# Patient Record
Sex: MALE | Race: WHITE | NOT HISPANIC OR LATINO | Employment: OTHER | URBAN - METROPOLITAN AREA
[De-identification: names, ages, dates, MRNs, and addresses within clinical notes are randomized per-mention and may not be internally consistent; named-entity substitution may affect disease eponyms.]

---

## 2018-08-27 ENCOUNTER — HOSPITAL ENCOUNTER (OUTPATIENT)
Dept: RADIOLOGY | Facility: MEDICAL CENTER | Age: 63
End: 2018-08-27

## 2018-09-20 ENCOUNTER — APPOINTMENT (OUTPATIENT)
Dept: ADMISSIONS | Facility: MEDICAL CENTER | Age: 63
DRG: 472 | End: 2018-09-20
Attending: NEUROLOGICAL SURGERY
Payer: COMMERCIAL

## 2018-09-20 RX ORDER — AMLODIPINE BESYLATE 5 MG/1
5 TABLET ORAL DAILY
COMMUNITY
End: 2021-03-01

## 2018-09-20 RX ORDER — TRAMADOL HYDROCHLORIDE 50 MG/1
50 TABLET ORAL EVERY 4 HOURS PRN
Status: ON HOLD | COMMUNITY
End: 2018-09-22

## 2018-09-20 RX ORDER — LEVOTHYROXINE SODIUM 0.05 MG/1
50 TABLET ORAL
COMMUNITY
End: 2021-03-01

## 2018-09-20 RX ORDER — LISINOPRIL 30 MG/1
30 TABLET ORAL
COMMUNITY
End: 2021-03-01

## 2018-09-20 RX ORDER — POTASSIUM CHLORIDE 750 MG/1
10 TABLET, FILM COATED, EXTENDED RELEASE ORAL DAILY
COMMUNITY
End: 2021-03-01

## 2018-09-20 RX ORDER — GABAPENTIN 100 MG/1
100 CAPSULE ORAL 2 TIMES DAILY
COMMUNITY
End: 2021-03-01

## 2018-09-20 RX ORDER — FUROSEMIDE 20 MG/1
20 TABLET ORAL DAILY
Status: ON HOLD | COMMUNITY
End: 2021-03-05

## 2018-09-20 RX ORDER — TRAVOPROST OPHTHALMIC SOLUTION 0.04 MG/ML
1 SOLUTION OPHTHALMIC EVERY EVENING
COMMUNITY

## 2018-09-21 ENCOUNTER — APPOINTMENT (OUTPATIENT)
Dept: RADIOLOGY | Facility: MEDICAL CENTER | Age: 63
DRG: 472 | End: 2018-09-21
Attending: NEUROLOGICAL SURGERY
Payer: COMMERCIAL

## 2018-09-21 ENCOUNTER — HOSPITAL ENCOUNTER (INPATIENT)
Facility: MEDICAL CENTER | Age: 63
LOS: 1 days | DRG: 472 | End: 2018-09-22
Attending: NEUROLOGICAL SURGERY | Admitting: NEUROLOGICAL SURGERY
Payer: COMMERCIAL

## 2018-09-21 DIAGNOSIS — G89.18 ACUTE POST-OPERATIVE PAIN: ICD-10-CM

## 2018-09-21 LAB
ABO GROUP BLD: NORMAL
ABO GROUP BLD: NORMAL
BLD GP AB SCN SERPL QL: NORMAL
GLUCOSE BLD-MCNC: 139 MG/DL (ref 65–99)
GLUCOSE BLD-MCNC: 141 MG/DL (ref 65–99)
GLUCOSE BLD-MCNC: 311 MG/DL (ref 65–99)
RH BLD: NORMAL
RH BLD: NORMAL

## 2018-09-21 PROCEDURE — 110454 HCHG SHELL REV 250: Performed by: NEUROLOGICAL SURGERY

## 2018-09-21 PROCEDURE — A6402 STERILE GAUZE <= 16 SQ IN: HCPCS | Performed by: NEUROLOGICAL SURGERY

## 2018-09-21 PROCEDURE — 86850 RBC ANTIBODY SCREEN: CPT

## 2018-09-21 PROCEDURE — 302128 INFUSION PUMP: Performed by: NEUROLOGICAL SURGERY

## 2018-09-21 PROCEDURE — 0RT30ZZ RESECTION OF CERVICAL VERTEBRAL DISC, OPEN APPROACH: ICD-10-PCS | Performed by: NEUROLOGICAL SURGERY

## 2018-09-21 PROCEDURE — 700111 HCHG RX REV CODE 636 W/ 250 OVERRIDE (IP)

## 2018-09-21 PROCEDURE — 160009 HCHG ANES TIME/MIN: Performed by: NEUROLOGICAL SURGERY

## 2018-09-21 PROCEDURE — 502000 HCHG MISC OR IMPLANTS RC 0278: Performed by: NEUROLOGICAL SURGERY

## 2018-09-21 PROCEDURE — 700111 HCHG RX REV CODE 636 W/ 250 OVERRIDE (IP): Performed by: PHYSICIAN ASSISTANT

## 2018-09-21 PROCEDURE — 95939 C MOTOR EVOKED UPR&LWR LIMBS: CPT | Performed by: NEUROLOGICAL SURGERY

## 2018-09-21 PROCEDURE — C1713 ANCHOR/SCREW BN/BN,TIS/BN: HCPCS | Performed by: NEUROLOGICAL SURGERY

## 2018-09-21 PROCEDURE — 95865 NEEDLE EMG LARYNX: CPT | Performed by: NEUROLOGICAL SURGERY

## 2018-09-21 PROCEDURE — 86900 BLOOD TYPING SEROLOGIC ABO: CPT

## 2018-09-21 PROCEDURE — 0RG10A0 FUSION OF CERVICAL VERTEBRAL JOINT WITH INTERBODY FUSION DEVICE, ANTERIOR APPROACH, ANTERIOR COLUMN, OPEN APPROACH: ICD-10-PCS | Performed by: NEUROLOGICAL SURGERY

## 2018-09-21 PROCEDURE — 700112 HCHG RX REV CODE 229: Performed by: PHYSICIAN ASSISTANT

## 2018-09-21 PROCEDURE — 160036 HCHG PACU - EA ADDL 30 MINS PHASE I: Performed by: NEUROLOGICAL SURGERY

## 2018-09-21 PROCEDURE — 500444 HCHG HEMOSTAT, SURGICEL 2X3: Performed by: NEUROLOGICAL SURGERY

## 2018-09-21 PROCEDURE — 160035 HCHG PACU - 1ST 60 MINS PHASE I: Performed by: NEUROLOGICAL SURGERY

## 2018-09-21 PROCEDURE — A9270 NON-COVERED ITEM OR SERVICE: HCPCS | Performed by: PHYSICIAN ASSISTANT

## 2018-09-21 PROCEDURE — 36415 COLL VENOUS BLD VENIPUNCTURE: CPT

## 2018-09-21 PROCEDURE — 700111 HCHG RX REV CODE 636 W/ 250 OVERRIDE (IP): Performed by: ANESTHESIOLOGY

## 2018-09-21 PROCEDURE — A9270 NON-COVERED ITEM OR SERVICE: HCPCS | Performed by: ANESTHESIOLOGY

## 2018-09-21 PROCEDURE — 700101 HCHG RX REV CODE 250

## 2018-09-21 PROCEDURE — 501838 HCHG SUTURE GENERAL: Performed by: NEUROLOGICAL SURGERY

## 2018-09-21 PROCEDURE — 160041 HCHG SURGERY MINUTES - EA ADDL 1 MIN LEVEL 4: Performed by: NEUROLOGICAL SURGERY

## 2018-09-21 PROCEDURE — 700105 HCHG RX REV CODE 258: Performed by: PHYSICIAN ASSISTANT

## 2018-09-21 PROCEDURE — 95861 NEEDLE EMG 2 EXTREMITIES: CPT | Performed by: NEUROLOGICAL SURGERY

## 2018-09-21 PROCEDURE — 160029 HCHG SURGERY MINUTES - 1ST 30 MINS LEVEL 4: Performed by: NEUROLOGICAL SURGERY

## 2018-09-21 PROCEDURE — 95940 IONM IN OPERATNG ROOM 15 MIN: CPT | Performed by: NEUROLOGICAL SURGERY

## 2018-09-21 PROCEDURE — 95925 SOMATOSENSORY TESTING: CPT | Performed by: NEUROLOGICAL SURGERY

## 2018-09-21 PROCEDURE — 95937 NEUROMUSCULAR JUNCTION TEST: CPT | Performed by: NEUROLOGICAL SURGERY

## 2018-09-21 PROCEDURE — 700101 HCHG RX REV CODE 250: Performed by: ANESTHESIOLOGY

## 2018-09-21 PROCEDURE — 500331 HCHG COTTONOID, SURG PATTIE: Performed by: NEUROLOGICAL SURGERY

## 2018-09-21 PROCEDURE — 86901 BLOOD TYPING SEROLOGIC RH(D): CPT

## 2018-09-21 PROCEDURE — 160002 HCHG RECOVERY MINUTES (STAT): Performed by: NEUROLOGICAL SURGERY

## 2018-09-21 PROCEDURE — 4A11X4G MONITORING OF PERIPHERAL NERVOUS ELECTRICAL ACTIVITY, INTRAOPERATIVE, EXTERNAL APPROACH: ICD-10-PCS | Performed by: NEUROLOGICAL SURGERY

## 2018-09-21 PROCEDURE — 700102 HCHG RX REV CODE 250 W/ 637 OVERRIDE(OP): Performed by: ANESTHESIOLOGY

## 2018-09-21 PROCEDURE — 82962 GLUCOSE BLOOD TEST: CPT

## 2018-09-21 PROCEDURE — 700102 HCHG RX REV CODE 250 W/ 637 OVERRIDE(OP): Performed by: PHYSICIAN ASSISTANT

## 2018-09-21 PROCEDURE — 160048 HCHG OR STATISTICAL LEVEL 1-5: Performed by: NEUROLOGICAL SURGERY

## 2018-09-21 PROCEDURE — 72040 X-RAY EXAM NECK SPINE 2-3 VW: CPT

## 2018-09-21 PROCEDURE — 500864 HCHG NEEDLE, SPINAL 18G: Performed by: NEUROLOGICAL SURGERY

## 2018-09-21 PROCEDURE — 770001 HCHG ROOM/CARE - MED/SURG/GYN PRIV*

## 2018-09-21 DEVICE — PLATE ANTERIOR CERVICAL 25MM (1TX1+2TCX1=3): Type: IMPLANTABLE DEVICE | Status: FUNCTIONAL

## 2018-09-21 DEVICE — SCREW ATL TRNS 4.0X15 SELF TAP VAR (1TX10+2TCX10=30): Type: IMPLANTABLE DEVICE | Status: FUNCTIONAL

## 2018-09-21 RX ORDER — BUPIVACAINE HYDROCHLORIDE AND EPINEPHRINE 5; 5 MG/ML; UG/ML
INJECTION, SOLUTION EPIDURAL; INTRACAUDAL; PERINEURAL
Status: DISCONTINUED | OUTPATIENT
Start: 2018-09-21 | End: 2018-09-21 | Stop reason: HOSPADM

## 2018-09-21 RX ORDER — INSULIN GLARGINE 100 [IU]/ML
0.2 INJECTION, SOLUTION SUBCUTANEOUS EVERY EVENING
Status: DISCONTINUED | OUTPATIENT
Start: 2018-09-21 | End: 2018-09-21

## 2018-09-21 RX ORDER — HYDROMORPHONE HYDROCHLORIDE 2 MG/ML
0.2 INJECTION, SOLUTION INTRAMUSCULAR; INTRAVENOUS; SUBCUTANEOUS
Status: DISCONTINUED | OUTPATIENT
Start: 2018-09-21 | End: 2018-09-21 | Stop reason: HOSPADM

## 2018-09-21 RX ORDER — HYDROMORPHONE HYDROCHLORIDE 2 MG/ML
0.4 INJECTION, SOLUTION INTRAMUSCULAR; INTRAVENOUS; SUBCUTANEOUS
Status: DISCONTINUED | OUTPATIENT
Start: 2018-09-21 | End: 2018-09-21 | Stop reason: HOSPADM

## 2018-09-21 RX ORDER — NALOXONE HYDROCHLORIDE 0.4 MG/ML
0.1 INJECTION, SOLUTION INTRAMUSCULAR; INTRAVENOUS; SUBCUTANEOUS PRN
Status: DISCONTINUED | OUTPATIENT
Start: 2018-09-21 | End: 2018-09-21 | Stop reason: HOSPADM

## 2018-09-21 RX ORDER — PROMETHAZINE HYDROCHLORIDE 25 MG/1
12.5-25 TABLET ORAL EVERY 4 HOURS PRN
Status: DISCONTINUED | OUTPATIENT
Start: 2018-09-21 | End: 2018-09-22 | Stop reason: HOSPADM

## 2018-09-21 RX ORDER — DIPHENHYDRAMINE HCL 25 MG
25 TABLET ORAL EVERY 6 HOURS PRN
Status: DISCONTINUED | OUTPATIENT
Start: 2018-09-21 | End: 2018-09-22 | Stop reason: HOSPADM

## 2018-09-21 RX ORDER — CALCIUM CARBONATE 500 MG/1
500 TABLET, CHEWABLE ORAL 2 TIMES DAILY
Status: DISCONTINUED | OUTPATIENT
Start: 2018-09-21 | End: 2018-09-22 | Stop reason: HOSPADM

## 2018-09-21 RX ORDER — LISINOPRIL 20 MG/1
30 TABLET ORAL
Status: DISCONTINUED | OUTPATIENT
Start: 2018-09-21 | End: 2018-09-22 | Stop reason: HOSPADM

## 2018-09-21 RX ORDER — INSULIN GLARGINE 100 [IU]/ML
40 INJECTION, SOLUTION SUBCUTANEOUS 2 TIMES DAILY
COMMUNITY

## 2018-09-21 RX ORDER — SODIUM CHLORIDE 9 MG/ML
INJECTION, SOLUTION INTRAVENOUS CONTINUOUS
Status: ACTIVE | OUTPATIENT
Start: 2018-09-21 | End: 2018-09-21

## 2018-09-21 RX ORDER — AMOXICILLIN 250 MG
1 CAPSULE ORAL NIGHTLY
Status: DISCONTINUED | OUTPATIENT
Start: 2018-09-21 | End: 2018-09-22 | Stop reason: HOSPADM

## 2018-09-21 RX ORDER — POTASSIUM CHLORIDE 750 MG/1
10 TABLET, FILM COATED, EXTENDED RELEASE ORAL DAILY
Status: DISCONTINUED | OUTPATIENT
Start: 2018-09-22 | End: 2018-09-21

## 2018-09-21 RX ORDER — GLYCOPYRROLATE 0.2 MG/ML
0.2 INJECTION INTRAMUSCULAR; INTRAVENOUS
Status: DISCONTINUED | OUTPATIENT
Start: 2018-09-21 | End: 2018-09-21 | Stop reason: HOSPADM

## 2018-09-21 RX ORDER — LIDOCAINE HYDROCHLORIDE 10 MG/ML
INJECTION, SOLUTION INFILTRATION; PERINEURAL
Status: COMPLETED
Start: 2018-09-21 | End: 2018-09-21

## 2018-09-21 RX ORDER — OXYCODONE HYDROCHLORIDE AND ACETAMINOPHEN 5; 325 MG/1; MG/1
1 TABLET ORAL EVERY 4 HOURS PRN
Status: DISCONTINUED | OUTPATIENT
Start: 2018-09-21 | End: 2018-09-22 | Stop reason: HOSPADM

## 2018-09-21 RX ORDER — ACETAMINOPHEN 500 MG
1000 TABLET ORAL ONCE
Status: DISCONTINUED | OUTPATIENT
Start: 2018-09-21 | End: 2018-09-21

## 2018-09-21 RX ORDER — ALPRAZOLAM 0.25 MG/1
0.25 TABLET ORAL 2 TIMES DAILY PRN
Status: DISCONTINUED | OUTPATIENT
Start: 2018-09-21 | End: 2018-09-22 | Stop reason: HOSPADM

## 2018-09-21 RX ORDER — OXYCODONE HYDROCHLORIDE 10 MG/1
5 TABLET ORAL
Status: DISCONTINUED | OUTPATIENT
Start: 2018-09-21 | End: 2018-09-21 | Stop reason: HOSPADM

## 2018-09-21 RX ORDER — ONDANSETRON 2 MG/ML
4 INJECTION INTRAMUSCULAR; INTRAVENOUS EVERY 4 HOURS PRN
Status: DISCONTINUED | OUTPATIENT
Start: 2018-09-21 | End: 2018-09-22 | Stop reason: HOSPADM

## 2018-09-21 RX ORDER — ACETAMINOPHEN 500 MG
1000 TABLET ORAL EVERY 6 HOURS
Status: DISCONTINUED | OUTPATIENT
Start: 2018-09-21 | End: 2018-09-22 | Stop reason: HOSPADM

## 2018-09-21 RX ORDER — HYDROMORPHONE HYDROCHLORIDE 2 MG/ML
0.1 INJECTION, SOLUTION INTRAMUSCULAR; INTRAVENOUS; SUBCUTANEOUS
Status: DISCONTINUED | OUTPATIENT
Start: 2018-09-21 | End: 2018-09-21 | Stop reason: HOSPADM

## 2018-09-21 RX ORDER — FUROSEMIDE 20 MG/1
20 TABLET ORAL DAILY
Status: DISCONTINUED | OUTPATIENT
Start: 2018-09-21 | End: 2018-09-22 | Stop reason: HOSPADM

## 2018-09-21 RX ORDER — LATANOPROST 50 UG/ML
1 SOLUTION/ DROPS OPHTHALMIC EVERY EVENING
Status: DISCONTINUED | OUTPATIENT
Start: 2018-09-21 | End: 2018-09-22 | Stop reason: HOSPADM

## 2018-09-21 RX ORDER — DIPHENHYDRAMINE HYDROCHLORIDE 50 MG/ML
25 INJECTION INTRAMUSCULAR; INTRAVENOUS EVERY 6 HOURS PRN
Status: DISCONTINUED | OUTPATIENT
Start: 2018-09-21 | End: 2018-09-22 | Stop reason: HOSPADM

## 2018-09-21 RX ORDER — MORPHINE SULFATE 4 MG/ML
2 INJECTION, SOLUTION INTRAMUSCULAR; INTRAVENOUS EVERY 4 HOURS PRN
Status: DISCONTINUED | OUTPATIENT
Start: 2018-09-21 | End: 2018-09-22 | Stop reason: HOSPADM

## 2018-09-21 RX ORDER — DOCUSATE SODIUM 100 MG/1
100 CAPSULE, LIQUID FILLED ORAL 2 TIMES DAILY
Status: DISCONTINUED | OUTPATIENT
Start: 2018-09-21 | End: 2018-09-22 | Stop reason: HOSPADM

## 2018-09-21 RX ORDER — CEFAZOLIN SODIUM 2 G/100ML
2 INJECTION, SOLUTION INTRAVENOUS EVERY 8 HOURS
Status: COMPLETED | OUTPATIENT
Start: 2018-09-21 | End: 2018-09-22

## 2018-09-21 RX ORDER — DEXAMETHASONE SODIUM PHOSPHATE 4 MG/ML
4 INJECTION, SOLUTION INTRA-ARTICULAR; INTRALESIONAL; INTRAMUSCULAR; INTRAVENOUS; SOFT TISSUE EVERY 6 HOURS
Status: DISCONTINUED | OUTPATIENT
Start: 2018-09-21 | End: 2018-09-22 | Stop reason: HOSPADM

## 2018-09-21 RX ORDER — ONDANSETRON 2 MG/ML
4 INJECTION INTRAMUSCULAR; INTRAVENOUS
Status: DISCONTINUED | OUTPATIENT
Start: 2018-09-21 | End: 2018-09-21 | Stop reason: HOSPADM

## 2018-09-21 RX ORDER — MEPERIDINE HYDROCHLORIDE 25 MG/ML
6.25 INJECTION INTRAMUSCULAR; INTRAVENOUS; SUBCUTANEOUS
Status: DISCONTINUED | OUTPATIENT
Start: 2018-09-21 | End: 2018-09-21 | Stop reason: HOSPADM

## 2018-09-21 RX ORDER — BISACODYL 10 MG
10 SUPPOSITORY, RECTAL RECTAL
Status: DISCONTINUED | OUTPATIENT
Start: 2018-09-21 | End: 2018-09-22 | Stop reason: HOSPADM

## 2018-09-21 RX ORDER — DEXTROSE MONOHYDRATE 25 G/50ML
25 INJECTION, SOLUTION INTRAVENOUS
Status: DISCONTINUED | OUTPATIENT
Start: 2018-09-21 | End: 2018-09-22 | Stop reason: HOSPADM

## 2018-09-21 RX ORDER — OXYCODONE HCL 5 MG/5 ML
10 SOLUTION, ORAL ORAL
Status: COMPLETED | OUTPATIENT
Start: 2018-09-21 | End: 2018-09-21

## 2018-09-21 RX ORDER — AMLODIPINE BESYLATE 5 MG/1
5 TABLET ORAL DAILY
Status: DISCONTINUED | OUTPATIENT
Start: 2018-09-21 | End: 2018-09-22 | Stop reason: HOSPADM

## 2018-09-21 RX ORDER — SODIUM CHLORIDE 9 MG/ML
INJECTION, SOLUTION INTRAVENOUS CONTINUOUS
Status: DISCONTINUED | OUTPATIENT
Start: 2018-09-21 | End: 2018-09-22 | Stop reason: HOSPADM

## 2018-09-21 RX ORDER — ONDANSETRON 4 MG/1
4 TABLET, ORALLY DISINTEGRATING ORAL EVERY 4 HOURS PRN
Status: DISCONTINUED | OUTPATIENT
Start: 2018-09-21 | End: 2018-09-22 | Stop reason: HOSPADM

## 2018-09-21 RX ORDER — TRAMADOL HYDROCHLORIDE 50 MG/1
100 TABLET ORAL EVERY 4 HOURS PRN
Status: DISCONTINUED | OUTPATIENT
Start: 2018-09-21 | End: 2018-09-22 | Stop reason: HOSPADM

## 2018-09-21 RX ORDER — OXYCODONE HCL 5 MG/5 ML
5 SOLUTION, ORAL ORAL
Status: COMPLETED | OUTPATIENT
Start: 2018-09-21 | End: 2018-09-21

## 2018-09-21 RX ORDER — AMOXICILLIN 250 MG
1 CAPSULE ORAL
Status: DISCONTINUED | OUTPATIENT
Start: 2018-09-21 | End: 2018-09-22 | Stop reason: HOSPADM

## 2018-09-21 RX ORDER — GABAPENTIN 100 MG/1
100 CAPSULE ORAL 2 TIMES DAILY
Status: DISCONTINUED | OUTPATIENT
Start: 2018-09-21 | End: 2018-09-22 | Stop reason: HOSPADM

## 2018-09-21 RX ORDER — POLYETHYLENE GLYCOL 3350 17 G/17G
1 POWDER, FOR SOLUTION ORAL 2 TIMES DAILY PRN
Status: DISCONTINUED | OUTPATIENT
Start: 2018-09-21 | End: 2018-09-22 | Stop reason: HOSPADM

## 2018-09-21 RX ORDER — CYCLOBENZAPRINE HCL 10 MG
10 TABLET ORAL EVERY 8 HOURS PRN
Status: DISCONTINUED | OUTPATIENT
Start: 2018-09-21 | End: 2018-09-22 | Stop reason: HOSPADM

## 2018-09-21 RX ORDER — SODIUM CHLORIDE, SODIUM LACTATE, POTASSIUM CHLORIDE, AND CALCIUM CHLORIDE .6; .31; .03; .02 G/100ML; G/100ML; G/100ML; G/100ML
IRRIGANT IRRIGATION
Status: DISCONTINUED | OUTPATIENT
Start: 2018-09-21 | End: 2018-09-21 | Stop reason: HOSPADM

## 2018-09-21 RX ORDER — LABETALOL HYDROCHLORIDE 5 MG/ML
5 INJECTION, SOLUTION INTRAVENOUS
Status: DISCONTINUED | OUTPATIENT
Start: 2018-09-21 | End: 2018-09-21 | Stop reason: HOSPADM

## 2018-09-21 RX ORDER — POTASSIUM CHLORIDE 1.5 G/1.58G
10 POWDER, FOR SOLUTION ORAL DAILY
Status: DISCONTINUED | OUTPATIENT
Start: 2018-09-21 | End: 2018-09-21

## 2018-09-21 RX ORDER — CLONIDINE HYDROCHLORIDE 0.1 MG/1
0.1 TABLET ORAL EVERY 4 HOURS PRN
Status: DISCONTINUED | OUTPATIENT
Start: 2018-09-21 | End: 2018-09-22 | Stop reason: HOSPADM

## 2018-09-21 RX ORDER — POTASSIUM CHLORIDE 20 MEQ/1
10 TABLET, EXTENDED RELEASE ORAL DAILY
Status: DISCONTINUED | OUTPATIENT
Start: 2018-09-22 | End: 2018-09-22 | Stop reason: HOSPADM

## 2018-09-21 RX ORDER — M-VIT,TX,IRON,MINS/CALC/FOLIC 27MG-0.4MG
1 TABLET ORAL DAILY
Status: ON HOLD | COMMUNITY
End: 2021-03-05

## 2018-09-21 RX ORDER — LIDOCAINE HYDROCHLORIDE 10 MG/ML
0.5 INJECTION, SOLUTION INFILTRATION; PERINEURAL
Status: DISCONTINUED | OUTPATIENT
Start: 2018-09-21 | End: 2018-09-21 | Stop reason: HOSPADM

## 2018-09-21 RX ORDER — PROMETHAZINE HYDROCHLORIDE 25 MG/1
12.5-25 SUPPOSITORY RECTAL EVERY 4 HOURS PRN
Status: DISCONTINUED | OUTPATIENT
Start: 2018-09-21 | End: 2018-09-22 | Stop reason: HOSPADM

## 2018-09-21 RX ORDER — LEVOTHYROXINE SODIUM 0.05 MG/1
50 TABLET ORAL
Status: DISCONTINUED | OUTPATIENT
Start: 2018-09-22 | End: 2018-09-22 | Stop reason: HOSPADM

## 2018-09-21 RX ORDER — TRAMADOL HYDROCHLORIDE 50 MG/1
50 TABLET ORAL EVERY 4 HOURS PRN
Status: DISCONTINUED | OUTPATIENT
Start: 2018-09-21 | End: 2018-09-22 | Stop reason: HOSPADM

## 2018-09-21 RX ORDER — OXYCODONE HYDROCHLORIDE 10 MG/1
10 TABLET ORAL
Status: DISCONTINUED | OUTPATIENT
Start: 2018-09-21 | End: 2018-09-21 | Stop reason: HOSPADM

## 2018-09-21 RX ORDER — ENEMA 19; 7 G/133ML; G/133ML
1 ENEMA RECTAL
Status: DISCONTINUED | OUTPATIENT
Start: 2018-09-21 | End: 2018-09-22 | Stop reason: HOSPADM

## 2018-09-21 RX ORDER — SODIUM CHLORIDE, SODIUM LACTATE, POTASSIUM CHLORIDE, CALCIUM CHLORIDE 600; 310; 30; 20 MG/100ML; MG/100ML; MG/100ML; MG/100ML
INJECTION, SOLUTION INTRAVENOUS CONTINUOUS
Status: DISCONTINUED | OUTPATIENT
Start: 2018-09-21 | End: 2018-09-21 | Stop reason: HOSPADM

## 2018-09-21 RX ORDER — INSULIN GLARGINE 100 [IU]/ML
50 INJECTION, SOLUTION SUBCUTANEOUS NIGHTLY
Status: DISCONTINUED | OUTPATIENT
Start: 2018-09-21 | End: 2018-09-22 | Stop reason: HOSPADM

## 2018-09-21 RX ORDER — BACITRACIN 50000 [IU]/1
INJECTION, POWDER, FOR SOLUTION INTRAMUSCULAR
Status: DISCONTINUED | OUTPATIENT
Start: 2018-09-21 | End: 2018-09-21 | Stop reason: HOSPADM

## 2018-09-21 RX ORDER — HALOPERIDOL 5 MG/ML
1 INJECTION INTRAMUSCULAR
Status: DISCONTINUED | OUTPATIENT
Start: 2018-09-21 | End: 2018-09-21 | Stop reason: HOSPADM

## 2018-09-21 RX ADMIN — HYDROMORPHONE HYDROCHLORIDE 0.4 MG: 2 INJECTION, SOLUTION INTRAMUSCULAR; INTRAVENOUS; SUBCUTANEOUS at 12:48

## 2018-09-21 RX ADMIN — OXYCODONE HYDROCHLORIDE 10 MG: 5 SOLUTION ORAL at 12:47

## 2018-09-21 RX ADMIN — LIDOCAINE HYDROCHLORIDE 0.5 ML: 10 INJECTION, SOLUTION INFILTRATION; PERINEURAL at 08:49

## 2018-09-21 RX ADMIN — DEXAMETHASONE SODIUM PHOSPHATE 4 MG: 4 INJECTION, SOLUTION INTRA-ARTICULAR; INTRALESIONAL; INTRAMUSCULAR; INTRAVENOUS; SOFT TISSUE at 19:21

## 2018-09-21 RX ADMIN — SODIUM CHLORIDE: 9 INJECTION, SOLUTION INTRAVENOUS at 08:49

## 2018-09-21 RX ADMIN — INSULIN GLARGINE 50 UNITS: 100 INJECTION, SOLUTION SUBCUTANEOUS at 22:13

## 2018-09-21 RX ADMIN — SENNOSIDES AND DOCUSATE SODIUM 1 TABLET: 8.6; 5 TABLET ORAL at 22:13

## 2018-09-21 RX ADMIN — ACETAMINOPHEN 1000 MG: 500 TABLET, FILM COATED ORAL at 19:21

## 2018-09-21 RX ADMIN — CEFAZOLIN SODIUM 2 G: 2 INJECTION, SOLUTION INTRAVENOUS at 19:10

## 2018-09-21 RX ADMIN — SODIUM CHLORIDE: 9 INJECTION, SOLUTION INTRAVENOUS at 19:08

## 2018-09-21 RX ADMIN — SODIUM CHLORIDE, SODIUM LACTATE, POTASSIUM CHLORIDE, CALCIUM CHLORIDE: 600; 310; 30; 20 INJECTION, SOLUTION INTRAVENOUS at 12:20

## 2018-09-21 RX ADMIN — HYDROMORPHONE HYDROCHLORIDE 0.4 MG: 2 INJECTION, SOLUTION INTRAMUSCULAR; INTRAVENOUS; SUBCUTANEOUS at 13:20

## 2018-09-21 RX ADMIN — GABAPENTIN 100 MG: 100 CAPSULE ORAL at 19:20

## 2018-09-21 RX ADMIN — LABETALOL HYDROCHLORIDE 5 MG: 5 INJECTION, SOLUTION INTRAVENOUS at 12:54

## 2018-09-21 RX ADMIN — AMLODIPINE BESYLATE 5 MG: 5 TABLET ORAL at 15:00

## 2018-09-21 RX ADMIN — LISINOPRIL 30 MG: 20 TABLET ORAL at 22:12

## 2018-09-21 RX ADMIN — LATANOPROST 1 DROP: 50 SOLUTION OPHTHALMIC at 19:20

## 2018-09-21 RX ADMIN — ANTACID TABLETS 500 MG: 500 TABLET, CHEWABLE ORAL at 19:25

## 2018-09-21 RX ADMIN — DOCUSATE SODIUM 100 MG: 100 CAPSULE, LIQUID FILLED ORAL at 19:21

## 2018-09-21 ASSESSMENT — PAIN SCALES - GENERAL
PAINLEVEL_OUTOF10: 3
PAINLEVEL_OUTOF10: 0
PAINLEVEL_OUTOF10: 7
PAINLEVEL_OUTOF10: 2
PAINLEVEL_OUTOF10: 0
PAINLEVEL_OUTOF10: 5

## 2018-09-21 ASSESSMENT — PATIENT HEALTH QUESTIONNAIRE - PHQ9
SUM OF ALL RESPONSES TO PHQ9 QUESTIONS 1 AND 2: 0
2. FEELING DOWN, DEPRESSED, IRRITABLE, OR HOPELESS: NOT AT ALL
1. LITTLE INTEREST OR PLEASURE IN DOING THINGS: NOT AT ALL

## 2018-09-21 NOTE — OR SURGEON
Immediate Post OP Note    PreOp Diagnosis: cervical stenosis, C6,7 large disc herniation, + C7 acute radiculopathy    PostOp Diagnosis: same    Procedure(s):  CERVICAL DISK AND FUSION ANTERIOR - Wound Class: Clean    Surgeon(s):  Rajesh Azar M.D.    Anesthesiologist/Type of Anesthesia:  Anesthesiologist: Richie Bueno M.D./General    Surgical Staff:  Assistant: Hermila Alvarez P.A.-C.  Circulator: Ritika Rodriguez R.N.  Scrub Person: Del Jacobo    Specimens removed if any:  * No specimens in log *    Estimated Blood Loss: minimal    Findings: large C6,7 disc herniation, reproducible MEP's, decreased SSEP's at end of case, stable rln monitoring, stable emg's    Complications: none        9/21/2018 11:59 AM Rajesh Azar M.D.

## 2018-09-21 NOTE — DISCHARGE PLANNING
Care Transition Team Assessment    Information Source  Orientation : Oriented x 4  Who is responsible for making decisions for patient? : Patient              Interdisciplinary Discharge Planning  Does Admitting Nurse Feel This Could be a Complex Discharge?: No  Primary Care Physician: Dr. Garcia  Lives with - Patient's Self Care Capacity: Spouse  Support Systems: Spouse / Significant Other  Housing / Facility: 1 Story House (2 steps)  Do You Take your Prescribed Medications Regularly: Yes  Able to Return to Previous ADL's: Yes  Mobility Issues: No (TBD)  Prior Services: None  Patient Expects to be Discharged to:: Home  Assistance Needed: Yes  Durable Medical Equipment: Other - Specify (pt has a cane)    Discharge Preparedness  What is your plan after discharge?: Home with help  What are your discharge supports?: Spouse  Prior Functional Level: Ambulatory, Drives Self, Independent with Activities of Daily Living  Difficulity with ADLs: Dressing (wife helps with socks)  Difficulity with IADLs: Driving, Laundry    Functional Assesment  Prior Functional Level: Ambulatory, Drives Self, Independent with Activities of Daily Living    Finances  Financial Barriers to Discharge: No  Prescription Coverage: Yes    Vision / Hearing Impairment  Right Eye Vision: Wears Glasses  Left Eye Vision: Wears Glasses    Values / Beliefs / Concerns  Values / Beliefs Concerns : No  Cultural Requests During Hospitalization:  (Pentacostal)  Spiritual Requests During Hospitalization: No         Domestic Abuse  Have you ever been the victim of abuse or violence?: No  Physical Abuse or Sexual Abuse: No  Verbal Abuse or Emotional Abuse: No  Possible Abuse Reported to:: Not Applicable              Anticipated Discharge Information  Anticipated discharge disposition: Home

## 2018-09-21 NOTE — DISCHARGE PLANNING
Anticipated Discharge Disposition:   home    Action:   Met with pt. He has no dc concerns yet.   Assessment completed.     Barriers to Discharge:   PT/OT recommendations pending.     Plan:   Follow up with PT/OT.

## 2018-09-21 NOTE — PROGRESS NOTES
The Medication Reconciliation process has been completed by interviewing the patient    Allergies have been reviewed  Antibiotic use in 30 days -none    Home Pharmacy:  [facility]

## 2018-09-21 NOTE — OP REPORT
DATE OF SERVICE:  09/21/2018    PREOPERATIVE DIAGNOSES:  1.  Large C6-C7 disk herniation with right C7 acute radiculopathy.  2.  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  3.  C3-C4 stenosis.  4.  Acute right C7 radiculopathy.  5.  Chronic right C5 radiculopathy.    POSTOPERATIVE DIAGNOSES:  1.  Large C6-C7 disk herniation with right C7 acute radiculopathy.  2.  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  3.  C3-C4 stenosis.  4.  Acute right C7 radiculopathy.  5.  Chronic right C5 radiculopathy.    PRINCIPAL PROCEDURES PERFORMED:  1.  C6-C7 anterior cervical diskectomy with C6 hemicorporectomy with   microdissection, C7 hemicorporectomy with microdissection, placement of a   titanium Truss interbody cage at C6-C7.  2.  Placement of an anterior cervical plate from C6-C7.  Please note that a 22   modifier was added given the extra difficulty and time required for the case.    The patient's BMI is 42.96.  He is 5 feet 10 inches and weighs 299 pounds.    SURGEON:  Rajesh Azar MD    ASSISTANT:  Hermila Alvarez PA-C    ANESTHESIA:  The procedure was performed under general anesthesia.    ANESTHESIOLOGIST:  Richie Bueno MD    COMPLICATIONS:  There were no complications.    FINDINGS:  Include evidence of significant spinal canal compromise and a large   C6-C7 disk herniation with reproducible motor evoked potentials, please note   that the SSEPs were decreased at the end of the case, but motor evoked   potentials were reproducible and strong.  Stable recurrent laryngeal nerve   monitoring was visualized and stable EMGs were visualized.  Remote monitoring   was performed by neuro monitoring associates.    DISPOSITION:  The patient will be extubated and brought to the recovery room.    CLINICAL HISTORY:  The patient is a 62-year-old male who presents with   cervical radiculopathy.  An EMG confirmed an acute C7 radiculopathy.   I had   actually followed the patient for a number of years.  His MRI in 2012 showed   that C5-C6 and C6-C7 was clean.  He had a right-sided disk herniation at C3-C4   and also an intradural mass or possible free fragment disk visualized at   C4-C5.  The patient presented with acute onset of severe right arm weakness   and severe pain developing in early 07/2018.  An MRI showed a large C6-C7 disk   herniation, which was not present before.  An EMG showed an acute C7   radiculopathy and a chronic right C4-C5 radiculopathy.  We discussed surgical   options given the neurologic deficit and the impressive MRI.  We had actually   consented the patient for 360 from C3-T1.  However, given that most of his   symptoms were coming from C7, I recommended that we just perform a C6-C7   anterior cervical diskectomy and interbody fusion with a plate.  Ultimately, I   needed to perform hemicorporectomy, which will be documented later.  The   patient signed a written informed consent and understands that we may need to   take him back to surgery due to the posterior portion.  The patient signed a   written informed consent and expressed his understanding.  Risks, benefits,   and options have been thoroughly discussed.    DESCRIPTION OF PROCEDURE:  The patient was brought to the operating room and   placed under general anesthesia.  The neck was prepped and draped in the usual   sterile fashion.  Local anesthetic was infiltrated in the skin after a   time-out was performed.  A right-sided skin incision was made on the platysma.    There was at least 1-2 cm subcutaneous fat before we were able to visualize   the platysma.  The platysma was found to be quite thin.  All these factors   added extra time and difficulty to the case.  Gentle and careful dissection   was performed to palpate the carotid sheath and to exploit the natural plane   between the carotid sheath laterally and the trachea and esophagus medially.    Even the  dissection required extra time and expertise given the extra depth of   the wound and the patient's BMI of 42.96.  Ultimately, I was able to find a   nice plane and develop a plane.  The longus colli muscles were undermined.    Intraoperative fluoroscopy demonstrated we had marked C5-C6.  We had counted   down to identify C6-C7.  The correct level was identified and an annulotomy   was performed by using an 11 blade knife.  An upbiting curette was used.    Next, Clearfield pins were placed.  I used an upbiting curette to perform a   diskectomy and prepare the endplates.  This required extra time and expertise   given the extra depth of the incision and difficulty in visualization.  Extra   time was required in order to obtain good visualization to perform technically   excellent job.  Next, the microscope was brought in view underneath the   microscope.  However, there was still significant spinal cord compression as   we started to remove part of the disk.  Please note that part of the   osteophyte was harvested for local autograft.  I used an AM-8 drill bit to   drill the posterior half of the C6 vertebral body and I used an AM-8 drill bit   underneath the microscope to drill the posterior half of the C7 vertebral   body.  This allowed the use of hemicorporectomy in order to adequately   decompress a large central disk herniation causing severe spinal cord   compression.  The disk herniation appeared larger than what the MRI suggested.    Next, bilateral foraminotomies were performed by using Kerrison 2 punch.    Perfect hemostasis was achieved.  The C6-C7 endplates have been adequately   prepared.  A 9 mm in height lordotic titanium Truss in the interbody cage made   by [employer] was gently tapped into place.  Clearfield pins were removed.  Next, I   secured a [employer]onic translational plate by using four 15 mm screws.  The   locking mechanism was engaged.  Perfect hemostasis was achieved.    Intraoperative fluoroscopy  demonstrated nice placement of hardware.  However,   multiple x-rays were required in order to confirm the correct level and   confirm adequate visualization of the hardware given the patient's size and   broad shoulders.  Again, this even added extra time and expertise as well.    Perfect hemostasis was achieved.  Please note that after the plate was placed,   there was a decrease in amplitude.  Repeat motor evoked potentials were   stable, however.  Perfect hemostasis was achieved.  EMGs were stable as well.    The wound was closed in anatomic layers and a sterile dressing was applied.    The patient will be extubated and brought to the recovery room.       ____________________________________     MD STEPHEN PLASENCIA / TOD    DD:  09/21/2018 12:24:54  DT:  09/21/2018 12:42:31    D#:  [id number]  Job#:  [id number]

## 2018-09-21 NOTE — OR NURSING
"Await for room to be cleaned.Pt'S. V/S WNL.LASHELL-pt. verbalized that \"right arm feels stonger already\".Medicated for C/O mod. P/O pain with good relief,[place] C- collar inplaced.Family updated.  "

## 2018-09-22 VITALS
HEIGHT: 70 IN | OXYGEN SATURATION: 96 % | HEART RATE: 84 BPM | SYSTOLIC BLOOD PRESSURE: 169 MMHG | WEIGHT: 299.38 LBS | DIASTOLIC BLOOD PRESSURE: 81 MMHG | RESPIRATION RATE: 18 BRPM | TEMPERATURE: 97.9 F | BODY MASS INDEX: 42.86 KG/M2

## 2018-09-22 LAB
ANION GAP SERPL CALC-SCNC: 7 MMOL/L (ref 0–11.9)
BUN SERPL-MCNC: 28 MG/DL (ref 8–22)
CALCIUM SERPL-MCNC: 8.4 MG/DL (ref 8.5–10.5)
CHLORIDE SERPL-SCNC: 105 MMOL/L (ref 96–112)
CO2 SERPL-SCNC: 23 MMOL/L (ref 20–33)
CREAT SERPL-MCNC: 1.3 MG/DL (ref 0.5–1.4)
GLUCOSE BLD-MCNC: 285 MG/DL (ref 65–99)
GLUCOSE SERPL-MCNC: 298 MG/DL (ref 65–99)
POTASSIUM SERPL-SCNC: 4.9 MMOL/L (ref 3.6–5.5)
SODIUM SERPL-SCNC: 135 MMOL/L (ref 135–145)

## 2018-09-22 PROCEDURE — G8989 SELF CARE D/C STATUS: HCPCS | Mod: CI

## 2018-09-22 PROCEDURE — 36415 COLL VENOUS BLD VENIPUNCTURE: CPT

## 2018-09-22 PROCEDURE — G8980 MOBILITY D/C STATUS: HCPCS | Mod: CI

## 2018-09-22 PROCEDURE — A9270 NON-COVERED ITEM OR SERVICE: HCPCS | Performed by: PHYSICIAN ASSISTANT

## 2018-09-22 PROCEDURE — G8979 MOBILITY GOAL STATUS: HCPCS | Mod: CI

## 2018-09-22 PROCEDURE — 700111 HCHG RX REV CODE 636 W/ 250 OVERRIDE (IP): Performed by: PHYSICIAN ASSISTANT

## 2018-09-22 PROCEDURE — 82962 GLUCOSE BLOOD TEST: CPT

## 2018-09-22 PROCEDURE — 700112 HCHG RX REV CODE 229: Performed by: PHYSICIAN ASSISTANT

## 2018-09-22 PROCEDURE — 80048 BASIC METABOLIC PNL TOTAL CA: CPT

## 2018-09-22 PROCEDURE — 700102 HCHG RX REV CODE 250 W/ 637 OVERRIDE(OP): Performed by: PHYSICIAN ASSISTANT

## 2018-09-22 PROCEDURE — 97161 PT EVAL LOW COMPLEX 20 MIN: CPT

## 2018-09-22 PROCEDURE — G8987 SELF CARE CURRENT STATUS: HCPCS | Mod: CI

## 2018-09-22 PROCEDURE — G8988 SELF CARE GOAL STATUS: HCPCS | Mod: CI

## 2018-09-22 PROCEDURE — G8978 MOBILITY CURRENT STATUS: HCPCS | Mod: CI

## 2018-09-22 PROCEDURE — 97165 OT EVAL LOW COMPLEX 30 MIN: CPT

## 2018-09-22 RX ORDER — TRAMADOL HYDROCHLORIDE 50 MG/1
50-100 TABLET ORAL EVERY 4 HOURS PRN
Qty: 75 TAB | Refills: 0 | Status: SHIPPED | OUTPATIENT
Start: 2018-09-22 | End: 2018-10-06

## 2018-09-22 RX ORDER — OXYCODONE HYDROCHLORIDE AND ACETAMINOPHEN 5; 325 MG/1; MG/1
1 TABLET ORAL EVERY 4 HOURS PRN
Qty: 30 TAB | Refills: 0 | Status: SHIPPED | OUTPATIENT
Start: 2018-09-22 | End: 2018-10-06

## 2018-09-22 RX ORDER — CYCLOBENZAPRINE HCL 10 MG
10 TABLET ORAL EVERY 8 HOURS PRN
Qty: 60 TAB | Refills: 0 | Status: ON HOLD | OUTPATIENT
Start: 2018-09-22 | End: 2021-03-06

## 2018-09-22 RX ADMIN — FUROSEMIDE 20 MG: 20 TABLET ORAL at 07:56

## 2018-09-22 RX ADMIN — ACETAMINOPHEN 1000 MG: 500 TABLET, FILM COATED ORAL at 01:26

## 2018-09-22 RX ADMIN — INSULIN LISPRO 15 UNITS: 100 INJECTION, SOLUTION INTRAVENOUS; SUBCUTANEOUS at 08:15

## 2018-09-22 RX ADMIN — VITAMIN D, TAB 1000IU (100/BT) 5000 UNITS: 25 TAB at 07:56

## 2018-09-22 RX ADMIN — ACETAMINOPHEN 1000 MG: 500 TABLET, FILM COATED ORAL at 07:56

## 2018-09-22 RX ADMIN — ANTACID TABLETS 500 MG: 500 TABLET, CHEWABLE ORAL at 07:58

## 2018-09-22 RX ADMIN — GABAPENTIN 100 MG: 100 CAPSULE ORAL at 07:57

## 2018-09-22 RX ADMIN — ALPRAZOLAM 0.25 MG: 0.25 TABLET ORAL at 01:26

## 2018-09-22 RX ADMIN — CEFAZOLIN SODIUM 2 G: 2 INJECTION, SOLUTION INTRAVENOUS at 01:26

## 2018-09-22 RX ADMIN — CLONIDINE HYDROCHLORIDE 0.1 MG: 0.1 TABLET ORAL at 03:31

## 2018-09-22 RX ADMIN — DEXAMETHASONE SODIUM PHOSPHATE 4 MG: 4 INJECTION, SOLUTION INTRA-ARTICULAR; INTRALESIONAL; INTRAMUSCULAR; INTRAVENOUS; SOFT TISSUE at 01:27

## 2018-09-22 RX ADMIN — AMLODIPINE BESYLATE 5 MG: 5 TABLET ORAL at 07:57

## 2018-09-22 RX ADMIN — DOCUSATE SODIUM 100 MG: 100 CAPSULE, LIQUID FILLED ORAL at 07:56

## 2018-09-22 RX ADMIN — POTASSIUM CHLORIDE 10 MEQ: 1500 TABLET, EXTENDED RELEASE ORAL at 07:57

## 2018-09-22 RX ADMIN — LEVOTHYROXINE SODIUM 50 MCG: 50 TABLET ORAL at 07:56

## 2018-09-22 ASSESSMENT — COGNITIVE AND FUNCTIONAL STATUS - GENERAL
TURNING FROM BACK TO SIDE WHILE IN FLAT BAD: A LITTLE
MOBILITY SCORE: 23
DAILY ACTIVITIY SCORE: 24
SUGGESTED CMS G CODE MODIFIER DAILY ACTIVITY: CH
SUGGESTED CMS G CODE MODIFIER MOBILITY: CI

## 2018-09-22 ASSESSMENT — PAIN SCALES - GENERAL: PAINLEVEL_OUTOF10: 2

## 2018-09-22 ASSESSMENT — GAIT ASSESSMENTS
DISTANCE (FEET): 150
GAIT LEVEL OF ASSIST: SUPERVISED

## 2018-09-22 ASSESSMENT — ACTIVITIES OF DAILY LIVING (ADL): TOILETING: INDEPENDENT

## 2018-09-22 ASSESSMENT — LIFESTYLE VARIABLES: ALCOHOL_USE: NO

## 2018-09-22 NOTE — THERAPY
"Occupational Therapy Evaluation completed.   Functional Status:  SPV for functional transfers and BADLs  Plan of Care: Patient with no further skilled OT needs in the acute care setting at this time  Discharge Recommendations:  Equipment: No Equipment Needed. Post-acute therapy Discharge to home with outpatient or home health for additional skilled therapy services.    See \"Rehab Therapy-Acute\" Patient Summary Report for complete documentation.    OT eval completed on 63YO M s/p C6-7 anterior fusion. Pt with c-collar PRN, off for showers and meals per charts. Pt with supportive SO who was present for eval. Pt SPV for BADLs and functional transfers. Pt demonstrated ability to don/doff c-collar. Pt provided with handout on spinal precautions and has no further concerns at this time. Pt does not require acute OT services at this time.   "

## 2018-09-22 NOTE — DISCHARGE INSTRUCTIONS
Discharge Instructions    Discharged to home by car with relative. Discharged via wheelchair, hospital escort: Yes.  Special equipment needed: Cervical collar    Be sure to schedule a follow-up appointment with your primary care doctor or any specialists as instructed.     Discharge Plan:   Diet Plan: Discussed  Activity Level: Discussed  Confirmed Follow up Appointment: Patient to Call and Schedule Appointment  Confirmed Symptoms Management: Discussed  Medication Reconciliation Updated: Yes  Pneumococcal Vaccine Administered/Refused: Not given - Patient refused pneumococcal vaccine  Influenza Vaccine Indication: Patient Refuses    I understand that a diet low in cholesterol, fat, and sodium is recommended for good health. Unless I have been given specific instructions below for another diet, I accept this instruction as my diet prescription.   Other diet: Diabetic    Special Instructions: None    · Is patient discharged on Warfarin / Coumadin?   No     Depression / Suicide Risk    As you are discharged from this [facility] facility, it is important to learn how to keep safe from harming yourself.    Recognize the warning signs:  · Abrupt changes in personality, positive or negative- including increase in energy   · Giving away possessions  · Change in eating patterns- significant weight changes-  positive or negative  · Change in sleeping patterns- unable to sleep or sleeping all the time   · Unwillingness or inability to communicate  · Depression  · Unusual sadness, discouragement and loneliness  · Talk of wanting to die  · Neglect of personal appearance   · Rebelliousness- reckless behavior  · Withdrawal from people/activities they love  · Confusion- inability to concentrate     If you or a loved one observes any of these behaviors or has concerns about self-harm, here's what you can do:  · Talk about it- your feelings and reasons for harming yourself  · Remove any means that you might use to hurt yourself  (examples: pills, rope, extension cords, firearm)  · Get professional help from the community (Mental Health, Substance Abuse, psychological counseling)  · Do not be alone:Call your Safe Contact- someone whom you trust who will be there for you.  · Call your local CRISIS HOTLINE 131-3864 or 867-198-3096  · Call your local Children's Mobile Crisis Response Team [place] (692) 116-2971 or www.Offerial  · Call the toll free National Suicide Prevention Hotlines   · National Suicide Prevention Lifeline 617-366-KCGD (9697)  · National Hope Line Network 800-SUICIDE (957-1846)

## 2018-09-22 NOTE — PROGRESS NOTES
"Neurosurgery Progress Note    Subjective:  Feels great, RUE pain gone, feels stronger than preop  \"Everything is nathaniel\"  Ambulatory, walked up stairs   Voiding, passing flatus  Pain well controlled on oral medications  Denies new pain, numbness, weakness.   Denies HA, positional HA, N/V, dizziness, chest pain, SOB, difficulty breathing, chills      Exam:  VSS  A&Ox4, NAD  No hoarseness of voice.   Trachea midline, no difficulty swallowing - no coughing or choking while drinking water   No nuchal rigidity   NM: 5/5 deltoid, biceps, triceps, handgrip, intrinsics except 4/5 right tricep, 4+ handgrip   Sensation intact and equal throughout all four extremities.   Abdomen: soft, non-tender  Pulmonary: non-labored breathing on room air, normal respiratory effort  No LE edema, erythema, cyanosis, clubbing  Calves non-tender to compression bilat  Incision CDI, no halo sign   C-collar being worn appropriately      BP  Min: 133/58  Max: 180/76  Pulse  Av.5  Min: 76  Max: 96  Resp  Av.3  Min: 9  Max: 23  Temp  Av.5 °C (97.7 °F)  Min: 36.3 °C (97.3 °F)  Max: 36.8 °C (98.3 °F)  SpO2  Av.4 %  Min: 95 %  Max: 100 %    No Data Recorded        Recent Labs      18   0313   SODIUM  135   POTASSIUM  4.9   CHLORIDE  105   CO2  23   GLUCOSE  298*   BUN  28*   CREATININE  1.30   CALCIUM  8.4*               Intake/Output       18 0700 - 18 0659 18 07 - 18 0659       Total 1900-0659 Total       Intake    P.O.  120  -- 120  280  -- 280    P.O. 120 -- 120 280 -- 280    I.V.  1200  -- 1200  --  -- --    Crystalloid Intake 1200 -- 1200 -- -- --    Total Intake 1320 -- 1320 280 -- 280       Output    Urine  --  -- --  --  -- --    Number of Times Voided -- 1 x 1 x -- -- --    Stool  --  -- --  --  -- --    Number of Times Stooled 0 x -- 0 x -- -- --    Blood  50  -- 50  --  -- --    Est. Blood Loss (mL) 50 -- 50 -- -- --    Total Output 50 -- 50 -- -- --       Net " I/O     1270 -- 1270 280 -- 280            Intake/Output Summary (Last 24 hours) at 09/22/18 1024  Last data filed at 09/22/18 0900   Gross per 24 hour   Intake             1600 ml   Output               50 ml   Net             1550 ml            • amLODIPine  5 mg DAILY   • furosemide  20 mg DAILY   • gabapentin  100 mg BID   • insulin glargine  50 Units Nightly   • levothyroxine  50 mcg AM ES   • tramadol  50 mg Q4HRS PRN   • lisinopril  30 mg QHS   • latanoprost  1 Drop Q EVENING   • Pharmacy Consult Request  1 Each PRN   • MD ALERT...DO NOT ADMINISTER NSAIDS or ASPIRIN unless ORDERED By Neurosurgery  1 Each PRN   • docusate sodium  100 mg BID   • senna-docusate  1 Tab Nightly   • senna-docusate  1 Tab Q24HRS PRN   • polyethylene glycol/lytes  1 Packet BID PRN   • magnesium hydroxide  30 mL QDAY PRN   • bisacodyl  10 mg Q24HRS PRN   • fleet  1 Each Once PRN   • NS   Continuous   • acetaminophen  1,000 mg Q6HRS   • enoxaparin  30 mg Q12HRS   • diphenhydrAMINE  25 mg Q6HRS PRN    Or   • diphenhydrAMINE  25 mg Q6HRS PRN   • ondansetron  4 mg Q4HRS PRN   • ondansetron  4 mg Q4HRS PRN   • promethazine  12.5-25 mg Q4HRS PRN   • promethazine  12.5-25 mg Q4HRS PRN   • prochlorperazine  5-10 mg Q4HRS PRN   • dexamethasone  4 mg Q6HRS   • cyclobenzaprine  10 mg Q8HRS PRN   • cloNIDine  0.1 mg Q4HRS PRN   • vitamin D  5,000 Units DAILY   • calcium carbonate  500 mg BID   • benzocaine-menthol  1 Lozenge Q2HRS PRN   • ALPRAZolam  0.25 mg BID PRN   • insulin lispro  2-9 Units 4X/DAY ACHS    And   • glucose 4 g  16 g Q15 MIN PRN    And   • dextrose 50%  25 mL Q15 MIN PRN   • tramadol  100 mg Q4HRS PRN   • oxyCODONE-acetaminophen  1 Tab Q4HRS PRN   • morphine injection  2 mg Q4HRS PRN   • insulin lispro  12-30 Units 4X/DAY ACHS   • potassium chloride SA  10 mEq DAILY       Assessment and Plan:  POD #C6-C7 ACDF with plate  Prophylactic anticoagulation: no          Feels great, very eager to go home    DC instructions d/w  pt:  INCISION CARE:  OK to shower with incision covered or uncovered. After shower, pat incision dry and cover with gauze and tape if draining. OK to leave open to air if not draining.   Steri-strips, if applicable can be removed as they fall off on their own naturally.     RESTRICTIONS:  Continue to wear your brace as directed   No lifting greater than 10 pounds, no repetitive bending or twisting  No driving for two weeks, no driving while on narcotic medication or muscle relaxers  No aspirin, blood thinners, NSAIDS (non-steroidal anti-inflammatory medications - aleve, motrin, ibuprofen, celebrex) for two weeks     RECOMMENDATIONS:  Over the counter stool softeners daily while on narcotics  Ambulate often to prevent blood clots in your legs  Continue incentive spirometer hourly   Follow up at [facility] in 2 weeks after surgery.    Please call [phone] to confirm the date, location, and time of your follow up appointment that was made for you.     Patient agrees with treatment plan.   Case discussed with Dr. Azar.

## 2018-09-22 NOTE — CARE PLAN
Problem: Safety  Goal: Will remain free from falls  Bed alarm in place.  Calls appropriately for assistance.    Problem: Infection  Goal: Will remain free from infection  Hand hygiene performed before and after pt care.  Gloves worn at all times while caring for pt.

## 2018-09-22 NOTE — DIETARY
NUTRITION SERVICES: BMI - Pt with BMI >40 (=42.96). Weight loss counseling not appropriate in acute care setting. RECOMMEND - Referral to outpatient nutrition services for weight management after D/C.

## 2018-09-22 NOTE — PROGRESS NOTES
Received report and assumed pt care.  A&O x4.  Bed alarm and treaded socks on.  Call light and personal belongings within reach.  Bed locked and at lowest position.  Declines pain meds.  LOBO.  VSS.

## 2018-09-22 NOTE — THERAPY
"61 y/o male adm for cervical HNP with myelopathy, s/p C6-7 ACDF with plate in [place] j collar. Intact motor and sensory components BLE,intact coordination. No LOB with level ground amb with no AD and 2 step negotiation. No further acute PT services required at this time.    Physical Therapy Evaluation completed.   Bed Mobility:  Supine to Sit: Supervised  Transfers: Sit to Stand: Supervised  Gait: Level Of Assist: Supervised with No Equipment Needed       Plan of Care: Patient with no further skilled PT needs in the acute care setting at this time  Discharge Recommendations: Equipment: No Equipment Needed. Post-acute therapy Discharge to home with outpatient or home health for additional skilled therapy services.    See \"Rehab Therapy-Acute\" Patient Summary Report for complete documentation.     "

## 2018-09-22 NOTE — PROGRESS NOTES
Discharge instructions reviewed.  Prescriptions given.  PIV removed.  Pt discharged with wife and transport in stable condition.

## 2021-02-18 ENCOUNTER — HOSPITAL ENCOUNTER (OUTPATIENT)
Dept: RADIOLOGY | Facility: MEDICAL CENTER | Age: 66
End: 2021-02-18
Payer: COMMERCIAL

## 2021-03-01 ENCOUNTER — PRE-ADMISSION TESTING (OUTPATIENT)
Dept: ADMISSIONS | Facility: MEDICAL CENTER | Age: 66
End: 2021-03-01
Attending: NEUROLOGICAL SURGERY
Payer: COMMERCIAL

## 2021-03-01 ENCOUNTER — HOSPITAL ENCOUNTER (OUTPATIENT)
Dept: RADIOLOGY | Facility: MEDICAL CENTER | Age: 66
End: 2021-03-01
Attending: NEUROLOGICAL SURGERY | Admitting: NEUROLOGICAL SURGERY
Payer: COMMERCIAL

## 2021-03-01 DIAGNOSIS — Z01.811 PRE-OPERATIVE RESPIRATORY EXAMINATION: ICD-10-CM

## 2021-03-01 DIAGNOSIS — Z01.812 PRE-OPERATIVE LABORATORY EXAMINATION: ICD-10-CM

## 2021-03-01 DIAGNOSIS — Z01.810 PRE-OPERATIVE CARDIOVASCULAR EXAMINATION: ICD-10-CM

## 2021-03-01 LAB
ANION GAP SERPL CALC-SCNC: 9 MMOL/L (ref 7–16)
APTT PPP: 27 SEC (ref 24.7–36)
BASOPHILS # BLD AUTO: 0.7 % (ref 0–1.8)
BASOPHILS # BLD: 0.05 K/UL (ref 0–0.12)
BUN SERPL-MCNC: 33 MG/DL (ref 8–22)
CALCIUM SERPL-MCNC: 9 MG/DL (ref 8.5–10.5)
CHLORIDE SERPL-SCNC: 105 MMOL/L (ref 96–112)
CO2 SERPL-SCNC: 23 MMOL/L (ref 20–33)
CREAT SERPL-MCNC: 1.97 MG/DL (ref 0.5–1.4)
EOSINOPHIL # BLD AUTO: 0.2 K/UL (ref 0–0.51)
EOSINOPHIL NFR BLD: 2.7 % (ref 0–6.9)
ERYTHROCYTE [DISTWIDTH] IN BLOOD BY AUTOMATED COUNT: 49.1 FL (ref 35.9–50)
EST. AVERAGE GLUCOSE BLD GHB EST-MCNC: 154 MG/DL
GLUCOSE SERPL-MCNC: 111 MG/DL (ref 65–99)
HBA1C MFR BLD: 7 % (ref 4–5.6)
HCT VFR BLD AUTO: 39.7 % (ref 42–52)
HGB BLD-MCNC: 12.2 G/DL (ref 14–18)
IMM GRANULOCYTES # BLD AUTO: 0.04 K/UL (ref 0–0.11)
IMM GRANULOCYTES NFR BLD AUTO: 0.5 % (ref 0–0.9)
INR PPP: 0.99 (ref 0.87–1.13)
LYMPHOCYTES # BLD AUTO: 2.2 K/UL (ref 1–4.8)
LYMPHOCYTES NFR BLD: 30.2 % (ref 22–41)
MCH RBC QN AUTO: 28.2 PG (ref 27–33)
MCHC RBC AUTO-ENTMCNC: 30.7 G/DL (ref 33.7–35.3)
MCV RBC AUTO: 91.9 FL (ref 81.4–97.8)
MONOCYTES # BLD AUTO: 0.59 K/UL (ref 0–0.85)
MONOCYTES NFR BLD AUTO: 8.1 % (ref 0–13.4)
NEUTROPHILS # BLD AUTO: 4.21 K/UL (ref 1.82–7.42)
NEUTROPHILS NFR BLD: 57.8 % (ref 44–72)
NRBC # BLD AUTO: 0 K/UL
NRBC BLD-RTO: 0 /100 WBC
PLATELET # BLD AUTO: 196 K/UL (ref 164–446)
PMV BLD AUTO: 11.7 FL (ref 9–12.9)
POTASSIUM SERPL-SCNC: 4.6 MMOL/L (ref 3.6–5.5)
PROTHROMBIN TIME: 13.4 SEC (ref 12–14.6)
RBC # BLD AUTO: 4.32 M/UL (ref 4.7–6.1)
SARS-COV-2 RNA RESP QL NAA+PROBE: NOTDETECTED
SODIUM SERPL-SCNC: 137 MMOL/L (ref 135–145)
SPECIMEN SOURCE: NORMAL
WBC # BLD AUTO: 7.3 K/UL (ref 4.8–10.8)

## 2021-03-01 PROCEDURE — 85610 PROTHROMBIN TIME: CPT

## 2021-03-01 PROCEDURE — U0003 INFECTIOUS AGENT DETECTION BY NUCLEIC ACID (DNA OR RNA); SEVERE ACUTE RESPIRATORY SYNDROME CORONAVIRUS 2 (SARS-COV-2) (CORONAVIRUS DISEASE [COVID-19]), AMPLIFIED PROBE TECHNIQUE, MAKING USE OF HIGH THROUGHPUT TECHNOLOGIES AS DESCRIBED BY CMS-2020-01-R: HCPCS

## 2021-03-01 PROCEDURE — U0005 INFEC AGEN DETEC AMPLI PROBE: HCPCS

## 2021-03-01 PROCEDURE — 83036 HEMOGLOBIN GLYCOSYLATED A1C: CPT

## 2021-03-01 PROCEDURE — 71045 X-RAY EXAM CHEST 1 VIEW: CPT

## 2021-03-01 PROCEDURE — 80048 BASIC METABOLIC PNL TOTAL CA: CPT

## 2021-03-01 PROCEDURE — C9803 HOPD COVID-19 SPEC COLLECT: HCPCS

## 2021-03-01 PROCEDURE — 85025 COMPLETE CBC W/AUTO DIFF WBC: CPT

## 2021-03-01 PROCEDURE — 36415 COLL VENOUS BLD VENIPUNCTURE: CPT

## 2021-03-01 PROCEDURE — 93005 ELECTROCARDIOGRAM TRACING: CPT

## 2021-03-01 PROCEDURE — 85730 THROMBOPLASTIN TIME PARTIAL: CPT

## 2021-03-01 RX ORDER — GABAPENTIN 300 MG/1
300 CAPSULE ORAL
COMMUNITY

## 2021-03-01 RX ORDER — CHLORTHALIDONE 25 MG/1
25 TABLET ORAL DAILY
COMMUNITY

## 2021-03-01 RX ORDER — ATORVASTATIN CALCIUM 40 MG/1
40 TABLET, FILM COATED ORAL NIGHTLY
COMMUNITY

## 2021-03-01 RX ORDER — DULAGLUTIDE 1.5 MG/.5ML
INJECTION, SOLUTION SUBCUTANEOUS
COMMUNITY

## 2021-03-01 RX ORDER — LISINOPRIL 20 MG/1
20 TABLET ORAL 2 TIMES DAILY
COMMUNITY

## 2021-03-01 RX ORDER — CARVEDILOL 12.5 MG/1
12.5 TABLET ORAL 2 TIMES DAILY WITH MEALS
COMMUNITY

## 2021-03-01 RX ORDER — ALBUTEROL SULFATE 90 UG/1
2 AEROSOL, METERED RESPIRATORY (INHALATION) PRN
COMMUNITY

## 2021-03-01 RX ORDER — LEVOTHYROXINE SODIUM 0.15 MG/1
150 TABLET ORAL
COMMUNITY

## 2021-03-01 RX ORDER — LANOLIN ALCOHOL/MO/W.PET/CERES
325 CREAM (GRAM) TOPICAL DAILY
COMMUNITY

## 2021-03-02 LAB — EKG IMPRESSION: NORMAL

## 2021-03-02 PROCEDURE — 93010 ELECTROCARDIOGRAM REPORT: CPT | Performed by: INTERNAL MEDICINE

## 2021-03-04 NOTE — OR NURSING
COVID-19 Pre-surgery screenin. Do you have an undiagnosed respiratory illness or symptoms such as coughing or sneezing? No  a. Onset of Sx No  b. Acute vs. chronic respiratory illness No    2. Do you have an unexplained fever greater than 100.4 degrees Fahrenheit or 38 degrees Celsius?     No     3. Have you had direct exposure to a patient who tested positive for Covid-19?    No    4. Have you had any loss of your sense of taste or smell? Have you had N/V or sore throat? No    Patient has been informed of visitor policy and asked to wear a mask upon entering the hospital   Yes

## 2021-03-05 ENCOUNTER — ANESTHESIA EVENT (OUTPATIENT)
Dept: SURGERY | Facility: MEDICAL CENTER | Age: 66
End: 2021-03-05
Payer: COMMERCIAL

## 2021-03-05 ENCOUNTER — APPOINTMENT (OUTPATIENT)
Dept: RADIOLOGY | Facility: MEDICAL CENTER | Age: 66
End: 2021-03-05
Attending: NEUROLOGICAL SURGERY
Payer: COMMERCIAL

## 2021-03-05 ENCOUNTER — ANESTHESIA (OUTPATIENT)
Dept: SURGERY | Facility: MEDICAL CENTER | Age: 66
End: 2021-03-05
Payer: COMMERCIAL

## 2021-03-05 ENCOUNTER — HOSPITAL ENCOUNTER (OUTPATIENT)
Facility: MEDICAL CENTER | Age: 66
End: 2021-03-07
Attending: NEUROLOGICAL SURGERY | Admitting: NEUROLOGICAL SURGERY
Payer: COMMERCIAL

## 2021-03-05 DIAGNOSIS — G89.18 POSTOPERATIVE PAIN: ICD-10-CM

## 2021-03-05 LAB
GLUCOSE BLD-MCNC: 300 MG/DL (ref 65–99)
GLUCOSE BLD-MCNC: 95 MG/DL (ref 65–99)

## 2021-03-05 PROCEDURE — 160009 HCHG ANES TIME/MIN: Performed by: NEUROLOGICAL SURGERY

## 2021-03-05 PROCEDURE — 700101 HCHG RX REV CODE 250: Performed by: NEUROLOGICAL SURGERY

## 2021-03-05 PROCEDURE — 95937 NEUROMUSCULAR JUNCTION TEST: CPT | Performed by: NEUROLOGICAL SURGERY

## 2021-03-05 PROCEDURE — C1713 ANCHOR/SCREW BN/BN,TIS/BN: HCPCS | Performed by: NEUROLOGICAL SURGERY

## 2021-03-05 PROCEDURE — A9270 NON-COVERED ITEM OR SERVICE: HCPCS | Performed by: STUDENT IN AN ORGANIZED HEALTH CARE EDUCATION/TRAINING PROGRAM

## 2021-03-05 PROCEDURE — 95939 C MOTOR EVOKED UPR&LWR LIMBS: CPT | Performed by: NEUROLOGICAL SURGERY

## 2021-03-05 PROCEDURE — 95940 IONM IN OPERATNG ROOM 15 MIN: CPT | Performed by: NEUROLOGICAL SURGERY

## 2021-03-05 PROCEDURE — 700105 HCHG RX REV CODE 258: Performed by: STUDENT IN AN ORGANIZED HEALTH CARE EDUCATION/TRAINING PROGRAM

## 2021-03-05 PROCEDURE — 700111 HCHG RX REV CODE 636 W/ 250 OVERRIDE (IP): Performed by: STUDENT IN AN ORGANIZED HEALTH CARE EDUCATION/TRAINING PROGRAM

## 2021-03-05 PROCEDURE — 500444 HCHG HEMOSTAT, SURGICEL 2X3: Performed by: NEUROLOGICAL SURGERY

## 2021-03-05 PROCEDURE — 96365 THER/PROPH/DIAG IV INF INIT: CPT

## 2021-03-05 PROCEDURE — 95955 EEG DURING SURGERY: CPT | Performed by: NEUROLOGICAL SURGERY

## 2021-03-05 PROCEDURE — 160048 HCHG OR STATISTICAL LEVEL 1-5: Performed by: NEUROLOGICAL SURGERY

## 2021-03-05 PROCEDURE — 110454 HCHG SHELL REV 250: Performed by: NEUROLOGICAL SURGERY

## 2021-03-05 PROCEDURE — 502000 HCHG MISC OR IMPLANTS RC 0278: Performed by: NEUROLOGICAL SURGERY

## 2021-03-05 PROCEDURE — 501838 HCHG SUTURE GENERAL: Performed by: NEUROLOGICAL SURGERY

## 2021-03-05 PROCEDURE — 160002 HCHG RECOVERY MINUTES (STAT): Performed by: NEUROLOGICAL SURGERY

## 2021-03-05 PROCEDURE — 95864 NEEDLE EMG 4 EXTREMITIES: CPT | Performed by: NEUROLOGICAL SURGERY

## 2021-03-05 PROCEDURE — 96372 THER/PROPH/DIAG INJ SC/IM: CPT

## 2021-03-05 PROCEDURE — L8699 PROSTHETIC IMPLANT NOS: HCPCS | Performed by: NEUROLOGICAL SURGERY

## 2021-03-05 PROCEDURE — 500864 HCHG NEEDLE, SPINAL 18G: Performed by: NEUROLOGICAL SURGERY

## 2021-03-05 PROCEDURE — 95925 SOMATOSENSORY TESTING: CPT | Performed by: NEUROLOGICAL SURGERY

## 2021-03-05 PROCEDURE — G0378 HOSPITAL OBSERVATION PER HR: HCPCS

## 2021-03-05 PROCEDURE — 95868 NDL EMG CRANIAL NRV MUSC BI: CPT | Performed by: NEUROLOGICAL SURGERY

## 2021-03-05 PROCEDURE — 700105 HCHG RX REV CODE 258: Performed by: NEUROLOGICAL SURGERY

## 2021-03-05 PROCEDURE — 72040 X-RAY EXAM NECK SPINE 2-3 VW: CPT

## 2021-03-05 PROCEDURE — 700111 HCHG RX REV CODE 636 W/ 250 OVERRIDE (IP): Performed by: NEUROLOGICAL SURGERY

## 2021-03-05 PROCEDURE — 96376 TX/PRO/DX INJ SAME DRUG ADON: CPT

## 2021-03-05 PROCEDURE — 160041 HCHG SURGERY MINUTES - EA ADDL 1 MIN LEVEL 4: Performed by: NEUROLOGICAL SURGERY

## 2021-03-05 PROCEDURE — 95865 NEEDLE EMG LARYNX: CPT | Performed by: NEUROLOGICAL SURGERY

## 2021-03-05 PROCEDURE — 700101 HCHG RX REV CODE 250: Performed by: STUDENT IN AN ORGANIZED HEALTH CARE EDUCATION/TRAINING PROGRAM

## 2021-03-05 PROCEDURE — 96375 TX/PRO/DX INJ NEW DRUG ADDON: CPT

## 2021-03-05 PROCEDURE — 500367 HCHG DRAIN KIT, HEMOVAC: Performed by: NEUROLOGICAL SURGERY

## 2021-03-05 PROCEDURE — 700102 HCHG RX REV CODE 250 W/ 637 OVERRIDE(OP): Performed by: STUDENT IN AN ORGANIZED HEALTH CARE EDUCATION/TRAINING PROGRAM

## 2021-03-05 PROCEDURE — 160035 HCHG PACU - 1ST 60 MINS PHASE I: Performed by: NEUROLOGICAL SURGERY

## 2021-03-05 PROCEDURE — 82962 GLUCOSE BLOOD TEST: CPT

## 2021-03-05 PROCEDURE — 160029 HCHG SURGERY MINUTES - 1ST 30 MINS LEVEL 4: Performed by: NEUROLOGICAL SURGERY

## 2021-03-05 DEVICE — PUTTY MASTERGRAFT 3CC: Type: IMPLANTABLE DEVICE | Status: FUNCTIONAL

## 2021-03-05 DEVICE — SCREW ATL TRNS 4.0X16 SELF TAP VAR (1TX10+2TCX10=30): Type: IMPLANTABLE DEVICE | Status: FUNCTIONAL

## 2021-03-05 DEVICE — IMPLANTABLE DEVICE: Type: IMPLANTABLE DEVICE | Status: FUNCTIONAL

## 2021-03-05 RX ORDER — SODIUM CHLORIDE, SODIUM LACTATE, POTASSIUM CHLORIDE, CALCIUM CHLORIDE 600; 310; 30; 20 MG/100ML; MG/100ML; MG/100ML; MG/100ML
INJECTION, SOLUTION INTRAVENOUS CONTINUOUS
Status: DISCONTINUED | OUTPATIENT
Start: 2021-03-05 | End: 2021-03-05 | Stop reason: HOSPADM

## 2021-03-05 RX ORDER — DIPHENHYDRAMINE HYDROCHLORIDE 50 MG/ML
12.5 INJECTION INTRAMUSCULAR; INTRAVENOUS
Status: DISCONTINUED | OUTPATIENT
Start: 2021-03-05 | End: 2021-03-05 | Stop reason: HOSPADM

## 2021-03-05 RX ORDER — DOCUSATE SODIUM 100 MG/1
100 CAPSULE, LIQUID FILLED ORAL 2 TIMES DAILY
Status: DISCONTINUED | OUTPATIENT
Start: 2021-03-05 | End: 2021-03-07 | Stop reason: HOSPADM

## 2021-03-05 RX ORDER — LISINOPRIL 20 MG/1
20 TABLET ORAL 2 TIMES DAILY
Status: DISCONTINUED | OUTPATIENT
Start: 2021-03-05 | End: 2021-03-07 | Stop reason: HOSPADM

## 2021-03-05 RX ORDER — HYDROMORPHONE HYDROCHLORIDE 1 MG/ML
0.2 INJECTION, SOLUTION INTRAMUSCULAR; INTRAVENOUS; SUBCUTANEOUS
Status: DISCONTINUED | OUTPATIENT
Start: 2021-03-05 | End: 2021-03-05 | Stop reason: HOSPADM

## 2021-03-05 RX ORDER — LEVOTHYROXINE SODIUM 0.15 MG/1
150 TABLET ORAL
Status: DISCONTINUED | OUTPATIENT
Start: 2021-03-06 | End: 2021-03-07 | Stop reason: HOSPADM

## 2021-03-05 RX ORDER — DEXAMETHASONE SODIUM PHOSPHATE 4 MG/ML
4 INJECTION, SOLUTION INTRA-ARTICULAR; INTRALESIONAL; INTRAMUSCULAR; INTRAVENOUS; SOFT TISSUE EVERY 8 HOURS
Status: DISCONTINUED | OUTPATIENT
Start: 2021-03-05 | End: 2021-03-05

## 2021-03-05 RX ORDER — ENEMA 19; 7 G/133ML; G/133ML
1 ENEMA RECTAL
Status: DISCONTINUED | OUTPATIENT
Start: 2021-03-05 | End: 2021-03-07 | Stop reason: HOSPADM

## 2021-03-05 RX ORDER — PROCHLORPERAZINE EDISYLATE 5 MG/ML
5-10 INJECTION INTRAMUSCULAR; INTRAVENOUS EVERY 4 HOURS PRN
Status: DISCONTINUED | OUTPATIENT
Start: 2021-03-05 | End: 2021-03-07 | Stop reason: HOSPADM

## 2021-03-05 RX ORDER — POLYETHYLENE GLYCOL 3350 17 G/17G
1 POWDER, FOR SOLUTION ORAL 2 TIMES DAILY PRN
Status: DISCONTINUED | OUTPATIENT
Start: 2021-03-05 | End: 2021-03-07 | Stop reason: HOSPADM

## 2021-03-05 RX ORDER — INSULIN GLARGINE 100 [IU]/ML
0.2 INJECTION, SOLUTION SUBCUTANEOUS EVERY EVENING
Status: DISCONTINUED | OUTPATIENT
Start: 2021-03-05 | End: 2021-03-06

## 2021-03-05 RX ORDER — SODIUM CHLORIDE, SODIUM LACTATE, POTASSIUM CHLORIDE, CALCIUM CHLORIDE 600; 310; 30; 20 MG/100ML; MG/100ML; MG/100ML; MG/100ML
INJECTION, SOLUTION INTRAVENOUS CONTINUOUS
Status: DISCONTINUED | OUTPATIENT
Start: 2021-03-05 | End: 2021-03-05

## 2021-03-05 RX ORDER — CEFAZOLIN SODIUM 1 G/3ML
INJECTION, POWDER, FOR SOLUTION INTRAMUSCULAR; INTRAVENOUS
Status: DISCONTINUED | OUTPATIENT
Start: 2021-03-05 | End: 2021-03-05 | Stop reason: HOSPADM

## 2021-03-05 RX ORDER — AMOXICILLIN 250 MG
1 CAPSULE ORAL
Status: DISCONTINUED | OUTPATIENT
Start: 2021-03-05 | End: 2021-03-07 | Stop reason: HOSPADM

## 2021-03-05 RX ORDER — PROMETHAZINE HYDROCHLORIDE 25 MG/1
12.5-25 TABLET ORAL EVERY 4 HOURS PRN
Status: DISCONTINUED | OUTPATIENT
Start: 2021-03-05 | End: 2021-03-07 | Stop reason: HOSPADM

## 2021-03-05 RX ORDER — PHENYLEPHRINE HYDROCHLORIDE 10 MG/ML
INJECTION, SOLUTION INTRAMUSCULAR; INTRAVENOUS; SUBCUTANEOUS PRN
Status: DISCONTINUED | OUTPATIENT
Start: 2021-03-05 | End: 2021-03-05 | Stop reason: SURG

## 2021-03-05 RX ORDER — HYDROMORPHONE HYDROCHLORIDE 1 MG/ML
0.4 INJECTION, SOLUTION INTRAMUSCULAR; INTRAVENOUS; SUBCUTANEOUS
Status: DISCONTINUED | OUTPATIENT
Start: 2021-03-05 | End: 2021-03-05 | Stop reason: HOSPADM

## 2021-03-05 RX ORDER — LIDOCAINE HYDROCHLORIDE 20 MG/ML
INJECTION, SOLUTION EPIDURAL; INFILTRATION; INTRACAUDAL; PERINEURAL PRN
Status: DISCONTINUED | OUTPATIENT
Start: 2021-03-05 | End: 2021-03-05 | Stop reason: SURG

## 2021-03-05 RX ORDER — LABETALOL HYDROCHLORIDE 5 MG/ML
10 INJECTION, SOLUTION INTRAVENOUS
Status: DISCONTINUED | OUTPATIENT
Start: 2021-03-05 | End: 2021-03-06

## 2021-03-05 RX ORDER — CALCIUM CARBONATE 500 MG/1
500 TABLET, CHEWABLE ORAL 2 TIMES DAILY
Status: DISCONTINUED | OUTPATIENT
Start: 2021-03-05 | End: 2021-03-07 | Stop reason: HOSPADM

## 2021-03-05 RX ORDER — HYDROMORPHONE HYDROCHLORIDE 1 MG/ML
0.1 INJECTION, SOLUTION INTRAMUSCULAR; INTRAVENOUS; SUBCUTANEOUS
Status: DISCONTINUED | OUTPATIENT
Start: 2021-03-05 | End: 2021-03-05 | Stop reason: HOSPADM

## 2021-03-05 RX ORDER — INSULIN GLARGINE 100 [IU]/ML
40 INJECTION, SOLUTION SUBCUTANEOUS 2 TIMES DAILY
Status: DISCONTINUED | OUTPATIENT
Start: 2021-03-05 | End: 2021-03-05

## 2021-03-05 RX ORDER — FERROUS SULFATE 325(65) MG
325 TABLET ORAL DAILY
Status: DISCONTINUED | OUTPATIENT
Start: 2021-03-05 | End: 2021-03-07 | Stop reason: HOSPADM

## 2021-03-05 RX ORDER — INSULIN GLARGINE 100 [IU]/ML
30 INJECTION, SOLUTION SUBCUTANEOUS ONCE
Status: DISCONTINUED | OUTPATIENT
Start: 2021-03-05 | End: 2021-03-05

## 2021-03-05 RX ORDER — ACETAMINOPHEN 500 MG
1000 TABLET ORAL EVERY 6 HOURS PRN
Status: DISCONTINUED | OUTPATIENT
Start: 2021-03-10 | End: 2021-03-07 | Stop reason: HOSPADM

## 2021-03-05 RX ORDER — ALBUTEROL SULFATE 90 UG/1
2 AEROSOL, METERED RESPIRATORY (INHALATION)
Status: DISCONTINUED | OUTPATIENT
Start: 2021-03-05 | End: 2021-03-07 | Stop reason: HOSPADM

## 2021-03-05 RX ORDER — AMOXICILLIN 250 MG
1 CAPSULE ORAL NIGHTLY
Status: DISCONTINUED | OUTPATIENT
Start: 2021-03-05 | End: 2021-03-07 | Stop reason: HOSPADM

## 2021-03-05 RX ORDER — DEXTROSE MONOHYDRATE 25 G/50ML
50 INJECTION, SOLUTION INTRAVENOUS
Status: DISCONTINUED | OUTPATIENT
Start: 2021-03-05 | End: 2021-03-06

## 2021-03-05 RX ORDER — ACETAMINOPHEN 500 MG
500 TABLET ORAL EVERY 6 HOURS PRN
Status: DISCONTINUED | OUTPATIENT
Start: 2021-03-05 | End: 2021-03-07 | Stop reason: HOSPADM

## 2021-03-05 RX ORDER — ATORVASTATIN CALCIUM 40 MG/1
40 TABLET, FILM COATED ORAL NIGHTLY
Status: DISCONTINUED | OUTPATIENT
Start: 2021-03-05 | End: 2021-03-07 | Stop reason: HOSPADM

## 2021-03-05 RX ORDER — ONDANSETRON 2 MG/ML
4 INJECTION INTRAMUSCULAR; INTRAVENOUS EVERY 4 HOURS PRN
Status: DISCONTINUED | OUTPATIENT
Start: 2021-03-05 | End: 2021-03-07 | Stop reason: HOSPADM

## 2021-03-05 RX ORDER — DIPHENHYDRAMINE HCL 25 MG
25 TABLET ORAL EVERY 6 HOURS PRN
Status: DISCONTINUED | OUTPATIENT
Start: 2021-03-05 | End: 2021-03-07 | Stop reason: HOSPADM

## 2021-03-05 RX ORDER — CHLORTHALIDONE 25 MG/1
25 TABLET ORAL DAILY
Status: DISCONTINUED | OUTPATIENT
Start: 2021-03-05 | End: 2021-03-06

## 2021-03-05 RX ORDER — ONDANSETRON 4 MG/1
4 TABLET, ORALLY DISINTEGRATING ORAL EVERY 4 HOURS PRN
Status: DISCONTINUED | OUTPATIENT
Start: 2021-03-05 | End: 2021-03-07 | Stop reason: HOSPADM

## 2021-03-05 RX ORDER — OXYCODONE HCL 5 MG/5 ML
10 SOLUTION, ORAL ORAL
Status: COMPLETED | OUTPATIENT
Start: 2021-03-05 | End: 2021-03-05

## 2021-03-05 RX ORDER — MORPHINE SULFATE 4 MG/ML
2 INJECTION, SOLUTION INTRAMUSCULAR; INTRAVENOUS
Status: DISCONTINUED | OUTPATIENT
Start: 2021-03-05 | End: 2021-03-07 | Stop reason: HOSPADM

## 2021-03-05 RX ORDER — DEXAMETHASONE SODIUM PHOSPHATE 4 MG/ML
4 INJECTION, SOLUTION INTRA-ARTICULAR; INTRALESIONAL; INTRAMUSCULAR; INTRAVENOUS; SOFT TISSUE EVERY 8 HOURS
Status: DISPENSED | OUTPATIENT
Start: 2021-03-05 | End: 2021-03-06

## 2021-03-05 RX ORDER — SODIUM CHLORIDE 9 MG/ML
INJECTION, SOLUTION INTRAVENOUS CONTINUOUS
Status: DISCONTINUED | OUTPATIENT
Start: 2021-03-05 | End: 2021-03-06

## 2021-03-05 RX ORDER — CARVEDILOL 12.5 MG/1
12.5 TABLET ORAL 2 TIMES DAILY WITH MEALS
Status: DISCONTINUED | OUTPATIENT
Start: 2021-03-05 | End: 2021-03-06

## 2021-03-05 RX ORDER — MIDAZOLAM HYDROCHLORIDE 1 MG/ML
INJECTION INTRAMUSCULAR; INTRAVENOUS PRN
Status: DISCONTINUED | OUTPATIENT
Start: 2021-03-05 | End: 2021-03-05 | Stop reason: SURG

## 2021-03-05 RX ORDER — VITAMIN B COMPLEX
5000 TABLET ORAL DAILY
Status: DISCONTINUED | OUTPATIENT
Start: 2021-03-05 | End: 2021-03-07 | Stop reason: HOSPADM

## 2021-03-05 RX ORDER — DIPHENHYDRAMINE HYDROCHLORIDE 50 MG/ML
25 INJECTION INTRAMUSCULAR; INTRAVENOUS EVERY 6 HOURS PRN
Status: DISCONTINUED | OUTPATIENT
Start: 2021-03-05 | End: 2021-03-07 | Stop reason: HOSPADM

## 2021-03-05 RX ORDER — TRAVOPROST OPHTHALMIC SOLUTION 0.04 MG/ML
1 SOLUTION OPHTHALMIC EVERY EVENING
Status: DISCONTINUED | OUTPATIENT
Start: 2021-03-05 | End: 2021-03-05

## 2021-03-05 RX ORDER — PROMETHAZINE HYDROCHLORIDE 25 MG/1
12.5-25 SUPPOSITORY RECTAL EVERY 4 HOURS PRN
Status: DISCONTINUED | OUTPATIENT
Start: 2021-03-05 | End: 2021-03-07 | Stop reason: HOSPADM

## 2021-03-05 RX ORDER — HYDROCODONE BITARTRATE AND ACETAMINOPHEN 10; 325 MG/1; MG/1
1 TABLET ORAL EVERY 4 HOURS PRN
Status: DISCONTINUED | OUTPATIENT
Start: 2021-03-05 | End: 2021-03-07 | Stop reason: HOSPADM

## 2021-03-05 RX ORDER — CYCLOBENZAPRINE HCL 10 MG
10 TABLET ORAL 3 TIMES DAILY
Status: DISCONTINUED | OUTPATIENT
Start: 2021-03-05 | End: 2021-03-07 | Stop reason: HOSPADM

## 2021-03-05 RX ORDER — ONDANSETRON 2 MG/ML
4 INJECTION INTRAMUSCULAR; INTRAVENOUS
Status: DISCONTINUED | OUTPATIENT
Start: 2021-03-05 | End: 2021-03-05 | Stop reason: HOSPADM

## 2021-03-05 RX ORDER — OXYCODONE AND ACETAMINOPHEN 10; 325 MG/1; MG/1
1 TABLET ORAL EVERY 4 HOURS PRN
Status: DISCONTINUED | OUTPATIENT
Start: 2021-03-05 | End: 2021-03-07 | Stop reason: HOSPADM

## 2021-03-05 RX ORDER — CEFAZOLIN SODIUM 2 G/100ML
2 INJECTION, SOLUTION INTRAVENOUS EVERY 8 HOURS
Status: COMPLETED | OUTPATIENT
Start: 2021-03-05 | End: 2021-03-06

## 2021-03-05 RX ORDER — HYDROMORPHONE HYDROCHLORIDE 2 MG/ML
INJECTION, SOLUTION INTRAMUSCULAR; INTRAVENOUS; SUBCUTANEOUS PRN
Status: DISCONTINUED | OUTPATIENT
Start: 2021-03-05 | End: 2021-03-05 | Stop reason: SURG

## 2021-03-05 RX ORDER — CEFAZOLIN SODIUM 1 G/3ML
INJECTION, POWDER, FOR SOLUTION INTRAMUSCULAR; INTRAVENOUS PRN
Status: DISCONTINUED | OUTPATIENT
Start: 2021-03-05 | End: 2021-03-05 | Stop reason: SURG

## 2021-03-05 RX ORDER — LATANOPROST 50 UG/ML
1 SOLUTION/ DROPS OPHTHALMIC EVERY EVENING
Status: DISCONTINUED | OUTPATIENT
Start: 2021-03-05 | End: 2021-03-07 | Stop reason: HOSPADM

## 2021-03-05 RX ORDER — BISACODYL 10 MG
10 SUPPOSITORY, RECTAL RECTAL
Status: DISCONTINUED | OUTPATIENT
Start: 2021-03-05 | End: 2021-03-07 | Stop reason: HOSPADM

## 2021-03-05 RX ORDER — GABAPENTIN 300 MG/1
300 CAPSULE ORAL
Status: DISCONTINUED | OUTPATIENT
Start: 2021-03-05 | End: 2021-03-07 | Stop reason: HOSPADM

## 2021-03-05 RX ORDER — VITAMIN B COMPLEX
5000 TABLET ORAL DAILY
Status: DISCONTINUED | OUTPATIENT
Start: 2021-03-05 | End: 2021-03-05

## 2021-03-05 RX ORDER — ONDANSETRON 2 MG/ML
INJECTION INTRAMUSCULAR; INTRAVENOUS PRN
Status: DISCONTINUED | OUTPATIENT
Start: 2021-03-05 | End: 2021-03-05 | Stop reason: SURG

## 2021-03-05 RX ORDER — BUPIVACAINE HYDROCHLORIDE AND EPINEPHRINE 5; 5 MG/ML; UG/ML
INJECTION, SOLUTION EPIDURAL; INTRACAUDAL; PERINEURAL
Status: DISCONTINUED | OUTPATIENT
Start: 2021-03-05 | End: 2021-03-05 | Stop reason: HOSPADM

## 2021-03-05 RX ORDER — INSULIN GLARGINE 100 [IU]/ML
30 INJECTION, SOLUTION SUBCUTANEOUS ONCE
COMMUNITY

## 2021-03-05 RX ORDER — HALOPERIDOL 5 MG/ML
1 INJECTION INTRAMUSCULAR
Status: DISCONTINUED | OUTPATIENT
Start: 2021-03-05 | End: 2021-03-05 | Stop reason: HOSPADM

## 2021-03-05 RX ORDER — DEXAMETHASONE SODIUM PHOSPHATE 4 MG/ML
INJECTION, SOLUTION INTRA-ARTICULAR; INTRALESIONAL; INTRAMUSCULAR; INTRAVENOUS; SOFT TISSUE PRN
Status: DISCONTINUED | OUTPATIENT
Start: 2021-03-05 | End: 2021-03-05 | Stop reason: SURG

## 2021-03-05 RX ORDER — OXYCODONE HCL 5 MG/5 ML
5 SOLUTION, ORAL ORAL
Status: COMPLETED | OUTPATIENT
Start: 2021-03-05 | End: 2021-03-05

## 2021-03-05 RX ADMIN — PROPOFOL 200 MG: 10 INJECTION, EMULSION INTRAVENOUS at 07:41

## 2021-03-05 RX ADMIN — SODIUM CHLORIDE, POTASSIUM CHLORIDE, SODIUM LACTATE AND CALCIUM CHLORIDE: 600; 310; 30; 20 INJECTION, SOLUTION INTRAVENOUS at 08:55

## 2021-03-05 RX ADMIN — DEXAMETHASONE SODIUM PHOSPHATE 4 MG: 4 INJECTION, SOLUTION INTRA-ARTICULAR; INTRALESIONAL; INTRAMUSCULAR; INTRAVENOUS; SOFT TISSUE at 21:05

## 2021-03-05 RX ADMIN — PHENYLEPHRINE HYDROCHLORIDE 200 MCG: 10 INJECTION INTRAVENOUS at 08:08

## 2021-03-05 RX ADMIN — SODIUM CHLORIDE: 9 INJECTION, SOLUTION INTRAVENOUS at 14:52

## 2021-03-05 RX ADMIN — DEXAMETHASONE SODIUM PHOSPHATE 4 MG: 4 INJECTION, SOLUTION INTRA-ARTICULAR; INTRALESIONAL; INTRAMUSCULAR; INTRAVENOUS; SOFT TISSUE at 07:41

## 2021-03-05 RX ADMIN — SODIUM CHLORIDE, POTASSIUM CHLORIDE, SODIUM LACTATE AND CALCIUM CHLORIDE: 600; 310; 30; 20 INJECTION, SOLUTION INTRAVENOUS at 07:29

## 2021-03-05 RX ADMIN — DEXAMETHASONE SODIUM PHOSPHATE 4 MG: 4 INJECTION, SOLUTION INTRA-ARTICULAR; INTRALESIONAL; INTRAMUSCULAR; INTRAVENOUS; SOFT TISSUE at 23:59

## 2021-03-05 RX ADMIN — CHLORTHALIDONE 25 MG: 25 TABLET ORAL at 14:52

## 2021-03-05 RX ADMIN — INSULIN LISPRO 9 UNITS: 100 INJECTION, SOLUTION INTRAVENOUS; SUBCUTANEOUS at 17:00

## 2021-03-05 RX ADMIN — CEFAZOLIN SODIUM 2 G: 2 INJECTION, SOLUTION INTRAVENOUS at 19:40

## 2021-03-05 RX ADMIN — FENTANYL CITRATE 100 MCG: 50 INJECTION, SOLUTION INTRAMUSCULAR; INTRAVENOUS at 07:38

## 2021-03-05 RX ADMIN — PROPOFOL 120 MCG/KG/MIN: 10 INJECTION, EMULSION INTRAVENOUS at 07:41

## 2021-03-05 RX ADMIN — INSULIN GLARGINE 28 UNITS: 100 INJECTION, SOLUTION SUBCUTANEOUS at 20:56

## 2021-03-05 RX ADMIN — PHENYLEPHRINE HYDROCHLORIDE 200 MCG: 10 INJECTION INTRAVENOUS at 07:59

## 2021-03-05 RX ADMIN — DOCUSATE SODIUM 100 MG: 100 CAPSULE, LIQUID FILLED ORAL at 19:39

## 2021-03-05 RX ADMIN — ONDANSETRON 4 MG: 2 INJECTION INTRAMUSCULAR; INTRAVENOUS at 07:41

## 2021-03-05 RX ADMIN — OXYCODONE HYDROCHLORIDE 10 MG: 5 SOLUTION ORAL at 10:53

## 2021-03-05 RX ADMIN — PHENYLEPHRINE HYDROCHLORIDE 200 MCG: 10 INJECTION INTRAVENOUS at 07:51

## 2021-03-05 RX ADMIN — GABAPENTIN 300 MG: 300 CAPSULE ORAL at 21:04

## 2021-03-05 RX ADMIN — CEFAZOLIN SODIUM 2 G: 2 INJECTION, SOLUTION INTRAVENOUS at 23:59

## 2021-03-05 RX ADMIN — LIDOCAINE HYDROCHLORIDE 100 MG: 20 INJECTION, SOLUTION EPIDURAL; INFILTRATION; INTRACAUDAL at 07:41

## 2021-03-05 RX ADMIN — MIDAZOLAM HYDROCHLORIDE 2 MG: 1 INJECTION, SOLUTION INTRAMUSCULAR; INTRAVENOUS at 07:33

## 2021-03-05 RX ADMIN — DOCUSATE SODIUM 50 MG AND SENNOSIDES 8.6 MG 1 TABLET: 8.6; 5 TABLET, FILM COATED ORAL at 20:53

## 2021-03-05 RX ADMIN — CEFAZOLIN 3 G: 330 INJECTION, POWDER, FOR SOLUTION INTRAMUSCULAR; INTRAVENOUS at 07:41

## 2021-03-05 RX ADMIN — PHENYLEPHRINE HYDROCHLORIDE 200 MCG: 10 INJECTION INTRAVENOUS at 07:41

## 2021-03-05 RX ADMIN — CARVEDILOL 12.5 MG: 12.5 TABLET, FILM COATED ORAL at 19:38

## 2021-03-05 RX ADMIN — CYCLOBENZAPRINE 10 MG: 10 TABLET, FILM COATED ORAL at 19:37

## 2021-03-05 RX ADMIN — FERROUS SULFATE TAB 325 MG (65 MG ELEMENTAL FE) 325 MG: 325 (65 FE) TAB at 14:52

## 2021-03-05 RX ADMIN — HYDROCODONE BITARTRATE AND ACETAMINOPHEN 1 TABLET: 10; 325 TABLET ORAL at 14:52

## 2021-03-05 RX ADMIN — LATANOPROST 1 DROP: 50 SOLUTION OPHTHALMIC at 20:55

## 2021-03-05 RX ADMIN — ATORVASTATIN CALCIUM 40 MG: 40 TABLET, FILM COATED ORAL at 21:05

## 2021-03-05 RX ADMIN — GABAPENTIN 300 MG: 300 CAPSULE ORAL at 14:52

## 2021-03-05 RX ADMIN — HYDROMORPHONE HYDROCHLORIDE 1 MG: 2 INJECTION, SOLUTION INTRAMUSCULAR; INTRAVENOUS; SUBCUTANEOUS at 08:39

## 2021-03-05 RX ADMIN — Medication 5000 UNITS: at 14:52

## 2021-03-05 ASSESSMENT — PAIN DESCRIPTION - PAIN TYPE
TYPE: SURGICAL PAIN
TYPE: ACUTE PAIN;SURGICAL PAIN
TYPE: SURGICAL PAIN
TYPE: ACUTE PAIN;SURGICAL PAIN
TYPE: ACUTE PAIN;SURGICAL PAIN

## 2021-03-05 ASSESSMENT — PAIN SCALES - GENERAL: PAIN_LEVEL: 1

## 2021-03-05 NOTE — OP REPORT
DATE OF SERVICE:  03/05/2021     PREOPERATIVE DIAGNOSES:  Severe right arm radiculopathy into the shoulder and   down the right arm towards the hand, prior C6-C7 ACDF with plate performed by   myself in 2018 with a right C3-C4 paracentral disk herniation, which has been   stable through the years and a right C4-C5 extraforaminal or foraminal   neoplasm versus extruded disk herniation.     POSTOPERATIVE DIAGNOSES:  Showed no evidence of extruded right C4-C5 disk   herniation, and showed evidence of an acute right C5-C6 foraminal disk   herniation, which was not seen on the MRI.     PRINCIPAL PROCEDURES PERFORMED:  1.  C6-C7 plate removal performed as a distinct procedural service.  2.  C6-C7 fusion exploration performed as a distinct procedural service.  3.  C4-C5, C5-C6 anterior cervical diskectomy and interbody fusion with   placement of Medtronic plasma titanium coated PEEK interbody cages x2, use of   tricalcium phosphate MasterGraft, and placement of a Medtronic static anterior   cervical plate from C4-C6 as a distinct procedural service.  Please note that   22 modifier was added given the patient's BMI of 44.13 and given his prior   surgery and extensive scar tissue.     SURGEON:  Rajesh Azar MD     ASSISTANT:  Melvi Rivera PA-C     ANESTHESIA:  The procedure was performed under general anesthesia.     ANESTHESIOLOGIST:  Magnus Prajapati MD     COMPLICATIONS:  There were no complications.     FINDINGS:  Relief of stenosis, nice placement of hardware, stable SSEPs, EMGs,   MEPs, and recurrent laryngeal nerve monitoring.     OR IV FLUIDS, URINE OUTPUT, ESTIMATED BLOOD LOSS:  Please see the anesthesia   record.     DISPOSITION:  The patient will be extubated and brought to the recovery room.     CLINICAL HISTORY:  The patient is a 65-year-old patient.  The patient was   referred to me back in 2012.  We kept an eye on him throughout the years.    Ultimately, he came to surgery in 2018.  He had a large right  C6-C7 disk   herniation.  He underwent a C6-C7 ACDF with plate.  Even at that time, we had   considered a C3 to C7 ACDF and a C3-T1 posterior procedure.  However, at that   time, I felt that it would be best to just do a single level.  He now presents   with severe shooting right arm pain.  His MRI through the years that showed   either a foraminal disk or an intradural tumor or nerve sheath tumor on the   right at C4-C5.  It was felt that this was not symptomatic and the patient   also complained of pain radiating beyond the shoulder towards the hand.  I   felt the patient was symptomatic at C5-C6, although his MRI did not look too   bad.  We discussed surgical intervention.  The patient failed conservative   treatment.  Risks, benefits and options were discussed.  The patient signed a   written informed consent.     The patient was brought to the operating room and placed under general   anesthesia.  Please note that the patient received the proper preoperative   clearance from Dr. Ranjeet Gusman and also Dr. Byron Montelongo.  The patient's   comorbidities include chronic kidney disease and diabetes.  After safe   placement of endotracheal tube and securing his airway, needle electrodes were   placed for baseline SSEPs, EMGs, MEPs, and recurrent laryngeal nerve   monitoring.  Remote monitoring was performed by [facility] associates.    After the neck was prepped and draped, a timeout was performed.  Local   anesthetic was infiltrated in the skin.  Please note that every step of the   procedure required extra time and expertise given the patient's BMI and morbid   obesity 44.13.  His last weight is 307 pounds and 8.7 ounces and his height   is 5 feet 10 inches.  In positioning, the patient required extra time and   expertise.  Also, the patient's neck was relatively short given his habitus   and so we had to tape his chin pulled upwards so we can get a visualization of   the anterior neck.  Regardless, after  the timeout, perioperative IV   antibiotics were given.  Local anesthetic was infiltrated in the skin.  A   right-sided skin incision was made down to the platysma.  The platysma was   incised.  Blunt dissection was used to create a plane between the carotid   sheath laterally and the trachea and esophagus medially.  The longus colli   muscles were undermined and radiolucent self-retaining retractors were placed.    Intraoperative fluoroscopy demonstrated the correct level.  The old plate   was identified.  I removed the old plate.  There did not appear to be any   evidence of instability; however, just to be sure given the patient's weight,   I wanted to make sure that I replaced the plate if there is any question.  I   placed Madison pins in the C6 and C7 and I tried to distract and I tried to use   an upbiting curette to demonstrate any evidence of pseudoarthrosis.  A fusion   exploration was performed as a distinct procedural service, taking up an   extra 15 minutes of time.  There was no evidence of pseudoarthrosis at this   level.  Next, at each level, I placed Madison pins.  I used an 11 blade knife   to make an annulotomy.  I used an upbiting curette to perform the diskectomy   and prepare the endplates.  The microscope was brought into view and I used an   AM-12 drill bit to even out the endplates.  I used an AM-8 drill bit to thin   down the posterior osteophytes and thin down the disk.  I used a small right   angle hook to develop a plane between the dura and the posterior longitudinal   ligament.  At C5-C6, a large foraminal disk, which was not appreciated on the   MRI was removed.  I felt that this was causing most of the patient's symptoms.    After the nice decompression and perfect hemostasis, I placed the   appropriate sized plasma titanium coated PEEK interbody cage, packed with   tricalcium phosphate MasterGraft for a C5-C6 fusion.  Next, attention was paid   to C4-C5.  The similar steps used at  C5-C6 were used at C4-C5.  Great care   was taken along the right side of the spinal canal as I was looking for an   intradural tumor.  There was a disk herniation on this level as well and this   was removed with a Kerrison 2 and Kerrison 3 punch.  Once I decompressed in   the foramen.  I was unable to visualize the intradural tumor, but I could   palpate that at least the ventral surface of the C5 nerve root was completely   decompressed.  After nice decompression, I obtained perfect hemostasis and I   placed the appropriate sized plasma titanium coated PEEK interbody cage at   C4-C5.  I felt that C3-C4 was currently asymptomatic and I recommended leaving   it alone.  Next, I secured a Medtronic static anterior cervical plate.  16 mm   screws x6 were applied and the locking mechanism was engaged.  AP and lateral   fluoroscopy demonstrated perfect placement of hardware.  SSEPs, EMGs, MEPs,   and recurrent laryngeal nerve monitoring were stable throughout the case.  The   patient will be extubated and brought to the recovery room.        ______________________________  MD STEPHEN PLASENCIA/AMY    DD:  03/05/2021 10:55  DT:  03/05/2021 12:27    Job#:  [id number]

## 2021-03-05 NOTE — ANESTHESIA PROCEDURE NOTES
Arterial Line  Performed by: Magnus Prajapati M.D.  Authorized by: Magnus Prajapati M.D.     Start Time:  3/5/2021 7:49 AM  Localization: surface landmarks    Patient Location:  OR  Indication: continuous blood pressure monitoring        Catheter Size:  20 G  Seldinger Technique?: Yes    Laterality:  Right  Site:  Radial artery  Line Secured:  Antimicrobial disc, tape and transparent dressing  Events: patient tolerated procedure well with no complications

## 2021-03-05 NOTE — ANESTHESIA TIME REPORT
Anesthesia Start and Stop Event Times     Date Time Event    3/5/2021 0716 Ready for Procedure     0729 Anesthesia Start     1034 Anesthesia Stop        Responsible Staff  03/05/21    Name Role Begin End    Magnus Prajapati M.D. Anesth 0729 1034        Preop Diagnosis (Free Text):  Pre-op Diagnosis     CERVICAL HNP WITH MYELOPATHY        Preop Diagnosis (Codes):    Post op Diagnosis  HNP (herniated nucleus pulposus) with myelopathy, cervical      Premium Reason  Non-Premium    Comments:

## 2021-03-05 NOTE — PROGRESS NOTES
Pharmacy Medication Reconciliation      Medication reconciliation updated and complete per pt at bedside with medication list.   Allergies have been verified and updated   No oral ABX within the last 14 days  [facility]

## 2021-03-05 NOTE — ANESTHESIA PROCEDURE NOTES
Airway    Date/Time: 3/5/2021 7:43 AM  Performed by: Magnus Prajapati M.D.  Authorized by: Magnus Prajapati M.D.     Location:  OR  Urgency:  Elective  Indications for Airway Management:  Anesthesia      Spontaneous Ventilation: absent    Sedation Level:  Deep  Preoxygenated: Yes    Patient Position:  Sniffing  Mask Difficulty Assessment:  2 - vent by mask + OA or adjuvant +/- NMBA  Final Airway Type:  Endotracheal airway  Final Endotracheal Airway:  ETT and NIM tube  Cuffed: Yes    Technique Used for Successful ETT Placement:  Video laryngoscopy  Devices/Methods Used in Placement:  Intubating stylet    Insertion Site:  Oral  Blade Type:  Glide  Laryngoscope Blade/Videolaryngoscope Blade Size:  4  ETT Size (mm):  7.0  Measured from:  Teeth  ETT to Teeth (cm):  24  Placement Verified by: auscultation    Cormack-Lehane Classification:  Grade I - full view of glottis  Number of Attempts at Approach:  1

## 2021-03-05 NOTE — ANESTHESIA POSTPROCEDURE EVALUATION
Patient: Marlo León    Procedure Summary     Date: 03/05/21 Room / Location: [facility] 04 / SURGERY [facility]    Anesthesia Start: 0729 Anesthesia Stop: 1034    Procedures:       DISCECTOMY, SPINE, CERVICAL, ANTERIOR APPROACH, WITH FUSION - C4-5 WITH PLATE, C5-6 WITH PLATE (Neck)      REMOVAL, HARDWARE - C6-7 (Neck) Diagnosis: (CERVICAL HNP WITH MYELOPATHY)    Surgeons: Rajesh Azar M.D. Responsible Provider: Magnus Prajapati M.D.    Anesthesia Type: general ASA Status: 3          Final Anesthesia Type: general  Last vitals  BP   Blood Pressure: 158/65    Temp   36.3 °C (97.3 °F)    Pulse   74   Resp   18    SpO2   96 %      Anesthesia Post Evaluation    Patient location during evaluation: PACU  Patient participation: complete - patient participated  Level of consciousness: awake and alert  Pain score: 1    Airway patency: patent  Anesthetic complications: no  Cardiovascular status: hemodynamically stable  Respiratory status: acceptable  Hydration status: euvolemic    PONV: none          No complications documented.     Nurse Pain Score: 1 (NPRS)

## 2021-03-05 NOTE — PROGRESS NOTES
Patient seen and evaluated in PACU  Denies new numbness, weakness.    On exam, patient is alert, VSS  FCx4  4+/5 Right deltoid, 5/5 strength bilateral biceps and triceps  Sensation intact UEs distally  Drain intact, minimal drainage     Plan:  Plan to transfer to floor   Discussed POC with nursing  Family updated     Case d/w Dr. Azar

## 2021-03-05 NOTE — ANESTHESIA PREPROCEDURE EVALUATION
"Anes H&P:  PAST MEDICAL HISTORY:   65 y.o. male who presents for Procedure(s):  DISCECTOMY, SPINE, CERVICAL, ANTERIOR APPROACH, WITH FUSION - C4-5 WITH PLATE, POSSIBLE C5-6 WITH PLATE  REMOVAL, HARDWARE - POSS C6-7.  He has current and past medical problems significant for:    Past Medical History:   Diagnosis Date   • Anesthesia     slow to wake up   • Arthritis     pt states all joints   • Asthma     Seasonal, uses rarely/ prn inhaler   • Bowel habit changes     diarrhea   • Breath shortness     With exertion, no oxygen   • Burn     burns on bottom of feet, due to neuropathy, wound care vists from 7/4 to 9/10   • Cataract 03/2021    diana non surgically repaired, pt states with \"macular degeneration\"   • Diabetes (HCC)     Insulin dependent.   • Disorder of thyroid    • Heart burn    • High cholesterol    • Hypertension     pt states well controlled on meds   • Indigestion    • Renal disorder     Kidney stone   • Sleep apnea     uses bipap   • Snoring    • Urinary incontinence        SMOKING/ALCOHOL/RECREATIONAL DRUG USE:  Social History     Tobacco Use   • Smoking status: Never Smoker   • Smokeless tobacco: Never Used   Substance Use Topics   • Alcohol use: No   • Drug use: No     Social History     Substance and Sexual Activity   Drug Use No       PAST SURGICAL HISTORY:  Past Surgical History:   Procedure Laterality Date   • CERVICAL DISK AND FUSION ANTERIOR  9/21/2018    Procedure: CERVICAL DISK AND FUSION ANTERIOR;  Surgeon: Rajesh Azar M.D.;  Location: SURGERY [facility];  Service: Neurosurgery   • OTHER  2017    Kidney stone   • OTHER  2011    Hernia mesh umbilical   • OTHER  2007    Gastric bypass   • OTHER ORTHOPEDIC SURGERY Left 2006    ankle, lower leg orif    • TONSILLECTOMY  1960       ALLERGIES:   Allergies   Allergen Reactions   • Neosporin [Neomycin-Polymyxin B] Rash     Rxn - ongoing     • Tape Unspecified     Has had blisters in the past         MEDICATIONS:  No current " facility-administered medications on file prior to encounter.     Current Outpatient Medications on File Prior to Encounter   Medication Sig Dispense Refill   • insulin glargine (LANTUS) 100 UNIT/ML Solution Inject 30 Units under the skin one time.     • cyclobenzaprine (FLEXERIL) 10 MG Tab Take 1 Tab by mouth every 8 hours as needed for Muscle Spasms. (Patient taking differently: Take 10 mg by mouth every evening.) 60 Tab 0   • Aflibercept (EYLEA) 2 MG/0.05ML Solution by Intravitreal route as needed. Both eyes     • insulin glargine (LANTUS) 100 UNIT/ML Solution Inject 40 Units under the skin 2 times a day.     • aspirin EC (ECOTRIN) 81 MG Tablet Delayed Response Take 81 mg by mouth every evening.     • therapeutic multivitamin-minerals (THERAGRAN-M) Tab Take 1 Tab by mouth every day.     • travoprost (TRAVATAN Z) 0.004 % Solution Place 1 Drop in right eye every evening.         LABS:  Lab Results   Component Value Date/Time    HEMOGLOBIN 12.2 (L) 03/01/2021 1101    HEMATOCRIT 39.7 (L) 03/01/2021 1101    WBC 7.3 03/01/2021 1101     Lab Results   Component Value Date/Time    SODIUM 137 03/01/2021 1101    POTASSIUM 4.6 03/01/2021 1101    CHLORIDE 105 03/01/2021 1101    CO2 23 03/01/2021 1101    GLUCOSE 111 (H) 03/01/2021 1101    BUN 33 (H) 03/01/2021 1101    CALCIUM 9.0 03/01/2021 1101       SARS-CoV2 result: Negative      PREVIOUS ANESTHETICS: See EMR  __________________________________________      Relevant Problems   ANESTHESIA   (+) Sleep apnea      PULMONARY   (+) Asthma      CARDIAC   (+) Hypertension      Other   (+) Anesthesia   (+) Diabetes (HCC)       Physical Exam    Airway   Mallampati: III  TM distance: >3 FB  Neck ROM: full       Cardiovascular - normal exam  Rhythm: regular  Rate: normal  (-) murmur     Dental - normal exam           Pulmonary - normal exam  Breath sounds clear to auscultation     Abdominal   (+) obese     Neurological - normal exam               Anesthesia Plan    ASA 3   ASA physical  status 3 criteria: morbid obesity - BMI greater than or equal to 40 and COPD    Plan - general       Airway plan will be ETT          Induction: intravenous    Postoperative Plan: Postoperative administration of opioids is intended.    Pertinent diagnostic labs and testing reviewed    Informed Consent:    Anesthetic plan and risks discussed with patient and spouse.    Use of blood products discussed with: patient whom consented to blood products.

## 2021-03-05 NOTE — OR SURGEON
Immediate Post OP Note    PreOp Diagnosis: C3,4 right paracentral disc without right C4 radiculopathy, mild stenosis, C4,5 foraminal mass, C5,6 degenerative dsic, prior C6,7 ACDF + plate    PostOp Diagnosis: same + right C5,6 foraminal disc    Procedure(s):  DISCECTOMY, SPINE, CERVICAL, ANTERIOR APPROACH, WITH FUSION - C4-5 WITH PLATE, C5-6 WITH PLATE - Wound Class: Clean with Drain  REMOVAL, HARDWARE - C6-7 - Wound Class: Clean with Drain    Surgeon(s):  Rajesh Azar M.D.    Anesthesiologist/Type of Anesthesia:  Anesthesiologist: Magnus Prajapati M.D./General    Surgical Staff:  Assistant: Melvi Rivera, Student  Circulator: Graham Tan R.N.  Relief Circulator: Jayshree Wolf R.N.  Scrub Person: Juliette Camejo  Radiology Technologist: Zbigniew Appiah    Specimens removed if any:  * No specimens in log *    Estimated Blood Loss: < 50 cc    Findings: stenosis relieved, nice placement of hardware, stable ssep's, emg's, mep's, rln monitoring    Complications: none        3/5/2021 10:27 AM Rajesh Azar M.D.

## 2021-03-06 PROBLEM — I16.0 ASYMPTOMATIC HYPERTENSIVE URGENCY: Status: ACTIVE | Noted: 2021-03-06

## 2021-03-06 PROBLEM — I10 UNCONTROLLED HYPERTENSION: Status: RESOLVED | Noted: 2021-03-06 | Resolved: 2021-03-06

## 2021-03-06 PROBLEM — I10 UNCONTROLLED HYPERTENSION: Status: ACTIVE | Noted: 2021-03-06

## 2021-03-06 LAB
GLUCOSE BLD-MCNC: 247 MG/DL (ref 65–99)
GLUCOSE BLD-MCNC: 267 MG/DL (ref 65–99)
GLUCOSE BLD-MCNC: 331 MG/DL (ref 65–99)
GLUCOSE BLD-MCNC: 332 MG/DL (ref 65–99)

## 2021-03-06 PROCEDURE — 700101 HCHG RX REV CODE 250: Performed by: STUDENT IN AN ORGANIZED HEALTH CARE EDUCATION/TRAINING PROGRAM

## 2021-03-06 PROCEDURE — 96372 THER/PROPH/DIAG INJ SC/IM: CPT

## 2021-03-06 PROCEDURE — 97161 PT EVAL LOW COMPLEX 20 MIN: CPT

## 2021-03-06 PROCEDURE — 96376 TX/PRO/DX INJ SAME DRUG ADON: CPT

## 2021-03-06 PROCEDURE — 97165 OT EVAL LOW COMPLEX 30 MIN: CPT

## 2021-03-06 PROCEDURE — 82962 GLUCOSE BLOOD TEST: CPT

## 2021-03-06 PROCEDURE — A9270 NON-COVERED ITEM OR SERVICE: HCPCS | Performed by: STUDENT IN AN ORGANIZED HEALTH CARE EDUCATION/TRAINING PROGRAM

## 2021-03-06 PROCEDURE — 700111 HCHG RX REV CODE 636 W/ 250 OVERRIDE (IP): Performed by: STUDENT IN AN ORGANIZED HEALTH CARE EDUCATION/TRAINING PROGRAM

## 2021-03-06 PROCEDURE — 94660 CPAP INITIATION&MGMT: CPT

## 2021-03-06 PROCEDURE — 96375 TX/PRO/DX INJ NEW DRUG ADDON: CPT

## 2021-03-06 PROCEDURE — 94760 N-INVAS EAR/PLS OXIMETRY 1: CPT

## 2021-03-06 PROCEDURE — 700102 HCHG RX REV CODE 250 W/ 637 OVERRIDE(OP): Performed by: STUDENT IN AN ORGANIZED HEALTH CARE EDUCATION/TRAINING PROGRAM

## 2021-03-06 PROCEDURE — 99212 OFFICE O/P EST SF 10 MIN: CPT | Performed by: STUDENT IN AN ORGANIZED HEALTH CARE EDUCATION/TRAINING PROGRAM

## 2021-03-06 PROCEDURE — G0378 HOSPITAL OBSERVATION PER HR: HCPCS

## 2021-03-06 RX ORDER — DEXTROSE MONOHYDRATE 25 G/50ML
50 INJECTION, SOLUTION INTRAVENOUS
Status: DISCONTINUED | OUTPATIENT
Start: 2021-03-06 | End: 2021-03-07 | Stop reason: HOSPADM

## 2021-03-06 RX ORDER — HYDROCODONE BITARTRATE AND ACETAMINOPHEN 10; 325 MG/1; MG/1
1 TABLET ORAL EVERY 6 HOURS PRN
Qty: 56 TABLET | Refills: 0 | Status: SHIPPED | OUTPATIENT
Start: 2021-03-06 | End: 2021-03-20

## 2021-03-06 RX ORDER — CLONIDINE 0.1 MG/24H
1 PATCH, EXTENDED RELEASE TRANSDERMAL
Status: DISCONTINUED | OUTPATIENT
Start: 2021-03-06 | End: 2021-03-07 | Stop reason: HOSPADM

## 2021-03-06 RX ORDER — CYCLOBENZAPRINE HCL 10 MG
10 TABLET ORAL 3 TIMES DAILY PRN
Qty: 90 TABLET | Refills: 0 | Status: SHIPPED | OUTPATIENT
Start: 2021-03-06

## 2021-03-06 RX ORDER — AMLODIPINE BESYLATE 5 MG/1
5 TABLET ORAL
Status: DISCONTINUED | OUTPATIENT
Start: 2021-03-06 | End: 2021-03-07

## 2021-03-06 RX ORDER — CARVEDILOL 6.25 MG/1
25 TABLET ORAL 2 TIMES DAILY WITH MEALS
Status: DISCONTINUED | OUTPATIENT
Start: 2021-03-07 | End: 2021-03-07 | Stop reason: HOSPADM

## 2021-03-06 RX ORDER — INSULIN GLARGINE 100 [IU]/ML
0.2 INJECTION, SOLUTION SUBCUTANEOUS EVERY EVENING
Status: DISCONTINUED | OUTPATIENT
Start: 2021-03-07 | End: 2021-03-07 | Stop reason: HOSPADM

## 2021-03-06 RX ORDER — CARVEDILOL 6.25 MG/1
12.5 TABLET ORAL ONCE
Status: DISCONTINUED | OUTPATIENT
Start: 2021-03-06 | End: 2021-03-06

## 2021-03-06 RX ORDER — LABETALOL HYDROCHLORIDE 5 MG/ML
10 INJECTION, SOLUTION INTRAVENOUS
Status: DISCONTINUED | OUTPATIENT
Start: 2021-03-06 | End: 2021-03-07 | Stop reason: HOSPADM

## 2021-03-06 RX ORDER — CHLORTHALIDONE 25 MG/1
50 TABLET ORAL DAILY
Status: DISCONTINUED | OUTPATIENT
Start: 2021-03-07 | End: 2021-03-07 | Stop reason: HOSPADM

## 2021-03-06 RX ADMIN — CLONIDINE 1 PATCH: 0.1 PATCH TRANSDERMAL at 16:09

## 2021-03-06 RX ADMIN — CYCLOBENZAPRINE 10 MG: 10 TABLET, FILM COATED ORAL at 04:32

## 2021-03-06 RX ADMIN — LABETALOL HYDROCHLORIDE 10 MG: 5 INJECTION, SOLUTION INTRAVENOUS at 13:53

## 2021-03-06 RX ADMIN — LISINOPRIL 20 MG: 20 TABLET ORAL at 17:39

## 2021-03-06 RX ADMIN — LABETALOL HYDROCHLORIDE 10 MG: 5 INJECTION, SOLUTION INTRAVENOUS at 22:14

## 2021-03-06 RX ADMIN — FERROUS SULFATE TAB 325 MG (65 MG ELEMENTAL FE) 325 MG: 325 (65 FE) TAB at 04:31

## 2021-03-06 RX ADMIN — HYDROCODONE BITARTRATE AND ACETAMINOPHEN 1 TABLET: 10; 325 TABLET ORAL at 04:31

## 2021-03-06 RX ADMIN — CARVEDILOL 12.5 MG: 12.5 TABLET, FILM COATED ORAL at 17:40

## 2021-03-06 RX ADMIN — CYCLOBENZAPRINE 10 MG: 10 TABLET, FILM COATED ORAL at 16:06

## 2021-03-06 RX ADMIN — INSULIN LISPRO 9 UNITS: 100 INJECTION, SOLUTION INTRAVENOUS; SUBCUTANEOUS at 17:46

## 2021-03-06 RX ADMIN — CEFAZOLIN SODIUM 2 G: 2 INJECTION, SOLUTION INTRAVENOUS at 07:54

## 2021-03-06 RX ADMIN — CALCIUM CARBONATE 500 MG: 500 TABLET, CHEWABLE ORAL at 06:00

## 2021-03-06 RX ADMIN — INSULIN GLARGINE 28 UNITS: 100 INJECTION, SOLUTION SUBCUTANEOUS at 17:44

## 2021-03-06 RX ADMIN — LEVOTHYROXINE SODIUM 150 MCG: 0.15 TABLET ORAL at 05:27

## 2021-03-06 RX ADMIN — AMLODIPINE BESYLATE 5 MG: 5 TABLET ORAL at 23:33

## 2021-03-06 RX ADMIN — GABAPENTIN 300 MG: 300 CAPSULE ORAL at 20:22

## 2021-03-06 RX ADMIN — CHLORTHALIDONE 25 MG: 25 TABLET ORAL at 04:32

## 2021-03-06 RX ADMIN — LATANOPROST 1 DROP: 50 SOLUTION OPHTHALMIC at 16:25

## 2021-03-06 RX ADMIN — LABETALOL HYDROCHLORIDE 10 MG: 5 INJECTION, SOLUTION INTRAVENOUS at 18:26

## 2021-03-06 RX ADMIN — LISINOPRIL 20 MG: 20 TABLET ORAL at 04:32

## 2021-03-06 RX ADMIN — INSULIN LISPRO 9 UNITS: 100 INJECTION, SOLUTION INTRAVENOUS; SUBCUTANEOUS at 12:07

## 2021-03-06 RX ADMIN — CARVEDILOL 12.5 MG: 12.5 TABLET, FILM COATED ORAL at 07:53

## 2021-03-06 RX ADMIN — LABETALOL HYDROCHLORIDE 10 MG: 5 INJECTION, SOLUTION INTRAVENOUS at 23:34

## 2021-03-06 RX ADMIN — GABAPENTIN 300 MG: 300 CAPSULE ORAL at 07:54

## 2021-03-06 RX ADMIN — ATORVASTATIN CALCIUM 40 MG: 40 TABLET, FILM COATED ORAL at 20:22

## 2021-03-06 RX ADMIN — DOCUSATE SODIUM 100 MG: 100 CAPSULE, LIQUID FILLED ORAL at 16:06

## 2021-03-06 RX ADMIN — Medication 5000 UNITS: at 04:32

## 2021-03-06 RX ADMIN — LABETALOL HYDROCHLORIDE 10 MG: 5 INJECTION, SOLUTION INTRAVENOUS at 05:27

## 2021-03-06 RX ADMIN — DEXAMETHASONE SODIUM PHOSPHATE 4 MG: 4 INJECTION, SOLUTION INTRA-ARTICULAR; INTRALESIONAL; INTRAMUSCULAR; INTRAVENOUS; SOFT TISSUE at 07:55

## 2021-03-06 RX ADMIN — CYCLOBENZAPRINE 10 MG: 10 TABLET, FILM COATED ORAL at 11:53

## 2021-03-06 RX ADMIN — DOCUSATE SODIUM 50 MG AND SENNOSIDES 8.6 MG 1 TABLET: 8.6; 5 TABLET, FILM COATED ORAL at 20:22

## 2021-03-06 RX ADMIN — LABETALOL HYDROCHLORIDE 10 MG: 5 INJECTION, SOLUTION INTRAVENOUS at 11:56

## 2021-03-06 RX ADMIN — DOCUSATE SODIUM 100 MG: 100 CAPSULE, LIQUID FILLED ORAL at 04:32

## 2021-03-06 RX ADMIN — INSULIN LISPRO 9 UNITS: 100 INJECTION, SOLUTION INTRAVENOUS; SUBCUTANEOUS at 08:05

## 2021-03-06 ASSESSMENT — GAIT ASSESSMENTS
DISTANCE (FEET): 15
DISTANCE (FEET): 100
DEVIATION: INCREASED BASE OF SUPPORT
GAIT LEVEL OF ASSIST: SUPERVISED

## 2021-03-06 ASSESSMENT — PAIN DESCRIPTION - PAIN TYPE
TYPE: SURGICAL PAIN
TYPE: SURGICAL PAIN

## 2021-03-06 ASSESSMENT — COGNITIVE AND FUNCTIONAL STATUS - GENERAL
MOBILITY SCORE: 24
HELP NEEDED FOR BATHING: A LITTLE
DAILY ACTIVITIY SCORE: 23
SUGGESTED CMS G CODE MODIFIER DAILY ACTIVITY: CI
SUGGESTED CMS G CODE MODIFIER MOBILITY: CH

## 2021-03-06 ASSESSMENT — ACTIVITIES OF DAILY LIVING (ADL): TOILETING: INDEPENDENT

## 2021-03-06 NOTE — THERAPY
Physical Therapy   Initial Evaluation     Patient Name: Marlo León  Age:  65 y.o., Sex:  male  Medical Record #: [id number]  Today's Date: 3/6/2021     Precautions: (P) Cervical Collar  , Spinal / Back Precautions     Assessment  Patient is 65 y.o. male s/p cervical procedure. Pt spouse present during session. Pt demonstrated impaired LE sensation bilaterally, reported to be baseline. Pt demonstrated no limitations in functional mobility and good understanding of cervical precautions. Recommend discharge home when medically cleared.    Plan    Recommend Physical Therapy for Evaluation only     DC Equipment Recommendations: (P) None  Discharge Recommendations: (P) Anticipate that the patient will have no further physical therapy needs after discharge from the hospital       Abridged Subjective/Objective       03/06/21 1005   Prior Living Situation   Prior Services None   Housing / Facility 1 Story House   Steps Into Home 2   Steps In Home 0   Equipment Owned None   Lives with - Patient's Self Care Capacity Spouse   Comments spouse able to assist   Prior Level of Functional Mobility   Bed Mobility Independent   Transfer Status Independent   Ambulation Independent   Distance Ambulation (Feet)   (community)   Assistive Devices Used None   Stairs Independent   Cognition    Cognition / Consciousness WDL   Level of Consciousness Alert   Comments appropriate responses, motivated to participate   Active ROM Lower Body    Active ROM Lower Body  WDL   Strength Lower Body   Lower Body Strength  WDL   Sensation Lower Body   Lower Extremity Sensation   X   Comments diminshed sensation medial foot and ankle bilaterally, reported baseline neuopathy   Strength Upper Body   Upper Body Strength  WDL   Balance Assessment   Sitting Balance (Static) Good   Sitting Balance (Dynamic) Good   Standing Balance (Static) Good   Standing Balance (Dynamic) Fair +   Weight Shift Sitting Good   Weight Shift Standing Fair   Comments no AD, no LOB    Gait Analysis   Gait Level Of Assist Supervised   Assistive Device None   Distance (Feet) 100   # of Times Distance was Traveled 1   Deviation Increased Base Of Support   # of Stairs Climbed 5   Level of Assist with Stairs Independent   Weight Bearing Status no restrictions   Vision Deficits Impacting Mobility none noted   Comments LImitations not demonstrated   Bed Mobility    Supine to Sit Independent   Sit to Supine Independent   Scooting Independent   Rolling Independent   Functional Mobility   Sit to Stand Independent   Bed, Chair, Wheelchair Transfer Independent   How much difficulty does the patient currently have...   Turning over in bed (including adjusting bedclothes, sheets and blankets)? 4   Sitting down on and standing up from a chair with arms (e.g., wheelchair, bedside commode, etc.) 4   Moving from lying on back to sitting on the side of the bed? 4   How much help from another person does the patient currently need...   Moving to and from a bed to a chair (including a wheelchair)? 4   Need to walk in a hospital room? 4   Climbing 3-5 steps with a railing? 4   6 clicks Mobility Score 24   Activity Tolerance   Sitting Edge of Bed 10 + min  (Left upright EOB)   Standing 5 min   Comments Limitations not demonstrated during session

## 2021-03-06 NOTE — PROGRESS NOTES
0715 Patient's in bed. Bedside report from RADHA Matthews RN at the beginning of the shift.     1114 New order received and acknowledged from JOSE MARTIN Ogden for the patient to be discharged home.     1402 Placed a call to JOSE MARTIN Ogden, awaiting for response.    1406 Received a call from JOSE MARTIN Ogden. Notified the said JOSE MARTIN that patient's BP has been elevated even with prn medication. New order received and acknowledged to cancel dischaerge order, d/c drain and will monitor BP. Notified patient and spouse. Verbalized understanding.    1459 New order received and acknowledged from JOSE MARTIN Ogden (see MAR).    1610 Hemovac drain dc'd as per order, covered with gauze, steri strips and tegaderm. No bleeding noted.     1826 Medicated with labetalol (see MAR) for BP - 194/76.    1915 Patient's sitting up EOB. No changes in status. Bedside report given to RADHA Cleaning RN

## 2021-03-06 NOTE — PROGRESS NOTES
0700 Bedside report received from RADHA Matthews RN.     1041 Notified that there was some dried blood on pt's right lateral foot. Pt explained it may have been from when he bumped his foot against the table. The dried blood was cleaned up and an ice bag was provided to reduce the swelling.     1514 Spoke with Melvi Rivera P.A.-C. Clonidine ordered. Discussed the removal of drain and plans of discharge if BP stabilized.    1600 Drain was removed from pt. Watching for BP to stabilize before discharge.       1740 Pt sitting comfortably in bed. No distress noted.

## 2021-03-06 NOTE — PROGRESS NOTES
Neurosurgery Progress Note    Subjective:  Patient overall doing well  Denies new weakness, numbness, or pain  Ambulating and voiding without difficulty    Exam:  VSS  A&Ox4, NAD  No hoarseness of voice.   Trachea midline, no difficulty swallowing - no coughing or choking while drinking water   No nuchal rigidity   NM: 4+/5 right 5/5 left deltoid, 4+/5 right 5/5 left biceps, 4/5 right 5/5 left handgrip. 5/5 triceps bilaterally  Sensation intact and equal throughout all four extremities.   Abdomen: soft, non-tender  Pulmonary: non-labored breathing on room air, normal respiratory effort  No LE edema, erythema, cyanosis, clubbing  Calves non-tender to compression bilat  Incision CDI, no halo sign   C-collar being worn appropriately  Drain: 25 mL overnight    BP  Min: 108/73  Max: 187/73  Pulse  Av.8  Min: 66  Max: 86  Resp  Av.6  Min: 12  Max: 19  Temp  Av.5 °C (97.7 °F)  Min: 36.1 °C (97 °F)  Max: 36.9 °C (98.5 °F)  SpO2  Av.4 %  Min: 92 %  Max: 98 %    No data recorded                      Intake/Output       21 - 21 0659 21 - 21 0659       Total  Total       Intake    I.V.  1400  -- 1400  --  -- --    Volume (mL) (lactated ringers infusion) 1400 -- 1400 -- -- --    Total Intake 1400 -- 1400 -- -- --       Output    Urine  --  500 500  --  -- --    Number of Times Voided 1 x 1 x 2 x -- -- --    Urine Void (mL) -- 500 500 -- -- --    Drains  0  25 25  --  -- --    Output (mL) (Closed/Suction Drain Anterior Neck Hemovac) 0 25 25 -- -- --    Blood  50  -- 50  --  -- --    Est. Blood Loss 50 -- 50 -- -- --    Total Output 50 525 575 -- -- --       Net I/O     1350 -525 825 -- -- --            Intake/Output Summary (Last 24 hours) at 3/6/2021 1000  Last data filed at 3/6/2021 0523  Gross per 24 hour   Intake 400 ml   Output 575 ml   Net -175 ml            • albuterol  2 Puff Q6HRS PRN (RT)   • atorvastatin  40 mg Nightly   •  carvedilol  12.5 mg BID WITH MEALS   • chlorthalidone  25 mg DAILY   • vitamin D  5,000 Units DAILY   • cyclobenzaprine  10 mg TID   • ferrous sulfate  325 mg DAILY   • gabapentin  300 mg BID   • levothyroxine  150 mcg AM ES   • lisinopril  20 mg BID   • Pharmacy Consult Request  1 Each PHARMACY TO DOSE   • MD ALERT...DO NOT ADMINISTER NSAIDS or ASPIRIN unless ORDERED By Neurosurgery  1 Each PRN   • docusate sodium  100 mg BID   • senna-docusate  1 tablet Nightly   • senna-docusate  1 tablet Q24HRS PRN   • polyethylene glycol/lytes  1 Packet BID PRN   • magnesium hydroxide  30 mL QDAY PRN   • bisacodyl  10 mg Q24HRS PRN   • fleet  1 Each Once PRN   • NS   Continuous   • acetaminophen  500 mg Q6HRS PRN    Followed by   • [START ON 3/10/2021] acetaminophen  1,000 mg Q6HRS PRN   • diphenhydrAMINE  25 mg Q6HRS PRN    Or   • diphenhydrAMINE  25 mg Q6HRS PRN   • ondansetron  4 mg Q4HRS PRN   • ondansetron  4 mg Q4HRS PRN   • promethazine  12.5-25 mg Q4HRS PRN   • promethazine  12.5-25 mg Q4HRS PRN   • prochlorperazine  5-10 mg Q4HRS PRN   • labetalol  10 mg Q HOUR PRN   • benzocaine-menthol  1 Lozenge Q2HRS PRN   • calcium carbonate  500 mg BID   • insulin glargine  0.2 Units/kg/day Q EVENING    And   • insulin lispro  0.2 Units/kg/day TID AC    And   • insulin lispro  1-6 Units 4X/DAY ACHS    And   • glucose  16 g Q15 MIN PRN    And   • dextrose 50%  50 mL Q15 MIN PRN   • HYDROcodone/acetaminophen  1 tablet Q4HRS PRN   • oxyCODONE-acetaminophen  1 tablet Q4HRS PRN   • morphine injection  2 mg Q3HRS PRN   • latanoprost  1 Drop Q EVENING   • dexamethasone  4 mg Q8HRS       Assessment and Plan:  POD#1  C4-6 ACDF with plate C6-7 plate removal    Pt overall doing well.   D/c drain per protocol.  Ambulating and voiding without difficulty.   Okay to discharge home today.    INCISION CARE:  OK to shower with incision covered or uncovered. After shower, pat incision dry and cover with gauze and tape if draining. OK to leave open  to air if not draining.   Steri-strips, if applicable can be removed as they fall off on their own naturally.     RESTRICTIONS:  Continue to wear your brace as directed   No lifting greater than 10 pounds, no repetitive bending or twisting  No driving for two weeks, no driving while on narcotic medication or muscle relaxers  No aspirin, blood thinners, NSAIDS (non-steroidal anti-inflammatory medications - aleve, motrin, ibuprofen, celebrex) for two weeks     RECOMMENDATIONS:  Over the counter stool softeners daily while on narcotics  Ambulate often to prevent blood clots in your legs  Continue incentive spirometer hourly   Follow up at [facility] in 2 weeks after surgery.    Please call [phone] to confirm the date, location, and time of your follow up appointment that was made for you.     Chemical prophylactic DVT therapy: No    Case d/w Dr. Azar

## 2021-03-06 NOTE — THERAPY
Occupational Therapy   Initial Evaluation     Patient Name: Marlo León  Age:  65 y.o., Sex:  male  Medical Record #: [id number]  Today's Date: 3/6/2021     Precautions: Spinal / Back Precautions , Cervical Collar    Comments: Soft collar, off for showers & meals     Assessment    Patient is 65 y.o. male s/p C4-C6 anterior fusion, C6-C7 hardware removal. Pt seen for OT eval; spouse present. Educated/trained pt and spouse on: neutral spine, lifting restriction, compensatory ADL. Pt is completing basic ADL and transfers with no more than supv in this setting. No further acute OT needs at this time.     Plan    Recommend Occupational Therapy for Evaluation only     DC Equipment Recommendations: None  Discharge Recommendations: Anticipate that the patient will have no further occupational therapy needs after discharge from the hospital      Objective       03/06/21 1054   Prior Living Situation   Housing / Facility 1 Story House   Steps Into Home 2   Bathroom Set up Walk In Shower   Comments Pt resides with spouse who can assist as needed on DC   Prior Level of ADL Function   Comments Pt was independent with BADL PTA. Required use of non-dominant L hand for self-feeding intermittently due to RUE dysfunction    Prior Level of IADL Function   Comments Pt was independent with I-ADL and functional mobility PTA   Active ROM Upper Body   Dominant Hand Right   Rt Shoulder Flexion Degrees 160   Strength Upper Body   Rt Elbow Extension Strength 4- (G-)   Sensation Upper Body   Comments H/o B hand neuropathy; reports decreased paresthesias since procedure    Coordination Upper Body   Comments managed oral care supplies without difficulty    Balance Assessment   Sitting Balance (Static) Good   Sitting Balance (Dynamic) Good   Standing Balance (Static) Good   Standing Balance (Dynamic) Fair +   Weight Shift Sitting Good   Weight Shift Standing Fair   Comments no AD, no LOB   Bed Mobility    Supine to Sit   (NT, EOB pre )   Sit to  Supine Modified Independent   Scooting Modified Independent   Rolling Modified Independent   Comments flat bed, no rail; via log roll    ADL Assessment   Grooming Supervision;Standing  (oral care at sink )   Upper Body Dressing Supervision  (gown )   Lower Body Dressing Supervision  (doff/don B socks in alternating supported half frog-sit EOB)   Toileting   (declined need; did toilet transfer and simulated hygiene )   Functional Mobility   Sit to Stand Supervised   Bed, Chair, Wheelchair Transfer Supervised   Toilet Transfers Supervised   Transfer Method Stand Step  (no AD)   Mobility Short gait and transfers in room without AD

## 2021-03-07 VITALS
BODY MASS INDEX: 44.03 KG/M2 | HEART RATE: 72 BPM | RESPIRATION RATE: 17 BRPM | HEIGHT: 70 IN | OXYGEN SATURATION: 95 % | DIASTOLIC BLOOD PRESSURE: 60 MMHG | TEMPERATURE: 97.4 F | WEIGHT: 307.54 LBS | SYSTOLIC BLOOD PRESSURE: 144 MMHG

## 2021-03-07 PROBLEM — E66.01 MORBID OBESITY (HCC): Status: ACTIVE | Noted: 2021-03-07

## 2021-03-07 PROBLEM — E78.5 HYPERLIPIDEMIA: Status: ACTIVE | Noted: 2021-03-07

## 2021-03-07 PROBLEM — E03.9 HYPOTHYROIDISM: Status: ACTIVE | Noted: 2021-03-07

## 2021-03-07 LAB
ALBUMIN SERPL BCP-MCNC: 3.1 G/DL (ref 3.2–4.9)
ALBUMIN/GLOB SERPL: 1.1 G/DL
ALP SERPL-CCNC: 63 U/L (ref 30–99)
ALT SERPL-CCNC: 11 U/L (ref 2–50)
ANION GAP SERPL CALC-SCNC: 12 MMOL/L (ref 7–16)
AST SERPL-CCNC: 16 U/L (ref 12–45)
BASOPHILS # BLD AUTO: 0.4 % (ref 0–1.8)
BASOPHILS # BLD: 0.04 K/UL (ref 0–0.12)
BILIRUB SERPL-MCNC: 0.2 MG/DL (ref 0.1–1.5)
BUN SERPL-MCNC: 35 MG/DL (ref 8–22)
CALCIUM SERPL-MCNC: 8.8 MG/DL (ref 8.5–10.5)
CHLORIDE SERPL-SCNC: 104 MMOL/L (ref 96–112)
CO2 SERPL-SCNC: 22 MMOL/L (ref 20–33)
CREAT SERPL-MCNC: 1.64 MG/DL (ref 0.5–1.4)
EOSINOPHIL # BLD AUTO: 0.12 K/UL (ref 0–0.51)
EOSINOPHIL NFR BLD: 1.2 % (ref 0–6.9)
ERYTHROCYTE [DISTWIDTH] IN BLOOD BY AUTOMATED COUNT: 48.4 FL (ref 35.9–50)
GLOBULIN SER CALC-MCNC: 2.7 G/DL (ref 1.9–3.5)
GLUCOSE BLD-MCNC: 161 MG/DL (ref 65–99)
GLUCOSE BLD-MCNC: 249 MG/DL (ref 65–99)
GLUCOSE SERPL-MCNC: 191 MG/DL (ref 65–99)
HCT VFR BLD AUTO: 35.6 % (ref 42–52)
HGB BLD-MCNC: 11.5 G/DL (ref 14–18)
IMM GRANULOCYTES # BLD AUTO: 0.04 K/UL (ref 0–0.11)
IMM GRANULOCYTES NFR BLD AUTO: 0.4 % (ref 0–0.9)
LYMPHOCYTES # BLD AUTO: 2.38 K/UL (ref 1–4.8)
LYMPHOCYTES NFR BLD: 23.7 % (ref 22–41)
MCH RBC QN AUTO: 29.4 PG (ref 27–33)
MCHC RBC AUTO-ENTMCNC: 32.3 G/DL (ref 33.7–35.3)
MCV RBC AUTO: 91 FL (ref 81.4–97.8)
MONOCYTES # BLD AUTO: 0.8 K/UL (ref 0–0.85)
MONOCYTES NFR BLD AUTO: 8 % (ref 0–13.4)
NEUTROPHILS # BLD AUTO: 6.66 K/UL (ref 1.82–7.42)
NEUTROPHILS NFR BLD: 66.3 % (ref 44–72)
NRBC # BLD AUTO: 0 K/UL
NRBC BLD-RTO: 0 /100 WBC
PLATELET # BLD AUTO: 191 K/UL (ref 164–446)
PMV BLD AUTO: 11.5 FL (ref 9–12.9)
POTASSIUM SERPL-SCNC: 4.2 MMOL/L (ref 3.6–5.5)
PROT SERPL-MCNC: 5.8 G/DL (ref 6–8.2)
RBC # BLD AUTO: 3.91 M/UL (ref 4.7–6.1)
SODIUM SERPL-SCNC: 138 MMOL/L (ref 135–145)
WBC # BLD AUTO: 10 K/UL (ref 4.8–10.8)

## 2021-03-07 PROCEDURE — 96376 TX/PRO/DX INJ SAME DRUG ADON: CPT

## 2021-03-07 PROCEDURE — G0378 HOSPITAL OBSERVATION PER HR: HCPCS

## 2021-03-07 PROCEDURE — 99217 PR OBSERVATION CARE DISCHARGE: CPT | Performed by: STUDENT IN AN ORGANIZED HEALTH CARE EDUCATION/TRAINING PROGRAM

## 2021-03-07 PROCEDURE — 85025 COMPLETE CBC W/AUTO DIFF WBC: CPT

## 2021-03-07 PROCEDURE — A9270 NON-COVERED ITEM OR SERVICE: HCPCS | Performed by: STUDENT IN AN ORGANIZED HEALTH CARE EDUCATION/TRAINING PROGRAM

## 2021-03-07 PROCEDURE — 96372 THER/PROPH/DIAG INJ SC/IM: CPT

## 2021-03-07 PROCEDURE — 80053 COMPREHEN METABOLIC PANEL: CPT

## 2021-03-07 PROCEDURE — 94660 CPAP INITIATION&MGMT: CPT

## 2021-03-07 PROCEDURE — 36415 COLL VENOUS BLD VENIPUNCTURE: CPT

## 2021-03-07 PROCEDURE — 82962 GLUCOSE BLOOD TEST: CPT

## 2021-03-07 PROCEDURE — 700102 HCHG RX REV CODE 250 W/ 637 OVERRIDE(OP): Performed by: STUDENT IN AN ORGANIZED HEALTH CARE EDUCATION/TRAINING PROGRAM

## 2021-03-07 RX ORDER — NIFEDIPINE 30 MG
30 TABLET, EXTENDED RELEASE ORAL DAILY
Qty: 30 TABLET | Refills: 0 | Status: SHIPPED | OUTPATIENT
Start: 2021-03-08 | End: 2021-04-07

## 2021-03-07 RX ORDER — CHLORTHALIDONE 50 MG/1
50 TABLET ORAL DAILY
Qty: 30 TABLET | Refills: 0 | Status: SHIPPED | OUTPATIENT
Start: 2021-03-08 | End: 2021-04-07

## 2021-03-07 RX ORDER — NIFEDIPINE 30 MG/1
30 TABLET, EXTENDED RELEASE ORAL
Status: DISCONTINUED | OUTPATIENT
Start: 2021-03-07 | End: 2021-03-07 | Stop reason: HOSPADM

## 2021-03-07 RX ORDER — CARVEDILOL 25 MG/1
25 TABLET ORAL 2 TIMES DAILY WITH MEALS
Qty: 60 TABLET | Refills: 0 | Status: SHIPPED | OUTPATIENT
Start: 2021-03-07 | End: 2021-04-06

## 2021-03-07 RX ORDER — AMLODIPINE BESYLATE 5 MG/1
5 TABLET ORAL ONCE
Status: DISCONTINUED | OUTPATIENT
Start: 2021-03-07 | End: 2021-03-07

## 2021-03-07 RX ADMIN — DOCUSATE SODIUM 100 MG: 100 CAPSULE, LIQUID FILLED ORAL at 05:46

## 2021-03-07 RX ADMIN — NIFEDIPINE 30 MG: 30 TABLET, EXTENDED RELEASE ORAL at 02:25

## 2021-03-07 RX ADMIN — LEVOTHYROXINE SODIUM 150 MCG: 0.15 TABLET ORAL at 05:46

## 2021-03-07 RX ADMIN — CARVEDILOL 25 MG: 6.25 TABLET, FILM COATED ORAL at 07:34

## 2021-03-07 RX ADMIN — Medication 5000 UNITS: at 05:46

## 2021-03-07 RX ADMIN — HYDROCODONE BITARTRATE AND ACETAMINOPHEN 1 TABLET: 10; 325 TABLET ORAL at 11:36

## 2021-03-07 RX ADMIN — CHLORTHALIDONE 50 MG: 25 TABLET ORAL at 05:46

## 2021-03-07 RX ADMIN — FERROUS SULFATE TAB 325 MG (65 MG ELEMENTAL FE) 325 MG: 325 (65 FE) TAB at 05:46

## 2021-03-07 RX ADMIN — CYCLOBENZAPRINE 10 MG: 10 TABLET, FILM COATED ORAL at 12:16

## 2021-03-07 RX ADMIN — LABETALOL HYDROCHLORIDE 10 MG: 5 INJECTION, SOLUTION INTRAVENOUS at 01:33

## 2021-03-07 RX ADMIN — CYCLOBENZAPRINE 10 MG: 10 TABLET, FILM COATED ORAL at 05:46

## 2021-03-07 RX ADMIN — GABAPENTIN 300 MG: 300 CAPSULE ORAL at 07:35

## 2021-03-07 RX ADMIN — CALCIUM CARBONATE 500 MG: 500 TABLET, CHEWABLE ORAL at 05:46

## 2021-03-07 RX ADMIN — LISINOPRIL 20 MG: 20 TABLET ORAL at 05:46

## 2021-03-07 ASSESSMENT — PAIN DESCRIPTION - PAIN TYPE
TYPE: SURGICAL PAIN
TYPE: SURGICAL PAIN

## 2021-03-07 ASSESSMENT — ENCOUNTER SYMPTOMS
NECK PAIN: 1
COUGH: 0
BLURRED VISION: 0
VOMITING: 0
FEVER: 0
HEADACHES: 1
SHORTNESS OF BREATH: 0
CHILLS: 0
NAUSEA: 0
FOCAL WEAKNESS: 1

## 2021-03-07 NOTE — PROGRESS NOTES
0720 Patient's sitting up in bed. Bedside report received from RADHA Myers RN at the beginning of the shift.     0735 Patient's sitting up EOB, Rates headache 2/10, declines pain medication at this time. Educated on the importance/use of IS at least 10x every hour while awake, able to reach 2500. Fall Protocol in effect. Call light within reach. Reminded patient to call for assist. Assessment completed. No distress noted. Plan of care reviewed with the patient. Verbalized understanding.    0900 Patient's in bed. No distress noted.     1019 JOSE MARTIN Ogden visited. POC discussed with the patient.     1034 New order received and acknowledged from Dr Manriquez for the patient to be discharged and (see MAR).    1136 Medicated with Norco (see MAR) for c/o's anterior and posterior neck pain, rates pain 4/10. Spouse visiting.     1310 Patient's sitting up in bed, having lunch. No distress noted. Per spouse e prescriptions were pending insurance authorization and will notiy Primary Nurse.       1420 Spouse notified Primary RN that medication is already available in Pharmacy.

## 2021-03-07 NOTE — ASSESSMENT & PLAN NOTE
Has history of hypertension on BP medications. Now uncontrolled. Last /73. No more than 25% reduction in MAP first hour than gradual reduction in BP.   -Increase coreg from 12.5 mg BID to 25 mg BID with holding parameters SBP<90 HR<55  -Increase chlorthalidone 25 mg to 50 mg daily   -Add Procardia SR 30 mg daily   -Continue with lisinopril 20 mg BID   -Would taper off clonidine patch   -2 gram sodium and moderate carb consistent diet.

## 2021-03-07 NOTE — PROGRESS NOTES
The pt's blood pressure is still elevated at 203/93 after a second dose of labetalol 10 mg IV. Dr. Doran was paged by this RN to update on pt condition. Dr. Doran, ordered this RN to give the pt 10 mg IV labetalol.

## 2021-03-07 NOTE — DISCHARGE SUMMARY
Discharge Summary    CHIEF COMPLAINT ON ADMISSION  No chief complaint on file.      Reason for Admission  CERVICAL HNP WITH MYELOPATHY     Admission Date  3/5/2021    CODE STATUS  Full Code    HPI & HOSPITAL COURSE  A 65-year-old man with h/o HTN, DM, HLD, hypothyroidism, morbid obesity presented with severe right arm radiculopathy. S/p C4-t cervical discetomy with fusion, with C5-6 plate placement on 3/5.  Medicine was consulted for uncontrolled BP on 3/6. Coreg increased form 12.5 mg bid to 25 mg bid, chlorthalidone increased from 25 mg to 50 mg daily, started procardia 30 mg daily, continued lisinopril 20 mg bid.  Afebrile, hemodynamically stable. BP well-controlled today.    Therefore, he is discharged in good and stable condition to home with close outpatient follow-up.    Asymptomatic hypertensive urgency  Assessment & Plan  Has history of hypertension on BP medications. Now uncontrolled. Last /73. No more than 25% reduction in MAP first hour than gradual reduction in BP.   -Increase coreg from 12.5 mg BID to 25 mg BID with holding parameters SBP<90 HR<55  -Increase chlorthalidone 25 mg to 50 mg daily   -Add Procardia SR 30 mg daily   -Continue with lisinopril 20 mg BID   -Would taper off clonidine patch   -2 gram sodium and moderate carb consistent diet.      Morbid obesity (HCC)  Assessment & Plan  Counseling regarding weight loss, diet and exercise      Hyperlipidemia  Assessment & Plan  Continue atorvastatin 40 mg     Hypothyroidism  Assessment & Plan  Continue levothyroxine 150 mcg     Diabetes (HCC)- (present on admission)  Assessment & Plan  Resume home medications  HgA1C 7.0      Discharge Date  3/7/21    FOLLOW UP ITEMS POST DISCHARGE  Follow up with PCP  Follow up with neurosurgery    DISCHARGE DIAGNOSES  Principal Problem:    Asymptomatic hypertensive urgency POA: Unknown  Active Problems:    Diabetes (HCC) POA: Yes      Overview: Insulin dependent.    Hypothyroidism POA: Unknown     Hyperlipidemia POA: Unknown    Morbid obesity (HCC) POA: Unknown  Resolved Problems:    Uncontrolled hypertension POA: Unknown      FOLLOW UP  No future appointments.  Ranjeet Gusman D.O.  [address] [facility] [id number] [phone]    In 1 week        MEDICATIONS ON DISCHARGE     Medication List      START taking these medications      Instructions   HYDROcodone/acetaminophen  MG Tabs  Commonly known as: Norco   Take 1 tablet by mouth every 6 hours as needed for up to 14 days.  Dose: 1 tablet     NIFEdipine 30 MG CR tablet  Start taking on: March 8, 2021  Commonly known as: ADALAT CC   Take 1 tablet by mouth every day for 30 days.  Dose: 30 mg        CHANGE how you take these medications      Instructions   * carvedilol 12.5 MG Tabs  What changed: Another medication with the same name was added. Make sure you understand how and when to take each.  Commonly known as: COREG   Take 12.5 mg by mouth 2 times a day, with meals.  Dose: 12.5 mg     * carvedilol 25 MG Tabs  What changed: You were already taking a medication with the same name, and this prescription was added. Make sure you understand how and when to take each.  Commonly known as: COREG   Take 1 tablet by mouth 2 times a day, with meals for 30 days.  Dose: 25 mg     * chlorthalidone 25 MG Tabs  What changed: Another medication with the same name was added. Make sure you understand how and when to take each.  Commonly known as: HYGROTON   Take 25 mg by mouth every day.  Dose: 25 mg     * chlorthalidone 50 MG Tabs  Start taking on: March 8, 2021  What changed: You were already taking a medication with the same name, and this prescription was added. Make sure you understand how and when to take each.  Commonly known as: HYGROTON   Take 1 tablet by mouth every day for 30 days.  Dose: 50 mg     cyclobenzaprine 10 mg Tabs  What changed:   · when to take this  · reasons to take this  Commonly known as: Flexeril   Take 1 tablet by mouth  3 times a day as needed.  Dose: 10 mg         * This list has 4 medication(s) that are the same as other medications prescribed for you. Read the directions carefully, and ask your doctor or other care provider to review them with you.            CONTINUE taking these medications      Instructions   albuterol 108 (90 Base) MCG/ACT Aers inhalation aerosol   Inhale 2 Puffs as needed for Shortness of Breath.  Dose: 2 Puff     atorvastatin 40 MG Tabs  Commonly known as: LIPITOR   Take 40 mg by mouth every evening.  Dose: 40 mg     Eylea 2 MG/0.05ML Soln  Generic drug: Aflibercept   by Intravitreal route as needed. Both eyes     Fe Tabs 325 (65 Fe) MG EC tablet  Generic drug: ferrous sulfate   Take 325 mg by mouth every day.  Dose: 325 mg     gabapentin 300 MG Caps  Commonly known as: NEURONTIN   Take 300 mg by mouth 2 (two) times a day. Takes 2 caps bid  Dose: 300 mg     GAVISCON-2 PO   Take 2 Tablets by mouth every 1 hour as needed.  Dose: 2 tablet     HumaLOG 100 UNIT/ML  Generic drug: insulin lispro   Inject 2-30 Units under the skin 3 times a day before meals. Sliding scale  Dose: 2-30 Units     * insulin glargine 100 UNIT/ML Soln  Commonly known as: Lantus   Inject 40 Units under the skin 2 times a day.  Dose: 40 Units     * Lantus 100 UNIT/ML Soln  Generic drug: insulin glargine   Inject 30 Units under the skin one time.  Dose: 30 Units     levothyroxine 150 MCG Tabs  Commonly known as: SYNTHROID   Take 150 mcg by mouth Every morning on an empty stomach.  Dose: 150 mcg     lisinopril 20 MG Tabs  Commonly known as: PRINIVIL   Take 20 mg by mouth 2 times a day.  Dose: 20 mg     Travatan Z 0.004 % Soln  Generic drug: travoprost   Place 1 Drop in right eye every evening.  Dose: 1 Drop     Trulicity 1.5 MG/0.5ML Sopn  Generic drug: Dulaglutide   Inject  under the skin every 7 days.     vitamin D3 125 MCG (5000 UT) Caps   Take 1 capsule by mouth every day.  Dose: 1 capsule         * This list has 2 medication(s) that  are the same as other medications prescribed for you. Read the directions carefully, and ask your doctor or other care provider to review them with you.                Allergies  Allergies   Allergen Reactions   • Neosporin [Neomycin-Polymyxin B] Rash     Rxn - ongoing     • Tape Unspecified     Has had blisters in the past         DIET  Orders Placed This Encounter   Procedures   • Diet Order Diet: Consistent CHO (Diabetic); Second Modifier: (optional): Cardiac     Standing Status:   Standing     Number of Occurrences:   1     Order Specific Question:   Diet:     Answer:   Consistent CHO (Diabetic) [4]     Order Specific Question:   Second Modifier: (optional)     Answer:   Cardiac [6]       ACTIVITY  As tolerated.  Weight bearing as tolerated    CONSULTATIONS  Neurosurgery  Hospitalist    PROCEDURES  C4-t cervical discetomy with fusion, with C5-6 plate placement on 3/5.    LABORATORY  Lab Results   Component Value Date    SODIUM 138 03/07/2021    POTASSIUM 4.2 03/07/2021    CHLORIDE 104 03/07/2021    CO2 22 03/07/2021    GLUCOSE 191 (H) 03/07/2021    BUN 35 (H) 03/07/2021    CREATININE 1.64 (H) 03/07/2021        Lab Results   Component Value Date    WBC 10.0 03/07/2021    HEMOGLOBIN 11.5 (L) 03/07/2021    HEMATOCRIT 35.6 (L) 03/07/2021    PLATELETCT 191 03/07/2021        Total time of the discharge process exceeds 35 minutes.

## 2021-03-07 NOTE — PROGRESS NOTES
Attending Hospitalist is Dr Manriquez starting at 0700. Please contact this physician for orders, updates or questions today.

## 2021-03-07 NOTE — CONSULTS
Hospital Medicine Consultation    Date of Service  3/6/2021    Referring Physician  Rajesh Azar M.D.    Consulting Physician  Jaskaran Doran M.D.    Reason for Consultation  Uncontrolled blood pressure     History of Presenting Illness  65 y.o. male with pmhx of hypertension, DM, Hypothyrodism, HLD, morbid obesity who presented 3/5/2021 with severe right arm radiculopathy  s/p neurosurgical intervention. Medicine consulted for uncontrolled blood pressure. He denies any headache, chest pain, shortness of breath at this time.     Most recent blood pressure /73, HR 70, RR 16, T 98*F. He was just given labetalol 10 mg IV.         Review of Systems  Review of Systems   All other systems reviewed and are negative.      Past Medical History   has a past medical history of Anesthesia, Arthritis, Asthma, Bowel habit changes, Breath shortness, Burn, Cataract (03/2021), Diabetes (HCC), Disorder of thyroid, Heart burn, High cholesterol, Hypertension, Indigestion, Renal disorder, Sleep apnea, Snoring, and Urinary incontinence.    Surgical History   has a past surgical history that includes tonsillectomy (1960); cervical disk and fusion anterior (9/21/2018); other orthopedic surgery (Left, 2006); other (2011); other (2007); other (2017); cervical disk and fusion anterior (3/5/2021); and hardware removal neuro (3/5/2021).    Family History  Reviewed and not pertinent     Social History   reports that he has never smoked. He has never used smokeless tobacco. He reports that he does not drink alcohol and does not use drugs.    Medications  Scheduled Medications   Medication Dose Frequency   • cloNIDine  1 Patch Q7 DAYS   • [START ON 3/7/2021] carvedilol  25 mg BID WITH MEALS   • amLODIPine  5 mg Q DAY   • [START ON 3/7/2021] chlorthalidone  50 mg DAILY   • [START ON 3/7/2021] insulin glargine  0.2 Units/kg/day Q EVENING    And   • [START ON 3/7/2021] insulin lispro  13 Units TID AC    And   • [START ON 3/7/2021] insulin  lispro  1-6 Units 4X/DAY ACHS   • atorvastatin  40 mg Nightly   • vitamin D  5,000 Units DAILY   • cyclobenzaprine  10 mg TID   • ferrous sulfate  325 mg DAILY   • gabapentin  300 mg BID   • levothyroxine  150 mcg AM ES   • lisinopril  20 mg BID   • Pharmacy Consult Request  1 Each PHARMACY TO DOSE   • docusate sodium  100 mg BID   • senna-docusate  1 tablet Nightly   • calcium carbonate  500 mg BID   • latanoprost  1 Drop Q EVENING   • dexamethasone  4 mg Q8HRS       Allergies  Allergies   Allergen Reactions   • Neosporin [Neomycin-Polymyxin B] Rash     Rxn - ongoing     • Tape Unspecified     Has had blisters in the past         Physical Exam  Temp:  [36.4 °C (97.6 °F)-37 °C (98.6 °F)] 36.9 °C (98.5 °F)  Pulse:  [70-86] 70  Resp:  [15-18] 16  BP: (142-227)/(62-91) 227/73  SpO2:  [91 %-97 %] 95 %    Physical Exam  Vitals and nursing note reviewed.   Constitutional:       Appearance: Normal appearance. He is obese.   HENT:      Head: Normocephalic and atraumatic.      Mouth/Throat:      Pharynx: Oropharynx is clear.   Eyes:      Extraocular Movements: Extraocular movements intact.      Pupils: Pupils are equal, round, and reactive to light.   Cardiovascular:      Rate and Rhythm: Normal rate and regular rhythm.   Abdominal:      General: Bowel sounds are normal.      Palpations: Abdomen is soft.      Comments: protuberant    Musculoskeletal:         General: No swelling or tenderness. Normal range of motion.      Cervical back: Neck supple.   Skin:     General: Skin is warm and dry.      Capillary Refill: Capillary refill takes less than 2 seconds.   Neurological:      General: No focal deficit present.      Mental Status: He is alert and oriented to person, place, and time. Mental status is at baseline.   Psychiatric:         Mood and Affect: Mood normal.         Behavior: Behavior normal.         Thought Content: Thought content normal.         Fluids  Date 03/06/21 0700 - 03/07/21 0659   Shift [id number] 5932-7228  7173-4291 24 Hour Total   INTAKE   P.O. 600 360  960   I.V. 1050   1050   Shift Total 1650 360  2010   OUTPUT   Drains 30   30   Shift Total 30   30   Weight (kg) 139.5 139.5 139.5 139.5         Assessment/Plan  * Asymptomatic hypertensive urgency  Assessment & Plan  Has history of hypertension on BP medications. Now uncontrolled. Last /73. No more than 25% reduction in MAP first hour than gradual reduction in BP.   -Increase coreg from 12.5 mg BID to 25 mg BID with holding parameters SBP<90 HR<55  -Increase chlorthalidone 25 mg to 50 mg daily   -Add Procardia SR 30 mg daily   -Continue with lisinopril 20 mg BID   -Would taper off clonidine patch   -2 gram sodium and moderate carb consistent diet.     Diabetes (HCC)- (present on admission)  Assessment & Plan  Uncontrolled will Increase Lantus to 35 units and Lispro to 13 units TID. Target -180.    Repeat CBC and CMP in am.

## 2021-03-07 NOTE — CARE PLAN
Problem: Pain Management  Goal: Pain level will decrease to patient's comfort goal  Outcome: PROGRESSING AS EXPECTED  Note: Educated on pain scale. Encouraged to verbalize pain. Will medicate as per MAR.      Problem: Safety  Goal: Will remain free from injury  Outcome: PROGRESSING AS EXPECTED  Note: Implement Standard precaution. Hand washing every encounter & before & after patient care. Verbalized understanding.  Outcome: PROGRESSING AS EXPECTED  Note: Safety precaution. Hand washing every encounter & before and after patient care. Verbalized understanding.     Problem: Infection  Goal: Will remain free from infection  Outcome: PROGRESSING AS EXPECTED  Note: Implement Standard precaution. Hand washing very encounter & before & after patient care. Verbalized understanding.      Problem: Knowledge Deficit  Goal: Knowledge of disease process/condition, treatment plan, diagnostic tests, and medications will improve     Outcome: PROGRESSING AS EXPECTED  Note: Discussed Plan of care. Discharge instructions. Questions answered. Verbalized understanding.

## 2021-03-07 NOTE — CARE PLAN
Problem: Venous Thromboembolism (VTW)/Deep Vein Thrombosis (DVT) Prevention:  Goal: Patient will participate in Venous Thrombosis (VTE)/Deep Vein Thrombosis (DVT)Prevention Measures  Outcome: PROGRESSING AS EXPECTED   The pt is ambulating often in room and in hallways   Problem: Knowledge Deficit  Goal: Knowledge of the prescribed therapeutic regimen will improve  Outcome: PROGRESSING AS EXPECTED   The pt is educated on all of his medications and why he is on them   Problem: Fluid Volume:  Goal: Will maintain balanced intake and output  Outcome: PROGRESSING AS EXPECTED   The pt is drinking water and urinating at a normal rate

## 2021-03-07 NOTE — PROGRESS NOTES
1456 Discharge instructions given to the patient and spouse. Questions answered. Patient was discharged with patient's belongings, cervical soft collar, e prescriptions via w/c accompanied by one female CNA & spouse via Private car.

## 2021-03-07 NOTE — PROGRESS NOTES
Neurosurgery Progress Note    Subjective:  Patient overall doing well  Denies new weakness, numbness, or pain  Ambulating and voiding without difficulty  Pt had uncontrolled BP yesterday. Max: 227/73. Hospital medicine consulted.   Pt is not symptomatic.    Exam:  VSS  A&Ox4, NAD  No hoarseness of voice.   Trachea midline, no difficulty swallowing - no coughing or choking while drinking water   No nuchal rigidity   NM: 4+/5 right 5/5 left deltoid, 4+/5 right 5/5 left biceps, 4/5 right 5/5 left handgrip. 5/5 triceps bilaterally  Sensation intact and equal throughout all four extremities.   Abdomen: soft, non-tender  Pulmonary: non-labored breathing on room air, normal respiratory effort  No LE edema, erythema, cyanosis, clubbing  Calves non-tender to compression bilat  Incision CDI, no halo sign   C-collar being worn appropriately    BP  Min: 152/69  Max: 227/73  Pulse  Av.5  Min: 70  Max: 79  Resp  Av.9  Min: 16  Max: 18  Temp  Av.7 °C (98 °F)  Min: 36.4 °C (97.5 °F)  Max: 37 °C (98.6 °F)  SpO2  Av.8 %  Min: 91 %  Max: 98 %    No data recorded    Recent Labs     21  0533   WBC 10.0   RBC 3.91*   HEMOGLOBIN 11.5*   HEMATOCRIT 35.6*   MCV 91.0   MCH 29.4   MCHC 32.3*   RDW 48.4   PLATELETCT 191   MPV 11.5     Recent Labs     21  0533   SODIUM 138   POTASSIUM 4.2   CHLORIDE 104   CO2 22   GLUCOSE 191*   BUN 35*   CREATININE 1.64*   CALCIUM 8.8               Intake/Output       21 07 - 21 0659 21 07 - 21 0659      1900-0659 Total [id number] 7939-7395 Total       Intake    P.O.  960  -- 960  --  -- --    P.O. 960 -- 960 -- -- --    I.V.  1050  -- 1050  --  -- --    Volume (mL) (NS infusion) 1050 -- 1050 -- -- --    Total Intake 2010 -- -- --       Output    Urine  --  -- --  --  -- --    Number of Times Voided 4 x -- 4 x 1 x -- 1 x    Drains  30  -- 30  --  -- --    Output (mL) ([REMOVED] Closed/Suction Drain Anterior Neck Hemovac) 30 -- 30 --  -- --    Stool  --  -- --  --  -- --    Number of Times Stooled 1 x -- 1 x -- -- --    Total Output 30 -- 30 -- -- --       Net I/O     1980 -- 1980 -- -- --            Intake/Output Summary (Last 24 hours) at 3/7/2021 1019  Last data filed at 3/6/2021 1820  Gross per 24 hour   Intake 1770 ml   Output 30 ml   Net 1740 ml            • NIFEdipine SR  30 mg Q DAY   • cloNIDine  1 Patch Q7 DAYS   • carvedilol  25 mg BID WITH MEALS   • chlorthalidone  50 mg DAILY   • insulin glargine  0.2 Units/kg/day Q EVENING    And   • insulin lispro  13 Units TID AC    And   • insulin lispro  1-6 Units 4X/DAY ACHS    And   • glucose  16 g Q15 MIN PRN    And   • dextrose 50%  50 mL Q15 MIN PRN   • labetalol  10 mg Q HOUR PRN   • albuterol  2 Puff Q6HRS PRN (RT)   • atorvastatin  40 mg Nightly   • vitamin D  5,000 Units DAILY   • cyclobenzaprine  10 mg TID   • ferrous sulfate  325 mg DAILY   • gabapentin  300 mg BID   • levothyroxine  150 mcg AM ES   • lisinopril  20 mg BID   • Pharmacy Consult Request  1 Each PHARMACY TO DOSE   • MD ALERT...DO NOT ADMINISTER NSAIDS or ASPIRIN unless ORDERED By Neurosurgery  1 Each PRN   • docusate sodium  100 mg BID   • senna-docusate  1 tablet Nightly   • senna-docusate  1 tablet Q24HRS PRN   • polyethylene glycol/lytes  1 Packet BID PRN   • magnesium hydroxide  30 mL QDAY PRN   • bisacodyl  10 mg Q24HRS PRN   • fleet  1 Each Once PRN   • acetaminophen  500 mg Q6HRS PRN    Followed by   • [START ON 3/10/2021] acetaminophen  1,000 mg Q6HRS PRN   • diphenhydrAMINE  25 mg Q6HRS PRN    Or   • diphenhydrAMINE  25 mg Q6HRS PRN   • ondansetron  4 mg Q4HRS PRN   • ondansetron  4 mg Q4HRS PRN   • promethazine  12.5-25 mg Q4HRS PRN   • promethazine  12.5-25 mg Q4HRS PRN   • prochlorperazine  5-10 mg Q4HRS PRN   • benzocaine-menthol  1 Lozenge Q2HRS PRN   • calcium carbonate  500 mg BID   • HYDROcodone/acetaminophen  1 tablet Q4HRS PRN   • oxyCODONE-acetaminophen  1 tablet Q4HRS PRN   • morphine injection  2 mg  Q3HRS PRN   • latanoprost  1 Drop Q EVENING       Assessment and Plan:  POD#2  C4-6 ACDF with plate C6-7 plate removal    Pt overall doing well.   Exam is stable.  Ambulating and voiding without difficulty.    BP and blood glucose control  Medications adjusted by hospitalist.    Okay to discharge home from NS standpoint once cleared by hospitalist team.     Chemical prophylactic DVT therapy: No    Case d/w Dr. Azar

## 2021-03-07 NOTE — PROGRESS NOTES
Mobility Progress Note    Surgery patient: yes  Date of surgery: 3/5/2021  Ambulated 50 ft on day of surgery? (N/A if patient did not undergo surgery today): n/a  Number of times ambulated 50 feet or greater today: 4  Patient has been up to chair, edge of bed or HOB 90 degrees for all meals?: yes  Goal met? (goal is ambulating at least 50 feet 2 times on day shift, one time on night shift): yes  If patient did not meet mobility goal, why?: n/a/

## 2021-03-07 NOTE — PROGRESS NOTES
Hospital Medicine Daily Progress Note    Date of Service  3/7/2021    Chief Complaint  65 y.o. male admitted 3/5/2021 with neck pain radiating to right arm.    Hospital Course  A 65-year-old man with h/o HTN, DM, HLD, hypothyroidism, morbid obesity presented with severe right arm radiculopathy. S/p C4-t cervical discetomy with fusion, with C5-6 plate placement on 3/5.  Medicine was consulted for uncontrolled BP on 3/6. Coreg increased form 12.5 mg bid to 25 mg bid, chlorthalidone increased from 25 mg to 50 mg daily, started procardia 30 mg daily, continued lisinopril 20 mg bid.    Interval Problem Update  Pain controlled with medications prn.   Afebrile, hemodynamically stable. BP controlled.    Consultants/Specialty  Neurosurgery  Hospitalist    Code Status  Full Code    Disposition  TBD  OT no needs after discharge    Review of Systems  Review of Systems   Constitutional: Negative for chills and fever.   HENT: Negative.    Eyes: Negative for blurred vision.   Respiratory: Negative for cough and shortness of breath.    Cardiovascular: Negative for chest pain and leg swelling.   Gastrointestinal: Negative for nausea and vomiting.   Genitourinary: Negative for dysuria.   Musculoskeletal: Positive for neck pain.   Skin: Negative for rash.   Neurological: Positive for focal weakness (RUE) and headaches.        Physical Exam  Temp:  [36.4 °C (97.5 °F)-37 °C (98.6 °F)] 36.6 °C (97.8 °F)  Pulse:  [70-79] 70  Resp:  [16-18] 17  BP: (152-227)/(62-93) 158/64  SpO2:  [91 %-98 %] 94 %    Physical Exam  Vitals and nursing note reviewed.   Constitutional:       Appearance: Normal appearance. He is obese.   HENT:      Head: Normocephalic.      Mouth/Throat:      Mouth: Mucous membranes are moist.      Pharynx: Oropharynx is clear.   Eyes:      Pupils: Pupils are equal, round, and reactive to light.   Cardiovascular:      Rate and Rhythm: Normal rate and regular rhythm.      Pulses: Normal pulses.      Heart sounds: No murmur.    Pulmonary:      Effort: Pulmonary effort is normal.      Breath sounds: Normal breath sounds. No wheezing.   Abdominal:      General: Abdomen is flat. Bowel sounds are normal.      Tenderness: There is no abdominal tenderness.   Musculoskeletal:         General: Normal range of motion.      Cervical back: Normal range of motion.   Skin:     General: Skin is warm.   Neurological:      Mental Status: He is alert and oriented to person, place, and time.         Fluids    Intake/Output Summary (Last 24 hours) at 3/7/2021 0931  Last data filed at 3/6/2021 1820  Gross per 24 hour   Intake 1770 ml   Output 30 ml   Net 1740 ml       Laboratory  Recent Labs     03/07/21  0533   WBC 10.0   RBC 3.91*   HEMOGLOBIN 11.5*   HEMATOCRIT 35.6*   MCV 91.0   MCH 29.4   MCHC 32.3*   RDW 48.4   PLATELETCT 191   MPV 11.5     Recent Labs     03/07/21  0533   SODIUM 138   POTASSIUM 4.2   CHLORIDE 104   CO2 22   GLUCOSE 191*   BUN 35*   CREATININE 1.64*   CALCIUM 8.8                   Imaging  DX-CERVICAL SPINE-2 OR 3 VIEWS   Final Result      Intraoperative image(s) as described.      DX-PORTABLE FLUORO > 1 HOUR   Final Result      Portable fluoroscopy utilized for 5 seconds.         INTERPRETING LOCATION: [address] [place] [address], [id number]           Assessment/Plan  * Asymptomatic hypertensive urgency  Assessment & Plan  Has history of hypertension on BP medications. Now uncontrolled. Last /73. No more than 25% reduction in MAP first hour than gradual reduction in BP.   -Increase coreg from 12.5 mg BID to 25 mg BID with holding parameters SBP<90 HR<55  -Increase chlorthalidone 25 mg to 50 mg daily   -Add Procardia SR 30 mg daily   -Continue with lisinopril 20 mg BID   -Would taper off clonidine patch   -2 gram sodium and moderate carb consistent diet.     Morbid obesity (HCC)  Assessment & Plan  Counseling regarding weight loss, diet and exercise     Hyperlipidemia  Assessment & Plan  Continue atorvastatin 40  mg    Hypothyroidism  Assessment & Plan  Continue levothyroxine 150 mcg    Diabetes (HCC)- (present on admission)  Assessment & Plan  Uncontrolled will Increase Lantus to 35 units and Lispro to 13 units TID. Target -180.  HgA1C 7.0       VTE prophylaxis: SCD

## 2021-03-07 NOTE — CARE PLAN
Problem: Safety  Goal: Will remain free from injury    3/6/2021 1515 by Simi Trejo R.N.  Outcome: PROGRESSING AS EXPECTED  Note: Non-skid socks in use. Pt reminded to call when needing assistance. Call light within reach. Bed in lowest position. Hourly rounds in effect. Pt verbalized understanding.      Problem: Infection  Goal: Will remain free from infection  3/6/2021 1539 by Simi Trejo R.N.  Outcome: PROGRESSING AS EXPECTED  Note: Hand hygiene performed before and after pt care. Standard precautions in place. Pt verbalizes understanding of infection prevention.       Problem: Knowledge Deficit  Goal: Knowledge of disease process/condition, treatment plan, diagnostic tests, and medications will improve  Outcome: PROGRESSING AS EXPECTED  Note: Pt updated on plan of care throughout the day. Pt educated on medications and tasks throughout the day. Pt verbalized understanding.

## 2021-03-07 NOTE — DISCHARGE INSTRUCTIONS
Discharge Instructions    Discharged to home by car with relative. Discharged via wheelchair, hospital escort: Yes.  Special equipment needed: Soft cervical collar    Be sure to schedule a follow-up appointment with your primary care doctor or any specialists as instructed.     Discharge Plan:        I understand that a diet low in cholesterol, fat, and sodium is recommended for good health. Unless I have been given specific instructions below for another diet, I accept this instruction as my diet prescription.   Other diet: Regular    Special Instructions:    Continue to wear brace as directed, may remove for showering. No bending, lifting or twisting. Daily OTC laxative while on narcotics. No aspirin, NSAID or blood thinners x 2 weeks. Ambulate often to prevent DVT Continue incentive spirometer hourly F/u with [facility] in 2 weeks    Follow up with PCP, call for appointment.    Is patient discharged on Warfarin / Coumadin?   No     Depression / Suicide Risk    As you are discharged from this [facility] facility, it is important to learn how to keep safe from harming yourself.    Recognize the warning signs:  Abrupt changes in personality, positive or negative- including increase in energy   Giving away possessions  Change in eating patterns- significant weight changes-  positive or negative  Change in sleeping patterns- unable to sleep or sleeping all the time   Unwillingness or inability to communicate  Depression  Unusual sadness, discouragement and loneliness  Talk of wanting to die  Neglect of personal appearance   Rebelliousness- reckless behavior  Withdrawal from people/activities they love  Confusion- inability to concentrate     If you or a loved one observes any of these behaviors or has concerns about self-harm, here's what you can do:  Talk about it- your feelings and reasons for harming yourself  Remove any means that you might use to hurt yourself (examples: pills, rope, extension  cords, firearm)  Get professional help from the community (Mental Health, Substance Abuse, psychological counseling)  Do not be alone:Call your Safe Contact- someone whom you trust who will be there for you.  Call your local CRISIS HOTLINE 141-0962 or 724-754-6367  Call your local Children's Mobile Crisis Response Team [place] (682) 191-6577 or www.OpenHomes  Call the toll free National Suicide Prevention Hotlines   National Suicide Prevention Lifeline 964-868-GAJS (1422)  [place] Hope Line Network 800-SUICIDE (877-7732)        Anterior Cervical Diskectomy and Fusion, Care After  This sheet gives you information about how to care for yourself after your procedure. Your health care provider may also give you more specific instructions. If you have problems or questions, contact your health care provider.  What can I expect after the procedure?  After the procedure, it is common to have:  · Neck pain.  · Discomfort when swallowing.  · Slight hoarseness.  Follow these instructions at home:  If you have a neck brace:  · Wear it as told by your health care provider. Remove it only as told by your health care provider.  · Keep the brace clean and dry.  · Ask your health care provider if you should remove the brace to bathe or shower.  Incision care    · Follow instructions from your health care provider about how to take care of your incision. Make sure you:  ? Wash your hands with soap and water before and after you change your bandage (dressing). If soap and water are not available, use hand .  ? Change your dressing as told by your health care provider.  ? Leave stitches (sutures), skin glue, or adhesive strips in place. These skin closures may need to stay in place for 2 weeks or longer. If adhesive strip edges start to loosen and curl up, you may trim the loose edges. Do not remove adhesive strips completely unless your health care provider tells you to do that.  · Check your incision area  every day for signs of infection. Check for:  ? Redness, swelling, or pain.  ? Fluid or blood.  ? Warmth.  ? Pus or a bad smell.  Managing pain, stiffness, and swelling    · Take over-the-counter and prescription medicines only as told by your health care provider.  · If directed, put ice on the injured area.  ? If you have a removable brace, remove it as told by your health care provider.  ? Put ice in a plastic bag.  ? Place a towel between your skin and the bag.  ? Leave the ice on for 20 minutes, 2-3 times a day.  Activity    · Return to your normal activities as told by your health care provider. Ask your health care provider what activities are safe for you.  · Do exercises as told by your health care provider.  · Do not take baths, swim, or use a hot tub until your health care provider approves.  · Do not lift anything that is heavier than 10 lb (4.5 kg), or the limit that you are told, until your health care provider says that it is safe.  General instructions  · Ask your health care provider if the medicine prescribed to you:  ? Requires you to avoid driving or using heavy machinery.  ? Can cause constipation. You may need to take actions to prevent or treat constipation, such as:  § Drink enough fluid to keep your urine pale yellow.  § Take over-the-counter or prescription medicines.  § Eat foods that are high in fiber, such as beans, whole grains, and fresh fruits and vegetables.  § Limit foods that are high in fat and processed sugars, such as fried and sweet foods.  · Do not use any products that contain nicotine or tobacco, such as cigarettes, e-cigarettes, and chewing tobacco. These can delay healing. If you need help quitting, ask your health care provider.  · Keep all follow-up visits and physical therapy appointments as told by your health care provider. This is important.  Contact a health care provider if you have:  · A fever.  · Redness, swelling, or pain around your incision.  · Fluid or blood  "coming from your incision.  · Pus or a bad smell coming from your incision.  · Pain that is not controlled by your pain medicine.  · Increasing hoarseness or trouble swallowing.  Get help right away if you have:  · Severe pain.  · Sudden numbness or weakness in your arms.  · Warmth, tenderness, or swelling in your calf.  · Chest pain.  · Difficulty breathing.  Summary  · After the procedure, it is common to have neck pain, discomfort when swallowing, and slight hoarseness.  · Follow instructions from your health care provider about how to take care of your incision.  · Check your incision area every day for signs of infection.  · Return to your normal activities as told by your health care provider. Ask your health care provider what activities are safe for you.  · Contact a health care provider if you have signs of infection at your incision.  This information is not intended to replace advice given to you by your health care provider. Make sure you discuss any questions you have with your health care provider.  Document Released: 01/13/2017 Document Revised: 09/12/2019 Document Reviewed: 09/12/2019  [employer] Patient Education © 2020 [employer]      Hypertension, Adult  High blood pressure (hypertension) is when the force of blood pumping through the arteries is too strong. The arteries are the blood vessels that carry blood from the heart throughout the body. Hypertension forces the heart to work harder to pump blood and may cause arteries to become narrow or stiff. Untreated or uncontrolled hypertension can cause a heart attack, heart failure, a stroke, kidney disease, and other problems.  A blood pressure reading consists of a higher number over a lower number. Ideally, your blood pressure should be below 120/80. The first (\"top\") number is called the systolic pressure. It is a measure of the pressure in your arteries as your heart beats. The second (\"bottom\") number is called the diastolic pressure. It is a " measure of the pressure in your arteries as the heart relaxes.  What are the causes?  The exact cause of this condition is not known. There are some conditions that result in or are related to high blood pressure.  What increases the risk?  Some risk factors for high blood pressure are under your control. The following factors may make you more likely to develop this condition:  · Smoking.  · Having type 2 diabetes mellitus, high cholesterol, or both.  · Not getting enough exercise or physical activity.  · Being overweight.  · Having too much fat, sugar, calories, or salt (sodium) in your diet.  · Drinking too much alcohol.  Some risk factors for high blood pressure may be difficult or impossible to change. Some of these factors include:  · Having chronic kidney disease.  · Having a family history of high blood pressure.  · Age. Risk increases with age.  · Race. You may be at higher risk if you are .  · Gender. Men are at higher risk than women before age 45. After age 65, women are at higher risk than men.  · Having obstructive sleep apnea.  · Stress.  What are the signs or symptoms?  High blood pressure may not cause symptoms. Very high blood pressure (hypertensive crisis) may cause:  · Headache.  · Anxiety.  · Shortness of breath.  · Nosebleed.  · Nausea and vomiting.  · Vision changes.  · Severe chest pain.  · Seizures.  How is this diagnosed?  This condition is diagnosed by measuring your blood pressure while you are seated, with your arm resting on a flat surface, your legs uncrossed, and your feet flat on the floor. The cuff of the blood pressure monitor will be placed directly against the skin of your upper arm at the level of your heart. It should be measured at least twice using the same arm. Certain conditions can cause a difference in blood pressure between your right and left arms.  Certain factors can cause blood pressure readings to be lower or higher than normal for a short period of  time:  · When your blood pressure is higher when you are in a health care provider's office than when you are at home, this is called white coat hypertension. Most people with this condition do not need medicines.  · When your blood pressure is higher at home than when you are in a health care provider's office, this is called masked hypertension. Most people with this condition may need medicines to control blood pressure.  If you have a high blood pressure reading during one visit or you have normal blood pressure with other risk factors, you may be asked to:  · Return on a different day to have your blood pressure checked again.  · Monitor your blood pressure at home for 1 week or longer.  If you are diagnosed with hypertension, you may have other blood or imaging tests to help your health care provider understand your overall risk for other conditions.  How is this treated?  This condition is treated by making healthy lifestyle changes, such as eating healthy foods, exercising more, and reducing your alcohol intake. Your health care provider may prescribe medicine if lifestyle changes are not enough to get your blood pressure under control, and if:  · Your systolic blood pressure is above 130.  · Your diastolic blood pressure is above 80.  Your personal target blood pressure may vary depending on your medical conditions, your age, and other factors.  Follow these instructions at home:  Eating and drinking       · Eat a diet that is high in fiber and potassium, and low in sodium, added sugar, and fat. An example eating plan is called the DASH (Dietary Approaches to Stop Hypertension) diet. To eat this way:  ? Eat plenty of fresh fruits and vegetables. Try to fill one half of your plate at each meal with fruits and vegetables.  ? Eat whole grains, such as whole-wheat pasta, brown rice, or whole-grain bread. Fill about one fourth of your plate with whole grains.  ? Eat or drink low-fat dairy products, such as skim  milk or low-fat yogurt.  ? Avoid fatty cuts of meat, processed or cured meats, and poultry with skin. Fill about one fourth of your plate with lean proteins, such as fish, chicken without skin, beans, eggs, or tofu.  ? Avoid pre-made and processed foods. These tend to be higher in sodium, added sugar, and fat.  · Reduce your daily sodium intake. Most people with hypertension should eat less than 1,500 mg of sodium a day.  · Do not drink alcohol if:  ? Your health care provider tells you not to drink.  ? You are pregnant, may be pregnant, or are planning to become pregnant.  · If you drink alcohol:  ? Limit how much you use to:  § 0-1 drink a day for women.  § 0-2 drinks a day for men.  ? Be aware of how much alcohol is in your drink. In the [place] one drink equals one 12 oz bottle of beer (355 mL), one 5 oz glass of wine (148 mL), or one 1½ oz glass of hard liquor (44 mL).  Lifestyle       · Work with your health care provider to maintain a healthy body weight or to lose weight. Ask what an ideal weight is for you.  · Get at least 30 minutes of exercise most days of the week. Activities may include walking, swimming, or biking.  · Include exercise to strengthen your muscles (resistance exercise), such as Pilates or lifting weights, as part of your weekly exercise routine. Try to do these types of exercises for 30 minutes at least 3 days a week.  · Do not use any products that contain nicotine or tobacco, such as cigarettes, e-cigarettes, and chewing tobacco. If you need help quitting, ask your health care provider.  · Monitor your blood pressure at home as told by your health care provider.  · Keep all follow-up visits as told by your health care provider. This is important.  Medicines  · Take over-the-counter and prescription medicines only as told by your health care provider. Follow directions carefully. Blood pressure medicines must be taken as prescribed.  · Do not skip doses of blood pressure medicine. Doing  this puts you at risk for problems and can make the medicine less effective.  · Ask your health care provider about side effects or reactions to medicines that you should watch for.  Contact a health care provider if you:  · Think you are having a reaction to a medicine you are taking.  · Have headaches that keep coming back (recurring).  · Feel dizzy.  · Have swelling in your ankles.  · Have trouble with your vision.  Get help right away if you:  · Develop a severe headache or confusion.  · Have unusual weakness or numbness.  · Feel faint.  · Have severe pain in your chest or abdomen.  · Vomit repeatedly.  · Have trouble breathing.  Summary  · Hypertension is when the force of blood pumping through your arteries is too strong. If this condition is not controlled, it may put you at risk for serious complications.  · Your personal target blood pressure may vary depending on your medical conditions, your age, and other factors. For most people, a normal blood pressure is less than 120/80.  · Hypertension is treated with lifestyle changes, medicines, or a combination of both. Lifestyle changes include losing weight, eating a healthy, low-sodium diet, exercising more, and limiting alcohol.  This information is not intended to replace advice given to you by your health care provider. Make sure you discuss any questions you have with your health care provider.  Document Released: 12/18/2006 Document Revised: 08/28/2019 Document Reviewed: 08/28/2019  [employer] Patient Education © 2020 [employer]

## 2025-03-06 ENCOUNTER — HOSPITAL ENCOUNTER (INPATIENT)
Facility: REHABILITATION | Age: 70
LOS: 15 days | DRG: 560 | End: 2025-03-21
Attending: PHYSICAL MEDICINE & REHABILITATION | Admitting: PHYSICAL MEDICINE & REHABILITATION
Payer: MEDICARE

## 2025-03-06 DIAGNOSIS — E11.65 TYPE 2 DIABETES MELLITUS WITH HYPERGLYCEMIA, WITH LONG-TERM CURRENT USE OF INSULIN (HCC): ICD-10-CM

## 2025-03-06 DIAGNOSIS — E87.5 HYPERKALEMIA: ICD-10-CM

## 2025-03-06 DIAGNOSIS — Z79.4 TYPE 2 DIABETES MELLITUS WITH HYPERGLYCEMIA, WITH LONG-TERM CURRENT USE OF INSULIN (HCC): ICD-10-CM

## 2025-03-06 DIAGNOSIS — E03.9 HYPOTHYROIDISM, UNSPECIFIED TYPE: ICD-10-CM

## 2025-03-06 DIAGNOSIS — Z89.519 HISTORY OF AMPUTATION OF LOWER EXTREMITY THROUGH TIBIA AND FIBULA, UNSPECIFIED LATERALITY (HCC): ICD-10-CM

## 2025-03-06 DIAGNOSIS — D72.829 LEUKOCYTOSIS, UNSPECIFIED TYPE: ICD-10-CM

## 2025-03-06 DIAGNOSIS — B95.61 MSSA BACTEREMIA: ICD-10-CM

## 2025-03-06 DIAGNOSIS — I25.10 CORONARY ARTERY DISEASE INVOLVING NATIVE CORONARY ARTERY OF NATIVE HEART WITHOUT ANGINA PECTORIS: ICD-10-CM

## 2025-03-06 DIAGNOSIS — Z89.512 HX OF BKA, LEFT (HCC): ICD-10-CM

## 2025-03-06 DIAGNOSIS — N18.30 STAGE 3 CHRONIC KIDNEY DISEASE, UNSPECIFIED WHETHER STAGE 3A OR 3B CKD: ICD-10-CM

## 2025-03-06 DIAGNOSIS — I10 PRIMARY HYPERTENSION: ICD-10-CM

## 2025-03-06 DIAGNOSIS — R78.81 MSSA BACTEREMIA: ICD-10-CM

## 2025-03-06 LAB
GLUCOSE BLD STRIP.AUTO-MCNC: 204 MG/DL (ref 65–99)
GLUCOSE BLD STRIP.AUTO-MCNC: 219 MG/DL (ref 65–99)

## 2025-03-06 PROCEDURE — 770010 HCHG ROOM/CARE - REHAB SEMI PRIVAT*

## 2025-03-06 PROCEDURE — 82962 GLUCOSE BLOOD TEST: CPT | Mod: 91

## 2025-03-06 PROCEDURE — A9270 NON-COVERED ITEM OR SERVICE: HCPCS | Performed by: PHYSICAL MEDICINE & REHABILITATION

## 2025-03-06 PROCEDURE — 700105 HCHG RX REV CODE 258: Performed by: PHYSICAL MEDICINE & REHABILITATION

## 2025-03-06 PROCEDURE — 700111 HCHG RX REV CODE 636 W/ 250 OVERRIDE (IP): Performed by: PHYSICAL MEDICINE & REHABILITATION

## 2025-03-06 PROCEDURE — 700102 HCHG RX REV CODE 250 W/ 637 OVERRIDE(OP): Performed by: PHYSICAL MEDICINE & REHABILITATION

## 2025-03-06 RX ORDER — CARVEDILOL 12.5 MG/1
12.5 TABLET ORAL 2 TIMES DAILY WITH MEALS
Status: DISCONTINUED | OUTPATIENT
Start: 2025-03-06 | End: 2025-03-10

## 2025-03-06 RX ORDER — OMEPRAZOLE 20 MG/1
20 CAPSULE, DELAYED RELEASE ORAL DAILY
Status: DISCONTINUED | OUTPATIENT
Start: 2025-03-07 | End: 2025-03-21 | Stop reason: HOSPADM

## 2025-03-06 RX ORDER — TRAMADOL HYDROCHLORIDE 50 MG/1
50 TABLET ORAL EVERY 4 HOURS PRN
Status: DISCONTINUED | OUTPATIENT
Start: 2025-03-06 | End: 2025-03-14

## 2025-03-06 RX ORDER — ALUMINA, MAGNESIA, AND SIMETHICONE 2400; 2400; 240 MG/30ML; MG/30ML; MG/30ML
20 SUSPENSION ORAL
Status: DISCONTINUED | OUTPATIENT
Start: 2025-03-06 | End: 2025-03-21 | Stop reason: HOSPADM

## 2025-03-06 RX ORDER — INSULIN LISPRO 100 [IU]/ML
1-6 INJECTION, SOLUTION INTRAVENOUS; SUBCUTANEOUS
Status: DISCONTINUED | OUTPATIENT
Start: 2025-03-06 | End: 2025-03-07

## 2025-03-06 RX ORDER — ATORVASTATIN CALCIUM 40 MG/1
40 TABLET, FILM COATED ORAL EVERY EVENING
Status: DISCONTINUED | OUTPATIENT
Start: 2025-03-06 | End: 2025-03-21 | Stop reason: HOSPADM

## 2025-03-06 RX ORDER — POLYETHYLENE GLYCOL 3350 17 G/17G
1 POWDER, FOR SOLUTION ORAL
Status: DISCONTINUED | OUTPATIENT
Start: 2025-03-06 | End: 2025-03-06

## 2025-03-06 RX ORDER — AMOXICILLIN 250 MG
2 CAPSULE ORAL EVERY EVENING
Status: DISCONTINUED | OUTPATIENT
Start: 2025-03-06 | End: 2025-03-06

## 2025-03-06 RX ORDER — ECHINACEA PURPUREA EXTRACT 125 MG
2 TABLET ORAL PRN
Status: DISCONTINUED | OUTPATIENT
Start: 2025-03-06 | End: 2025-03-21 | Stop reason: HOSPADM

## 2025-03-06 RX ORDER — CARBOXYMETHYLCELLULOSE SODIUM 5 MG/ML
1 SOLUTION/ DROPS OPHTHALMIC PRN
Status: DISCONTINUED | OUTPATIENT
Start: 2025-03-06 | End: 2025-03-21 | Stop reason: HOSPADM

## 2025-03-06 RX ORDER — ONDANSETRON 2 MG/ML
4 INJECTION INTRAMUSCULAR; INTRAVENOUS 4 TIMES DAILY PRN
Status: DISCONTINUED | OUTPATIENT
Start: 2025-03-06 | End: 2025-03-21 | Stop reason: HOSPADM

## 2025-03-06 RX ORDER — LIDOCAINE 4 G/G
1-2 PATCH TOPICAL
Status: DISCONTINUED | OUTPATIENT
Start: 2025-03-06 | End: 2025-03-21 | Stop reason: HOSPADM

## 2025-03-06 RX ORDER — HEPARIN SODIUM 5000 [USP'U]/ML
5000 INJECTION, SOLUTION INTRAVENOUS; SUBCUTANEOUS EVERY 8 HOURS
Status: DISCONTINUED | OUTPATIENT
Start: 2025-03-06 | End: 2025-03-19

## 2025-03-06 RX ORDER — TRAZODONE HYDROCHLORIDE 50 MG/1
50 TABLET ORAL
Status: DISCONTINUED | OUTPATIENT
Start: 2025-03-06 | End: 2025-03-21 | Stop reason: HOSPADM

## 2025-03-06 RX ORDER — SODIUM BICARBONATE 650 MG/1
650 TABLET ORAL 2 TIMES DAILY
Status: DISCONTINUED | OUTPATIENT
Start: 2025-03-06 | End: 2025-03-14

## 2025-03-06 RX ORDER — ENOXAPARIN SODIUM 100 MG/ML
40 INJECTION SUBCUTANEOUS
Status: DISCONTINUED | OUTPATIENT
Start: 2025-03-06 | End: 2025-03-06

## 2025-03-06 RX ORDER — SODIUM PHOSPHATE,MONO-DIBASIC 19G-7G/118
1 ENEMA (ML) RECTAL
Status: DISCONTINUED | OUTPATIENT
Start: 2025-03-06 | End: 2025-03-21 | Stop reason: HOSPADM

## 2025-03-06 RX ORDER — ACETAMINOPHEN 325 MG/1
650 TABLET ORAL EVERY 4 HOURS PRN
Status: DISCONTINUED | OUTPATIENT
Start: 2025-03-06 | End: 2025-03-21 | Stop reason: HOSPADM

## 2025-03-06 RX ORDER — LACTULOSE 10 G/15ML
30 SOLUTION ORAL
Status: DISCONTINUED | OUTPATIENT
Start: 2025-03-06 | End: 2025-03-21 | Stop reason: HOSPADM

## 2025-03-06 RX ORDER — HYDRALAZINE HYDROCHLORIDE 50 MG/1
50 TABLET, FILM COATED ORAL ONCE
Status: COMPLETED | OUTPATIENT
Start: 2025-03-06 | End: 2025-03-06

## 2025-03-06 RX ORDER — BISACODYL 5 MG
5 TABLET, DELAYED RELEASE (ENTERIC COATED) ORAL
Status: DISCONTINUED | OUTPATIENT
Start: 2025-03-06 | End: 2025-03-21 | Stop reason: HOSPADM

## 2025-03-06 RX ORDER — HYDRALAZINE HYDROCHLORIDE 25 MG/1
25 TABLET, FILM COATED ORAL EVERY 8 HOURS PRN
Status: DISCONTINUED | OUTPATIENT
Start: 2025-03-06 | End: 2025-03-21 | Stop reason: HOSPADM

## 2025-03-06 RX ORDER — HYDROCHLOROTHIAZIDE 25 MG/1
25 TABLET ORAL
Status: DISCONTINUED | OUTPATIENT
Start: 2025-03-07 | End: 2025-03-21 | Stop reason: HOSPADM

## 2025-03-06 RX ORDER — DEXTROSE MONOHYDRATE 25 G/50ML
25 INJECTION, SOLUTION INTRAVENOUS
Status: DISCONTINUED | OUTPATIENT
Start: 2025-03-06 | End: 2025-03-07

## 2025-03-06 RX ORDER — ONDANSETRON 4 MG/1
4 TABLET, ORALLY DISINTEGRATING ORAL 4 TIMES DAILY PRN
Status: DISCONTINUED | OUTPATIENT
Start: 2025-03-06 | End: 2025-03-21 | Stop reason: HOSPADM

## 2025-03-06 RX ORDER — LINEZOLID 600 MG/1
600 TABLET, FILM COATED ORAL EVERY 12 HOURS
Status: DISCONTINUED | OUTPATIENT
Start: 2025-03-15 | End: 2025-03-21 | Stop reason: HOSPADM

## 2025-03-06 RX ORDER — ASPIRIN 81 MG/1
81 TABLET ORAL DAILY
Status: DISCONTINUED | OUTPATIENT
Start: 2025-03-07 | End: 2025-03-19

## 2025-03-06 RX ORDER — TRAMADOL HYDROCHLORIDE 50 MG/1
100 TABLET ORAL EVERY 4 HOURS PRN
Status: DISCONTINUED | OUTPATIENT
Start: 2025-03-06 | End: 2025-03-14

## 2025-03-06 RX ORDER — LISINOPRIL 20 MG/1
40 TABLET ORAL EVERY EVENING
Status: DISCONTINUED | OUTPATIENT
Start: 2025-03-06 | End: 2025-03-21 | Stop reason: HOSPADM

## 2025-03-06 RX ADMIN — HEPARIN SODIUM 5000 UNITS: 5000 INJECTION, SOLUTION INTRAVENOUS; SUBCUTANEOUS at 20:53

## 2025-03-06 RX ADMIN — LISINOPRIL 40 MG: 20 TABLET ORAL at 20:58

## 2025-03-06 RX ADMIN — ATORVASTATIN CALCIUM 40 MG: 40 TABLET, FILM COATED ORAL at 20:53

## 2025-03-06 RX ADMIN — INSULIN GLARGINE-YFGN 23 UNITS: 100 INJECTION, SOLUTION SUBCUTANEOUS at 21:11

## 2025-03-06 RX ADMIN — CEFAZOLIN 2 G: 2 INJECTION, POWDER, FOR SOLUTION INTRAMUSCULAR; INTRAVENOUS at 15:29

## 2025-03-06 RX ADMIN — CARVEDILOL 12.5 MG: 12.5 TABLET, FILM COATED ORAL at 17:29

## 2025-03-06 RX ADMIN — INSULIN LISPRO 2 UNITS: 100 INJECTION, SOLUTION INTRAVENOUS; SUBCUTANEOUS at 17:30

## 2025-03-06 RX ADMIN — SODIUM BICARBONATE 650 MG: 650 TABLET ORAL at 20:53

## 2025-03-06 RX ADMIN — HYDRALAZINE HYDROCHLORIDE 50 MG: 50 TABLET ORAL at 14:14

## 2025-03-06 RX ADMIN — INSULIN LISPRO 2 UNITS: 100 INJECTION, SOLUTION INTRAVENOUS; SUBCUTANEOUS at 21:03

## 2025-03-06 RX ADMIN — CEFAZOLIN 2 G: 2 INJECTION, POWDER, FOR SOLUTION INTRAMUSCULAR; INTRAVENOUS at 21:30

## 2025-03-06 ASSESSMENT — LIFESTYLE VARIABLES
ON A TYPICAL DAY WHEN YOU DRINK ALCOHOL HOW MANY DRINKS DO YOU HAVE: 0
HAVE PEOPLE ANNOYED YOU BY CRITICIZING YOUR DRINKING: NO
EVER FELT BAD OR GUILTY ABOUT YOUR DRINKING: NO
TOTAL SCORE: 0
ALCOHOL_USE: NO
TOTAL SCORE: 0
HAVE YOU EVER FELT YOU SHOULD CUT DOWN ON YOUR DRINKING: NO
AVERAGE NUMBER OF DAYS PER WEEK YOU HAVE A DRINK CONTAINING ALCOHOL: 0
DOES PATIENT WANT TO STOP DRINKING: NO
TOTAL SCORE: 0
HOW MANY TIMES IN THE PAST YEAR HAVE YOU HAD 5 OR MORE DRINKS IN A DAY: 0
CONSUMPTION TOTAL: NEGATIVE
EVER HAD A DRINK FIRST THING IN THE MORNING TO STEADY YOUR NERVES TO GET RID OF A HANGOVER: NO

## 2025-03-06 ASSESSMENT — COPD QUESTIONNAIRES
DURING THE PAST 4 WEEKS HOW MUCH DID YOU FEEL SHORT OF BREATH: NONE/LITTLE OF THE TIME
DO YOU EVER COUGH UP ANY MUCUS OR PHLEGM?: YES, A FEW DAYS A WEEK OR MONTH
COPD SCREENING SCORE: 3
HAVE YOU SMOKED AT LEAST 100 CIGARETTES IN YOUR ENTIRE LIFE: NO/DON'T KNOW

## 2025-03-06 ASSESSMENT — PATIENT HEALTH QUESTIONNAIRE - PHQ9
SUM OF ALL RESPONSES TO PHQ QUESTIONS 1-9: 1
6. FEELING BAD ABOUT YOURSELF - OR THAT YOU ARE A FAILURE OR HAVE LET YOURSELF OR YOUR FAMILY DOWN: NOT AL ALL
7. TROUBLE CONCENTRATING ON THINGS, SUCH AS READING THE NEWSPAPER OR WATCHING TELEVISION: NOT AT ALL
9. THOUGHTS THAT YOU WOULD BE BETTER OFF DEAD, OR OF HURTING YOURSELF: NOT AT ALL
4. FEELING TIRED OR HAVING LITTLE ENERGY: NOT AT ALL
1. LITTLE INTEREST OR PLEASURE IN DOING THINGS: NOT AT ALL
SUM OF ALL RESPONSES TO PHQ9 QUESTIONS 1 AND 2: 1
2. FEELING DOWN, DEPRESSED, IRRITABLE, OR HOPELESS: SEVERAL DAYS
3. TROUBLE FALLING OR STAYING ASLEEP OR SLEEPING TOO MUCH: NOT AT ALL
SUM OF ALL RESPONSES TO PHQ QUESTIONS 1-9: 1
4. FEELING TIRED OR HAVING LITTLE ENERGY: NOT AT ALL
7. TROUBLE CONCENTRATING ON THINGS, SUCH AS READING THE NEWSPAPER OR WATCHING TELEVISION: NOT AT ALL
5. POOR APPETITE OR OVEREATING: NOT AT ALL
SUM OF ALL RESPONSES TO PHQ9 QUESTIONS 1 AND 2: 1
2. FEELING DOWN, DEPRESSED, IRRITABLE, OR HOPELESS: SEVERAL DAYS
5. POOR APPETITE OR OVEREATING: NOT AT ALL
9. THOUGHTS THAT YOU WOULD BE BETTER OFF DEAD, OR OF HURTING YOURSELF: NOT AT ALL
1. LITTLE INTEREST OR PLEASURE IN DOING THINGS: NOT AT ALL
8. MOVING OR SPEAKING SO SLOWLY THAT OTHER PEOPLE COULD HAVE NOTICED. OR THE OPPOSITE, BEING SO FIGETY OR RESTLESS THAT YOU HAVE BEEN MOVING AROUND A LOT MORE THAN USUAL: NOT AT ALL
8. MOVING OR SPEAKING SO SLOWLY THAT OTHER PEOPLE COULD HAVE NOTICED. OR THE OPPOSITE, BEING SO FIGETY OR RESTLESS THAT YOU HAVE BEEN MOVING AROUND A LOT MORE THAN USUAL: NOT AT ALL
3. TROUBLE FALLING OR STAYING ASLEEP OR SLEEPING TOO MUCH: NOT AT ALL
6. FEELING BAD ABOUT YOURSELF - OR THAT YOU ARE A FAILURE OR HAVE LET YOURSELF OR YOUR FAMILY DOWN: NOT AL ALL

## 2025-03-06 ASSESSMENT — FIBROSIS 4 INDEX: FIB4 SCORE: 0.6

## 2025-03-06 NOTE — PROGRESS NOTES
4 Eyes Skin Assessment Completed by CM Stevenson and CM Almanza.    Head WDL  Ears WDL  Nose WDL  Mouth WDL  Neck WDL  Breast/Chest WDL  Shoulder Blades WDL  Spine WDL  (R) Arm/Elbow/Hand WDL  (L) Arm/Elbow/Hand WDL  Abdomen WDL  Groin WDL  Scrotum/Coccyx/Buttocks redness, blanching  (R) Leg WDL  (L) Leg BKA incision  (R) Heel/Foot/Toe Redness and Blanching  (L) Heel/Foot/Toe NA          Devices In Places Blood Pressure Cuff      Interventions In Place Pillows    Possible Skin Injury No    Pictures Uploaded Into Epic Yes  Wound Consult Placed N/A  RN Wound Prevention Protocol Ordered Yes
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
## 2025-03-06 NOTE — PROGRESS NOTES
Pt arrived at [facility] from [facility] via [facility]. Dr Tong to follow for diagnosis of L BKA. Initial assessment initiated. Pt oriented to room and facility routine and safety measures; pt education binder provided and discussed. Pt A/O x 4, continent of bowel and bladder. Max assist for transfers. All wounds photographed and documented; photos uploaded to . Pt denies pain or discomfort at this time. Pt positioned for comfort in bed. Call light within reach, safety measures in place.

## 2025-03-07 ENCOUNTER — APPOINTMENT (OUTPATIENT)
Dept: OCCUPATIONAL THERAPY | Facility: REHABILITATION | Age: 70
DRG: 560 | End: 2025-03-07
Attending: PHYSICAL MEDICINE & REHABILITATION
Payer: MEDICARE

## 2025-03-07 ENCOUNTER — APPOINTMENT (OUTPATIENT)
Dept: PHYSICAL THERAPY | Facility: REHABILITATION | Age: 70
DRG: 560 | End: 2025-03-07
Attending: PHYSICAL MEDICINE & REHABILITATION
Payer: MEDICARE

## 2025-03-07 PROBLEM — R78.81 MSSA BACTEREMIA: Status: ACTIVE | Noted: 2025-03-07

## 2025-03-07 PROBLEM — B95.61 MSSA BACTEREMIA: Status: ACTIVE | Noted: 2025-03-07

## 2025-03-07 PROBLEM — R94.6 BORDERLINE ABNORMAL TFTS: Status: ACTIVE | Noted: 2025-03-07

## 2025-03-07 PROBLEM — I25.10 CAD (CORONARY ARTERY DISEASE): Status: ACTIVE | Noted: 2025-03-07

## 2025-03-07 PROBLEM — D72.829 LEUKOCYTOSIS: Status: ACTIVE | Noted: 2025-03-07

## 2025-03-07 PROBLEM — N18.9 CKD (CHRONIC KIDNEY DISEASE): Status: ACTIVE | Noted: 2025-03-07

## 2025-03-07 PROBLEM — D64.9 ANEMIA: Status: ACTIVE | Noted: 2025-03-07

## 2025-03-07 PROBLEM — I49.9 IRREGULAR HEARTBEAT: Status: ACTIVE | Noted: 2025-03-07

## 2025-03-07 LAB
25(OH)D3 SERPL-MCNC: 35 NG/ML (ref 30–100)
ALBUMIN SERPL BCP-MCNC: 3.1 G/DL (ref 3.2–4.9)
ALBUMIN/GLOB SERPL: 1 G/DL
ALP SERPL-CCNC: 81 U/L (ref 30–99)
ALT SERPL-CCNC: 12 U/L (ref 2–50)
ANION GAP SERPL CALC-SCNC: 11 MMOL/L (ref 7–16)
ANISOCYTOSIS BLD QL SMEAR: ABNORMAL
AST SERPL-CCNC: 25 U/L (ref 12–45)
BASOPHILS # BLD AUTO: 0.9 % (ref 0–1.8)
BASOPHILS # BLD: 0.1 K/UL (ref 0–0.12)
BILIRUB SERPL-MCNC: <0.2 MG/DL (ref 0.1–1.5)
BUN SERPL-MCNC: 51 MG/DL (ref 8–22)
CALCIUM ALBUM COR SERPL-MCNC: 9.4 MG/DL (ref 8.5–10.5)
CALCIUM SERPL-MCNC: 8.7 MG/DL (ref 8.5–10.5)
CHLORIDE SERPL-SCNC: 108 MMOL/L (ref 96–112)
CO2 SERPL-SCNC: 19 MMOL/L (ref 20–33)
CREAT SERPL-MCNC: 2.12 MG/DL (ref 0.5–1.4)
EKG IMPRESSION: NORMAL
EOSINOPHIL # BLD AUTO: 0.19 K/UL (ref 0–0.51)
EOSINOPHIL NFR BLD: 1.7 % (ref 0–6.9)
ERYTHROCYTE [DISTWIDTH] IN BLOOD BY AUTOMATED COUNT: 49.3 FL (ref 35.9–50)
EST. AVERAGE GLUCOSE BLD GHB EST-MCNC: 143 MG/DL
GFR SERPLBLD CREATININE-BSD FMLA CKD-EPI: 33 ML/MIN/1.73 M 2
GLOBULIN SER CALC-MCNC: 3.2 G/DL (ref 1.9–3.5)
GLUCOSE BLD STRIP.AUTO-MCNC: 131 MG/DL (ref 65–99)
GLUCOSE BLD STRIP.AUTO-MCNC: 149 MG/DL (ref 65–99)
GLUCOSE BLD STRIP.AUTO-MCNC: 201 MG/DL (ref 65–99)
GLUCOSE BLD STRIP.AUTO-MCNC: 221 MG/DL (ref 65–99)
GLUCOSE SERPL-MCNC: 175 MG/DL (ref 65–99)
HBA1C MFR BLD: 6.6 % (ref 4–5.6)
HCT VFR BLD AUTO: 28.7 % (ref 42–52)
HGB BLD-MCNC: 9 G/DL (ref 14–18)
LYMPHOCYTES # BLD AUTO: 2.44 K/UL (ref 1–4.8)
LYMPHOCYTES NFR BLD: 22.4 % (ref 22–41)
MAGNESIUM SERPL-MCNC: 2.4 MG/DL (ref 1.5–2.5)
MANUAL DIFF BLD: NORMAL
MCH RBC QN AUTO: 28.8 PG (ref 27–33)
MCHC RBC AUTO-ENTMCNC: 31.4 G/DL (ref 32.3–36.5)
MCV RBC AUTO: 92 FL (ref 81.4–97.8)
METAMYELOCYTES NFR BLD MANUAL: 2.6 %
MICROCYTES BLD QL SMEAR: ABNORMAL
MONOCYTES # BLD AUTO: 0.19 K/UL (ref 0–0.85)
MONOCYTES NFR BLD AUTO: 1.7 % (ref 0–13.4)
MORPHOLOGY BLD-IMP: NORMAL
MYELOCYTES NFR BLD MANUAL: 0.9 %
NEUTROPHILS # BLD AUTO: 7.61 K/UL (ref 1.82–7.42)
NEUTROPHILS NFR BLD: 69.8 % (ref 44–72)
NRBC # BLD AUTO: 0 K/UL
NRBC BLD-RTO: 0 /100 WBC (ref 0–0.2)
OVALOCYTES BLD QL SMEAR: NORMAL
PHOSPHATE SERPL-MCNC: 3 MG/DL (ref 2.5–4.5)
PLATELET # BLD AUTO: 326 K/UL (ref 164–446)
PLATELET BLD QL SMEAR: NORMAL
PMV BLD AUTO: 9.7 FL (ref 9–12.9)
POIKILOCYTOSIS BLD QL SMEAR: NORMAL
POTASSIUM SERPL-SCNC: 4.6 MMOL/L (ref 3.6–5.5)
PROT SERPL-MCNC: 6.3 G/DL (ref 6–8.2)
RBC # BLD AUTO: 3.12 M/UL (ref 4.7–6.1)
RBC BLD AUTO: PRESENT
SCHISTOCYTES BLD QL SMEAR: NORMAL
SODIUM SERPL-SCNC: 138 MMOL/L (ref 135–145)
T4 FREE SERPL-MCNC: 1.45 NG/DL (ref 0.93–1.7)
TSH SERPL DL<=0.005 MIU/L-ACNC: 5.61 UIU/ML (ref 0.38–5.33)
WBC # BLD AUTO: 10.9 K/UL (ref 4.8–10.8)

## 2025-03-07 PROCEDURE — 83036 HEMOGLOBIN GLYCOSYLATED A1C: CPT

## 2025-03-07 PROCEDURE — 700102 HCHG RX REV CODE 250 W/ 637 OVERRIDE(OP): Performed by: PHYSICAL MEDICINE & REHABILITATION

## 2025-03-07 PROCEDURE — 83735 ASSAY OF MAGNESIUM: CPT

## 2025-03-07 PROCEDURE — 84100 ASSAY OF PHOSPHORUS: CPT

## 2025-03-07 PROCEDURE — 84439 ASSAY OF FREE THYROXINE: CPT

## 2025-03-07 PROCEDURE — 700111 HCHG RX REV CODE 636 W/ 250 OVERRIDE (IP): Performed by: PHYSICAL MEDICINE & REHABILITATION

## 2025-03-07 PROCEDURE — 700102 HCHG RX REV CODE 250 W/ 637 OVERRIDE(OP): Performed by: HOSPITALIST

## 2025-03-07 PROCEDURE — 84443 ASSAY THYROID STIM HORMONE: CPT

## 2025-03-07 PROCEDURE — 85007 BL SMEAR W/DIFF WBC COUNT: CPT

## 2025-03-07 PROCEDURE — 36415 COLL VENOUS BLD VENIPUNCTURE: CPT

## 2025-03-07 PROCEDURE — A9270 NON-COVERED ITEM OR SERVICE: HCPCS | Performed by: HOSPITALIST

## 2025-03-07 PROCEDURE — 97530 THERAPEUTIC ACTIVITIES: CPT

## 2025-03-07 PROCEDURE — 82962 GLUCOSE BLOOD TEST: CPT

## 2025-03-07 PROCEDURE — 99223 1ST HOSP IP/OBS HIGH 75: CPT | Performed by: HOSPITALIST

## 2025-03-07 PROCEDURE — 97162 PT EVAL MOD COMPLEX 30 MIN: CPT

## 2025-03-07 PROCEDURE — 97110 THERAPEUTIC EXERCISES: CPT

## 2025-03-07 PROCEDURE — 97535 SELF CARE MNGMENT TRAINING: CPT

## 2025-03-07 PROCEDURE — 93010 ELECTROCARDIOGRAM REPORT: CPT | Performed by: INTERNAL MEDICINE

## 2025-03-07 PROCEDURE — 93005 ELECTROCARDIOGRAM TRACING: CPT | Mod: TC | Performed by: HOSPITALIST

## 2025-03-07 PROCEDURE — 770010 HCHG ROOM/CARE - REHAB SEMI PRIVAT*

## 2025-03-07 PROCEDURE — A9270 NON-COVERED ITEM OR SERVICE: HCPCS | Performed by: PHYSICAL MEDICINE & REHABILITATION

## 2025-03-07 PROCEDURE — 82306 VITAMIN D 25 HYDROXY: CPT

## 2025-03-07 PROCEDURE — 700105 HCHG RX REV CODE 258: Performed by: PHYSICAL MEDICINE & REHABILITATION

## 2025-03-07 PROCEDURE — 80053 COMPREHEN METABOLIC PANEL: CPT

## 2025-03-07 PROCEDURE — 85027 COMPLETE CBC AUTOMATED: CPT

## 2025-03-07 PROCEDURE — 97165 OT EVAL LOW COMPLEX 30 MIN: CPT

## 2025-03-07 RX ORDER — HYDRALAZINE HYDROCHLORIDE 25 MG/1
25 TABLET, FILM COATED ORAL EVERY 8 HOURS
Status: DISCONTINUED | OUTPATIENT
Start: 2025-03-07 | End: 2025-03-11

## 2025-03-07 RX ORDER — DEXTROSE MONOHYDRATE 25 G/50ML
25 INJECTION, SOLUTION INTRAVENOUS
Status: DISCONTINUED | OUTPATIENT
Start: 2025-03-07 | End: 2025-03-17

## 2025-03-07 RX ORDER — HYDRALAZINE HYDROCHLORIDE 50 MG/1
50 TABLET, FILM COATED ORAL ONCE
Status: DISCONTINUED | OUTPATIENT
Start: 2025-03-07 | End: 2025-03-07

## 2025-03-07 RX ORDER — INSULIN LISPRO 100 [IU]/ML
2-12 INJECTION, SOLUTION INTRAVENOUS; SUBCUTANEOUS
Status: DISCONTINUED | OUTPATIENT
Start: 2025-03-07 | End: 2025-03-17

## 2025-03-07 RX ADMIN — LISINOPRIL 40 MG: 20 TABLET ORAL at 21:59

## 2025-03-07 RX ADMIN — SODIUM BICARBONATE 650 MG: 650 TABLET ORAL at 21:59

## 2025-03-07 RX ADMIN — HYDRALAZINE HYDROCHLORIDE 25 MG: 25 TABLET ORAL at 22:00

## 2025-03-07 RX ADMIN — HYDRALAZINE HYDROCHLORIDE 25 MG: 25 TABLET ORAL at 18:38

## 2025-03-07 RX ADMIN — HYDRALAZINE HYDROCHLORIDE 25 MG: 25 TABLET ORAL at 04:54

## 2025-03-07 RX ADMIN — HYDROCHLOROTHIAZIDE 25 MG: 25 TABLET ORAL at 05:52

## 2025-03-07 RX ADMIN — HEPARIN SODIUM 5000 UNITS: 5000 INJECTION, SOLUTION INTRAVENOUS; SUBCUTANEOUS at 05:53

## 2025-03-07 RX ADMIN — CARVEDILOL 12.5 MG: 12.5 TABLET, FILM COATED ORAL at 18:38

## 2025-03-07 RX ADMIN — CARVEDILOL 12.5 MG: 12.5 TABLET, FILM COATED ORAL at 09:30

## 2025-03-07 RX ADMIN — ASPIRIN 81 MG: 81 TABLET, COATED ORAL at 09:30

## 2025-03-07 RX ADMIN — HEPARIN SODIUM 5000 UNITS: 5000 INJECTION, SOLUTION INTRAVENOUS; SUBCUTANEOUS at 22:07

## 2025-03-07 RX ADMIN — OMEPRAZOLE 20 MG: 20 CAPSULE, DELAYED RELEASE ORAL at 09:30

## 2025-03-07 RX ADMIN — LEVOTHYROXINE SODIUM 175 MCG: 0.05 TABLET ORAL at 05:52

## 2025-03-07 RX ADMIN — HEPARIN SODIUM 5000 UNITS: 5000 INJECTION, SOLUTION INTRAVENOUS; SUBCUTANEOUS at 14:43

## 2025-03-07 RX ADMIN — ATORVASTATIN CALCIUM 40 MG: 40 TABLET, FILM COATED ORAL at 21:59

## 2025-03-07 RX ADMIN — CEFAZOLIN 2 G: 2 INJECTION, POWDER, FOR SOLUTION INTRAMUSCULAR; INTRAVENOUS at 22:09

## 2025-03-07 RX ADMIN — INSULIN LISPRO 2 UNITS: 100 INJECTION, SOLUTION INTRAVENOUS; SUBCUTANEOUS at 07:59

## 2025-03-07 RX ADMIN — CEFAZOLIN 2 G: 2 INJECTION, POWDER, FOR SOLUTION INTRAMUSCULAR; INTRAVENOUS at 06:05

## 2025-03-07 RX ADMIN — CEFAZOLIN 2 G: 2 INJECTION, POWDER, FOR SOLUTION INTRAMUSCULAR; INTRAVENOUS at 14:42

## 2025-03-07 RX ADMIN — INSULIN LISPRO 2 UNITS: 100 INJECTION, SOLUTION INTRAVENOUS; SUBCUTANEOUS at 11:34

## 2025-03-07 RX ADMIN — SODIUM BICARBONATE 650 MG: 650 TABLET ORAL at 09:30

## 2025-03-07 SDOH — ECONOMIC STABILITY: TRANSPORTATION INSECURITY
IN THE PAST 12 MONTHS, HAS THE LACK OF TRANSPORTATION KEPT YOU FROM MEDICAL APPOINTMENTS OR FROM GETTING MEDICATIONS?: NO

## 2025-03-07 SDOH — ECONOMIC STABILITY: TRANSPORTATION INSECURITY
IN THE PAST 12 MONTHS, HAS LACK OF RELIABLE TRANSPORTATION KEPT YOU FROM MEDICAL APPOINTMENTS, MEETINGS, WORK OR FROM GETTING THINGS NEEDED FOR DAILY LIVING?: NO

## 2025-03-07 ASSESSMENT — PATIENT HEALTH QUESTIONNAIRE - PHQ9
5. POOR APPETITE OR OVEREATING: NOT AT ALL
SUM OF ALL RESPONSES TO PHQ QUESTIONS 1-9: 0
3. TROUBLE FALLING OR STAYING ASLEEP OR SLEEPING TOO MUCH: NOT AT ALL
7. TROUBLE CONCENTRATING ON THINGS, SUCH AS READING THE NEWSPAPER OR WATCHING TELEVISION: NOT AT ALL
4. FEELING TIRED OR HAVING LITTLE ENERGY: NOT AT ALL
6. FEELING BAD ABOUT YOURSELF - OR THAT YOU ARE A FAILURE OR HAVE LET YOURSELF OR YOUR FAMILY DOWN: NOT AL ALL
8. MOVING OR SPEAKING SO SLOWLY THAT OTHER PEOPLE COULD HAVE NOTICED. OR THE OPPOSITE, BEING SO FIGETY OR RESTLESS THAT YOU HAVE BEEN MOVING AROUND A LOT MORE THAN USUAL: NOT AT ALL
9. THOUGHTS THAT YOU WOULD BE BETTER OFF DEAD, OR OF HURTING YOURSELF: NOT AT ALL
1. LITTLE INTEREST OR PLEASURE IN DOING THINGS: NOT AT ALL
SUM OF ALL RESPONSES TO PHQ9 QUESTIONS 1 AND 2: 0
2. FEELING DOWN, DEPRESSED, IRRITABLE, OR HOPELESS: NOT AT ALL

## 2025-03-07 ASSESSMENT — BRIEF INTERVIEW FOR MENTAL STATUS (BIMS)
WHAT MONTH IS IT: ACCURATE WITHIN 5 DAYS
ASKED TO RECALL BED: YES, NO CUE REQUIRED
BIMS SUMMARY SCORE: 15
ASKED TO RECALL SOCK: YES, NO CUE REQUIRED
ASKED TO RECALL BLUE: YES, NO CUE REQUIRED
INITIAL REPETITION OF BED BLUE SOCK - FIRST ATTEMPT: 3
WHAT DAY OF THE WEEK IS IT: CORRECT
WHAT YEAR IS IT: CORRECT

## 2025-03-07 ASSESSMENT — ACTIVITIES OF DAILY LIVING (ADL): TOILETING: INDEPENDENT

## 2025-03-07 ASSESSMENT — PAIN DESCRIPTION - PAIN TYPE: TYPE: ACUTE PAIN

## 2025-03-07 NOTE — THERAPY
Occupational Therapy  Daily Treatment     Patient Name:  Marlo León  Age:  69 y.o., Sex:  male  Medical Record #:  [id number]  Today's Date:  3/7/2025     Subjective: Pt agreeable to OT session, describes good motivation for UE there-ex. Has 1 db at home.      Objective:    03/07/25 1301   OT Charge Group   OT Therapeutic Exercise (Units) 2   OT Total Time Spent   OT Individual Total Time Spent (Mins) 30   Precautions   Precautions Fall Risk   Weight Bearing NWB LE (laterality in comments)   Braces Immobilizer LE (laterality in comments);IPOP   Comments L BKA, L IPOP and L knee immobilizer   Vitals   O2 Delivery Device None - Room Air   Interdisciplinary Plan of Care Collaboration   Patient Position at End of Therapy Seated;Chair Alarm On;Self Releasing Lap Belt Applied;Tray Table within Reach;Call Light within Reach         Therapeutic Exercise  Purpose: to improve strength and to improve functional endurance  Interventions:   Lower body exercises,   Standing program -   Hip extension, 3 sets of 10  Hip abduction, 3 sets of 10  Rickshaw,  3 sets of 10, 40#  Description of Intervention(s): CGA for STS and standing balance in // bars    Assessment:  Pt tolerated session well. Is a good candidate for [facility] Rickshaw activity for ongoing strengthening plus there-ex with PT/OT while still in house.   Strengths: Able to follow instructions, Alert and oriented, Good carryover of learning, Independent prior level of function, Manages pain appropriately, Motivated for self care and independence, Pleasant and cooperative, Supportive family, Willingly participates in therapeutic activities  Barriers: Decreased endurance, Fatigue, Generalized weakness, Impaired activity tolerance, Impaired balance, Limited mobility    Plan:  Continue OT POC    DME       Passport items to be completed:  Perform bathroom transfers, complete dressing, complete feeding, get ready for the day, prepare a simple meal, participate in household tasks,  adapt home for safety needs, demonstrate home exercise program, complete caregiver training     Occupational Therapy Goals (Active)       Problem: Bathing       Dates: Start:  03/07/25         Goal: STG-Within one week, patient will bathe with CGA.        Dates: Start:  03/07/25               Problem: Dressing       Dates: Start:  03/07/25         Goal: STG-Within one week, patient will dress LB with CGA.        Dates: Start:  03/07/25               Problem: OT Long Term Goals       Dates: Start:  03/07/25         Goal: LTG-By discharge, patient will complete basic self care tasks @ sup-mod I level.        Dates: Start:  03/07/25            Goal: LTG-By discharge, patient will perform bathroom transfers @ sup-mod I level.        Dates: Start:  03/07/25               Problem: Toileting       Dates: Start:  03/07/25         Goal: STG-Within one week, patient will complete toileting tasks with min A.        Dates: Start:  03/07/25

## 2025-03-07 NOTE — ASSESSMENT & PLAN NOTE
Hba1c: 6.6 (3/7)  BS: 150 --> 112 --> 183 (am) --> 130 --> 132 --> 98 (am) --> 118 --> 110  On Glargine: 37 units qhs  Has a CGM  Note: off SS coverage at night  Note: home meds include Glargine 28-35 units qhs, Humalog 10 units tid at meals, Farxiga, and Mounjaro  Cont to monitor

## 2025-03-07 NOTE — THERAPY
Physical Therapy   Initial Evaluation     Patient Name:  Marlo León  Age:  69 y.o., Sex:  male  Medical Record #:  [id number]  Today's Date: 3/7/2025     Subjective: Pt notes he has had a left foot wound for about 6 months. He has been using a manual wc as primary means of mobility. Wound progressively got worst. Brief bout of HH PT.     Objective:    03/07/25 0901   PT Charge Group   PT Therapeutic Activities (Units) 2   PT Evaluation PT Evaluation Mod   PT Total Time Spent   PT Individual Total Time Spent (Mins) 60   Precautions   Precautions Fall Risk   Weight Bearing NWB LE (laterality in comments)  (NWB L LE)   Braces Immobilizer LE (laterality in comments);IPOP   Comments L BKA, L IPOP and L knee immobilizer   Vitals   Pulse 86   Patient BP Position Supine   Blood Pressure  (!) 174/75  (188/77)   O2 Delivery Device None - Room Air   Vitals Comments pt supine at rest   Cognition    Orientation Level Oriented x 4   Level of Consciousness Alert   Prior Living Situation   Prior Services Home-Independent   Housing / Facility [address]   Steps Into Home   (ramp with HRs being installed, anticipated completion prior to pt DC from rehab hosp.)   Steps In Home 0   Elevator No   Bathroom Set up   (bathroom currently being remodeled to ADA standards)   Equipment Owned Front-Wheel Walker;4-Wheel Walker;Wheelchair;Single Point Cane;Crutches;Tub / Shower Seat;Hand Held Shower  (also has high toilet seats)   Lives with - Patient's Self Care Capacity Spouse   Prior Level of Functional Mobility   Bed Mobility Independent   Transfer Status Independent   Ambulation Other (Comments)  (used the manual wc as primary means of mobility for past 6 months)   Distance Ambulation   (during past 6 months he was non-ambulatory, however prior to that he was a community ambulator)   Assistive Devices Used Wheelchair   Wheelchair Independent   Stairs Required Assist  (used a crutch to negotiate the steps into garage and transferred  directly to the car)   Prior Functioning: Everyday Activities   Self Care Independent   Indoor Mobility (Ambulation) Dependent   Stairs Needed some help   Functional Cognition Independent   Prior Device Use Manual wheelchair   Roll Left and Right   Assistance Needed Physical assistance   Physical Assistance Level 26%-50%   CARE Score - Roll Left and Right 3   Roll Left and Right   Level of Assist Moderate Assist   Additional Description Use of bed rail;Extra time needed   Sit to Lying   Assistance Needed Incidental touching   Physical Assistance Level No physical assistance   CARE Score - Sit to Lying 4   Sit to Lying   Level of Assist Contact Guard Assist   Additional Description Head of bed elevated;Extra time needed   Lying to Sitting on Side of Bed   Assistance Needed Physical assistance   Physical Assistance Level 25% or less   CARE Score - Lying to Sitting on Side of Bed 3   Lying to Sitting on Side of Bed   Level of Assist Contact Guard Assist   Additional Description Use of bed rail;Head of bed elevated;Extra time needed   Sit to Stand   Assistance Needed Physical assistance   Physical Assistance Level 51%-75%   CARE Score - Sit to Stand 2   Sit to Stand   Level of Assist Maximal Assist  (from toilet, with use of grab bar, multiple attempts)   Assistive Devices   (grab bar)   Additional Description Extra time needed  (multiple attempts)   Chair/Bed-to-Chair Transfer   Assistance Needed Physical assistance;Adaptive equipment   Physical Assistance Level 76% or more   Comment educated in lateral scoot tx, removed armrest, education in hand placement and R foot placement   CARE Score - Chair/Bed-to-Chair Transfer 2   Chair/Bed-to-Chair Transfer   Level of Assist Moderate Assist   Transfer Type Squat Pivot Transfer   Additional Description Extra time needed;Cueing needed   Toilet Transfer   Assistance Needed Physical assistance   Physical Assistance Level 76% or more   CARE Score - Toilet Transfer 2   Toilet  Transfer   Level of Assist Maximal Assist   Transfer Type Stand Pivot Transfer   Adaptive Equipment Grab bars   Car Transfer   Reason if not Attempted Safety concerns   CARE Score - Car Transfer 88   Walk 10 Feet   Reason if not Attempted Safety concerns   CARE Score - Walk 10 Feet 88   Walk 50 Feet with Two Turns   Reason if not Attempted Safety concerns   CARE Score - Walk 50 Feet with Two Turns 88   Walk 150 Feet   Reason if not Attempted Safety concerns   CARE Score - Walk 150 Feet 88   Walking 10 Feet on Uneven Surfaces   Reason if not Attempted Safety concerns   CARE Score - Walking 10 Feet on Uneven Surfaces 88   Ambulation   Level of Assist Unable to Participate   1 Step (Curb)   Reason if not Attempted Safety concerns   CARE Score - 1 Step (Curb) 88   Curbs   Level of Assist Unable to Participate   4 Steps   Reason if not Attempted Safety concerns   CARE Score - 4 Steps 88   12 Steps   Reason if not Attempted Safety concerns   CARE Score - 12 Steps 88   Stairs   Level of Assist Unable to Participate   Picking Up Object   Reason if not Attempted Safety concerns   CARE Score - Picking Up Object 88   Wheel 50 Feet with Two Turns   Assistance Needed Supervision   CARE Score - Wheel 50 Feet with Two Turns 4   Type of Wheelchair/Scooter Manual   Wheel 150 Feet   Assistance Needed Supervision   CARE Score - Wheel 150 Feet 4   Type of Wheelchair/Scooter Manual   Wheelchair   Level of Assist Supervised   Type Manual   Additional Description Extra time needed   Patient Scheduling Information   PT Time 30/60 minutes   Problem List    Problems Impaired Bed Mobility;Impaired Transfers;Impaired Ambulation;Functional ROM Deficit;Functional Strength Deficit;Impaired Coordination;Impaired Balance;Decreased Activity Tolerance;Limited Knowledge of Post-Op Precautions   Strengths & Barriers   Strengths Effective communication skills;Pleasant and cooperative;Supportive family;Willingly participates in therapeutic  "activities;Motivated for self care and independence   Barriers Impaired activity tolerance;Impaired balance;Constipation;Limited mobility   Benefit   Therapy Benefit Patient Would Benefit from Inpatient Rehabilitation Physical Therapy to Maximize Functional Banks with ADLs, IADLs and Mobility.   Current Discharge Plan   Current Discharge Plan Return to Prior Living Situation   PT DME Recommendations   Wheelchair 18\" Width;Removable/Flip Back Armrests  (Left residual limb board)   Cushion Standard   Vendor Equipment IPOP   Interdisciplinary Plan of Care Collaboration   IDT Collaboration with  Occupational Therapist;Nursing  (Re: CLOF)     Patient has been educated on the following items: physical therapy role, physical therapy plan of care, rehab expectations, goal setting, mobility needs, patient passport, and fall risk and use of call light    Educated pt on positioning, importance of maintaining L knee ext to avoid knee flex contracture/use of knee immobilizer, use of call light, inpt rehab expectations.    Pt seated in wc with seatbelt secured, chair alarm on, call light/tray table/phone all within reach.  Assessment:    Patient is a 69 y.o. male with a diagnosis of Hx of BKA, left ([facility]) [Z89.512].  The patient was [facility] with severe functional debility after L BKA on 3/2/25. He presents with generalized weakness, difficulty in walking, unsteadiness on feet, with resultant impaired functional mobility. His home bathroom is currently being remodeled to allow for wc accessibility. His home entryway is also being modified to a ramp. Pt has a manual wc however will need a residual limb board.    Pt's goal for PT is \"to be ready to put my leg prosthesis on and walk\". He was educated on expected timeline and verbalizes understanding. All pt questions answered.     Currently, the patient has the following precautions: Fall Risk: Other (see comments) (Left BKA), Weight Bearing: " "NWB LE (laterality in comments) (NWB L LE), Braces: Immobilizer LE (laterality in comments), IPOP, Comments: L BKA, L IPOP and L knee immobilizer    Patient's PMH includes:   Past Medical History:   Diagnosis Date    Anesthesia     slow to wake up    Arthritis     pt states all joints    Asthma     Seasonal, uses rarely/ prn inhaler    Bowel habit changes     diarrhea    Breath shortness     With exertion, no oxygen    Burn     burns on bottom of feet, due to neuropathy, wound care vists from 7/4 to 9/10    Cataract 03/2021    diana non surgically repaired, pt states with \"macular degeneration\"    Diabetes (HCC)     Insulin dependent.    Disorder of thyroid     Heart burn     High cholesterol     Hypertension     pt states well controlled on meds    Indigestion     Renal disorder     Kidney stone    Sleep apnea     uses bipap    Snoring     Urinary incontinence      Patient's Past Surgical History:   Past Surgical History:   Procedure Laterality Date    CERVICAL DISK AND FUSION ANTERIOR  3/5/2021    Procedure: DISCECTOMY, SPINE, CERVICAL, ANTERIOR APPROACH, WITH FUSION - C4-5 WITH PLATE, C5-6 WITH PLATE;  Surgeon: Rajesh Azar M.D.;  Location: SURGERY [facility];  Service: Neurosurgery    HARDWARE REMOVAL NEURO  3/5/2021    Procedure: REMOVAL, HARDWARE - C6-7;  Surgeon: Rajesh Azar M.D.;  Location: [facility];  Service: Neurosurgery    CERVICAL DISK AND FUSION ANTERIOR  9/21/2018    Procedure: CERVICAL DISK AND FUSION ANTERIOR;  Surgeon: Rajesh Azar M.D.;  Location: [facility];  Service: Neurosurgery    OTHER  2017    Kidney stone    OTHER  2011    Hernia mesh umbilical    OTHER  2007    Gastric bypass    OTHER ORTHOPEDIC SURGERY Left 2006    ankle, lower leg orif     TONSILLECTOMY  1960       PT evaluation performed today; functional performance at today's assessment is as above. At baseline, the patient required assistance with mobility.  The patient is limited by the following " "barriers: Impaired activity tolerance, Impaired balance, Constipation, Limited mobility and recent L BKA affecting his mobility.    Additional factors influencing patient status and prognosis include: prior level of function, PMH, and the above comorbidities.     The patient is performing well below their baseline level of function and will benefit from an interdisciplinary high intensity rehabilitation program to maximize functional independence, decrease burden of care, and support a safe return home with spousal support.    Plan:    Recommend Physical Therapy  minutes per day 5-7 days per week for 2 weeks for the following treatments: PT Group Therapy, PT Orthotics Training, PT Prosthetic Training, PT Gait Training, PT Self Care/Home Eval, PT Therapeutic Exercises, PT Neuro Re-Ed/Balance, PT Therapeutic Activity, PT Evaluation, PT Wheelchair Management , and PT Community Integration.    Passport items to be completed:  Get in/out of bed safely, in/out of a vehicle, safely use mobility device, walk or wheel around home/community, navigate up and down stairs, show how to get up/down from the ground, ensure home is accessible, demonstrate HEP, complete caregiver training    Goals:  Long-term and short-term goals have been discussed with patient and they are in agreement.    Physical Therapy Problems (Active)       Problem: Mobility       Dates: Start:  03/07/25         Goal: STG-Within one week, patient will propel wheelchair household distances: on unit with Mod I.       Dates: Start:  03/07/25            Goal: STG-Within one week, patient will ambulate household distance at least 15' with FWW and Min A.       Dates: Start:  03/07/25               Problem: Mobility Transfers       Dates: Start:  03/07/25         Goal: STG-Within one week, patient will sit to stand Min A from 18.5\" surface to FWW or other hold support.       Dates: Start:  03/07/25               Problem: PT-Long Term Goals       Dates: Start:  " 03/07/25         Goal: LTG-By discharge, patient will propel wheelchair on outside surfaces and inside surfaces with Mod I, distance >500'.       Dates: Start:  03/07/25            Goal: LTG-By discharge, patient will ambulate at least 25' with FWW and SBA.       Dates: Start:  03/07/25            Goal: LTG-By discharge, patient will transfer one surface to another V.       Dates: Start:  03/07/25            Goal: LTG-By discharge, patient will perform home exercise program Mod I.       Dates: Start:  03/07/25            Goal: LTG-By discharge, patient will transfer in/out of a car CGA from  level.       Dates: Start:  03/07/25

## 2025-03-07 NOTE — THERAPY
Occupational Therapy   Initial Evaluation     Patient Name:  Marlo León  Age:  69 y.o., Sex:  male  Medical Record #:  [id number]  Today's Date: 3/7/2025     Subjective: Pt pleasant and agreeable to OT session. Declines pain. Asserts he has been using and independent at w/c level, including driving, for ~6 months with L foot wound.      Objective:    03/07/25 0701   OT Charge Group   Charges Yes   OT Self Care / ADL (Units) 1   OT Evaluation OT Evaluation Low   OT Total Time Spent   OT Individual Total Time Spent (Mins) 60   Precautions   Precautions Fall Risk   Weight Bearing NWB LE (laterality in comments)   Comments L BKA   Vitals   O2 Delivery Device None - Room Air   Prior Living Situation   Prior Services None;Home-Independent   Housing / Facility 1 Story House   Steps Into Home   (working on ramped entry, he asserts this will be completed by dc)   Bathroom Set up   (currently demo'ing bathroom for accessibility)   Equipment Owned Front-Wheel Walker;4-Wheel Walker;Wheelchair;Sock Aid;Reacher;Long Handled Shoehorn;Dressing Stick   Lives with - Patient's Self Care Capacity Spouse   Prior Level of ADL Function   Self Feeding Independent   Grooming / Hygiene Independent   Bathing Independent   Dressing Independent   Toileting Independent   Prior Level of IADL Function   Medication Management Independent   Laundry Independent   Kitchen Mobility Independent   Finances Independent   Home Management Independent   Shopping Independent   Prior Level Of Mobility Independent With Device in Community;Independent With Device in Home   Driving / Transportation Driving Independent   Prior Functioning: Everyday Activities   Self Care Independent   Indoor Mobility (Ambulation) Dependent   Stairs Dependent   Functional Cognition Independent   Prior Device Use Manual wheelchair   Cognitive Pattern Assessment   Cognitive Pattern Assessment Used BIMS   Brief Interview for Mental Status (BIMS)   Repetition of Three Words (First  "Attempt) 3   Temporal Orientation: Year Correct   Temporal Orientation: Month Accurate within 5 days   Temporal Orientation: Day Correct   Recall: \"Sock\" Yes, no cue required   Recall: \"Blue\" Yes, no cue required   Recall: \"Bed\" Yes, no cue required   BIMS Summary Score 15   Confusion Assessment Method (CAM)   Is there evidence of an acute change in mental status from the patient's baseline? No   Inattention Behavior not present   Disorganized thinking Behavior not present   Altered level of consciousness Behavior not present   Hearing, Speech, and Vision   Ability to Hear Adequate   Ability to See in Adequate Light Adequate   Expression of Ideas and Wants Without difficulty   Understanding Verbal and Non-Verbal Content Understands   Eating   Assistance Needed Independent   CARE Score - Eating 6   Eating   Level of Assist Independent   Oral Hygiene   Assistance Needed Adaptive equipment;Set-up / clean-up   CARE Score - Oral Hygiene 5   Oral Hygiene   Level of Assist Set Up   Grooming   Level of Assist Set Up   Patient Position Seated   Upper Body Dressing   Assistance Needed Set-up / clean-up   Physical Assistance Level No physical assistance   CARE Score - Upper Body Dressing 5   Upper Body Dressing   Level of Assist Set Up   Patient Position Seated   Clothing Item(s) Pullover shirt   Lower Body Dressing   Assistance Needed Physical assistance;Adaptive equipment   Physical Assistance Level 26%-50%   CARE Score - Lower Body Dressing 3   Lower Body Dressing   Level of Assist Minimal Assist;Moderate Assist   Patient Position Seated;Standing   Clothing Item(s) Underwear;Shorts   Additional Description Grab bars;Assist for balance;Assist with draw up;Cueing needed;Extra time needed   Putting On/Taking Off Footwear   Assistance Needed Physical assistance   Physical Assistance Level Total assistance   CARE Score - Putting On/Taking Off Footwear 1   Putting On/Taking Off Footwear   Level of Assist Total Assist   Patient " Position Seated   Shower/Bathe Self   Assistance Needed Physical assistance;Adaptive equipment   Physical Assistance Level 25% or less   CARE Score - Shower/Bathe Self 3   Shower/Bathe Self   Level of Assist Minimal Assist   Patient Position Standing;Seated   Adaptive Equipment Grab bars;Use of fold down shower bench  (HH shower head)   Tub/Shower Transfer   Assistance Needed Physical assistance;Adaptive equipment   Physical Assistance Level 25% or less   CARE Score - Tub/Shower Transfer 3   Tub/Shower Transfer   Level of Assist Minimal Assist   Transfer Type Stand pivot transfer   Adaptive Equipment Grab bars;Use of showerchair   Additional Description Cueing needed;Extra time needed   Toilet Transfer   Assistance Needed Physical assistance;Adaptive equipment   Physical Assistance Level 25% or less   CARE Score - Toilet Transfer 3   Toilet Transfer   Level of Assist Minimal Assist   Toileting Hygiene   Assistance Needed Physical assistance;Adaptive equipment   Physical Assistance Level 51%-75%   CARE Score - Toileting Hygiene 2   Toileting Hygiene   Level of Assist Maximal Assist   Interdisciplinary Communication   Patient Positioning in Bed Comments  (Maintain LLE extension, do not place pillows under knee)   Patient Scheduling Information   OT Time 30/60 minutes   Primary or Coverage OT NT   Strengths & Barriers   Strengths Able to follow instructions;Alert and oriented;Good carryover of learning;Independent prior level of function;Manages pain appropriately;Motivated for self care and independence;Pleasant and cooperative;Supportive family;Willingly participates in therapeutic activities   Barriers Decreased endurance;Fatigue;Generalized weakness;Impaired activity tolerance;Impaired balance;Limited mobility   Benefit    Therapy Benefit Patient Would Benefit from Inpatient Rehab Occupational Therapy to Maximize [place] with ADLs, IADLs and Functional Mobility.     Patient has been educated on the following  "items: occupational therapy role, occupational therapy plan of care, rehab expectations, goal setting, ADL needs, and fall risk and use of call light    Assessment:   Patient is a 69 y.o. male with a diagnosis of Hx of BKA, left ([facility]) [Z89.512].      Currently, the patient has the following precautions: Fall Risk: Other (see comments) (Left BKA), Weight Bearing: NWB LE (laterality in comments), Braces: Immobilizer LE (laterality in comments), IPOP, Comments: L BKA    Patient's PMH includes:   Past Medical History:   Diagnosis Date    Anesthesia     slow to wake up    Arthritis     pt states all joints    Asthma     Seasonal, uses rarely/ prn inhaler    Bowel habit changes     diarrhea    Breath shortness     With exertion, no oxygen    Burn     burns on bottom of feet, due to neuropathy, wound care vists from 7/4 to 9/10    Cataract 03/2021    diana non surgically repaired, pt states with \"macular degeneration\"    Diabetes ([facility])     Insulin dependent.    Disorder of thyroid     Heart burn     High cholesterol     Hypertension     pt states well controlled on meds    Indigestion     Renal disorder     Kidney stone    Sleep apnea     uses bipap    Snoring     Urinary incontinence      Patient's Past Surgical History:   Past Surgical History:   Procedure Laterality Date    CERVICAL DISK AND FUSION ANTERIOR  3/5/2021    Procedure: DISCECTOMY, SPINE, CERVICAL, ANTERIOR APPROACH, WITH FUSION - C4-5 WITH PLATE, C5-6 WITH PLATE;  Surgeon: Rajesh Azar M.D.;  Location: [facility];  Service: Neurosurgery    HARDWARE REMOVAL NEURO  3/5/2021    Procedure: REMOVAL, HARDWARE - C6-7;  Surgeon: Rajesh Azar M.D.;  Location: [facility];  Service: Neurosurgery    CERVICAL DISK AND FUSION ANTERIOR  9/21/2018    Procedure: CERVICAL DISK AND FUSION ANTERIOR;  Surgeon: Rajesh Azar M.D.;  Location: [facility];  Service: Neurosurgery    OTHER  2017    Kidney stone    OTHER  2011    Hernia mesh " umbilical    OTHER  2007    Gastric bypass    OTHER ORTHOPEDIC SURGERY Left 2006    ankle, lower leg orif     TONSILLECTOMY  1960       OT evaluation performed today; functional performance at today's assessment is as above. At baseline, the patient was independent with ADLs/IADLs. The patient is limited by the following barriers: (P) Decreased endurance, Fatigue, Generalized weakness, Impaired activity tolerance, Impaired balance, Limited mobility and neuropathies    Additional factors influencing patient status and prognosis include: prior level of function, PMH, and the above comorbidities.    The patient is performing well below their baseline level of function and will benefit from am interdisciplinary high intensity rehabilitation program to maximize functional independence with ADL's, IADL's and functional mobility, decrease burden of care, and support a safe return home with spousal support.    Plan:   Recommend Occupational Therapy  minutes per day 5-7 days per week for 2 weeks for the following treatments:  OT Self Care/ADL, OT Neuro Re-Ed/Balance, OT Therapeutic Activity, and OT Therapeutic Exercise.    Passport items to be completed:  Perform bathroom transfers, complete dressing, complete feeding, get ready for the day, prepare a simple meal, participate in household tasks, adapt home for safety needs, demonstrate home exercise program, complete caregiver training     Goals: Long term and short term goals have been discussed with patient and they are in agreement.    Occupational Therapy Goals (Active)       There are no active problems.

## 2025-03-07 NOTE — PROGRESS NOTES
NURSING DAILY NOTE    Name: Marlo León   Date of Admission: 3/6/2025   Admitting Diagnosis: Hx of BKA, left (HCC)  Attending Physician: MICKIE JUAREZ D.O.  Allergies: Neosporin [neomycin-polymyxin b], Tape, Amlodipine, Amoxicillin, Bacitracin-polymyxin b, Cyclobenzaprine, Empagliflozin-metformin hcl, Gabapentin, Gadolinium, Metformin, Neomycin-polymyxin b gu, Nsaids, Pioglitazone, Prednisone, Sulfamethoxazole w-trimethoprim, and Tamsulosin    Safety  Patient Assist     Patient Precautions     Bed Transfer Status     Toilet Transfer Status      Assistive Devices     Oxygen  None - Room Air  Diet/Therapeutic Dining  Current Diet Order   Procedures    Diet Order Diet: Regular (HIGH PROTEIN AND VEGETABLES. DAIRY OK. LIMIT CARBOHYDRATES); Nutrient modifications: (optional): High Protein     Pill Administration  whole  Agitated Behavioral Scale     ABS Level of Severity       Fall Risk  Has the patient had a fall this admission?      Nikole Swanson Fall Risk Scoring  15, HIGH RISK  Fall Risk Safety Measures  bed alarm, chair alarm, poor balance, and low vision/hearing    Vitals  Temperature: 36.9 °C (98.4 °F)  Temp src: Temporal  Pulse: 84  Respiration: 18  Blood Pressure : (!) 175/80 (Rn notified)  Blood Pressure MAP (Calculated): 112 MM HG  BP Location: Right, Upper Arm  Patient BP Position: Supine     Oxygen  Pulse Oximetry: 97 %  O2 Delivery Device: None - Room Air    Bowel and Bladder  Last Bowel Movement  03/06/25  Stool Type     Bowel Device     Continent  Bladder: Continent void   Bowel: Continent movement  Bladder Function  Urine Void (mL): 300 ml  Number of Times Voided: 1  Urine Color: Yellow  Genitourinary Assessment   Bladder Assessment (WDL):  Within Defined Limits  Urine Color: Yellow    Skin  Anselmo Score   16  Sensory Interventions   Bed Types: Standard/Trauma Mattress  Skin Preventative Measures: Pillows in Use for Support / Positioning  Moisture Interventions  Moisturizers/Barriers: Barrier  Paste      Pain  Pain Rating Scale     Pain Location     Pain Location Orientation     Pain Interventions        ADLs    Bathing      Linen Change      Personal Hygiene     Chlorhexidine Bath      Oral Care     Teeth/Dentures     Shave     Nutrition Percentage Eaten  Dinner, Between % Consumed  Environmental Precautions     Patient Turns/Positioning  Patient turns self independently side to side without assistance, to offload sacral area  Patient Turns Assistance/Tolerance     Bed Positions     Head of Bed Elevated         Psychosocial/Neurologic Assessment  Psychosocial Assessment  Psychosocial (WDL):  Within Defined Limits  Neurologic Assessment  Neuro (WDL): Within Defined Limits  Level of Consciousness: Alert  Orientation Level: Oriented X4  Cognition: Follows commands, Appropriate judgement, Appropriate safety awareness  Speech: Clear  EENT (WDL):  WDL Except    Cardio/Pulmonary Assessment  Edema      Respiratory Breath Sounds  RUL Breath Sounds: Clear  RML Breath Sounds: Clear  RLL Breath Sounds: Diminished  NOEMY Breath Sounds: Clear  LLL Breath Sounds: Diminished  Cardiac Assessment   Cardiac (WDL):  Within Defined Limits

## 2025-03-07 NOTE — DISCHARGE PLANNING
CASE MANAGEMENT INITIAL ASSESSMENT    Admit Date:  3/6/2025     I met with patient to discuss role of case management / discharge planning / team conference.   Patient is a  69 y.o. male transferred from [facility].    Patient lives at home with spouse. Was inquiring about what size and kind of w/c he was using here in rehab. Pt has a semi-new w/c at home but he just stuck on it when he transfers etc. He was looking into purchasing one. Patient has had home health prior, does not want the company again.     Diagnosis: Hx of BKA, left (HCC) [Z89.512]    Co-morbidities:   Patient Active Problem List    Diagnosis Date Noted    Hx of BKA, left (HCC) 03/06/2025    Hypothyroidism 03/07/2021    Hyperlipidemia 03/07/2021    Morbid obesity ([facility]) 03/07/2021    Asymptomatic hypertensive urgency 03/06/2021    Anesthesia     Sleep apnea     Hypertension     Diabetes ([facility])     Asthma      Prior Living Situation:  Housing / Facility: [address]  Lives with - Patient's Self Care Capacity: Spouse    Prior Level of Function:  Medication Management: Independent  Finances: Independent  Home Management: Independent  Shopping: Independent  Prior Level Of Mobility: Independent With Device in Community, Independent With Device in Home  Driving / Transportation: Driving Independent    Support Systems:  Primary : Celia León (spouse)    Previous Services Utilized:   Equipment Owned: Front-Wheel Walker, 4-Wheel Walker, Wheelchair, Single Point Cane, Crutches, Tub / Shower Seat, Hand Held Shower  Prior Services: None, Home-Independent    Other Information:        Primary Payor Source: Medicare A, Medicare B  Secondary Payor Source: Other (Comments) (anthem)  Primary Care Practitioner : Ranjeet Gusman DO  Other MDs: Dr. Echavarria (ortho)    Patient / Family Goal:  Patient / Family Goal: Learn how to do things again. Like being able to go to the restroom alone    Plan:  1. Continue to follow patient through hospitalization and  provide discharge planning in collaboration with patient, family, physicians and ancillary services.     2. Utilize community resources to ensure a safe discharge.

## 2025-03-07 NOTE — PROGRESS NOTES
NURSING DAILY NOTE    Name: Marlo León   Date of Admission: 3/6/2025   Admitting Diagnosis: Hx of BKA, left (HCC)  Attending Physician: MICKIE JUAREZ D.O.  Allergies: Neosporin [neomycin-polymyxin b], Tape, Amlodipine, Amoxicillin, Bacitracin-polymyxin b, Cyclobenzaprine, Empagliflozin-metformin hcl, Gabapentin, Gadolinium, Metformin, Neomycin-polymyxin b gu, Nsaids, Pioglitazone, Prednisone, Sulfamethoxazole w-trimethoprim, and Tamsulosin    Safety  Patient Assist     Patient Precautions  Precautions: Fall Risk  Fall Risk: Other (see comments) (Left BKA)  Bed Transfer Status     Toilet Transfer Status      Assistive Devices  Rails, Wheelchair  Oxygen  None - Room Air  Diet/Therapeutic Dining  Current Diet Order   Procedures    Diet Order Diet: Regular (HIGH PROTEIN AND VEGETABLES. DAIRY OK. LIMIT CARBOHYDRATES); Nutrient modifications: (optional): High Protein     Pill Administration  whole  Agitated Behavioral Scale     ABS Level of Severity       Fall Risk  Has the patient had a fall this admission?      Nikole Swanson Fall Risk Scoring  15, HIGH RISK  Fall Risk Safety Measures  bed alarm, chair alarm, poor balance, and low vision/hearing    Vitals  Temperature: 36.9 °C (98.5 °F)  Temp src: Temporal  Pulse: 74  Respiration: 18  Blood Pressure : (!) 183/75  Blood Pressure MAP (Calculated): 111 MM HG  BP Location: Left, Upper Arm  Patient BP Position: Supine     Oxygen  Pulse Oximetry: 98 %  O2 Delivery Device: None - Room Air    Bowel and Bladder  Last Bowel Movement  03/06/25  Stool Type     Bowel Device     Continent  Bladder: Continent void   Bowel: Continent movement  Bladder Function  Urine Void (mL): 200 ml  Number of Times Voided: 1  Urine Color: Yellow  Genitourinary Assessment   Bladder Assessment (WDL):  Within Defined Limits  Urine Color: Yellow    Skin  Anselmo Score   16  Sensory Interventions   Bed Types: Standard/Trauma Mattress  Skin Preventative Measures: Pillows in Use for Support /  Positioning  Moisture Interventions  Moisturizers/Barriers: Barrier Wipes      Pain  Pain Rating Scale  0 - No Pain  Pain Location     Pain Location Orientation     Pain Interventions   Declines    ADLs    Bathing      Linen Change      Personal Hygiene     Chlorhexidine Bath      Oral Care     Teeth/Dentures     Shave     Nutrition Percentage Eaten  Dinner, Between % Consumed  Environmental Precautions     Patient Turns/Positioning  Patient turns self independently side to side without assistance, to offload sacral area  Patient Turns Assistance/Tolerance     Bed Positions     Head of Bed Elevated         Psychosocial/Neurologic Assessment  Psychosocial Assessment  Psychosocial (WDL):  Within Defined Limits  Neurologic Assessment  Neuro (WDL): Within Defined Limits  Level of Consciousness: Alert  Orientation Level: Oriented X4  Cognition: Follows commands, Appropriate judgement, Appropriate safety awareness  Speech: Clear  Pupil Assesment: No  EENT (WDL):  WDL Except    Cardio/Pulmonary Assessment  Edema      Respiratory Breath Sounds  RUL Breath Sounds: Clear  RML Breath Sounds: Clear  RLL Breath Sounds: Diminished  NOEMY Breath Sounds: Clear  LLL Breath Sounds: Diminished  Cardiac Assessment   Cardiac (WDL):  Within Defined Limits

## 2025-03-07 NOTE — THERAPY
"Physical Therapy   Daily Treatment     Patient Name:  Marlo León  Age:  69 y.o., Sex:  male  Medical Record #:  [id number]  Today's Date: 3/7/2025     Precautions  Precautions: (P) Fall Risk  Fall Risk: Other (see comments) (Left BKA)  Weight Bearing: (P) NWB LE (laterality in comments) (L SBA)  Braces: (P) IPOP, Immobilizer LE (laterality in comments)  Comments: (P) L BKA, L IPOP and L knee immobilizer    Subjective: Pt received in room, agreeable to PT session.    Objective:    03/07/25 1401   Precautions   Precautions Fall Risk   Weight Bearing NWB LE (laterality in comments)  (L SBA)   Braces IPOP;Immobilizer LE (laterality in comments)   Comments L BKA, L IPOP and L knee immobilizer   Cognition    Level of Consciousness Alert   Sit to Stand   Level of Assist Moderate Assist  (in // bars)   Chair/Bed-to-Chair Transfer   Level of Assist Stand By Assist  (with slide board)   Transfer Type Slideboard Transfer   Ambulation   Level of Assist Unable to Participate   PT DME Recommendations   Wheelchair 18\" Width;Removable/Flip Back Armrests;Elevating Leg Rests  (pt has a wc which he purchased on [employer] however anticipates he will need a new one for DC with left residual limb board and flip back armrest)   Cushion Standard   Interdisciplinary Plan of Care Collaboration   Patient Position at End of Therapy Seated;Chair Alarm On;Self Releasing Lap Belt Applied;Call Light within Reach;Tray Table within Reach;Phone within Reach         Therapeutic Activities  Purpose: to improve performance and function of daily activities and to increase safety with activities of daily life and mobility related to activities of daily life  Interventions:   Bed/chair transfer training  Description of Intervention(s): slide board tx training mat<>wc with CGA to SBA by completion of training    Therapeutic Exercise  Purpose: to improve strength and to improve functional endurance  Interventions:   Lower body exercises:   Standing program - " "  Hip flexion, 1 set of 10 and Left  Hip extension, 1 set of 10 and Left  Hip abduction, 1 set of 10 and Left  Description of Intervention(s): standing in // bars on R LE with CGA    Assessment: Initiated slide board transfer training this session. By completion of training pt able to place board and scoot across and remove board with SBA both directions. Also initiated Standing tolerance in // bars which was limited to ~2 min due to R LE fatigue.     Strengths: Effective communication skills, Pleasant and cooperative, Supportive family, Willingly participates in therapeutic activities, Motivated for self care and independence  Barriers: Impaired activity tolerance, Impaired balance, Constipation, Limited mobility    Plan: Transfers, Sit to Stand Training, Car Transfer, Gait Training, Strengthening, Endurance, Activity Tolerance, and Standing Balance    DME    PT DME Recommendations  Wheelchair: (P) 18\" Width, Removable/Flip Back Armrests, Elevating Leg Rests (pt has a wc which he purchased on [employer] however anticipates he will need a new one for DC with left residual limb board and flip back armrest)  Cushion: (P) Standard  Vendor Equipment: [employer]    Goals:     Passport items to be completed:  Get in/out of bed safely, in/out of a vehicle, safely use mobility device, walk or wheel around home/community, navigate up and down stairs, show how to get up/down from the ground, ensure home is accessible, demonstrate HEP, complete caregiver training    Physical Therapy Problems (Active)       Problem: Mobility       Dates: Start:  03/07/25         Goal: STG-Within one week, patient will propel wheelchair household distances: on unit with Mod I.       Dates: Start:  03/07/25            Goal: STG-Within one week, patient will ambulate household distance at least 15' with FWW and Min A.       Dates: Start:  03/07/25               Problem: Mobility Transfers       Dates: Start:  03/07/25         Goal: STG-Within one week, " "patient will sit to stand Min A from 18.5\" surface to FWW or other hold support.       Dates: Start:  03/07/25               Problem: PT-Long Term Goals       Dates: Start:  03/07/25         Goal: LTG-By discharge, patient will propel wheelchair on outside surfaces and inside surfaces with Mod I, distance >500'.       Dates: Start:  03/07/25            Goal: LTG-By discharge, patient will ambulate at least 25' with FWW and SBA.       Dates: Start:  03/07/25            Goal: LTG-By discharge, patient will transfer one surface to another SPV.       Dates: Start:  03/07/25            Goal: LTG-By discharge, patient will perform home exercise program Mod I.       Dates: Start:  03/07/25            Goal: LTG-By discharge, patient will transfer in/out of a car CGA from  level.       Dates: Start:  03/07/25              "

## 2025-03-07 NOTE — CARE PLAN
"The patient is Stable - Low risk of patient condition declining or worsening    Shift Goals  Clinical Goals: Comfort and safety  Patient Goals: Comfort    Progress made toward(s) clinical / shift goals:    Problem: Mobility  Goal: Risk for activity intolerance will decrease  Outcome: Progressing     Problem: Skin Integrity  Goal: Skin integrity is maintained or improved  Outcome: Progressing     Problem: Fall Risk - Rehab  Goal: Patient will remain free from falls  Outcome: Progressing  Note: Nikole Swanson Fall risk Assessment Score:  15    High fall risk Interventions   - Bed and strip alarm   - Yellow sign by the door   - Yellow wrist band \"Fall risk\"  - Room near to the nurse station  - Do not leave patient unattended in the bathroom  - Fall risk education provided       Patient is not progressing towards the following goals:      "

## 2025-03-07 NOTE — CARE PLAN
The patient is Stable - Low risk of patient condition declining or worsening    Shift Goals  Clinical Goals: Safety  Patient Goals: Rest    Progress made toward(s) clinical / shift goals:    Problem: Knowledge Deficit - Standard  Goal: Patient and family/care givers will demonstrate understanding of plan of care, disease process/condition, diagnostic tests and medications  Outcome: Progressing     Problem: Fall Risk - Rehab  Goal: Patient will remain free from falls  Note: Patient uses call light consistently and appropriately. Patient waits for assistance does not attempt to self transfer this shift.

## 2025-03-07 NOTE — ASSESSMENT & PLAN NOTE
BP trending better recently   Cont Core.25 mg bid  Cont Lisinopril: 40 mg daily  Cont Hydralazine: 75 mg tid (3/15) --> 100 mg tid (3/19)  Note: has allergy to Norvasc  Cont to monitor

## 2025-03-07 NOTE — PROGRESS NOTES
"  Physical Medicine & Rehabilitation Progress Note    Encounter Date: 3/7/2025    Chief Complaint: L BKA    Interval Events (Subjective):  VS: Elevated BP  Last BM: 3/6  Bladder: Voiding  Schedule Meds Not Given: None   PRN Meds Taken: Hydralazine    Patient seen and examined in his room. Feeling well today. Really isn't having any pain still. States BP always a little high in morning and will go down as the morning goes on. Feels a little constipated. Doing well otherwise no new medical concerns.       ROS: 14 point ROS negative unless otherwise specified in the HPI    Objective:  VITAL SIGNS: BP (!) 150/70 Comment: Let RN know  Pulse 74   Temp 36.2 °C (97.1 °F) (Temporal)   Resp 16   Ht 1.778 m (5' 10\")   Wt 104 kg (230 lb)   SpO2 97%   BMI 33.00 kg/m²     GEN: No apparent distress  HEENT: Head normocephalic, atraumatic.  Sclera nonicteric bilaterally, no ocular discharge appreciated bilaterally.  CV: Extremities warm and well-perfused, no peripheral edema appreciated bilaterally.  PULMONARY: Breathing nonlabored on room air, no respiratory accessory muscle use.  Not requiring supplemental oxygen.  SKIN:       PSYCH: Mood and affect within normal limits.  NEURO: Awake alert.  Conversational.  Logical thought content.      Laboratory Values:  Recent Results (from the past 72 hours)   POC GLUCOSE    Collection Time: 03/04/25 12:04 PM   Result Value Ref Range    Glucose in Capillary blood by Glucometer 208 (H) 70 - 110   POC GLUCOSE    Collection Time: 03/04/25  4:47 PM   Result Value Ref Range    Glucose in Capillary blood by Glucometer 219 (H) 70 - 110   POC GLUCOSE    Collection Time: 03/04/25  8:41 PM   Result Value Ref Range    Glucose in Capillary blood by Glucometer 197 (H) 70 - 110   COMPLETE CBC & AUTO DIFF WBC    Collection Time: 03/05/25  3:30 AM   Result Value Ref Range    Leukocytes, Absolute 10.4 3.7 - 10.6 x10E3/uL    RBC 3.5 (L) 4.50 - 5.70 x10e6/uL    Hemoglobin 10.2 (L) 13.0 - 16.7 g/dL    " Hematocrit 31 (L) 38.8 - 49.7 %    MCV 88.4 83.0 - 99.0 fL    MCH 29.2 28.0 - 33.8 pg    MCHC 33 (L) 33.1 - 36.5 g/dL    RDW 16 (H) 11.8 - 14.0 %    Platelet Count 411 (H) 146 - 390 x10E3/uL    MPV 7.3 6.4 - 10.2 fL   POC GLUCOSE    Collection Time: 03/05/25  6:11 AM   Result Value Ref Range    Glucose in Capillary blood by Glucometer 266 (H) 70 - 110   POC GLUCOSE    Collection Time: 03/05/25 12:22 PM   Result Value Ref Range    Glucose in Capillary blood by Glucometer 231 (H) 70 - 110   POC GLUCOSE    Collection Time: 03/05/25  5:38 PM   Result Value Ref Range    Glucose in Capillary blood by Glucometer 212 (H) 70 - 110   POC GLUCOSE    Collection Time: 03/05/25  8:17 PM   Result Value Ref Range    Glucose in Capillary blood by Glucometer 259 (H) 70 - 110   COMPLETE CBC & AUTO DIFF WBC    Collection Time: 03/06/25  3:51 AM   Result Value Ref Range    Leukocytes, Absolute 9.9 3.7 - 10.6 x10E3/uL    RBC 3.47 (L) 4.50 - 5.70 x10e6/uL    Hemoglobin 10 (L) 13.0 - 16.7 g/dL    Hematocrit 30.5 (L) 38.8 - 49.7 %    MCV 87.8 83.0 - 99.0 fL    MCH 28.8 28.0 - 33.8 pg    MCHC 32.8 (L) 33.1 - 36.5 g/dL    RDW 16.1 (H) 11.8 - 14.0 %    Platelet Count 395 (H) 146 - 390 x10E3/uL    MPV 7.1 6.4 - 10.2 fL   POC GLUCOSE    Collection Time: 03/06/25  5:59 AM   Result Value Ref Range    Glucose in Capillary blood by Glucometer 212 (H) 70 - 110   POC GLUCOSE    Collection Time: 03/06/25 11:49 AM   Result Value Ref Range    Glucose in Capillary blood by Glucometer 251 (H) 70 - 110   POCT glucose device results    Collection Time: 03/06/25  5:25 PM   Result Value Ref Range    POC Glucose, Blood 204 (H) 65 - 99 mg/dL   POCT glucose device results    Collection Time: 03/06/25  9:02 PM   Result Value Ref Range    POC Glucose, Blood 219 (H) 65 - 99 mg/dL   CBC with Differential    Collection Time: 03/07/25  5:35 AM   Result Value Ref Range    WBC 10.9 (H) 4.8 - 10.8 K/uL    RBC 3.12 (L) 4.70 - 6.10 M/uL    Hemoglobin 9.0 (L) 14.0 - 18.0  g/dL    Hematocrit 28.7 (L) 42.0 - 52.0 %    MCV 92.0 81.4 - 97.8 fL    MCH 28.8 27.0 - 33.0 pg    MCHC 31.4 (L) 32.3 - 36.5 g/dL    RDW 49.3 35.9 - 50.0 fL    Platelet Count 326 164 - 446 K/uL    MPV 9.7 9.0 - 12.9 fL    Neutrophils-Polys 69.80 44.00 - 72.00 %    Lymphocytes 22.40 22.00 - 41.00 %    Monocytes 1.70 0.00 - 13.40 %    Eosinophils 1.70 0.00 - 6.90 %    Basophils 0.90 0.00 - 1.80 %    Nucleated RBC 0.00 0.00 - 0.20 /100 WBC    Neutrophils (Absolute) 7.61 (H) 1.82 - 7.42 K/uL    Lymphs (Absolute) 2.44 1.00 - 4.80 K/uL    Monos (Absolute) 0.19 0.00 - 0.85 K/uL    Eos (Absolute) 0.19 0.00 - 0.51 K/uL    Baso (Absolute) 0.10 0.00 - 0.12 K/uL    NRBC (Absolute) 0.00 K/uL    Anisocytosis 1+     Microcytosis 1+    Comp Metabolic Panel (CMP)    Collection Time: 03/07/25  5:35 AM   Result Value Ref Range    Sodium 138 135 - 145 mmol/L    Potassium 4.6 3.6 - 5.5 mmol/L    Chloride 108 96 - 112 mmol/L    Co2 19 (L) 20 - 33 mmol/L    Anion Gap 11.0 7.0 - 16.0    Glucose 175 (H) 65 - 99 mg/dL    Bun 51 (H) 8 - 22 mg/dL    Creatinine 2.12 (H) 0.50 - 1.40 mg/dL    Calcium 8.7 8.5 - 10.5 mg/dL    Correct Calcium 9.4 8.5 - 10.5 mg/dL    AST(SGOT) 25 12 - 45 U/L    ALT(SGPT) 12 2 - 50 U/L    Alkaline Phosphatase 81 30 - 99 U/L    Total Bilirubin <0.2 0.1 - 1.5 mg/dL    Albumin 3.1 (L) 3.2 - 4.9 g/dL    Total Protein 6.3 6.0 - 8.2 g/dL    Globulin 3.2 1.9 - 3.5 g/dL    A-G Ratio 1.0 g/dL   Magnesium    Collection Time: 03/07/25  5:35 AM   Result Value Ref Range    Magnesium 2.4 1.5 - 2.5 mg/dL   Phosphorus    Collection Time: 03/07/25  5:35 AM   Result Value Ref Range    Phosphorus 3.0 2.5 - 4.5 mg/dL   TSH with Reflex to FT4    Collection Time: 03/07/25  5:35 AM   Result Value Ref Range    TSH 5.610 (H) 0.380 - 5.330 uIU/mL   Vitamin D, 25-hydroxy (blood)    Collection Time: 03/07/25  5:35 AM   Result Value Ref Range    25-Hydroxy   Vitamin D 25 35 30 - 100 ng/mL   HEMOGLOBIN A1C    Collection Time: 03/07/25  5:35 AM    Result Value Ref Range    Glycohemoglobin 6.6 (H) 4.0 - 5.6 %    Est Avg Glucose 143 mg/dL   FREE THYROXINE    Collection Time: 03/07/25  5:35 AM   Result Value Ref Range    Free T-4 1.45 0.93 - 1.70 ng/dL   DIFFERENTIAL MANUAL    Collection Time: 03/07/25  5:35 AM   Result Value Ref Range    Metamyelocytes 2.60 %    Myelocytes 0.90 %    Manual Diff Status PERFORMED    PERIPHERAL SMEAR REVIEW    Collection Time: 03/07/25  5:35 AM   Result Value Ref Range    Peripheral Smear Review see below    PLATELET ESTIMATE    Collection Time: 03/07/25  5:35 AM   Result Value Ref Range    Plt Estimation Normal    MORPHOLOGY    Collection Time: 03/07/25  5:35 AM   Result Value Ref Range    RBC Morphology Present     Poikilocytosis 2+     Ovalocytes 1+     Schistocytes 1+    ESTIMATED GFR    Collection Time: 03/07/25  5:35 AM   Result Value Ref Range    GFR (CKD-EPI) 33 (A) >60 mL/min/1.73 m 2   POCT glucose device results    Collection Time: 03/07/25  7:57 AM   Result Value Ref Range    POC Glucose, Blood 201 (H) 65 - 99 mg/dL       Medications:  Scheduled Medications   Medication Dose Frequency    hydrALAZINE  50 mg Once    Pharmacy Consult Request  1 Each PHARMACY TO DOSE    omeprazole  20 mg DAILY    carvedilol  12.5 mg BID WITH MEALS    atorvastatin  40 mg Q EVENING    hydroCHLOROthiazide  25 mg Q DAY    lisinopril  40 mg Q EVENING    sodium bicarbonate  650 mg BID    levothyroxine  175 mcg AM ES    insulin lispro  1-6 Units 4X/DAY ACHS    insulin GLARGINE  23 Units Q EVENING    ceFAZolin  2 g Q8HRS    Followed by    [START ON 3/15/2025] linezolid  600 mg Q12HRS    heparin  5,000 Units Q8HRS    aspirin  81 mg DAILY     PRN medications: lidocaine, hydrALAZINE, lactulose, docusate sodium, bisacodyl EC, magnesium hydroxide, sodium phosphate, carboxymethylcellulose, benzocaine-menthol, mag hydrox-al hydrox-simeth, ondansetron **OR** ondansetron, traZODone, sodium chloride, traMADol **OR** traMADol, insulin lispro **AND** POC  blood glucose manual result **AND** NOTIFY MD and PharmD **AND** Administer 20 grams of glucose (approximately 8 ounces of fruit juice) every 15 minutes PRN FSBG less than 70 mg/dL **AND** dextrose bolus, Respiratory Therapy Consult, acetaminophen    Diet:  Current Diet Order   Procedures    Diet Order Diet: Regular (HIGH PROTEIN AND VEGETABLES. DAIRY OK. LIMIT CARBOHYDRATES); Nutrient modifications: (optional): High Protein       Medical Decision Making and Plan:  L BKA 3/2 Dr. Echavarria at [facility]  MSSA Bacteremia  TTE withough valvular lesions or endocarditis.   PT and OT for mobility and ADLs. Per guidelines, 15 hours per week between PT, OT and/or SLP.  Follow-up Ortho  Follow-up ID  Follow-up Vascular Surgery (Dr. Calderon) - referral placed per hospitalist at [facility]  Complete IV Cefazolin 3/14 --> Linezolid through 3/28   Tunneled cath placement 3/4     Hypertension - Continue Lisinopril, HCTZ, Coreg. 3/7 Elevated but improved in 150's monitor.     ABLA - 3/7 9 Monitor    Leukocytosis - Mild monitor 3/7    Thrombocytosis - WNL 3/7     Chronic Combined HR, LVH and Pulm HTN - EF 50-55% (Had been 65-70%). Follows with Cards Dr. Rodriguez     H/o CAD - On ASA and Statin      Hypothyroidism - Continue Synthroid      DARRIAN - RT consult     H/o Gastric Bypass - Has frequent BM due to this.      CKD Stage IV - Avoid Nephrotoxins. GFR 29 baseline. Managed by Nephro Dr. Michaels. 3/7 Stable monitor, trend    Elevated TSH - Free T4 WNL.     Morbid Obesity - Nutrition consult      Type II Diabetes Mellitus with Hyperglycemia - Currently on SSI and Lantus. Hgb A1c 6.6     Pain - Declines pain. Has Tylenol and Tramadol PRN.     Bowel - Patient on Senna-docusate for constipation prophylaxis.      Bladder - TV/PVR/BS PRN           Upcoming Labs/imaging: 3/10      DVT PROPHYLAXIS: Heparin --> D/c ASA 325mg BID     HOSPITALIST FOLLOWING: Yes      CODE STATUS: FULL - c/f with patient on admission      DISPO: Home with spouse       GIANCARLO: TBD     MEDS SENT TO: TBD     DISCHARGE SPECIALIST FOLLOW UP:   Ortho  ID  Vasc Surg  Cardiology  Nephrology      Patient to scheduled follow up with their PCP within 2 weeks from discharge from the [facility].   ____________________________________    Dr. Tracy Tong DO, MS  [facility] - Physical Medicine & Rehabilitation   ____________________________________

## 2025-03-08 ENCOUNTER — APPOINTMENT (OUTPATIENT)
Dept: PHYSICAL THERAPY | Facility: REHABILITATION | Age: 70
DRG: 560 | End: 2025-03-08
Attending: PHYSICAL MEDICINE & REHABILITATION
Payer: MEDICARE

## 2025-03-08 LAB
APPEARANCE UR: CLEAR
BACTERIA #/AREA URNS HPF: ABNORMAL /HPF
BASOPHILS # BLD AUTO: 0.5 % (ref 0–1.8)
BASOPHILS # BLD: 0.06 K/UL (ref 0–0.12)
BILIRUB UR QL STRIP.AUTO: NEGATIVE
CASTS URNS QL MICRO: ABNORMAL /LPF (ref 0–2)
COLOR UR: YELLOW
EOSINOPHIL # BLD AUTO: 0.32 K/UL (ref 0–0.51)
EOSINOPHIL NFR BLD: 2.9 % (ref 0–6.9)
EPITHELIAL CELLS 1715: ABNORMAL /HPF (ref 0–5)
ERYTHROCYTE [DISTWIDTH] IN BLOOD BY AUTOMATED COUNT: 50.4 FL (ref 35.9–50)
FERRITIN SERPL-MCNC: 115 NG/ML (ref 22–322)
GLUCOSE BLD STRIP.AUTO-MCNC: 128 MG/DL (ref 65–99)
GLUCOSE BLD STRIP.AUTO-MCNC: 134 MG/DL (ref 65–99)
GLUCOSE BLD STRIP.AUTO-MCNC: 172 MG/DL (ref 65–99)
GLUCOSE BLD STRIP.AUTO-MCNC: 200 MG/DL (ref 65–99)
GLUCOSE UR STRIP.AUTO-MCNC: 100 MG/DL
HCT VFR BLD AUTO: 32.5 % (ref 42–52)
HGB BLD-MCNC: 9.8 G/DL (ref 14–18)
IMM GRANULOCYTES # BLD AUTO: 0.55 K/UL (ref 0–0.11)
IMM GRANULOCYTES NFR BLD AUTO: 4.9 % (ref 0–0.9)
IRON SATN MFR SERPL: 31 % (ref 15–55)
IRON SERPL-MCNC: 72 UG/DL (ref 50–180)
KETONES UR STRIP.AUTO-MCNC: NEGATIVE MG/DL
LEUKOCYTE ESTERASE UR QL STRIP.AUTO: NEGATIVE
LYMPHOCYTES # BLD AUTO: 2.7 K/UL (ref 1–4.8)
LYMPHOCYTES NFR BLD: 24.3 % (ref 22–41)
MCH RBC QN AUTO: 28.5 PG (ref 27–33)
MCHC RBC AUTO-ENTMCNC: 30.2 G/DL (ref 32.3–36.5)
MCV RBC AUTO: 94.5 FL (ref 81.4–97.8)
MICRO URNS: ABNORMAL
MONOCYTES # BLD AUTO: 0.58 K/UL (ref 0–0.85)
MONOCYTES NFR BLD AUTO: 5.2 % (ref 0–13.4)
NEUTROPHILS # BLD AUTO: 6.91 K/UL (ref 1.82–7.42)
NEUTROPHILS NFR BLD: 62.2 % (ref 44–72)
NITRITE UR QL STRIP.AUTO: NEGATIVE
NRBC # BLD AUTO: 0 K/UL
NRBC BLD-RTO: 0 /100 WBC (ref 0–0.2)
PH UR STRIP.AUTO: 5.5 [PH] (ref 5–8)
PLATELET # BLD AUTO: 352 K/UL (ref 164–446)
PMV BLD AUTO: 9.8 FL (ref 9–12.9)
PROT UR QL STRIP: 300 MG/DL
RBC # BLD AUTO: 3.44 M/UL (ref 4.7–6.1)
RBC # URNS HPF: ABNORMAL /HPF (ref 0–2)
RBC UR QL AUTO: NEGATIVE
SP GR UR STRIP.AUTO: 1.02
TIBC SERPL-MCNC: 230 UG/DL (ref 250–450)
UIBC SERPL-MCNC: 158 UG/DL (ref 110–370)
UROBILINOGEN UR STRIP.AUTO-MCNC: 0.2 EU/DL
VIT B12 SERPL-MCNC: 1108 PG/ML (ref 211–911)
WBC # BLD AUTO: 11.1 K/UL (ref 4.8–10.8)
WBC #/AREA URNS HPF: ABNORMAL /HPF

## 2025-03-08 PROCEDURE — 700105 HCHG RX REV CODE 258: Performed by: PHYSICAL MEDICINE & REHABILITATION

## 2025-03-08 PROCEDURE — 700102 HCHG RX REV CODE 250 W/ 637 OVERRIDE(OP): Performed by: PHYSICAL MEDICINE & REHABILITATION

## 2025-03-08 PROCEDURE — A9270 NON-COVERED ITEM OR SERVICE: HCPCS | Performed by: PHYSICAL MEDICINE & REHABILITATION

## 2025-03-08 PROCEDURE — 82962 GLUCOSE BLOOD TEST: CPT | Mod: 91

## 2025-03-08 PROCEDURE — 97110 THERAPEUTIC EXERCISES: CPT

## 2025-03-08 PROCEDURE — 700102 HCHG RX REV CODE 250 W/ 637 OVERRIDE(OP): Performed by: HOSPITALIST

## 2025-03-08 PROCEDURE — 770010 HCHG ROOM/CARE - REHAB SEMI PRIVAT*

## 2025-03-08 PROCEDURE — 97150 GROUP THERAPEUTIC PROCEDURES: CPT

## 2025-03-08 PROCEDURE — 81001 URINALYSIS AUTO W/SCOPE: CPT

## 2025-03-08 PROCEDURE — 83550 IRON BINDING TEST: CPT

## 2025-03-08 PROCEDURE — 82607 VITAMIN B-12: CPT

## 2025-03-08 PROCEDURE — 700111 HCHG RX REV CODE 636 W/ 250 OVERRIDE (IP): Performed by: PHYSICAL MEDICINE & REHABILITATION

## 2025-03-08 PROCEDURE — 36415 COLL VENOUS BLD VENIPUNCTURE: CPT

## 2025-03-08 PROCEDURE — A9270 NON-COVERED ITEM OR SERVICE: HCPCS | Performed by: HOSPITALIST

## 2025-03-08 PROCEDURE — 99232 SBSQ HOSP IP/OBS MODERATE 35: CPT | Performed by: HOSPITALIST

## 2025-03-08 PROCEDURE — 83540 ASSAY OF IRON: CPT

## 2025-03-08 PROCEDURE — 82728 ASSAY OF FERRITIN: CPT

## 2025-03-08 PROCEDURE — 85025 COMPLETE CBC W/AUTO DIFF WBC: CPT

## 2025-03-08 PROCEDURE — 97530 THERAPEUTIC ACTIVITIES: CPT

## 2025-03-08 RX ADMIN — SODIUM BICARBONATE 650 MG: 650 TABLET ORAL at 22:05

## 2025-03-08 RX ADMIN — ASPIRIN 81 MG: 81 TABLET, COATED ORAL at 08:55

## 2025-03-08 RX ADMIN — HYDRALAZINE HYDROCHLORIDE 25 MG: 25 TABLET ORAL at 14:18

## 2025-03-08 RX ADMIN — HEPARIN SODIUM 5000 UNITS: 5000 INJECTION, SOLUTION INTRAVENOUS; SUBCUTANEOUS at 14:12

## 2025-03-08 RX ADMIN — CARVEDILOL 12.5 MG: 12.5 TABLET, FILM COATED ORAL at 17:19

## 2025-03-08 RX ADMIN — CEFAZOLIN 2 G: 2 INJECTION, POWDER, FOR SOLUTION INTRAMUSCULAR; INTRAVENOUS at 14:27

## 2025-03-08 RX ADMIN — SODIUM BICARBONATE 650 MG: 650 TABLET ORAL at 08:55

## 2025-03-08 RX ADMIN — CEFAZOLIN 2 G: 2 INJECTION, POWDER, FOR SOLUTION INTRAMUSCULAR; INTRAVENOUS at 22:14

## 2025-03-08 RX ADMIN — HYDRALAZINE HYDROCHLORIDE 25 MG: 25 TABLET ORAL at 05:41

## 2025-03-08 RX ADMIN — INSULIN LISPRO 2 UNITS: 100 INJECTION, SOLUTION INTRAVENOUS; SUBCUTANEOUS at 11:25

## 2025-03-08 RX ADMIN — CEFAZOLIN 2 G: 2 INJECTION, POWDER, FOR SOLUTION INTRAMUSCULAR; INTRAVENOUS at 05:42

## 2025-03-08 RX ADMIN — OMEPRAZOLE 20 MG: 20 CAPSULE, DELAYED RELEASE ORAL at 08:55

## 2025-03-08 RX ADMIN — CARVEDILOL 12.5 MG: 12.5 TABLET, FILM COATED ORAL at 08:55

## 2025-03-08 RX ADMIN — HYDROCHLOROTHIAZIDE 25 MG: 25 TABLET ORAL at 05:41

## 2025-03-08 RX ADMIN — INSULIN LISPRO 2 UNITS: 100 INJECTION, SOLUTION INTRAVENOUS; SUBCUTANEOUS at 07:56

## 2025-03-08 RX ADMIN — HYDRALAZINE HYDROCHLORIDE 25 MG: 25 TABLET ORAL at 22:05

## 2025-03-08 RX ADMIN — HEPARIN SODIUM 5000 UNITS: 5000 INJECTION, SOLUTION INTRAVENOUS; SUBCUTANEOUS at 05:41

## 2025-03-08 RX ADMIN — ATORVASTATIN CALCIUM 40 MG: 40 TABLET, FILM COATED ORAL at 22:05

## 2025-03-08 RX ADMIN — LEVOTHYROXINE SODIUM 175 MCG: 0.05 TABLET ORAL at 05:41

## 2025-03-08 RX ADMIN — HEPARIN SODIUM 5000 UNITS: 5000 INJECTION, SOLUTION INTRAVENOUS; SUBCUTANEOUS at 22:05

## 2025-03-08 RX ADMIN — LISINOPRIL 40 MG: 20 TABLET ORAL at 22:05

## 2025-03-08 RX ADMIN — ACETAMINOPHEN 650 MG: 325 TABLET ORAL at 16:10

## 2025-03-08 ASSESSMENT — ENCOUNTER SYMPTOMS
HEADACHES: 0
FEVER: 0
SHORTNESS OF BREATH: 0
NAUSEA: 0
DIZZINESS: 0
BLURRED VISION: 0
VOMITING: 0
HALLUCINATIONS: 0
PALPITATIONS: 0

## 2025-03-08 ASSESSMENT — PAIN DESCRIPTION - PAIN TYPE
TYPE: CHRONIC PAIN;ACUTE PAIN;SURGICAL PAIN
TYPE: ACUTE PAIN

## 2025-03-08 NOTE — PROGRESS NOTES
NURSING DAILY NOTE    Name: Marlo León   Date of Admission: 3/6/2025   Admitting Diagnosis: Hx of BKA, left (HCC)  Attending Physician: MICKIE JUAREZ D.O.  Allergies: Neosporin [neomycin-polymyxin b], Tape, Amlodipine, Amoxicillin, Bacitracin-polymyxin b, Cyclobenzaprine, Empagliflozin-metformin hcl, Gabapentin, Gadolinium, Metformin, Neomycin-polymyxin b gu, Nsaids, Pioglitazone, Prednisone, Sulfamethoxazole w-trimethoprim, and Tamsulosin    Safety  Patient Assist     Patient Precautions  Precautions: Fall Risk  Fall Risk: Other (see comments) (Left BKA)  Weight Bearing: NWB LE (laterality in comments) (L SBA)  Braces: IPOP, Immobilizer LE (laterality in comments)  Comments: L BKA, L IPOP and L knee immobilizer  Bed Transfer Status  Stand By Assist (with slide board)  Toilet Transfer Status   Maximal Assist  Assistive Devices  Rails, Wheelchair  Oxygen  None - Room Air  Diet/Therapeutic Dining  Current Diet Order   Procedures    Diet Order Diet: Consistent CHO (Diabetic) (HIGH PROTEIN AND VEGETABLES. DAIRY OK. LIMIT CARBOHYDRATES); Nutrient modifications: (optional): High Protein     Pill Administration  whole  Agitated Behavioral Scale     ABS Level of Severity       Fall Risk  Has the patient had a fall this admission?      Nikole Swanson Fall Risk Scoring  15, HIGH RISK  Fall Risk Safety Measures  bed alarm, chair alarm, poor balance, and Left BKA    Vitals  Temperature: 36.6 °C (97.8 °F)  Temp src: Temporal  Pulse: 77  Respiration: 12  Blood Pressure : (!) 143/56  Blood Pressure MAP (Calculated): 85 MM HG  BP Location: Right, Upper Arm  Patient BP Position: Sitting     Oxygen  Pulse Oximetry: 97 %  O2 Delivery Device: None - Room Air    Bowel and Bladder  Last Bowel Movement  03/06/25  Stool Type     Bowel Device     Continent  Bladder: Continent void   Bowel: Continent movement  Bladder Function  Urine Void (mL): 200 ml  Number of Times Voided: 1  Urine Color: Yellow  Genitourinary Assessment   Bladder  Assessment (WDL):  WDL Except  Urine Color: Yellow  $ Bladder Scan Results (mL): 349    Skin  Anselmo Score   16  Sensory Interventions   Bed Types: Standard/Trauma Mattress  Skin Preventative Measures: Pillows in Use for Support / Positioning  Moisture Interventions  Moisturizers/Barriers: Barrier Wipes      Pain  Pain Rating Scale  0 - No Pain  Pain Location     Pain Location Orientation     Pain Interventions   Declines    ADLs    Bathing   Shower, * * With Assistance from, Staff  Linen Change      Personal Hygiene     Chlorhexidine Bath      Oral Care     Teeth/Dentures     Shave     Nutrition Percentage Eaten  Dinner, Less than 25% Consumed  Environmental Precautions     Patient Turns/Positioning  Patient turns self independently side to side without assistance, to offload sacral area  Patient Turns Assistance/Tolerance     Bed Positions  Bed Controls On, Bed Locked  Head of Bed Elevated  Self regulated      Psychosocial/Neurologic Assessment  Psychosocial Assessment  Psychosocial (WDL):  Within Defined Limits  Neurologic Assessment  Neuro (WDL): Exceptions to WDL  Level of Consciousness: Alert  Orientation Level: Oriented X4  Cognition: Follows commands, Appropriate judgement, Appropriate safety awareness  Speech: Clear  Pupil Assesment: No  EENT (WDL):  WDL Except    Cardio/Pulmonary Assessment  Edema      Respiratory Breath Sounds  RUL Breath Sounds: Clear  RML Breath Sounds: Clear  RLL Breath Sounds: Diminished  NOEMY Breath Sounds: Clear  LLL Breath Sounds: Diminished  Cardiac Assessment   Cardiac (WDL):  WDL Except (hx htn)

## 2025-03-08 NOTE — CARE PLAN
"  Problem: Fall Risk - Rehab  Goal: Patient will remain free from falls  Outcome: Progressing   Nikole Swanson Fall risk Assessment Score: 15    High fall risk Interventions   - Bed and strip alarm   - Yellow sign by the door   - Yellow wrist band \"Fall risk\"  - Do not leave patient unattended in the bathroom  - Fall risk education provided  - Call light within reach   - Yellow  socks   - Belongings within reach   - Bed in the lowest position        Problem: Pain Management  Goal: Pain level will decrease to patient's comfort goal  Outcome: Progressing     Patient is able to rate pain on 0-10 scale. Denies pain or discomfort this shift.   "

## 2025-03-08 NOTE — CARE PLAN
"The patient is Watcher - Medium risk of patient condition declining or worsening    Shift Goals  Clinical Goals: safety  Patient Goals: sleep well  Family Goals: no family present    Progress made toward(s) clinical / shift goals:    Problem: Knowledge Deficit - Standard  Goal: Patient and family/care givers will demonstrate understanding of plan of care, disease process/condition, diagnostic tests and medications  Outcome: Progressing  Reviewed POC, all questions answered at this time.      Problem: Communication  Goal: The ability to communicate needs accurately and effectively will improve  Outcome: Progressing  Patient able to verbalize needs.  Will continue to encourage to voice his feelings.     Problem: Pain Management  Goal: Pain level will decrease to patient's comfort goal  Outcome: Progressing  Patient able to verbalize pain level and verbalize an acceptable level of pain. Pt denied pain this shift.     Problem: Fall Risk - Rehab  Goal: Patient will remain free from falls  Outcome: Progressing  Nikole Swanson Fall risk Assessment Score: 13    Moderate fall risk Interventions  - Bed and strip alarm   - Yellow sign by the door   - Yellow wrist band \"Fall risk\"  - Room near to the nurse station  - Do not leave patient unattended in the bathroom  - Fall risk education provided      "

## 2025-03-08 NOTE — CONSULTS
"DATE OF SERVICE:  3/7/25    REQUESTING PHYSICIAN:  Tracy Tong DO    CHIEF COMPLAINT / REASON FOR CONSULTATION:   Hypertension  Diabetes  CKD  Anemia  Leukocytosis  Abn TFT's    HISTORY OF PRESENT ILLNESS:  THis is a 68 y/o male with a PMH significant for hypertension, diabetes, PVD, DARRIAN, CKD, pulmonary hypertension, diabetic retinopathy, obesity class 2, CAD with hx of CABG, COPD, chronic diastolic heart failure, BPH, acquired hypothyroidism, celiac disease, status post gastric bypass, osteoarthritis, history of kidney stones, and s/p left lower extremity angioplasty 10/2024 who presented to [facility] with left heel ulcer and necrotic left toe.  He was found to have MSSA bacteremia and was treated.  He is now s/p left BKA 3/2/25 by Dr. Echavarria.  ID was consulted and recommended 4 weeks of antibiotics.    Because of the patient's weakness and debility, Rehab was consulted, evaluated the patient, and was deemed a good Rehab candidate.  The patient was transferred over to the Rehab facility on 3/6/25.      The patient denies fever, chills, nausea, vomiting, headaches, blurry vision, or chest pain.    REVIEW OF SYSTEMS: All review of systems are negative pre AMA and CMS criteria except for that stated in the HPI.    PAST MEDICAL HISTORY:  Past Medical History:   Diagnosis Date    Anesthesia     slow to wake up    Arthritis     pt states all joints    Asthma     Seasonal, uses rarely/ prn inhaler    Bowel habit changes     diarrhea    Breath shortness     With exertion, no oxygen    Burn     burns on bottom of feet, due to neuropathy, wound care vists from 7/4 to 9/10    Cataract 03/2021    diana non surgically repaired, pt states with \"macular degeneration\"    Diabetes (HCC)     Insulin dependent.    Disorder of thyroid     Heart burn     High cholesterol     Hypertension     pt states well controlled on meds    Indigestion     Renal disorder     Kidney stone    Sleep apnea     uses " bipap    Snoring     Urinary incontinence        PAST SURGICAL HISTORY:  Past Surgical History:   Procedure Laterality Date    CERVICAL DISK AND FUSION ANTERIOR  3/5/2021    Procedure: DISCECTOMY, SPINE, CERVICAL, ANTERIOR APPROACH, WITH FUSION - C4-5 WITH PLATE, C5-6 WITH PLATE;  Surgeon: Rajesh Azar M.D.;  Location: SURGERY [facility];  Service: Neurosurgery    HARDWARE REMOVAL NEURO  3/5/2021    Procedure: REMOVAL, HARDWARE - C6-7;  Surgeon: Rajesh Azar M.D.;  Location: SURGERY [facility];  Service: Neurosurgery    CERVICAL DISK AND FUSION ANTERIOR  9/21/2018    Procedure: CERVICAL DISK AND FUSION ANTERIOR;  Surgeon: Rajesh Azar M.D.;  Location: SURGERY [facility];  Service: Neurosurgery    OTHER  2017    Kidney stone    OTHER  2011    Hernia mesh umbilical    OTHER  2007    Gastric bypass    OTHER ORTHOPEDIC SURGERY Left 2006    ankle, lower leg orif     TONSILLECTOMY  1960       Allergies   Allergen Reactions    Neosporin [Neomycin-Polymyxin B] Rash     Rxn - ongoing      Tape Unspecified     Has had blisters in the past    Other reaction(s): Blisters  Has had blisters in the past    Amlodipine Rash     Other reaction(s): Unknown  Other reaction(s): Unknown    Amoxicillin      Other reaction(s): Other (See Comments)  Other reaction(s): Not available    Bacitracin-Polymyxin B      Other reaction(s): .Unknown    Cyclobenzaprine      Other reaction(s): .Unknown    Empagliflozin-Metformin Hcl      Other reaction(s): Unknown    Gabapentin      Other reaction(s): .Unknown, Unknown  Other reaction(s): .Unknown  Other reaction(s): Unknown  Other reaction(s): Not available    Gadolinium      Other reaction(s): Not available    Metformin Diarrhea and Rash     Other reaction(s): Unknown  Severe diarrhea    Severe diarrhea  Other reaction(s): Unknown    Neomycin-Polymyxin B Gu Rash     Rxn - ongoing    Nsaids      Other reaction(s): Unknown    Pioglitazone      Other reaction(s): Unknown    Prednisone       Other reaction(s): Other (See Comments)  Other reaction(s): Not available    Sulfamethoxazole W-Trimethoprim Shortness of Breath     Other reaction(s): Not available    Tamsulosin Rash       CURRENT MEDICATIONS:    Current Facility-Administered Medications:     Pharmacy Consult Request    lidocaine    hydrALAZINE    lactulose    docusate sodium    bisacodyl EC    magnesium hydroxide    sodium phosphate    omeprazole    carboxymethylcellulose    benzocaine-menthol    mag hydrox-al hydrox-simeth    ondansetron **OR** ondansetron    traZODone    sodium chloride    carvedilol    atorvastatin    hydroCHLOROthiazide    lisinopril    sodium bicarbonate    levothyroxine    traMADol **OR** traMADol    insulin lispro **AND** POC blood glucose manual result **AND** NOTIFY MD and PharmD **AND** Administer 20 grams of glucose (approximately 8 ounces of fruit juice) every 15 minutes PRN FSBG less than 70 mg/dL **AND** dextrose bolus    insulin GLARGINE    Respiratory Therapy Consult    ceFAZolin **FOLLOWED BY** [START ON 3/15/2025] linezolid    heparin    acetaminophen    aspirin    Social History     Socioeconomic History    Marital status:    Tobacco Use    Smoking status: Never    Smokeless tobacco: Never   Vaping Use    Vaping status: Never Used   Substance and Sexual Activity    Alcohol use: No    Drug use: No     Social Drivers of Health     Financial Resource Strain: Unknown (3/2/2021)    [facility]    Overall Financial Resource Strain (CARDIA)     Difficulty of Paying Living Expenses: Patient declined   Food Insecurity: No Food Insecurity (3/6/2025)    Hunger Vital Sign     Worried About Running Out of Food in the Last Year: Never true     Ran Out of Food in the Last Year: Never true   Transportation Needs: No Transportation Needs (3/7/2025)    PRAPARE - Transportation     Lack of Transportation (Medical): No     Lack of Transportation (Non-Medical): No    Physical Activity: Unknown (3/2/2021)    [facility]    Exercise Vital Sign     Days of Exercise per Week: Patient declined     Minutes of Exercise per Session: Patient declined   Stress: Unknown (3/2/2021)    [facility]    [employer] - Occupational Stress Questionnaire     Feeling of Stress : Patient declined   Social Connections: Unknown (3/2/2021)    [facility]    Social Connection and Isolation Panel [NHANES]     Frequency of Communication with Friends and Family: Patient declined     Frequency of Social Gatherings with Friends and Family: Patient declined     Attends Bahai Services: Patient declined     Active Member of Clubs or Organizations: Patient declined     Attends Club or Organization Meetings: Patient declined     Marital Status: Patient declined   Intimate Partner Violence: Not At Risk (3/6/2025)    Humiliation, Afraid, Rape, and Kick questionnaire     Fear of Current or Ex-Partner: No     Emotionally Abused: No     Physically Abused: No     Sexually Abused: No   Housing Stability: Low Risk  (3/6/2025)    Housing Stability Vital Sign     Unable to Pay for Housing in the Last Year: No     Number of Times Moved in the Last Year: 0     Homeless in the Last Year: No       FAMILY HISTORY:  was reviewed and is not pertinent to this consultation.    PHYSICAL EXAMINATION:  VITAL SIGNS:  Temp is 97.1, blood pressure is 150/70, heart rate is 74 respiratory rate is 16  GENERAL:  Patient was lying in bed in no distress.  HEENT:  Pupils were equal, round and reactive to light and accomodation.  Oral mucosa was pink and moist.  NECK:  Soft.  Supple.  No JVD.  HEART:  Irregular rhythm.  Normal S1 and S2.  No murmurs were appreciated.  LUNGS:  Are clear to auscultation bilaterally.  ABDOMEN:  Soft, non tender, non  distended.  Bowels sound were positive in all four quadrants.  EXTREMITIES:  No clubbing, cyanosis.  There was no lower extremity edema.  NEUROLOGIC:  Cranial nerves two through twelve were grossly intact.    LABS:  Lab Results   Component Value Date/Time    SODIUM 138 2025 05:35 AM    POTASSIUM 4.6 2025 05:35 AM    CHLORIDE 108 2025 05:35 AM    CO2 19 (L) 2025 05:35 AM    GLUCOSE 175 (H) 2025 05:35 AM    BUN 51 (H) 2025 05:35 AM    CREATININE 2.12 (H) 2025 05:35 AM    BUNCREATRAT 19 2023 07:57 AM    GLOMRATE 21 (L) 10/20/2023 09:37 AM      Lab Results   Component Value Date/Time    WBC 10.9 (H) 2025 05:35 AM    RBC 3.12 (L) 2025 05:35 AM    HEMOGLOBIN 9.0 (L) 2025 05:35 AM    HEMATOCRIT 28.7 (L) 2025 05:35 AM    MCV 92.0 2025 05:35 AM    MCH 28.8 2025 05:35 AM    MCHC 31.4 (L) 2025 05:35 AM    MPV 9.7 2025 05:35 AM    NEUTSPOLYS 69.80 2025 05:35 AM    LYMPHOCYTES 22.40 2025 05:35 AM    MONOCYTES 1.70 2025 05:35 AM    EOSINOPHILS 1.70 2025 05:35 AM    BASOPHILS 0.90 2025 05:35 AM    ANISOCYTOSIS 1+ 2025 05:35 AM      Lab Results   Component Value Date/Time    PROTHROMBTM 13.4 2021 11:06 AM    INR 0.99 2021 11:06 AM          Anemia  Hb: 9.0  Will get Fe & B12 levels at the next blood draw  Monitor    Borderline abnormal TFTs  TSH: 5.61  FT4 1.45  Likely subclinical  Cont Synthroid  Needs repeat TFT's as out patient    Hx of BKA, left (HCC)  Had angioplasty in 10/2024  Developed left heel ulcer and left toe gangrene  Now s/p left BKA    Primary hypertension  BP elevated  Cont Core.25 mg bid  Cont Lisinopril: 40 mg daily  Will start Hydralazine: 25 mg tid (3/5)  Note: has allergy to Norvasc  Cont to monitor    Type 2 diabetes mellitus with hyperglycemia (HCC)  Hba1c: 6.6 (3/7)  -221  Cont Glargine: 23 units qhs --> will increase to 30 units qhs (3/7)  Will change diet  from a regular diet to a diabetic diet  Will change SS doses for better coverage  Note: home meds include Humalog 10 units tid at meals, Farxiga, and Mounjaro  Cont to monitor    CAD (coronary artery disease)  Hx MI  Hx CABG  Echo (2/28): EF 55%  On Coreg & Lisinopril  On HCTZ  On ASA and Lipitor  Not on any K+ supplements currently  K+ 4.7 (3/7)    CKD (chronic kidney disease)  Bun/Cr around baseline (baseline wanders)  CO2: 15 --> 19 --> 19  Cont bicarb tabs  On HCTZ  Cont to monitor    Leukocytosis  WBC's: 10.9 (could be residual from recent bacteremia)  Has been afebrile  Denies cough  No diarrhea  Will check U/A  Will get am CBC   Note: on Zyvox & Ancef for recent MSSA bacteremia (thru 3/14)  Monitor    MSSA bacteremia  Resolved  Dx at [facility]  On Zyvox & Ancef (thru 3/14)    Irregular heartbeat  No doc for hx of afib  Will check an EKG  Monitor      This case has been discussed with the attending Physiatrist.    Thank you for the consultation.  Will follow the patient with you.

## 2025-03-08 NOTE — PROGRESS NOTES
"  Physical Medicine & Rehabilitation Progress Note    Encounter Date: 3/8/2025    Chief Complaint: L BKA    Interval Events (Subjective):  VS: Elevated BP - improved in 140-150's, afebrile, not on scheduled Tylenol   Last BM: 3/8  Bladder: Voiding  Schedule Meds Not Given: None   PRN Meds Taken: None    Patient seen and examined in therapy gym. He is doing well. Doesn't have any specific medical complaints or concerns.       ROS: 14 point ROS negative unless otherwise specified in the HPI    Objective:  VITAL SIGNS: BP (!) 152/69   Pulse 76   Temp 36.6 °C (97.8 °F) (Oral)   Resp 18   Ht 1.778 m (5' 10\")   Wt 104 kg (230 lb)   SpO2 97%   BMI 33.00 kg/m²     GEN: No apparent distress  HEENT: Head normocephalic, atraumatic.  Sclera nonicteric bilaterally, no ocular discharge appreciated bilaterally.  CV: Extremities warm and well-perfused, no peripheral edema appreciated bilaterally.  PULMONARY: Breathing nonlabored on room air, no respiratory accessory muscle use.  Not requiring supplemental oxygen.  SKIN:       3/7    PSYCH: Mood and affect within normal limits.  NEURO: Awake alert.  Conversational.  Logical thought content.      Laboratory Values:  Recent Results (from the past 72 hours)   POC GLUCOSE    Collection Time: 03/05/25 12:22 PM   Result Value Ref Range    Glucose in Capillary blood by Glucometer 231 (H) 70 - 110   POC GLUCOSE    Collection Time: 03/05/25  5:38 PM   Result Value Ref Range    Glucose in Capillary blood by Glucometer 212 (H) 70 - 110   POC GLUCOSE    Collection Time: 03/05/25  8:17 PM   Result Value Ref Range    Glucose in Capillary blood by Glucometer 259 (H) 70 - 110   COMPLETE CBC & AUTO DIFF WBC    Collection Time: 03/06/25  3:51 AM   Result Value Ref Range    Leukocytes, Absolute 9.9 3.7 - 10.6 x10E3/uL    RBC 3.47 (L) 4.50 - 5.70 x10e6/uL    Hemoglobin 10 (L) 13.0 - 16.7 g/dL    Hematocrit 30.5 (L) 38.8 - 49.7 %    MCV 87.8 83.0 - 99.0 fL    MCH 28.8 28.0 - 33.8 pg    MCHC 32.8 " (L) 33.1 - 36.5 g/dL    RDW 16.1 (H) 11.8 - 14.0 %    Platelet Count 395 (H) 146 - 390 x10E3/uL    MPV 7.1 6.4 - 10.2 fL   POC GLUCOSE    Collection Time: 03/06/25  5:59 AM   Result Value Ref Range    Glucose in Capillary blood by Glucometer 212 (H) 70 - 110   POC GLUCOSE    Collection Time: 03/06/25 11:49 AM   Result Value Ref Range    Glucose in Capillary blood by Glucometer 251 (H) 70 - 110   POCT glucose device results    Collection Time: 03/06/25  5:25 PM   Result Value Ref Range    POC Glucose, Blood 204 (H) 65 - 99 mg/dL   POCT glucose device results    Collection Time: 03/06/25  9:02 PM   Result Value Ref Range    POC Glucose, Blood 219 (H) 65 - 99 mg/dL   CBC with Differential    Collection Time: 03/07/25  5:35 AM   Result Value Ref Range    WBC 10.9 (H) 4.8 - 10.8 K/uL    RBC 3.12 (L) 4.70 - 6.10 M/uL    Hemoglobin 9.0 (L) 14.0 - 18.0 g/dL    Hematocrit 28.7 (L) 42.0 - 52.0 %    MCV 92.0 81.4 - 97.8 fL    MCH 28.8 27.0 - 33.0 pg    MCHC 31.4 (L) 32.3 - 36.5 g/dL    RDW 49.3 35.9 - 50.0 fL    Platelet Count 326 164 - 446 K/uL    MPV 9.7 9.0 - 12.9 fL    Neutrophils-Polys 69.80 44.00 - 72.00 %    Lymphocytes 22.40 22.00 - 41.00 %    Monocytes 1.70 0.00 - 13.40 %    Eosinophils 1.70 0.00 - 6.90 %    Basophils 0.90 0.00 - 1.80 %    Nucleated RBC 0.00 0.00 - 0.20 /100 WBC    Neutrophils (Absolute) 7.61 (H) 1.82 - 7.42 K/uL    Lymphs (Absolute) 2.44 1.00 - 4.80 K/uL    Monos (Absolute) 0.19 0.00 - 0.85 K/uL    Eos (Absolute) 0.19 0.00 - 0.51 K/uL    Baso (Absolute) 0.10 0.00 - 0.12 K/uL    NRBC (Absolute) 0.00 K/uL    Anisocytosis 1+     Microcytosis 1+    Comp Metabolic Panel (CMP)    Collection Time: 03/07/25  5:35 AM   Result Value Ref Range    Sodium 138 135 - 145 mmol/L    Potassium 4.6 3.6 - 5.5 mmol/L    Chloride 108 96 - 112 mmol/L    Co2 19 (L) 20 - 33 mmol/L    Anion Gap 11.0 7.0 - 16.0    Glucose 175 (H) 65 - 99 mg/dL    Bun 51 (H) 8 - 22 mg/dL    Creatinine 2.12 (H) 0.50 - 1.40 mg/dL    Calcium 8.7  8.5 - 10.5 mg/dL    Correct Calcium 9.4 8.5 - 10.5 mg/dL    AST(SGOT) 25 12 - 45 U/L    ALT(SGPT) 12 2 - 50 U/L    Alkaline Phosphatase 81 30 - 99 U/L    Total Bilirubin <0.2 0.1 - 1.5 mg/dL    Albumin 3.1 (L) 3.2 - 4.9 g/dL    Total Protein 6.3 6.0 - 8.2 g/dL    Globulin 3.2 1.9 - 3.5 g/dL    A-G Ratio 1.0 g/dL   Magnesium    Collection Time: 03/07/25  5:35 AM   Result Value Ref Range    Magnesium 2.4 1.5 - 2.5 mg/dL   Phosphorus    Collection Time: 03/07/25  5:35 AM   Result Value Ref Range    Phosphorus 3.0 2.5 - 4.5 mg/dL   TSH with Reflex to FT4    Collection Time: 03/07/25  5:35 AM   Result Value Ref Range    TSH 5.610 (H) 0.380 - 5.330 uIU/mL   Vitamin D, 25-hydroxy (blood)    Collection Time: 03/07/25  5:35 AM   Result Value Ref Range    25-Hydroxy   Vitamin D 25 35 30 - 100 ng/mL   HEMOGLOBIN A1C    Collection Time: 03/07/25  5:35 AM   Result Value Ref Range    Glycohemoglobin 6.6 (H) 4.0 - 5.6 %    Est Avg Glucose 143 mg/dL   FREE THYROXINE    Collection Time: 03/07/25  5:35 AM   Result Value Ref Range    Free T-4 1.45 0.93 - 1.70 ng/dL   DIFFERENTIAL MANUAL    Collection Time: 03/07/25  5:35 AM   Result Value Ref Range    Metamyelocytes 2.60 %    Myelocytes 0.90 %    Manual Diff Status PERFORMED    PERIPHERAL SMEAR REVIEW    Collection Time: 03/07/25  5:35 AM   Result Value Ref Range    Peripheral Smear Review see below    PLATELET ESTIMATE    Collection Time: 03/07/25  5:35 AM   Result Value Ref Range    Plt Estimation Normal    MORPHOLOGY    Collection Time: 03/07/25  5:35 AM   Result Value Ref Range    RBC Morphology Present     Poikilocytosis 2+     Ovalocytes 1+     Schistocytes 1+    ESTIMATED GFR    Collection Time: 03/07/25  5:35 AM   Result Value Ref Range    GFR (CKD-EPI) 33 (A) >60 mL/min/1.73 m 2   POCT glucose device results    Collection Time: 03/07/25  7:57 AM   Result Value Ref Range    POC Glucose, Blood 201 (H) 65 - 99 mg/dL   POCT glucose device results    Collection Time: 03/07/25  11:32 AM   Result Value Ref Range    POC Glucose, Blood 221 (H) 65 - 99 mg/dL   EKG    Collection Time: 25  5:08 PM   Result Value Ref Range    Report       [facility]    Test Date:  2025  Pt Name:    REGINALDO MAY                    Department: [facility]  MRN:        [id number]                      Room:       [facility]  Gender:     Male                         Technician: [id number]  :        1955                   Requested By:DAKOTAH REYES  Order #:    [id number]                    Reading MD: Chintan Mcdaniel MD    Measurements  Intervals                                Axis  Rate:       74                           P:          67  NY:         159                          QRS:        -42  QRSD:       108                          T:          57  QT:         400  QTc:        444    Interpretive Statements  Sinus rhythm  Incomplete RBBB and LAFB  Compared to ECG 2021 11:02:34  No significant changes  Electronically Signed On 2025 17:08:29 PST by Chintan Mcdaniel MD     POCT glucose device results    Collection Time: 25  5:21 PM   Result Value Ref Range    POC Glucose, Blood 131 (H) 65 - 99 mg/dL   POCT glucose device results    Collection Time: 25 10:16 PM   Result Value Ref Range    POC Glucose, Blood 149 (H) 65 - 99 mg/dL   URINALYSIS    Collection Time: 25  3:40 AM    Specimen: Urine, Clean Catch   Result Value Ref Range    Color Yellow     Character Clear     Specific Gravity 1.018 <1.035    Ph 5.5 5.0 - 8.0    Glucose 100 (A) Negative mg/dL    Ketones Negative Negative mg/dL    Protein 300 (A) Negative mg/dL    Bilirubin Negative Negative    Urobilinogen, Urine 0.2 <=1.0 EU/dL    Nitrite Negative Negative    Leukocyte Esterase Negative Negative    Occult Blood Negative Negative    Micro Urine Req Microscopic    URINE MICROSCOPIC (W/UA)    Collection Time: 25  3:40 AM   Result Value Ref Range    WBC 0-2 /hpf    RBC 0-2 0 - 2 /hpf    Bacteria None Seen None /hpf    Epithelial  Cells 0-2 0 - 5 /hpf    Urine Casts 3-5 (A) 0 - 2 /lpf   CBC WITH DIFFERENTIAL    Collection Time: 03/08/25  5:25 AM   Result Value Ref Range    WBC 11.1 (H) 4.8 - 10.8 K/uL    RBC 3.44 (L) 4.70 - 6.10 M/uL    Hemoglobin 9.8 (L) 14.0 - 18.0 g/dL    Hematocrit 32.5 (L) 42.0 - 52.0 %    MCV 94.5 81.4 - 97.8 fL    MCH 28.5 27.0 - 33.0 pg    MCHC 30.2 (L) 32.3 - 36.5 g/dL    RDW 50.4 (H) 35.9 - 50.0 fL    Platelet Count 352 164 - 446 K/uL    MPV 9.8 9.0 - 12.9 fL    Neutrophils-Polys 62.20 44.00 - 72.00 %    Lymphocytes 24.30 22.00 - 41.00 %    Monocytes 5.20 0.00 - 13.40 %    Eosinophils 2.90 0.00 - 6.90 %    Basophils 0.50 0.00 - 1.80 %    Immature Granulocytes 4.90 (H) 0.00 - 0.90 %    Nucleated RBC 0.00 0.00 - 0.20 /100 WBC    Neutrophils (Absolute) 6.91 1.82 - 7.42 K/uL    Lymphs (Absolute) 2.70 1.00 - 4.80 K/uL    Monos (Absolute) 0.58 0.00 - 0.85 K/uL    Eos (Absolute) 0.32 0.00 - 0.51 K/uL    Baso (Absolute) 0.06 0.00 - 0.12 K/uL    Immature Granulocytes (abs) 0.55 (H) 0.00 - 0.11 K/uL    NRBC (Absolute) 0.00 K/uL   FERRITIN    Collection Time: 03/08/25  5:25 AM   Result Value Ref Range    Ferritin 115.0 22.0 - 322.0 ng/mL   IRON/TOTAL IRON BIND    Collection Time: 03/08/25  5:25 AM   Result Value Ref Range    Iron 72 50 - 180 ug/dL    Total Iron Binding 230 (L) 250 - 450 ug/dL    Unsat Iron Binding 158 110 - 370 ug/dL    % Saturation 31 15 - 55 %   VITAMIN B12    Collection Time: 03/08/25  5:25 AM   Result Value Ref Range    Vitamin B12 -True Cobalamin 1108 (H) 211 - 911 pg/mL   POCT glucose device results    Collection Time: 03/08/25  7:55 AM   Result Value Ref Range    POC Glucose, Blood 200 (H) 65 - 99 mg/dL       Medications:  Scheduled Medications   Medication Dose Frequency    insulin GLARGINE  30 Units Q EVENING    insulin lispro  2-12 Units 4X/DAY ACHS    hydrALAZINE  25 mg Q8HRS    Pharmacy Consult Request  1 Each PHARMACY TO DOSE    omeprazole  20 mg DAILY    carvedilol  12.5 mg BID WITH MEALS     atorvastatin  40 mg Q EVENING    hydroCHLOROthiazide  25 mg Q DAY    lisinopril  40 mg Q EVENING    sodium bicarbonate  650 mg BID    levothyroxine  175 mcg AM ES    ceFAZolin  2 g Q8HRS    Followed by    [START ON 3/15/2025] linezolid  600 mg Q12HRS    heparin  5,000 Units Q8HRS    aspirin  81 mg DAILY     PRN medications: insulin lispro **AND** POC blood glucose manual result **AND** NOTIFY MD and PharmD **AND** Administer 20 grams of glucose (approximately 8 ounces of fruit juice) every 15 minutes PRN FSBG less than 70 mg/dL **AND** dextrose bolus, lidocaine, hydrALAZINE, lactulose, docusate sodium, bisacodyl EC, magnesium hydroxide, sodium phosphate, carboxymethylcellulose, benzocaine-menthol, mag hydrox-al hydrox-simeth, ondansetron **OR** ondansetron, traZODone, sodium chloride, traMADol **OR** traMADol, Respiratory Therapy Consult, acetaminophen    Diet:  Current Diet Order   Procedures    Diet Order Diet: Consistent CHO (Diabetic) (HIGH PROTEIN AND VEGETABLES. DAIRY OK. LIMIT CARBOHYDRATES); Nutrient modifications: (optional): High Protein       Medical Decision Making and Plan:  L BKA 3/2 Dr. [facility]chav[facility]ria at [facility]  MSSA Bacteremia  TTE withough valvular lesions or endocarditis.   PT and OT for mobility and ADLs. Per guidelines, 15 hours per week between PT, OT and/or SLP.  Follow-up Ortho  Follow-up ID  Follow-up Vascular Surgery (Dr. Calderon) - referral placed per hospitalist at [facility]  Complete IV Cefazolin 3/14 --> Linezolid through 3/28   Tunneled cath placement 3/4     Hypertension - Continue Lisinopril, HCTZ, Coreg. 3/7 Elevated but improved in 150's monitor. 3/8 BP improving without med changes, monitor.     ABLA - 3/7 9 Monitor. 3/8 Better 9.8.     Leukocytosis - Mild monitor 3/7. 3/8 Mild increase 10.9--> 11.1. UA negative. Incision c/d/I. Afebrile and not on scheduled tylenol nor taking PRN.     Thrombocytosis - WNL 3/7     Chronic Combined HR, LVH and Pulm HTN - EF 50-55% (Had been  65-70%). Follows with Cards Dr. Rodriguez     H/o CAD - On ASA and Statin      Hypothyroidism - Continue Synthroid      DARRIAN - RT consult     H/o Gastric Bypass - Has frequent BM due to this.      CKD Stage IV - Avoid Nephrotoxins. GFR 29 baseline. Managed by Nephro Dr. Michaels. 3/7 Stable monitor, trend    Elevated TSH - Free T4 WNL.     Morbid Obesity - Nutrition consult      Type II Diabetes Mellitus with Hyperglycemia - Currently on SSI and Lantus. Hgb A1c 6.6     Pain - Declines pain. Has Tylenol and Tramadol PRN.     Bowel - Patient on Senna-docusate for constipation prophylaxis.      Bladder - TV/PVR/BS PRN           Upcoming Labs/imaging: 3/10      DVT PROPHYLAXIS: Heparin --> D/c ASA 325mg BID     HOSPITALIST FOLLOWING: Yes      CODE STATUS: FULL - c/f with patient on admission      DISPO: Home with spouse      GIANCARLO: TBD     MEDS SENT TO: TBD     DISCHARGE SPECIALIST FOLLOW UP:   Ortho  ID  Vasc Surg  Cardiology  Nephrology      Patient to scheduled follow up with their PCP within 2 weeks from discharge from the [facility].   ____________________________________    Dr. Tracy Tong DO, MS  [facility] - Physical Medicine & Rehabilitation   ____________________________________

## 2025-03-08 NOTE — ASSESSMENT & PLAN NOTE
WBC's: 10.9 --> 11.1 --> 8.8 (3/10)  Has been afebrile  Denies cough  No diarrhea  U/A neg   Note: on Zyvox & Ancef for recent MSSA bacteremia (thru 3/14)  Monitor

## 2025-03-08 NOTE — FLOWSHEET NOTE
03/07/25 1700   Patient Events   Interdisciplinary Rounds Attendance at Rounds (30 Min)  (EKG)     EKG done and delivered to

## 2025-03-08 NOTE — THERAPY
Physical Therapy   Daily Treatment     Patient Name:  Marlo León  Age:  69 y.o., Sex:  male  Medical Record #:  [id number]  Today's Date: 3/8/2025     Precautions  Precautions: Fall Risk  Fall Risk: Other (see comments) (Left BKA)  Weight Bearing: NWB LE (laterality in comments)  Braces: Immobilizer LE (laterality in comments), IPOP  Comments: L BKA, L IPOP and L knee immobilizer    Subjective: Pt up in wc, willing to participate    Objective:    03/08/25 1431   PT Charge Group   PT Therapeutic Exercise (Units) 1   PT Therapeutic Activities (Units) 3   PT Total Time Spent   PT Individual Total Time Spent (Mins) 60   Sit to Stand   Level of Assist Minimal Assist  (pull to stand with // bars vs from elevated mat to FWW)   Chair/Bed-to-Chair Transfer   Level of Assist Contact Guard Assist   Transfer Type Squat Pivot Transfer   Assistive Devices   (wc level / squat-pivot)     Pt able to doff/ don L IPOP and describe process well. PT demonstrated figure 8 ace wrapping and completed gentle limb desensitization / light touch / pressure.     Therapeutic Activities  Purpose: to improve performance and function of daily activities, to provide patient and family education, and to increase safety with activities of daily life and mobility related to activities of daily life  Interventions:   Sit to stand training  Description of Intervention(s): pt completed sit<>stand to FWW from elevated edge of mat x 5 trials. Pt completed mat>wc transfer with FWW for stand-pivot and min A, unable to off load RLE to complete hop. Initiated pre-gait activity with // bars and min/mod A for 2 hops forward/ 1 hop backward x 2 trials (seated rest between trials.    Therapeutic Exercise  Purpose: to improve strength and to improve functional endurance  Interventions:   Lower body exercises:   Standing program -   Hip flexion, 2 sets of 10 and Left  Hip extension, 2 sets of 10 and Left  Hip abduction, 2 sets of 10 and Left  Description of  "Intervention(s): pt completed 1 set of 5 heel raises with RUE    Assessment: Pt receptive to all activity and education today, reports generalized fatigue  toward end of therapy session but tolerated introduction to standing with FWW well.     Strengths: Effective communication skills, Pleasant and cooperative, Supportive family, Willingly participates in therapeutic activities, Motivated for self care and independence  Barriers: Impaired activity tolerance, Impaired balance, Constipation, Limited mobility    Plan:     Transfers, Sit to Stand Training, Car Transfer, Gait Training, Strengthening, Endurance, Activity Tolerance, and Standing Balance.    BKA education/ limb wrapping/ desensitization. Attempt prone lying if able (pt has central line at R chest)       DME    PT DME Recommendations  Wheelchair: 18\" Width, Removable/Flip Back Armrests, Elevating Leg Rests (pt has a wc which he purchased on [employer] however anticipates he will need a new one for DC with left residual limb board and flip back armrest)  Cushion: Standard  Vendor Equipment: IPOP    Goals:     Passport items to be completed:  Get in/out of bed safely, in/out of a vehicle, safely use mobility device, walk or wheel around home/community, navigate up and down stairs, show how to get up/down from the ground, ensure home is accessible, demonstrate HEP, complete caregiver training    Physical Therapy Problems (Active)       Problem: Mobility       Dates: Start:  03/07/25         Goal: STG-Within one week, patient will propel wheelchair household distances: on unit with Mod I.       Dates: Start:  03/07/25            Goal: STG-Within one week, patient will ambulate household distance at least 15' with FWW and Min A.       Dates: Start:  03/07/25               Problem: Mobility Transfers       Dates: Start:  03/07/25         Goal: STG-Within one week, patient will sit to stand Min A from 18.5\" surface to FWW or other hold support.       Dates: Start:  " 03/07/25               Problem: PT-Long Term Goals       Dates: Start:  03/07/25         Goal: LTG-By discharge, patient will propel wheelchair on outside surfaces and inside surfaces with Mod I, distance >500'.       Dates: Start:  03/07/25            Goal: LTG-By discharge, patient will ambulate at least 25' with FWW and SBA.       Dates: Start:  03/07/25            Goal: LTG-By discharge, patient will transfer one surface to another \[facility]\"".       Dates: Start:  03/07/25            Goal: LTG-By discharge, patient will perform home exercise program Mod I.       Dates: Start:  03/07/25            Goal: LTG-By discharge, patient will transfer in/out of a car CGA from  level.       Dates: Start:  03/07/25

## 2025-03-08 NOTE — ASSESSMENT & PLAN NOTE
Hx MI  Hx CABG  Echo (2/28): EF 55%  On Coreg & Lisinopril  On HCTZ  On ASA and Lipitor  Not on any K+ supplements currently  K+ 5.1 (3/17) --> 6.3 (3/20) --> Lokelma 10 mg --> 5.9 --> Lokelma 10 mg x 2 --> 5.1 (3/21)  Will give Lokelma 10 mg daily x 3 days (3/21)  Will need f/u and repeat K+ levels as outpatient  Monitor

## 2025-03-08 NOTE — THERAPY
"Physical Therapy   Daily Treatment     Patient Name:  Marlo León  Age:  69 y.o., Sex:  male  Medical Record #:  [id number]  Today's Date: 3/8/2025     Precautions  Precautions: Fall Risk  Fall Risk: Other (see comments) (Left BKA)  Weight Bearing: NWB LE (laterality in comments)  Braces: IPOP, Immobilizer LE (laterality in comments)  Comments: L BKA, L IPOP and L knee immobilizer    Subjective: Agreeable and eager to participate    Objective:    03/08/25 1030   PT Charge Group   PT Group Therapy Group Activities   PT Total Time Spent   PT GroupTotal Time Spent (Mins) 60   Interdisciplinary Plan of Care Collaboration   Patient Position at End of Therapy Seated;Chair Alarm On;Self Releasing Lap Belt Applied  (self propel to cafe for lunch)       Group Therapy  Purpose: to validate increased independence with skills, to increase patient interaction during physical recovery, and to reinforce strengthening and movement through group format demonstration  Interventions:   LE exercise group  Description of Intervention(s): Mat table LE exercises for generalized LE strengthening and conditioning including : R LE and B LE on bolster bridges, sidelying hip abduction, marching, SAQs, prone hamstring curl, prone hip ext 3x10 ea  Additionally performed 3x10 with 4lb weights tricep ext, bicep curls, pec flys, and serratus punch    Able to clau/doff IPOP with SPV, Norma squat pivot transfers    Assessment: Marlo tolerated group therex well without complaints of L LE pain. Bed mobility limited by back pain.    Strengths: Effective communication skills, Pleasant and cooperative, Supportive family, Willingly participates in therapeutic activities, Motivated for self care and independence  Barriers: Impaired activity tolerance, Impaired balance, Constipation, Limited mobility    Plan:   Continue Per Primary PT POC    DME    PT DME Recommendations  Wheelchair: 18\" Width, Removable/Flip Back Armrests, Elevating Leg Rests (pt has a wc " "which he purchased on [employer] however anticipates he will need a new one for DC with left residual limb board and flip back armrest)  Cushion: Standard  Vendor Equipment: IPOP    Goals:     Passport items to be completed:  Get in/out of bed safely, in/out of a vehicle, safely use mobility device, walk or wheel around home/community, navigate up and down stairs, show how to get up/down from the ground, ensure home is accessible, demonstrate HEP, complete caregiver training    Physical Therapy Problems (Active)       Problem: Mobility       Dates: Start:  03/07/25         Goal: STG-Within one week, patient will propel wheelchair household distances: on unit with Mod I.       Dates: Start:  03/07/25            Goal: STG-Within one week, patient will ambulate household distance at least 15' with FWW and Min A.       Dates: Start:  03/07/25               Problem: Mobility Transfers       Dates: Start:  03/07/25         Goal: STG-Within one week, patient will sit to stand Min A from 18.5\" surface to FWW or other hold support.       Dates: Start:  03/07/25               Problem: PT-Long Term Goals       Dates: Start:  03/07/25         Goal: LTG-By discharge, patient will propel wheelchair on outside surfaces and inside surfaces with Mod I, distance >500'.       Dates: Start:  03/07/25            Goal: LTG-By discharge, patient will ambulate at least 25' with FWW and SBA.       Dates: Start:  03/07/25            Goal: LTG-By discharge, patient will transfer one surface to another SPV.       Dates: Start:  03/07/25            Goal: LTG-By discharge, patient will perform home exercise program Mod I.       Dates: Start:  03/07/25            Goal: LTG-By discharge, patient will transfer in/out of a car CGA from  level.       Dates: Start:  03/07/25              "

## 2025-03-08 NOTE — ASSESSMENT & PLAN NOTE
Cr a little more elevated than baseline (baseline wanders)  CO2: 20 (3/17) --> 19 (3/20)  Cont bicarb tabs  On HCTZ  Cont to monitor

## 2025-03-08 NOTE — PROGRESS NOTES
NURSING DAILY NOTE    Name: Marlo León   Date of Admission: 3/6/2025   Admitting Diagnosis: Hx of BKA, left (HCC)  Attending Physician: MICKIE JUAREZ D.O.  Allergies: Neosporin [neomycin-polymyxin b], Tape, Amlodipine, Amoxicillin, Bacitracin-polymyxin b, Cyclobenzaprine, Empagliflozin-metformin hcl, Gabapentin, Gadolinium, Metformin, Neomycin-polymyxin b gu, Nsaids, Pioglitazone, Prednisone, Sulfamethoxazole w-trimethoprim, and Tamsulosin    Safety  Patient Assist     Patient Precautions  Precautions: Fall Risk  Fall Risk: Other (see comments) (Left BKA)  Weight Bearing: NWB LE (laterality in comments)  Braces: IPOP, Immobilizer LE (laterality in comments)  Comments: L BKA, L IPOP and L knee immobilizer  Bed Transfer Status  Stand By Assist (with slide board)  Toilet Transfer Status   Maximal Assist  Assistive Devices  Wheelchair  Oxygen  None - Room Air  Diet/Therapeutic Dining  Current Diet Order   Procedures    Diet Order Diet: Consistent CHO (Diabetic) (HIGH PROTEIN AND VEGETABLES. DAIRY OK. LIMIT CARBOHYDRATES); Nutrient modifications: (optional): High Protein     Pill Administration  whole  Agitated Behavioral Scale     ABS Level of Severity       Fall Risk  Has the patient had a fall this admission?      Nikole Swanson Fall Risk Scoring  13, MODERATE RISK  Fall Risk Safety Measures  bed alarm, chair alarm, poor balance, and low vision/hearing    Vitals  Temperature: 36.6 °C (97.8 °F)  Temp src: Oral  Pulse: 64  Respiration: 18  Blood Pressure : (!) 147/63  Blood Pressure MAP (Calculated): 91 MM HG  BP Location: Left, Upper Arm  Patient BP Position: Supine     Oxygen  Pulse Oximetry: 97 %  O2 (LPM): 0  O2 Delivery Device: None - Room Air    Bowel and Bladder  Last Bowel Movement  03/08/25  Stool Type  Type 4: Like a sausage or snake, smooth and soft  Bowel Device     Continent  Bladder: Continent void   Bowel: Continent movement  Bladder Function  Urine Void (mL): 200 ml  Number of Times Voided:  1  Urine Color: Unable To Evaluate  Genitourinary Assessment   Bladder Assessment (WDL):  WDL Except  Jane Catheter: Not Applicable  Urine Color: Unable To Evaluate  $ Bladder Scan Results (mL): 349    Skin  Anselmo Score   16  Sensory Interventions   Bed Types: Standard/Trauma Mattress  Skin Preventative Measures: Pillows in Use for Support / Positioning  Moisture Interventions  Moisturizers/Barriers: Barrier Wipes      Pain  Pain Rating Scale  0 - No Pain  Pain Location     Pain Location Orientation     Pain Interventions   Declines    ADLs    Bathing   Shower, * * With Assistance from, Staff  Linen Change      Personal Hygiene     Chlorhexidine Bath   Completed  Oral Care     Teeth/Dentures     Shave     Nutrition Percentage Eaten  Dinner, Less than 25% Consumed  Environmental Precautions     Patient Turns/Positioning  Patient turns self independently side to side without assistance, to offload sacral area  Patient Turns Assistance/Tolerance     Bed Positions  Bed Controls On, Bed Locked  Head of Bed Elevated  Self regulated      Psychosocial/Neurologic Assessment  Psychosocial Assessment  Psychosocial (WDL):  Within Defined Limits  Neurologic Assessment  Neuro (WDL): Within Defined Limits  Level of Consciousness: Alert  Orientation Level: Oriented X4  Cognition: Follows commands, Appropriate judgement, Appropriate safety awareness  Speech: Clear  Pupil Assesment: No  EENT (WDL):  WDL Except    Cardio/Pulmonary Assessment  Edema      Respiratory Breath Sounds  RUL Breath Sounds: Clear  RML Breath Sounds: Clear  RLL Breath Sounds: Diminished  NOEMY Breath Sounds: Clear  LLL Breath Sounds: Diminished  Cardiac Assessment   Cardiac (WDL):  Within Defined Limits

## 2025-03-08 NOTE — PROGRESS NOTES
[facility] Medicine Daily Progress Note    Date of Service  3/8/2025    Chief Complaint:  Hypertension  Diabetes  CKD  Anemia  Leukocytosis  Abn TFT's    Interval History:  Discussed about his BS still elevated and have increased his Glargine.    Review of Systems  Review of Systems   Constitutional:  Negative for fever.   Eyes:  Negative for blurred vision.   Respiratory:  Negative for shortness of breath.    Cardiovascular:  Negative for palpitations.   Gastrointestinal:  Negative for nausea and vomiting.   Neurological:  Negative for dizziness and headaches.   Psychiatric/Behavioral:  Negative for hallucinations.         Physical Exam  Temp:  [36.2 °C (97.1 °F)-36.6 °C (97.9 °F)] 36.6 °C (97.9 °F)  Pulse:  [64-78] 78  Resp:  [12-18] 17  BP: (122-154)/(56-78) 122/78  SpO2:  [96 %-97 %] 97 %    Physical Exam  Vitals and nursing note reviewed.   Constitutional:       General: He is not in acute distress.  HENT:      Mouth/Throat:      Mouth: Mucous membranes are moist.      Pharynx: Oropharynx is clear.   Eyes:      General: No scleral icterus.  Cardiovascular:      Rate and Rhythm: Normal rate and regular rhythm.   Pulmonary:      Effort: Pulmonary effort is normal.      Breath sounds: No wheezing or rales.   Abdominal:      General: Bowel sounds are normal.      Palpations: Abdomen is soft.   Musculoskeletal:      Cervical back: No rigidity.      Right lower leg: No edema.      Left lower leg: No edema.   Skin:     General: Skin is warm and dry.   Neurological:      Mental Status: He is alert and oriented to person, place, and time.   Psychiatric:         Mood and Affect: Mood normal.         Behavior: Behavior normal.         Fluids    Intake/Output Summary (Last 24 hours) at 3/8/2025 1005  Last data filed at 3/8/2025 0834  Gross per 24 hour   Intake 900 ml   Output --   Net 900 ml        Laboratory  Recent Labs     03/06/25  0351 03/07/25  0535 03/08/25  0525   WBC  --  10.9* 11.1*   RBC 3.47* 3.12* 3.44*    HEMOGLOBIN 10* 9.0* 9.8*   HEMATOCRIT 30.5* 28.7* 32.5*   MCV 87.8 92.0 94.5   MCH 28.8 28.8 28.5   MCHC 32.8* 31.4* 30.2*   RDW 16.1* 49.3 50.4*   PLATELETCT 395* 326 352   MPV 7.1 9.7 9.8     Recent Labs     03/06/25  0559 03/06/25  1149 03/07/25  0535   SODIUM  --   --  138   POTASSIUM  --   --  4.6   CHLORIDE  --   --  108   CO2  --   --  19*   GLUCOSE 212* 251* 175*   BUN  --   --  51*   CREATININE  --   --  2.12*   CALCIUM  --   --  8.7                   Imaging       Assessment/Plan  * Hx of BKA, left (HCC)- (present on admission)  Assessment & Plan  Had angioplasty in 10/2024  Developed left heel ulcer and left toe gangrene  Now s/p left BKA    Irregular heartbeat  Assessment & Plan  No hx of afib  EKG showed NSR, no changes from prior  Likely PVCs    MSSA bacteremia  Assessment & Plan  Resolved  Dx at [facility]  On Zyvox & Ancef (thru 3/14)    CAD (coronary artery disease)  Assessment & Plan  Hx MI  Hx CABG  Echo (2/28): EF 55%  On Coreg & Lisinopril  On HCTZ  On ASA and Lipitor  Not on any K+ supplements currently  K+ 4.7 (3/7)    Borderline abnormal TFTs  Assessment & Plan  TSH: 5.61  FT4 1.45  Likely subclinical  Cont Synthroid  Needs repeat TFT's as out patient    Anemia  Assessment & Plan  Hb: 9.0 --> 9.8  Fe: 72, sats 31%  B12: 1108  Monitor    CKD (chronic kidney disease)  Assessment & Plan  Bun/Cr around baseline (baseline wanders)  CO2: 15 --> 19 --> 19  Cont bicarb tabs  On HCTZ  Cont to monitor    Leukocytosis- (present on admission)  Assessment & Plan  WBC's: 10.9 --> 11.1 (could be residual from recent bacteremia)  Has been afebrile  Denies cough  No diarrhea  U/A neg   Note: on Zyvox & Ancef for recent MSSA bacteremia (thru 3/14)  Monitor    Type 2 diabetes mellitus with hyperglycemia (HCC)- (present on admission)  Assessment & Plan  Hba1c: 6.6 (3/7)  BS today: 200 (am) --> 172 (noon)  Cont Glargine: 23 units qhs --> 30 units qhs (3/7) --> will increase to 35 units qhs (3/8)  Now on a  diabetic diet  Note: home meds include Humalog 10 units tid at meals, Farxiga, and Mounjaro  Cont to monitor    Primary hypertension- (present on admission)  Assessment & Plan  BP elevated but appears to be trending better  Cont Core.25 mg bid  Cont Lisinopril: 40 mg daily  Cont Hydralazine: 25 mg tid (3/5)  Note: has allergy to Norvasc  Will monitor another day since meds were recently adjusted

## 2025-03-09 ENCOUNTER — APPOINTMENT (OUTPATIENT)
Dept: PHYSICAL THERAPY | Facility: REHABILITATION | Age: 70
DRG: 560 | End: 2025-03-09
Attending: PHYSICAL MEDICINE & REHABILITATION
Payer: MEDICARE

## 2025-03-09 LAB
GLUCOSE BLD STRIP.AUTO-MCNC: 128 MG/DL (ref 65–99)
GLUCOSE BLD STRIP.AUTO-MCNC: 130 MG/DL (ref 65–99)
GLUCOSE BLD STRIP.AUTO-MCNC: 132 MG/DL (ref 65–99)
GLUCOSE BLD STRIP.AUTO-MCNC: 172 MG/DL (ref 65–99)

## 2025-03-09 PROCEDURE — 700105 HCHG RX REV CODE 258: Performed by: PHYSICAL MEDICINE & REHABILITATION

## 2025-03-09 PROCEDURE — A9270 NON-COVERED ITEM OR SERVICE: HCPCS | Performed by: HOSPITALIST

## 2025-03-09 PROCEDURE — 97530 THERAPEUTIC ACTIVITIES: CPT | Mod: CQ

## 2025-03-09 PROCEDURE — 770010 HCHG ROOM/CARE - REHAB SEMI PRIVAT*

## 2025-03-09 PROCEDURE — A9270 NON-COVERED ITEM OR SERVICE: HCPCS | Performed by: PHYSICAL MEDICINE & REHABILITATION

## 2025-03-09 PROCEDURE — 700111 HCHG RX REV CODE 636 W/ 250 OVERRIDE (IP): Performed by: PHYSICAL MEDICINE & REHABILITATION

## 2025-03-09 PROCEDURE — 700102 HCHG RX REV CODE 250 W/ 637 OVERRIDE(OP): Performed by: PHYSICAL MEDICINE & REHABILITATION

## 2025-03-09 PROCEDURE — 97110 THERAPEUTIC EXERCISES: CPT | Mod: CQ

## 2025-03-09 PROCEDURE — 82962 GLUCOSE BLOOD TEST: CPT | Mod: 91

## 2025-03-09 PROCEDURE — 700102 HCHG RX REV CODE 250 W/ 637 OVERRIDE(OP): Performed by: HOSPITALIST

## 2025-03-09 PROCEDURE — 99232 SBSQ HOSP IP/OBS MODERATE 35: CPT | Performed by: HOSPITALIST

## 2025-03-09 RX ADMIN — INSULIN LISPRO 2 UNITS: 100 INJECTION, SOLUTION INTRAVENOUS; SUBCUTANEOUS at 11:07

## 2025-03-09 RX ADMIN — LISINOPRIL 40 MG: 20 TABLET ORAL at 22:15

## 2025-03-09 RX ADMIN — HYDRALAZINE HYDROCHLORIDE 25 MG: 25 TABLET ORAL at 14:01

## 2025-03-09 RX ADMIN — CEFAZOLIN 2 G: 2 INJECTION, POWDER, FOR SOLUTION INTRAMUSCULAR; INTRAVENOUS at 14:12

## 2025-03-09 RX ADMIN — HEPARIN SODIUM 5000 UNITS: 5000 INJECTION, SOLUTION INTRAVENOUS; SUBCUTANEOUS at 05:20

## 2025-03-09 RX ADMIN — CARVEDILOL 12.5 MG: 12.5 TABLET, FILM COATED ORAL at 17:36

## 2025-03-09 RX ADMIN — SODIUM BICARBONATE 650 MG: 650 TABLET ORAL at 22:15

## 2025-03-09 RX ADMIN — CEFAZOLIN 2 G: 2 INJECTION, POWDER, FOR SOLUTION INTRAMUSCULAR; INTRAVENOUS at 05:29

## 2025-03-09 RX ADMIN — ASPIRIN 81 MG: 81 TABLET, COATED ORAL at 09:01

## 2025-03-09 RX ADMIN — ATORVASTATIN CALCIUM 40 MG: 40 TABLET, FILM COATED ORAL at 22:15

## 2025-03-09 RX ADMIN — OMEPRAZOLE 20 MG: 20 CAPSULE, DELAYED RELEASE ORAL at 09:01

## 2025-03-09 RX ADMIN — LEVOTHYROXINE SODIUM 175 MCG: 0.05 TABLET ORAL at 05:34

## 2025-03-09 RX ADMIN — HYDROCHLOROTHIAZIDE 25 MG: 25 TABLET ORAL at 05:21

## 2025-03-09 RX ADMIN — CARVEDILOL 12.5 MG: 12.5 TABLET, FILM COATED ORAL at 07:45

## 2025-03-09 RX ADMIN — HYDRALAZINE HYDROCHLORIDE 25 MG: 25 TABLET ORAL at 22:15

## 2025-03-09 RX ADMIN — HEPARIN SODIUM 5000 UNITS: 5000 INJECTION, SOLUTION INTRAVENOUS; SUBCUTANEOUS at 22:16

## 2025-03-09 RX ADMIN — CEFAZOLIN 2 G: 2 INJECTION, POWDER, FOR SOLUTION INTRAMUSCULAR; INTRAVENOUS at 22:09

## 2025-03-09 RX ADMIN — HEPARIN SODIUM 5000 UNITS: 5000 INJECTION, SOLUTION INTRAVENOUS; SUBCUTANEOUS at 13:56

## 2025-03-09 RX ADMIN — HYDRALAZINE HYDROCHLORIDE 25 MG: 25 TABLET ORAL at 05:21

## 2025-03-09 RX ADMIN — SODIUM BICARBONATE 650 MG: 650 TABLET ORAL at 09:01

## 2025-03-09 ASSESSMENT — PATIENT HEALTH QUESTIONNAIRE - PHQ9
1. LITTLE INTEREST OR PLEASURE IN DOING THINGS: NOT AT ALL
3. TROUBLE FALLING OR STAYING ASLEEP OR SLEEPING TOO MUCH: NOT AT ALL
2. FEELING DOWN, DEPRESSED, IRRITABLE, OR HOPELESS: NOT AT ALL
7. TROUBLE CONCENTRATING ON THINGS, SUCH AS READING THE NEWSPAPER OR WATCHING TELEVISION: NOT AT ALL
8. MOVING OR SPEAKING SO SLOWLY THAT OTHER PEOPLE COULD HAVE NOTICED. OR THE OPPOSITE, BEING SO FIGETY OR RESTLESS THAT YOU HAVE BEEN MOVING AROUND A LOT MORE THAN USUAL: NOT AT ALL
SUM OF ALL RESPONSES TO PHQ QUESTIONS 1-9: 0
9. THOUGHTS THAT YOU WOULD BE BETTER OFF DEAD, OR OF HURTING YOURSELF: NOT AT ALL
SUM OF ALL RESPONSES TO PHQ9 QUESTIONS 1 AND 2: 0
4. FEELING TIRED OR HAVING LITTLE ENERGY: NOT AT ALL
5. POOR APPETITE OR OVEREATING: NOT AT ALL
6. FEELING BAD ABOUT YOURSELF - OR THAT YOU ARE A FAILURE OR HAVE LET YOURSELF OR YOUR FAMILY DOWN: NOT AL ALL

## 2025-03-09 ASSESSMENT — ENCOUNTER SYMPTOMS
NERVOUS/ANXIOUS: 0
BLURRED VISION: 0
DIZZINESS: 0
DIARRHEA: 0
FEVER: 0
COUGH: 0

## 2025-03-09 ASSESSMENT — FIBROSIS 4 INDEX: FIB4 SCORE: 1.37

## 2025-03-09 ASSESSMENT — PAIN DESCRIPTION - PAIN TYPE
TYPE: ACUTE PAIN
TYPE: ACUTE PAIN

## 2025-03-09 NOTE — CARE PLAN
The patient is Stable - Low risk of patient condition declining or worsening    Shift Goals  Clinical Goals: safety  Patient Goals: sleep well  Family Goals: no family present    Progress made toward(s) clinical / shift goals: 2  Problem: Communication  Goal: The ability to communicate needs accurately and effectively will improve  Outcome: Progressing: Pt is oriented x4 and able to effectively and accurately communicate needs.     Problem: Mobility  Goal: Risk for activity intolerance will decrease  Outcome: Progressing: Pt OOB for meals, participating in therapies. Rest periods in bed off and on t/o day.

## 2025-03-09 NOTE — CARE PLAN
"The patient is Stable - Low risk of patient condition declining or worsening    Shift Goals  Clinical Goals: safety  Patient Goals: sleep well  Family Goals: no family present    Progress made toward(s) clinical / shift goals:  2    Problem: Fall Risk - Rehab  Goal: Patient will remain free from falls  Outcome: Progressing  Note: Nikole Swanson Fall risk Assessment Score: 14    Moderate fall risk Interventions  - Bed and strip alarm   - Yellow sign by the door   - Yellow wrist band \"Fall risk\"  - Room near to the nurse station  - Do not leave patient unattended in the bathroom  - Fall risk education provided    Pt oriented x4 and using call light appropriately for set up/spv with transfers and personal needs. Remains free from falls.    Problem: Pain - Standard  Goal: Alleviation of pain or a reduction in pain to the patient’s comfort goal  Outcome: Progressing: Pt reports 2/10 generalized aches and \"joint pain from therapy\". Denies need for pain relieving interventions such as medication heat, or cold.       "

## 2025-03-09 NOTE — PROGRESS NOTES
Hospital Medicine Daily Progress Note    Date of Service  3/9/2025    Chief Complaint:  Hypertension  Diabetes  CKD  Anemia  Leukocytosis  Abn TFT's    Interval History:  No significant events overnight.    Review of Systems  Review of Systems   Constitutional:  Negative for fever.   Eyes:  Negative for blurred vision.   Respiratory:  Negative for cough.    Cardiovascular:  Negative for chest pain.   Gastrointestinal:  Negative for diarrhea.   Musculoskeletal:  Negative for joint pain.   Neurological:  Negative for dizziness.   Psychiatric/Behavioral:  The patient is not nervous/anxious.         Physical Exam  Temp:  [36.6 °C (97.9 °F)-36.8 °C (98.3 °F)] 36.6 °C (97.9 °F)  Pulse:  [62-93] (P) 70  Resp:  [18] (P) 18  BP: (113-174)/(53-74) (P) 112/46  SpO2:  [96 %] 96 %    Physical Exam  Vitals and nursing note reviewed.   Constitutional:       Appearance: He is not diaphoretic.   HENT:      Mouth/Throat:      Pharynx: No oropharyngeal exudate or posterior oropharyngeal erythema.   Eyes:      Extraocular Movements: Extraocular movements intact.   Neck:      Vascular: No carotid bruit.   Cardiovascular:      Rate and Rhythm: Normal rate and regular rhythm.   Pulmonary:      Effort: Pulmonary effort is normal.      Breath sounds: No wheezing or rales.   Abdominal:      General: There is no distension.      Palpations: Abdomen is soft.      Tenderness: There is no abdominal tenderness.   Musculoskeletal:      Right lower leg: No edema.      Left lower leg: No edema.   Skin:     General: Skin is warm and dry.   Neurological:      Mental Status: He is alert and oriented to person, place, and time.   Psychiatric:         Mood and Affect: Mood normal.         Behavior: Behavior normal.         Fluids    Intake/Output Summary (Last 24 hours) at 3/9/2025 1006  Last data filed at 3/9/2025 0844  Gross per 24 hour   Intake 1300 ml   Output 1100 ml   Net 200 ml        Laboratory  Recent Labs     03/07/25  0535 03/08/25  0525   WBC  10.9* 11.1*   RBC 3.12* 3.44*   HEMOGLOBIN 9.0* 9.8*   HEMATOCRIT 28.7* 32.5*   MCV 92.0 94.5   MCH 28.8 28.5   MCHC 31.4* 30.2*   RDW 49.3 50.4*   PLATELETCT 326 352   MPV 9.7 9.8     Recent Labs     03/06/25  1149 03/07/25  0535   SODIUM  --  138   POTASSIUM  --  4.6   CHLORIDE  --  108   CO2  --  19*   GLUCOSE 251* 175*   BUN  --  51*   CREATININE  --  2.12*   CALCIUM  --  8.7                   Imaging       Assessment/Plan  * Hx of BKA, left (HCC)- (present on admission)  Assessment & Plan  Had angioplasty in 10/2024  Developed left heel ulcer and left toe gangrene  Now s/p left BKA    Irregular heartbeat  Assessment & Plan  No hx of afib  EKG showed NSR, no changes from prior  Likely PVCs    MSSA bacteremia  Assessment & Plan  Resolved  Dx at [facility]  On Zyvox & Ancef (thru 3/14)    CAD (coronary artery disease)  Assessment & Plan  Hx MI  Hx CABG  Echo (2/28): EF 55%  On Coreg & Lisinopril  On HCTZ  On ASA and Lipitor  Not on any K+ supplements currently  K+ 4.6 (3/7)    Borderline abnormal TFTs  Assessment & Plan  TSH: 5.61  FT4 1.45  Likely subclinical  Cont Synthroid  Needs repeat TFT's as out patient    Anemia  Assessment & Plan  Hb: 9.0 --> 9.8  Fe: 72, sats 31%  B12: 1108  Monitor    CKD (chronic kidney disease)  Assessment & Plan  Bun/Cr around baseline (baseline wanders)  CO2: 15 --> 19 --> 19  Cont bicarb tabs  On HCTZ  Cont to monitor    Leukocytosis- (present on admission)  Assessment & Plan  WBC's: 10.9 --> 11.1 (could be residual from recent bacteremia)  Has been afebrile  Denies cough  No diarrhea  U/A neg   Note: on Zyvox & Ancef for recent MSSA bacteremia (thru 3/14)  Monitor    Type 2 diabetes mellitus with hyperglycemia (HCC)- (present on admission)  Assessment & Plan  Hba1c: 6.6 (3/7)  BS this am: 132  Cont Glargine: 23 units qhs --> 30 units qhs (3/7) --> 35 units qhs (3/8)  Now on a diabetic diet  Note: home meds include Humalog 10 units tid at meals, Farxiga, and Mounjaro  Cont to  monitor    Primary hypertension- (present on admission)  Assessment & Plan  BP better recently; is ok but occ rises up  Cont Core.25 mg bid  Cont Lisinopril: 40 mg daily  Cont Hydralazine: 25 mg tid (3/5)  Note: has allergy to Norvasc  Cont to monitor

## 2025-03-09 NOTE — PROGRESS NOTES
NURSING DAILY NOTE    Name: Marlo León   Date of Admission: 3/6/2025   Admitting Diagnosis: Hx of BKA, left (HCC)  Attending Physician: MICKIE JUAREZ D.O.  Allergies: Neosporin [neomycin-polymyxin b], Tape, Amlodipine, Amoxicillin, Bacitracin-polymyxin b, Cyclobenzaprine, Empagliflozin-metformin hcl, Gabapentin, Gadolinium, Metformin, Neomycin-polymyxin b gu, Nsaids, Pioglitazone, Prednisone, Sulfamethoxazole w-trimethoprim, and Tamsulosin    Safety  Patient Assist     Patient Precautions  Precautions: Fall Risk  Fall Risk: Other (see comments) (Left BKA)  Weight Bearing: NWB LE (laterality in comments) (left)  Braces: Immobilizer LE (laterality in comments), IPOP  Comments: L BKA, L IPOP and L knee immobilizer  Bed Transfer Status  Contact Guard Assist  Toilet Transfer Status   Maximal Assist  Assistive Devices  Rails, Wheelchair  Oxygen  None - Room Air  Diet/Therapeutic Dining  Current Diet Order   Procedures    Diet Order Diet: Consistent CHO (Diabetic) (HIGH PROTEIN AND VEGETABLES. DAIRY OK. LIMIT CARBOHYDRATES); Nutrient modifications: (optional): High Protein     Pill Administration  whole  Agitated Behavioral Scale     ABS Level of Severity       Fall Risk  Has the patient had a fall this admission?      Nikole Swanson Fall Risk Scoring  14, MODERATE RISK  Fall Risk Safety Measures  bed alarm, chair alarm, poor balance, and low vision/hearing    Vitals  Temperature: 36.9 °C (98.4 °F)  Temp src: Oral  Pulse: 71  Respiration: 18  Blood Pressure : (!) 150/71  Blood Pressure MAP (Calculated): 97 MM HG  BP Location: Right, Upper Arm  Patient BP Position: Supine     Oxygen  Pulse Oximetry: 96 %  O2 (LPM): 0  O2 Delivery Device: None - Room Air    Bowel and Bladder  Last Bowel Movement  03/09/25  Stool Type  Type 5: Soft blob with clear cut edges (passed easily)  Bowel Device     Continent  Bladder: Continent void   Bowel: Continent movement  Bladder Function  Urine Void (mL):  (mod)  Number of Times Voided:  1  Urine Color: Yellow  Straight Catheter:  (ref icp @ this time)  Genitourinary Assessment   Bladder Assessment (WDL):  WDL Except  Jane Catheter: Not Applicable  Urine Color: Yellow  Bladder Device: Bathroom  Time Void: Yes  Bladder Scan: Re-Void  $ Bladder Scan Results (mL): 360    Skin  Anselmo Score   16  Sensory Interventions   Bed Types: Standard/Trauma Mattress  Skin Preventative Measures: Pillows in Use for Support / Positioning  Moisture Interventions  Moisturizers/Barriers: Barrier Wipes      Pain  Pain Rating Scale  2 - Notice Pain, does not interfere with activities  Pain Location  Generalized  Pain Location Orientation  Right, Left, Anterior, Posterior  Pain Interventions   Declines    ADLs    Bathing   Shower, * * With Assistance from, Staff  Linen Change      Personal Hygiene     Chlorhexidine Bath   Not Completed - Refused. Education Provided.  Oral Care     Teeth/Dentures     Shave     Nutrition Percentage Eaten  *  * Meal *  *, Lunch, Between % Consumed  Environmental Precautions     Patient Turns/Positioning  Patient turns self independently side to side without assistance, to offload sacral area  Patient Turns Assistance/Tolerance     Bed Positions  Bed Controls On, Bed Locked  Head of Bed Elevated  Self regulated      Psychosocial/Neurologic Assessment  Psychosocial Assessment  Psychosocial (WDL):  Within Defined Limits  Neurologic Assessment  Neuro (WDL): Exceptions to WDL  Level of Consciousness: Alert  Orientation Level: Oriented X4  Cognition: Follows commands, Appropriate judgement, Appropriate safety awareness  Speech: Clear  Pupil Assesment: No  EENT (WDL):  WDL Except    Cardio/Pulmonary Assessment  Edema      Respiratory Breath Sounds  RUL Breath Sounds: Clear  RML Breath Sounds: Clear  RLL Breath Sounds: Clear  NOEMY Breath Sounds: Clear  LLL Breath Sounds: Clear  Cardiac Assessment   Cardiac (WDL):  WDL Except (hx htn)

## 2025-03-09 NOTE — PROGRESS NOTES
NURSING DAILY NOTE    Name: Marlo León   Date of Admission: 3/6/2025   Admitting Diagnosis: Hx of BKA, left (HCC)  Attending Physician: MICKIE JUAREZ D.O.  Allergies: Neosporin [neomycin-polymyxin b], Tape, Amlodipine, Amoxicillin, Bacitracin-polymyxin b, Cyclobenzaprine, Empagliflozin-metformin hcl, Gabapentin, Gadolinium, Metformin, Neomycin-polymyxin b gu, Nsaids, Pioglitazone, Prednisone, Sulfamethoxazole w-trimethoprim, and Tamsulosin    Safety  Patient Assist     Patient Precautions  Precautions: Fall Risk  Fall Risk: Other (see comments) (Left BKA)  Weight Bearing: NWB LE (laterality in comments)  Braces: Immobilizer LE (laterality in comments), IPOP  Comments: L BKA, L IPOP and L knee immobilizer  Bed Transfer Status  Contact Guard Assist  Toilet Transfer Status   Maximal Assist  Assistive Devices  Rails, Wheelchair  Oxygen  None - Room Air  Diet/Therapeutic Dining  Current Diet Order   Procedures    Diet Order Diet: Consistent CHO (Diabetic) (HIGH PROTEIN AND VEGETABLES. DAIRY OK. LIMIT CARBOHYDRATES); Nutrient modifications: (optional): High Protein     Pill Administration  whole  Agitated Behavioral Scale     ABS Level of Severity       Fall Risk  Has the patient had a fall this admission?      Nikole Swanson Fall Risk Scoring  14, MODERATE RISK  Fall Risk Safety Measures  bed alarm, chair alarm, and poor balance    Vitals  Temperature: 36.6 °C (97.9 °F)  Temp src: Temporal  Pulse: 62  Respiration: 18  Blood Pressure : 113/66  Blood Pressure MAP (Calculated): 82 MM HG  BP Location: Right, Upper Arm  Patient BP Position: Supine     Oxygen  Pulse Oximetry: 96 %  O2 (LPM): 0  O2 Delivery Device: None - Room Air    Bowel and Bladder  Last Bowel Movement  03/08/25  Stool Type  Type 4: Like a sausage or snake, smooth and soft  Bowel Device     Continent  Bladder: Continent void   Bowel: Continent movement  Bladder Function  Urine Void (mL): 450 ml  Number of Times Voided: 1  Urine Color:  Yellow  Genitourinary Assessment   Bladder Assessment (WDL):  WDL Except  Jane Catheter: Not Applicable  Urine Color: Yellow  $ Bladder Scan Results (mL): 120    Skin  Anselmo Score   15  Sensory Interventions   Bed Types: Standard/Trauma Mattress  Skin Preventative Measures: Pillows in Use for Support / Positioning  Moisture Interventions  Moisturizers/Barriers: Barrier Wipes      Pain  Pain Rating Scale  0 - No Pain  Pain Location  Generalized  Pain Location Orientation  Right, Left, Anterior, Posterior  Pain Interventions   Declines    ADLs    Bathing   Shower, * * With Assistance from, Staff  Linen Change      Personal Hygiene     Chlorhexidine Bath   Not Completed - Refused. Education Provided.  Oral Care     Teeth/Dentures     Shave     Nutrition Percentage Eaten  *  * Meal *  *, Dinner, Between % Consumed  Environmental Precautions     Patient Turns/Positioning  Patient turns self independently side to side without assistance, to offload sacral area  Patient Turns Assistance/Tolerance     Bed Positions  Bed Controls On, Bed Locked  Head of Bed Elevated  Self regulated      Psychosocial/Neurologic Assessment  Psychosocial Assessment  Psychosocial (WDL):  Within Defined Limits  Neurologic Assessment  Neuro (WDL): Exceptions to WDL  Level of Consciousness: Alert  Orientation Level: Oriented X4  Cognition: Follows commands, Appropriate judgement, Appropriate safety awareness  Speech: Clear  Pupil Assesment: No  EENT (WDL):  WDL Except    Cardio/Pulmonary Assessment  Edema      Respiratory Breath Sounds  RUL Breath Sounds: Clear  RML Breath Sounds: Clear  RLL Breath Sounds: Clear  NOEMY Breath Sounds: Clear  LLL Breath Sounds: Clear  Cardiac Assessment   Cardiac (WDL):  WDL Except (hx htn)

## 2025-03-09 NOTE — PROGRESS NOTES
NURSING DAILY NOTE    Name: Marlo León   Date of Admission: 3/6/2025   Admitting Diagnosis: Hx of BKA, left (HCC)  Attending Physician: MICKIE JUAREZ D.O.  Allergies: Neosporin [neomycin-polymyxin b], Tape, Amlodipine, Amoxicillin, Bacitracin-polymyxin b, Cyclobenzaprine, Empagliflozin-metformin hcl, Gabapentin, Gadolinium, Metformin, Neomycin-polymyxin b gu, Nsaids, Pioglitazone, Prednisone, Sulfamethoxazole w-trimethoprim, and Tamsulosin    Safety  Patient Assist     Patient Precautions  Precautions: Fall Risk  Fall Risk: Other (see comments) (Left BKA)  Weight Bearing: NWB LE (laterality in comments)  Braces: Immobilizer LE (laterality in comments), IPOP  Comments: L BKA, L IPOP and L knee immobilizer  Bed Transfer Status  Contact Guard Assist  Toilet Transfer Status   Maximal Assist  Assistive Devices  Rails, Wheelchair  Oxygen  None - Room Air  Diet/Therapeutic Dining  Current Diet Order   Procedures    Diet Order Diet: Consistent CHO (Diabetic) (HIGH PROTEIN AND VEGETABLES. DAIRY OK. LIMIT CARBOHYDRATES); Nutrient modifications: (optional): High Protein     Pill Administration  whole  Agitated Behavioral Scale     ABS Level of Severity       Fall Risk  Has the patient had a fall this admission?      Nikole Swanson Fall Risk Scoring  14, MODERATE RISK  Fall Risk Safety Measures  bed alarm, chair alarm, and poor balance    Vitals  Temperature: 36.8 °C (98.3 °F)  Temp src: Temporal  Pulse: 80  Respiration: 18  Blood Pressure : 136/53  Blood Pressure MAP (Calculated): 81 MM HG  BP Location: Left, Upper Arm  Patient BP Position: Sitting     Oxygen  Pulse Oximetry: 96 %  O2 (LPM): 0  O2 Delivery Device: None - Room Air    Bowel and Bladder  Last Bowel Movement  03/08/25  Stool Type  Type 4: Like a sausage or snake, smooth and soft  Bowel Device     Continent  Bladder: Continent void   Bowel: Continent movement  Bladder Function  Urine Void (mL): 200 ml  Number of Times Voided: 1  Urine Color: Unable To  Evaluate  Genitourinary Assessment   Bladder Assessment (WDL):  WDL Except  Jane Catheter: Not Applicable  Urine Color: Unable To Evaluate  $ Bladder Scan Results (mL): 120    Skin  Anselmo Score   15  Sensory Interventions   Bed Types: Standard/Trauma Mattress  Skin Preventative Measures: Pillows in Use for Support / Positioning  Moisture Interventions  Moisturizers/Barriers: Barrier Wipes      Pain  Pain Rating Scale  4 - Distracts me, can do usual activities  Pain Location  Generalized  Pain Location Orientation  Right, Left, Anterior, Posterior  Pain Interventions   Medication (see MAR)    ADLs    Bathing   Shower, * * With Assistance from, Staff  Linen Change      Personal Hygiene     Chlorhexidine Bath   Not Completed - Refused. Education Provided.  Oral Care     Teeth/Dentures     Shave     Nutrition Percentage Eaten  *  * Meal *  *, Dinner, Between % Consumed  Environmental Precautions     Patient Turns/Positioning  Patient turns self independently side to side without assistance, to offload sacral area  Patient Turns Assistance/Tolerance     Bed Positions  Bed Controls On, Bed Locked  Head of Bed Elevated  Self regulated      Psychosocial/Neurologic Assessment  Psychosocial Assessment  Psychosocial (WDL):  Within Defined Limits  Neurologic Assessment  Neuro (WDL): Exceptions to WDL  Level of Consciousness: Alert  Orientation Level: Oriented X4  Cognition: Follows commands, Appropriate judgement, Appropriate safety awareness  Speech: Clear  Pupil Assesment: No  EENT (WDL):  WDL Except    Cardio/Pulmonary Assessment  Edema      Respiratory Breath Sounds  RUL Breath Sounds: Clear  RML Breath Sounds: Clear  RLL Breath Sounds: Clear  NOEMY Breath Sounds: Clear  LLL Breath Sounds: Clear  Cardiac Assessment   Cardiac (WDL):  WDL Except (hx htn)

## 2025-03-09 NOTE — CARE PLAN
"  Problem: Fall Risk - Rehab  Goal: Patient will remain free from falls  Outcome: Progressing  Note: Nikole Swanson Fall risk Assessment Score: 14    Moderate fall risk Interventions  - Bed and strip alarm   - Yellow sign by the door   - Yellow wrist band \"Fall risk\"  - Room near to the nurse station  - Do not leave patient unattended in the bathroom  - Fall risk education provided      Problem: Pain Management  Goal: Pain level will decrease to patient's comfort goal  Outcome: Progressing  Note: Assessed for pain and discomfort , pain under control, left bka dressing intact, IPOP in place.   The patient is Stable - Low risk of patient condition declining or worsening    Shift Goals  Clinical Goals: safety  Patient Goals: sleep well  Family Goals: no family present    Progress made toward(s) clinical / shift goals:  Pt free from fall and injury.    "

## 2025-03-09 NOTE — THERAPY
"Physical Therapy   Daily Treatment     Patient Name:  Marlo León  Age:  69 y.o., Sex:  male  Medical Record #:  [id number]  Today's Date: 3/9/2025     Precautions  Precautions: Fall Risk  Fall Risk: Other (see comments) (Left BKA)  Weight Bearing: NWB LE (laterality in comments) (left)  Braces: Immobilizer LE (laterality in comments), IPOP  Comments: L BKA, L IPOP and L knee immobilizer    Subjective: pt received up in wc, agreeable to therapy session    \"I'm feeling a little light headed for some reason\"     Objective:    03/09/25 0931   PT Charge Group   PT Therapeutic Exercise (Units) 1   PT Therapeutic Activities (Units) 3   Supervising Physical Therapist Zulma Nicole   PT Total Time Spent   PT Individual Total Time Spent (Mins) 60   Interdisciplinary Plan of Care Collaboration   IDT Collaboration with  Certified Nursing Assistant   Patient Position at End of Therapy Seated;Call Light within Reach;Other (Comments)   Collaboration Comments notified pt on commode at conclusion of session          03/09/25 0954   Vitals   Pulse 70   Patient BP Position Sitting   Blood Pressure  112/46   Respiration 18   Room Air Oximetry 96   O2 (LPM) 0   O2 Delivery Device None - Room Air     Therapeutic Activities  Purpose: to improve performance and function of daily activities, to provide patient and family education, and to increase safety with activities of daily life and mobility related to activities of daily life  Interventions:   Bed/chair transfer training  Sit to stand training  Other: BKA education  Description of Intervention(s): transfer training with FWW wc <> mat table with Norma, cues for hand placement and walker management with STS transition  Edu pt on residual limb wrapping, rough timeline for BKA healing, edu on post op protective device to be worn AAT except during therapies.   Issued amputee coalition booklet  Complete residual limb wrapping x 2 with pt observing  STS from EOM <> FWW x 2 trial  2nd stand pt " "had immediate onset of needing to go to the BR     Therapeutic Exercise  Purpose: to improve strength and to improve functional endurance  Interventions:   Lower body exercises:   Supine program -   Straight leg raises, Front and 1 set of 10  Gluteal isometrics, 1 set of 10 and Bilateral  Quadriceps isometrics, 1 set of 10 and Left  Description of Intervention(s): initiated BKA mat program exercises    Assessment: pt was limited due to feeling somewhat light headed during therapy session, vitals above with SBP below his typical reading. Unable to obtain standing bp.     Strengths: Effective communication skills, Pleasant and cooperative, Supportive family, Willingly participates in therapeutic activities, Motivated for self care and independence  Barriers: Impaired activity tolerance, Impaired balance, Constipation, Limited mobility    Plan:  cont BKA education  Standing at FWW  Transfer training  Wc mobility    DME    PT DME Recommendations  Wheelchair: 18\" Width, Removable/Flip Back Armrests, Elevating Leg Rests (pt has a wc which he purchased on [employer] however anticipates he will need a new one for DC with left residual limb board and flip back armrest)  Cushion: Standard  Vendor Equipment: IPOP    Goals:     Passport items to be completed:  Get in/out of bed safely, in/out of a vehicle, safely use mobility device, walk or wheel around home/community, navigate up and down stairs, show how to get up/down from the ground, ensure home is accessible, demonstrate HEP, complete caregiver training    Physical Therapy Problems (Active)       Problem: Mobility       Dates: Start:  03/07/25         Goal: STG-Within one week, patient will propel wheelchair household distances: on unit with Mod I.       Dates: Start:  03/07/25            Goal: STG-Within one week, patient will ambulate household distance at least 15' with FWW and Min A.       Dates: Start:  03/07/25               Problem: Mobility Transfers       Dates: " "Start:  03/07/25         Goal: STG-Within one week, patient will sit to stand Min A from 18.5\" surface to FWW or other hold support.       Dates: Start:  03/07/25               Problem: PT-Long Term Goals       Dates: Start:  03/07/25         Goal: LTG-By discharge, patient will propel wheelchair on outside surfaces and inside surfaces with Mod I, distance >500'.       Dates: Start:  03/07/25            Goal: LTG-By discharge, patient will ambulate at least 25' with FWW and SBA.       Dates: Start:  03/07/25            Goal: LTG-By discharge, patient will transfer one surface to another SPV.       Dates: Start:  03/07/25            Goal: LTG-By discharge, patient will perform home exercise program Mod I.       Dates: Start:  03/07/25            Goal: LTG-By discharge, patient will transfer in/out of a car CGA from wc level.       Dates: Start:  03/07/25              "

## 2025-03-10 ENCOUNTER — APPOINTMENT (OUTPATIENT)
Dept: PHYSICAL THERAPY | Facility: REHABILITATION | Age: 70
DRG: 560 | End: 2025-03-10
Attending: PHYSICAL MEDICINE & REHABILITATION
Payer: MEDICARE

## 2025-03-10 ENCOUNTER — APPOINTMENT (OUTPATIENT)
Dept: OCCUPATIONAL THERAPY | Facility: REHABILITATION | Age: 70
DRG: 560 | End: 2025-03-10
Attending: PHYSICAL MEDICINE & REHABILITATION
Payer: MEDICARE

## 2025-03-10 LAB
ANION GAP SERPL CALC-SCNC: 9 MMOL/L (ref 7–16)
BUN SERPL-MCNC: 51 MG/DL (ref 8–22)
CALCIUM SERPL-MCNC: 8.6 MG/DL (ref 8.5–10.5)
CHLORIDE SERPL-SCNC: 112 MMOL/L (ref 96–112)
CO2 SERPL-SCNC: 20 MMOL/L (ref 20–33)
CREAT SERPL-MCNC: 2.29 MG/DL (ref 0.5–1.4)
ERYTHROCYTE [DISTWIDTH] IN BLOOD BY AUTOMATED COUNT: 50.8 FL (ref 35.9–50)
GFR SERPLBLD CREATININE-BSD FMLA CKD-EPI: 30 ML/MIN/1.73 M 2
GLUCOSE BLD STRIP.AUTO-MCNC: 121 MG/DL (ref 65–99)
GLUCOSE BLD STRIP.AUTO-MCNC: 133 MG/DL (ref 65–99)
GLUCOSE BLD STRIP.AUTO-MCNC: 177 MG/DL (ref 65–99)
GLUCOSE SERPL-MCNC: 118 MG/DL (ref 65–99)
HCT VFR BLD AUTO: 30.6 % (ref 42–52)
HGB BLD-MCNC: 9.4 G/DL (ref 14–18)
MCH RBC QN AUTO: 28.7 PG (ref 27–33)
MCHC RBC AUTO-ENTMCNC: 30.7 G/DL (ref 32.3–36.5)
MCV RBC AUTO: 93.6 FL (ref 81.4–97.8)
PLATELET # BLD AUTO: 277 K/UL (ref 164–446)
PMV BLD AUTO: 10.6 FL (ref 9–12.9)
POTASSIUM SERPL-SCNC: 4.9 MMOL/L (ref 3.6–5.5)
RBC # BLD AUTO: 3.27 M/UL (ref 4.7–6.1)
SODIUM SERPL-SCNC: 141 MMOL/L (ref 135–145)
WBC # BLD AUTO: 8.8 K/UL (ref 4.8–10.8)

## 2025-03-10 PROCEDURE — 97110 THERAPEUTIC EXERCISES: CPT

## 2025-03-10 PROCEDURE — 82962 GLUCOSE BLOOD TEST: CPT

## 2025-03-10 PROCEDURE — 700111 HCHG RX REV CODE 636 W/ 250 OVERRIDE (IP): Performed by: PHYSICAL MEDICINE & REHABILITATION

## 2025-03-10 PROCEDURE — A9270 NON-COVERED ITEM OR SERVICE: HCPCS | Performed by: PHYSICAL MEDICINE & REHABILITATION

## 2025-03-10 PROCEDURE — 85027 COMPLETE CBC AUTOMATED: CPT

## 2025-03-10 PROCEDURE — 700102 HCHG RX REV CODE 250 W/ 637 OVERRIDE(OP): Performed by: HOSPITALIST

## 2025-03-10 PROCEDURE — A9270 NON-COVERED ITEM OR SERVICE: HCPCS | Performed by: HOSPITALIST

## 2025-03-10 PROCEDURE — 80048 BASIC METABOLIC PNL TOTAL CA: CPT

## 2025-03-10 PROCEDURE — 700105 HCHG RX REV CODE 258: Performed by: PHYSICAL MEDICINE & REHABILITATION

## 2025-03-10 PROCEDURE — 97150 GROUP THERAPEUTIC PROCEDURES: CPT

## 2025-03-10 PROCEDURE — 99232 SBSQ HOSP IP/OBS MODERATE 35: CPT | Performed by: HOSPITALIST

## 2025-03-10 PROCEDURE — 700102 HCHG RX REV CODE 250 W/ 637 OVERRIDE(OP): Performed by: PHYSICAL MEDICINE & REHABILITATION

## 2025-03-10 PROCEDURE — 770010 HCHG ROOM/CARE - REHAB SEMI PRIVAT*

## 2025-03-10 PROCEDURE — 36415 COLL VENOUS BLD VENIPUNCTURE: CPT

## 2025-03-10 PROCEDURE — 97530 THERAPEUTIC ACTIVITIES: CPT

## 2025-03-10 PROCEDURE — 97116 GAIT TRAINING THERAPY: CPT

## 2025-03-10 RX ORDER — CARVEDILOL 3.12 MG/1
6.25 TABLET ORAL 2 TIMES DAILY WITH MEALS
Status: DISCONTINUED | OUTPATIENT
Start: 2025-03-10 | End: 2025-03-21 | Stop reason: HOSPADM

## 2025-03-10 RX ADMIN — INSULIN LISPRO 2 UNITS: 100 INJECTION, SOLUTION INTRAVENOUS; SUBCUTANEOUS at 11:33

## 2025-03-10 RX ADMIN — CEFAZOLIN 2 G: 2 INJECTION, POWDER, FOR SOLUTION INTRAMUSCULAR; INTRAVENOUS at 14:51

## 2025-03-10 RX ADMIN — INSULIN LISPRO 4 UNITS: 100 INJECTION, SOLUTION INTRAVENOUS; SUBCUTANEOUS at 21:12

## 2025-03-10 RX ADMIN — LEVOTHYROXINE SODIUM 175 MCG: 0.05 TABLET ORAL at 05:05

## 2025-03-10 RX ADMIN — ATORVASTATIN CALCIUM 40 MG: 40 TABLET, FILM COATED ORAL at 21:07

## 2025-03-10 RX ADMIN — HYDRALAZINE HYDROCHLORIDE 25 MG: 25 TABLET ORAL at 05:32

## 2025-03-10 RX ADMIN — OMEPRAZOLE 20 MG: 20 CAPSULE, DELAYED RELEASE ORAL at 08:05

## 2025-03-10 RX ADMIN — SODIUM BICARBONATE 650 MG: 650 TABLET ORAL at 08:05

## 2025-03-10 RX ADMIN — HEPARIN SODIUM 5000 UNITS: 5000 INJECTION, SOLUTION INTRAVENOUS; SUBCUTANEOUS at 05:06

## 2025-03-10 RX ADMIN — ASPIRIN 81 MG: 81 TABLET, COATED ORAL at 08:05

## 2025-03-10 RX ADMIN — HEPARIN SODIUM 5000 UNITS: 5000 INJECTION, SOLUTION INTRAVENOUS; SUBCUTANEOUS at 14:30

## 2025-03-10 RX ADMIN — ACETAMINOPHEN 650 MG: 325 TABLET ORAL at 21:06

## 2025-03-10 RX ADMIN — CARVEDILOL 6.25 MG: 3.12 TABLET, FILM COATED ORAL at 17:06

## 2025-03-10 RX ADMIN — LISINOPRIL 40 MG: 20 TABLET ORAL at 21:07

## 2025-03-10 RX ADMIN — HEPARIN SODIUM 5000 UNITS: 5000 INJECTION, SOLUTION INTRAVENOUS; SUBCUTANEOUS at 21:09

## 2025-03-10 RX ADMIN — CARVEDILOL 12.5 MG: 12.5 TABLET, FILM COATED ORAL at 08:05

## 2025-03-10 RX ADMIN — CEFAZOLIN 2 G: 2 INJECTION, POWDER, FOR SOLUTION INTRAMUSCULAR; INTRAVENOUS at 05:38

## 2025-03-10 RX ADMIN — CEFAZOLIN 2 G: 2 INJECTION, POWDER, FOR SOLUTION INTRAMUSCULAR; INTRAVENOUS at 21:21

## 2025-03-10 RX ADMIN — HYDRALAZINE HYDROCHLORIDE 25 MG: 25 TABLET ORAL at 21:07

## 2025-03-10 RX ADMIN — HYDROCHLOROTHIAZIDE 25 MG: 25 TABLET ORAL at 05:32

## 2025-03-10 RX ADMIN — SODIUM BICARBONATE 650 MG: 650 TABLET ORAL at 21:08

## 2025-03-10 RX ADMIN — HYDRALAZINE HYDROCHLORIDE 25 MG: 25 TABLET ORAL at 14:30

## 2025-03-10 ASSESSMENT — ENCOUNTER SYMPTOMS
FEVER: 0
VOMITING: 0
NAUSEA: 0
CHILLS: 0
SHORTNESS OF BREATH: 0
NERVOUS/ANXIOUS: 0
ABDOMINAL PAIN: 0
DIARRHEA: 0

## 2025-03-10 ASSESSMENT — PATIENT HEALTH QUESTIONNAIRE - PHQ9
1. LITTLE INTEREST OR PLEASURE IN DOING THINGS: NOT AT ALL
SUM OF ALL RESPONSES TO PHQ9 QUESTIONS 1 AND 2: 0
2. FEELING DOWN, DEPRESSED, IRRITABLE, OR HOPELESS: NOT AT ALL

## 2025-03-10 ASSESSMENT — PAIN DESCRIPTION - PAIN TYPE: TYPE: ACUTE PAIN

## 2025-03-10 NOTE — THERAPY
"Occupational Therapy  Daily Treatment     Patient Name:  Marlo León  Age:  69 y.o., Sex:  male  Medical Record #:  [id number]  Today's Date:  3/10/2025     Subjective: Pt pleasant and agreeable to OT session.      Objective:    03/10/25 1031   OT Charge Group   OT Group Therapy Group Activities   OT Total Time Spent   OT Group Total Time Spent (Mins) 60   Precautions   Precautions Fall Risk   Fall Risk Other (see comments)   Weight Bearing NWB LE (laterality in comments)   Braces IPOP;Immobilizer LE (laterality in comments)   Comments L BKA, L IPOP and L knee immobilizer   Vitals   O2 Delivery Device None - Room Air   Interdisciplinary Plan of Care Collaboration   Patient Position at End of Therapy Seated;Chair Alarm On;Self Releasing Lap Belt Applied         Group Therapy  Purpose: to increase active participation through peer pressure, to enhance motivation, to validate increased independence with skills, to increase patient interaction during physical recovery, to facilitate goal discussion, and to reinforce strengthening and movement through group format demonstration  Interventions:   UE exercise group  Other: Mock LB dressing/AE training group  Description of Intervention(s): see below for details  Group Interventions  Purpose: to improve strength and to improve functional endurance  Interventions:   Upper body exercises,   Seated program - bicep curls, bilateral rows, curl to contralateral shoulder, wrist pronation/supination, internal/external rotation  Description of Intervention(s): Exercises completed seated in w/c with 3# db  Mock LB dressing task  Pt completed simulated LB dressing task with theraband loop as mock \"pants\" for progression with LB dressing goals. Pt used reacher to thread BLE into looped pants and completed pants over hips pursuit while standing at FWW with min A for STS and min A for dynamic standing balance.    Participated in group discussion re: relevance of AE/reacher, " rationale/indications for use  Assessment:  Pt tolerated group therapy well, no overt or new deficits observed within this session. Tolerated there-ex amongst peers well with short rest breaks. Pt able to describe generalized use of AE. Pt unsteady at FWW, reports increased confidence and stability with STS at GB to support dressing performance.   Strengths: Able to follow instructions, Alert and oriented, Good carryover of learning, Independent prior level of function, Manages pain appropriately, Motivated for self care and independence, Pleasant and cooperative, Supportive family, Willingly participates in therapeutic activities  Barriers: Decreased endurance, Fatigue, Generalized weakness, Impaired activity tolerance, Impaired balance, Limited mobility    Plan:  Continue OT POC    Occupational Therapy Goals (Active)       Problem: Bathing       Dates: Start:  03/07/25         Goal: STG-Within one week, patient will bathe with CGA.        Dates: Start:  03/07/25               Problem: Dressing       Dates: Start:  03/07/25         Goal: STG-Within one week, patient will dress LB with CGA.        Dates: Start:  03/07/25               Problem: OT Long Term Goals       Dates: Start:  03/07/25         Goal: LTG-By discharge, patient will complete basic self care tasks @ sup-mod I level.        Dates: Start:  03/07/25            Goal: LTG-By discharge, patient will perform bathroom transfers @ sup-mod I level.        Dates: Start:  03/07/25               Problem: Toileting       Dates: Start:  03/07/25         Goal: STG-Within one week, patient will complete toileting tasks with min A.        Dates: Start:  03/07/25

## 2025-03-10 NOTE — PROGRESS NOTES
"  Physical Medicine & Rehabilitation Progress Note    Encounter Date: 3/10/2025    Chief Complaint: L BKA    Interval Events (Subjective):  VS: Elevated BP - fluctuates 112-147  Last BM: 3/10  Bladder: Voiding  Schedule Meds Not Given: None   PRN Meds Taken: None    Patient seen and examined in his room. His blood pressure seems to be low either in AM or PM, but is getting better. Anemia stable, WBC WNL. Patient feeling well. D/w family yesterday working hard with contractor to get house modified.       ROS: 14 point ROS negative unless otherwise specified in the HPI    Objective:  VITAL SIGNS: BP (!) 140/58   Pulse 74   Temp 36.4 °C (97.5 °F) (Oral)   Resp 20   Ht 1.778 m (5' 10\")   Wt 110 kg (241 lb 8 oz)   SpO2 93%   BMI 34.65 kg/m²     GEN: No apparent distress  HEENT: Head normocephalic, atraumatic.  Sclera nonicteric bilaterally, no ocular discharge appreciated bilaterally.  CV: Extremities warm and well-perfused, no peripheral edema appreciated bilaterally.  PULMONARY: Breathing nonlabored on room air, no respiratory accessory muscle use.  Not requiring supplemental oxygen.  SKIN:   3/9    PSYCH: Mood and affect within normal limits.  NEURO: Awake alert.  Conversational.  Logical thought content.      Laboratory Values:  Recent Results (from the past 72 hours)   POCT glucose device results    Collection Time: 25 11:32 AM   Result Value Ref Range    POC Glucose, Blood 221 (H) 65 - 99 mg/dL   EKG    Collection Time: 25  5:08 PM   Result Value Ref Range    Report       [facility]    Test Date:  2025  Pt Name:    REGINALDO MAY                    Department: [facility]  MRN:        [id number]                      Room:       [facility]  Gender:     Male                         Technician: [id number]  :        1955                   Requested By:DAKOTAH REYES  Order #:    [id number]                    Reading MD: Chintan Mcadniel MD    Measurements  Intervals                                Axis  Rate: "       74                           P:          67  NV:         159                          QRS:        -42  QRSD:       108                          T:          57  QT:         400  QTc:        444    Interpretive Statements  Sinus rhythm  Incomplete RBBB and LAFB  Compared to ECG 03/01/2021 11:02:34  No significant changes  Electronically Signed On 03- 17:08:29 PST by Chintan Mcdaniel MD     POCT glucose device results    Collection Time: 03/07/25  5:21 PM   Result Value Ref Range    POC Glucose, Blood 131 (H) 65 - 99 mg/dL   POCT glucose device results    Collection Time: 03/07/25 10:16 PM   Result Value Ref Range    POC Glucose, Blood 149 (H) 65 - 99 mg/dL   URINALYSIS    Collection Time: 03/08/25  3:40 AM    Specimen: Urine, Clean Catch   Result Value Ref Range    Color Yellow     Character Clear     Specific Gravity 1.018 <1.035    Ph 5.5 5.0 - 8.0    Glucose 100 (A) Negative mg/dL    Ketones Negative Negative mg/dL    Protein 300 (A) Negative mg/dL    Bilirubin Negative Negative    Urobilinogen, Urine 0.2 <=1.0 EU/dL    Nitrite Negative Negative    Leukocyte Esterase Negative Negative    Occult Blood Negative Negative    Micro Urine Req Microscopic    URINE MICROSCOPIC (W/UA)    Collection Time: 03/08/25  3:40 AM   Result Value Ref Range    WBC 0-2 /hpf    RBC 0-2 0 - 2 /hpf    Bacteria None Seen None /hpf    Epithelial Cells 0-2 0 - 5 /hpf    Urine Casts 3-5 (A) 0 - 2 /lpf   CBC WITH DIFFERENTIAL    Collection Time: 03/08/25  5:25 AM   Result Value Ref Range    WBC 11.1 (H) 4.8 - 10.8 K/uL    RBC 3.44 (L) 4.70 - 6.10 M/uL    Hemoglobin 9.8 (L) 14.0 - 18.0 g/dL    Hematocrit 32.5 (L) 42.0 - 52.0 %    MCV 94.5 81.4 - 97.8 fL    MCH 28.5 27.0 - 33.0 pg    MCHC 30.2 (L) 32.3 - 36.5 g/dL    RDW 50.4 (H) 35.9 - 50.0 fL    Platelet Count 352 164 - 446 K/uL    MPV 9.8 9.0 - 12.9 fL    Neutrophils-Polys 62.20 44.00 - 72.00 %    Lymphocytes 24.30 22.00 - 41.00 %    Monocytes 5.20 0.00 - 13.40 %    Eosinophils 2.90  0.00 - 6.90 %    Basophils 0.50 0.00 - 1.80 %    Immature Granulocytes 4.90 (H) 0.00 - 0.90 %    Nucleated RBC 0.00 0.00 - 0.20 /100 WBC    Neutrophils (Absolute) 6.91 1.82 - 7.42 K/uL    Lymphs (Absolute) 2.70 1.00 - 4.80 K/uL    Monos (Absolute) 0.58 0.00 - 0.85 K/uL    Eos (Absolute) 0.32 0.00 - 0.51 K/uL    Baso (Absolute) 0.06 0.00 - 0.12 K/uL    Immature Granulocytes (abs) 0.55 (H) 0.00 - 0.11 K/uL    NRBC (Absolute) 0.00 K/uL   FERRITIN    Collection Time: 03/08/25  5:25 AM   Result Value Ref Range    Ferritin 115.0 22.0 - 322.0 ng/mL   IRON/TOTAL IRON BIND    Collection Time: 03/08/25  5:25 AM   Result Value Ref Range    Iron 72 50 - 180 ug/dL    Total Iron Binding 230 (L) 250 - 450 ug/dL    Unsat Iron Binding 158 110 - 370 ug/dL    % Saturation 31 15 - 55 %   VITAMIN B12    Collection Time: 03/08/25  5:25 AM   Result Value Ref Range    Vitamin B12 -True Cobalamin 1108 (H) 211 - 911 pg/mL   POCT glucose device results    Collection Time: 03/08/25  7:55 AM   Result Value Ref Range    POC Glucose, Blood 200 (H) 65 - 99 mg/dL   POCT glucose device results    Collection Time: 03/08/25 11:22 AM   Result Value Ref Range    POC Glucose, Blood 172 (H) 65 - 99 mg/dL   POCT glucose device results    Collection Time: 03/08/25  5:19 PM   Result Value Ref Range    POC Glucose, Blood 128 (H) 65 - 99 mg/dL   POCT glucose device results    Collection Time: 03/08/25 10:17 PM   Result Value Ref Range    POC Glucose, Blood 134 (H) 65 - 99 mg/dL   POCT glucose device results    Collection Time: 03/09/25  7:47 AM   Result Value Ref Range    POC Glucose, Blood 132 (H) 65 - 99 mg/dL   POCT glucose device results    Collection Time: 03/09/25 10:43 AM   Result Value Ref Range    POC Glucose, Blood 172 (H) 65 - 99 mg/dL   POCT glucose device results    Collection Time: 03/09/25  5:38 PM   Result Value Ref Range    POC Glucose, Blood 128 (H) 65 - 99 mg/dL   POCT glucose device results    Collection Time: 03/09/25 10:20 PM   Result  Value Ref Range    POC Glucose, Blood 130 (H) 65 - 99 mg/dL   Basic Metabolic Panel    Collection Time: 03/10/25  6:30 AM   Result Value Ref Range    Sodium 141 135 - 145 mmol/L    Potassium 4.9 3.6 - 5.5 mmol/L    Chloride 112 96 - 112 mmol/L    Co2 20 20 - 33 mmol/L    Glucose 118 (H) 65 - 99 mg/dL    Bun 51 (H) 8 - 22 mg/dL    Creatinine 2.29 (H) 0.50 - 1.40 mg/dL    Calcium 8.6 8.5 - 10.5 mg/dL    Anion Gap 9.0 7.0 - 16.0   CBC WITHOUT DIFFERENTIAL    Collection Time: 03/10/25  6:30 AM   Result Value Ref Range    WBC 8.8 4.8 - 10.8 K/uL    RBC 3.27 (L) 4.70 - 6.10 M/uL    Hemoglobin 9.4 (L) 14.0 - 18.0 g/dL    Hematocrit 30.6 (L) 42.0 - 52.0 %    MCV 93.6 81.4 - 97.8 fL    MCH 28.7 27.0 - 33.0 pg    MCHC 30.7 (L) 32.3 - 36.5 g/dL    RDW 50.8 (H) 35.9 - 50.0 fL    Platelet Count 277 164 - 446 K/uL    MPV 10.6 9.0 - 12.9 fL   ESTIMATED GFR    Collection Time: 03/10/25  6:30 AM   Result Value Ref Range    GFR (CKD-EPI) 30 (A) >60 mL/min/1.73 m 2   POCT glucose device results    Collection Time: 03/10/25  8:06 AM   Result Value Ref Range    POC Glucose, Blood 133 (H) 65 - 99 mg/dL   POCT glucose device results    Collection Time: 03/10/25 11:31 AM   Result Value Ref Range    POC Glucose, Blood 177 (H) 65 - 99 mg/dL       Medications:  Scheduled Medications   Medication Dose Frequency    insulin GLARGINE  38 Units Q EVENING    insulin lispro  2-12 Units 4X/DAY ACHS    hydrALAZINE  25 mg Q8HRS    Pharmacy Consult Request  1 Each PHARMACY TO DOSE    omeprazole  20 mg DAILY    carvedilol  12.5 mg BID WITH MEALS    atorvastatin  40 mg Q EVENING    hydroCHLOROthiazide  25 mg Q DAY    lisinopril  40 mg Q EVENING    sodium bicarbonate  650 mg BID    levothyroxine  175 mcg AM ES    ceFAZolin  2 g Q8HRS    Followed by    [START ON 3/15/2025] linezolid  600 mg Q12HRS    heparin  5,000 Units Q8HRS    aspirin  81 mg DAILY     PRN medications: insulin lispro **AND** POC blood glucose manual result **AND** NOTIFY MD and PharmD  **AND** Administer 20 grams of glucose (approximately 8 ounces of fruit juice) every 15 minutes PRN FSBG less than 70 mg/dL **AND** dextrose bolus, lidocaine, hydrALAZINE, lactulose, docusate sodium, bisacodyl EC, magnesium hydroxide, sodium phosphate, carboxymethylcellulose, benzocaine-menthol, mag hydrox-al hydrox-simeth, ondansetron **OR** ondansetron, traZODone, sodium chloride, traMADol **OR** traMADol, Respiratory Therapy Consult, acetaminophen    Diet:  Current Diet Order   Procedures    Diet Order Diet: Consistent CHO (Diabetic) (HIGH PROTEIN AND VEGETABLES. DAIRY OK. LIMIT CARBOHYDRATES); Nutrient modifications: (optional): High Protein       Medical Decision Making and Plan:  L BKA 3/2 Dr. Echavarria at [facility]  MSSA Bacteremia  TTE withough valvular lesions or endocarditis.   PT and OT for mobility and ADLs. Per guidelines, 15 hours per week between PT, OT and/or SLP.  Follow-up Ortho  Follow-up ID  Follow-up Vascular Surgery (Dr. Calderon) - referral placed per hospitalist at [facility]  Complete IV Cefazolin 3/14 --> Linezolid through 3/28   Tunneled cath placement 3/4     Hypertension - Continue Lisinopril, HCTZ, Coreg. 3/7 Elevated but improved in 150's monitor. 3/8 BP improving without med changes, monitor. 3/11 Continue monitor BP - patient reports feeling symptoms with low BP in early AM or late PM. Coreg was 12.5 BID hospitalist not indicates should be 6.25mg BID, will change today.     ABLA - 3/7 9 Monitor. 3/8 Better 9.8.     Leukocytosis - Mild monitor 3/7. 3/8 Mild increase 10.9--> 11.1. UA negative. Incision c/d/I. Afebrile and not on scheduled tylenol nor taking PRN. 3/10 Resolved.     Thrombocytosis - WNL 3/7, 3/10     Chronic Combined HR, LVH and Pulm HTN - EF 50-55% (Had been 65-70%). Follows with Cards Dr. Rodriguez     H/o CAD - On ASA and Statin      Hypothyroidism - Continue Synthroid      DARRIAN - RT consult     H/o Gastric Bypass - Has frequent BM due to this.      CKD Stage IV - Avoid  Nephrotoxins. GFR 29 baseline. Managed by Nephro Dr. Michaels. 3/10 Stable, monitor trend    Elevated TSH - Free T4 WNL.     Morbid Obesity - Nutrition consult      Type II Diabetes Mellitus with Hyperglycemia - Currently on SSI and Lantus. Hgb A1c 6.6     Pain - Declines pain. Has Tylenol and Tramadol PRN.     Bowel - Patient on Senna-docusate for constipation prophylaxis.      Bladder - TV/PVR/BS PRN           Upcoming Labs/imaging: 3/13     DVT PROPHYLAXIS: Heparin --> D/c ASA 325mg BID     HOSPITALIST FOLLOWING: Yes      CODE STATUS: FULL - c/f with patient on admission      DISPO: Home with spouse      GIANCARLO: TBD     MEDS SENT TO: TBD     DISCHARGE SPECIALIST FOLLOW UP:   Ortho  ID  Vasc Surg  Cardiology  Nephrology      Patient to scheduled follow up with their PCP within 2 weeks from discharge from the [facility].   ____________________________________    Dr. Tracy Tong DO, MS  [facility] - Physical Medicine & Rehabilitation   ____________________________________

## 2025-03-10 NOTE — CARE PLAN
"The patient is Watcher - Medium risk of patient condition declining or worsening    Shift Goals  Clinical Goals: safety  Patient Goals: sleep well  Family Goals: no family present    Progress made toward(s) clinical / shift goals:    Problem: Pain Management  Goal: Pain level will decrease to patient's comfort goal  Outcome: Progressing  Patient able to verbalize pain level and verbalize an acceptable level of pain.     Problem: Infection  Goal: Will remain free from infection  Outcome: Progressing  Patient remains free from s/s infection; afebrile. On going antibiotic therapy. Will continue to monitor.     Problem: Fall Risk - Rehab  Goal: Patient will remain free from falls  Outcome: Progressing  Nikole Swanson Fall risk Assessment Score: 14    Moderate fall risk Interventions  - Bed and strip alarm   - Yellow sign by the door   - Yellow wrist band \"Fall risk\"  - Room near to the nurse station  - Do not leave patient unattended in the bathroom  - Fall risk education provided  "

## 2025-03-10 NOTE — CARE PLAN
Problem: Self Care  Goal: Patient will have the ability to perform ADLs independently or with assistance (bathe, groom, dress, toilet and feed)  Outcome: Progressing  Note: Patient able to perform regular activities this shift.  Pain controlled this shift.  Pain management includes PRN pain meds as well as non-pharmacological measures such as emotional support, rest, and repositioning.  Will continue to monitor.   The patient is Stable - Low risk of patient condition declining or worsening    Shift Goals  Clinical Goals: safety  Patient Goals: sleep well  Family Goals: no family present    Progress made toward(s) clinical / shift goals:  pt participates in therapy and is cooperative with nursing    Patient is not progressing towards the following goals:

## 2025-03-10 NOTE — PROGRESS NOTES
NURSING DAILY NOTE    Name: Marlo León   Date of Admission: 3/6/2025   Admitting Diagnosis: Hx of BKA, left (HCC)  Attending Physician: MICKIE JUAREZ D.O.  Allergies: Neosporin [neomycin-polymyxin b], Tape, Amlodipine, Amoxicillin, Bacitracin-polymyxin b, Cyclobenzaprine, Empagliflozin-metformin hcl, Gabapentin, Gadolinium, Metformin, Neomycin-polymyxin b gu, Nsaids, Pioglitazone, Prednisone, Sulfamethoxazole w-trimethoprim, and Tamsulosin    Safety  Patient Assist     Patient Precautions  Precautions: Fall Risk  Fall Risk: Other (see comments) (Left BKA)  Weight Bearing: NWB LE (laterality in comments)  Braces: IPOP, Immobilizer LE (laterality in comments)  Comments: L BKA, L IPOP and L knee immobilizer  Bed Transfer Status  Contact Guard Assist  Toilet Transfer Status   Maximal Assist  Assistive Devices  Wheelchair  Oxygen  None - Room Air  Diet/Therapeutic Dining  Current Diet Order   Procedures    Diet Order Diet: Consistent CHO (Diabetic) (HIGH PROTEIN AND VEGETABLES. DAIRY OK. LIMIT CARBOHYDRATES); Nutrient modifications: (optional): High Protein     Pill Administration  whole  Agitated Behavioral Scale     ABS Level of Severity       Fall Risk  Has the patient had a fall this admission?      Nikole Swanson Fall Risk Scoring  14, MODERATE RISK  Fall Risk Safety Measures  bed alarm, chair alarm, poor balance, and low vision/hearing    Vitals  Temperature: 36.8 °C (98.2 °F)  Temp src: Temporal  Pulse: 67  Respiration: 18  Blood Pressure : (!) 147/60  Blood Pressure MAP (Calculated): 89 MM HG  BP Location: Right, Upper Arm  Patient BP Position: Martino's Position     Oxygen  Pulse Oximetry: 98 %  O2 (LPM): 0  O2 Delivery Device: None - Room Air    Bowel and Bladder  Last Bowel Movement  03/10/25  Stool Type  Type 5: Soft blob with clear cut edges (passed easily)  Bowel Device     Continent  Bladder: Continent void   Bowel: Continent movement  Bladder Function  Urine Void (mL): 350 ml  Number of Times  Voided: 1  Urine Color: Yellow  Straight Catheter:  (Pt said, he just needs time to void. He will try again later.)  Genitourinary Assessment   Bladder Assessment (WDL):  WDL Except  Jane Catheter: Not Applicable  Urine Color: Yellow  Bladder Device: Bathroom  Time Void: Yes  Bladder Scan: Post Void  $ Bladder Scan Results (mL): 165    Skin  Anselmo Score   16  Sensory Interventions   Bed Types: Standard/Trauma Mattress  Skin Preventative Measures: Pillows in Use to Float Heels, Pillows in Use for Support / Positioning  Moisture Interventions  Moisturizers/Barriers: Barrier Wipes      Pain  Pain Rating Scale  0 - No Pain  Pain Location  Generalized  Pain Location Orientation  Right, Left, Anterior, Posterior  Pain Interventions   Declines    ADLs    Bathing   Shower, * * With Assistance from, Staff  Linen Change      Personal Hygiene     Chlorhexidine Bath   Not Completed - Refused. Education Provided.  Oral Care     Teeth/Dentures     Shave     Nutrition Percentage Eaten  *  * Meal *  *, Lunch, Between % Consumed  Environmental Precautions     Patient Turns/Positioning  Patient turns self independently side to side without assistance, to offload sacral area  Patient Turns Assistance/Tolerance     Bed Positions  Bed Controls On, Bed Locked  Head of Bed Elevated  Self regulated      Psychosocial/Neurologic Assessment  Psychosocial Assessment  Psychosocial (WDL):  Within Defined Limits  Neurologic Assessment  Neuro (WDL): Within Defined Limits  Level of Consciousness: Alert  Orientation Level: Oriented X4  Cognition: Follows commands, Appropriate judgement, Appropriate safety awareness  Speech: Clear  Pupil Assesment: No  EENT (WDL):  WDL Except    Cardio/Pulmonary Assessment  Edema      Respiratory Breath Sounds  RUL Breath Sounds: Clear  RML Breath Sounds: Clear  RLL Breath Sounds: Diminished  NOEMY Breath Sounds: Clear  LLL Breath Sounds: Diminished  Cardiac Assessment   Cardiac (WDL):  Within Defined  Limits

## 2025-03-10 NOTE — PROGRESS NOTES
[facility] Medicine Daily Progress Note    Date of Service  3/10/2025    Chief Complaint:  Hypertension  Diabetes  CKD  Anemia  Leukocytosis  Abn TFT's    Interval History:  Discussed about his BS a little elevated and have increased his Glargine does a little.    Review of Systems  Review of Systems   Constitutional:  Negative for chills and fever.   Respiratory:  Negative for shortness of breath.    Cardiovascular:  Negative for chest pain.   Gastrointestinal:  Negative for abdominal pain, diarrhea, nausea and vomiting.   Psychiatric/Behavioral:  The patient is not nervous/anxious.         Physical Exam  Temp:  [36.4 °C (97.5 °F)-36.9 °C (98.4 °F)] 36.4 °C (97.5 °F)  Pulse:  [67-78] 74  Resp:  [16-20] 20  BP: (112-155)/(53-71) 140/58  SpO2:  [93 %-98 %] 93 %    Physical Exam  Vitals and nursing note reviewed.   Constitutional:       Appearance: Normal appearance.   HENT:      Head: Atraumatic.   Eyes:      Conjunctiva/sclera: Conjunctivae normal.      Pupils: Pupils are equal, round, and reactive to light.   Cardiovascular:      Rate and Rhythm: Normal rate and regular rhythm.      Heart sounds: No murmur heard.  Pulmonary:      Effort: Pulmonary effort is normal.      Breath sounds: No stridor. No wheezing or rales.   Abdominal:      General: There is no distension.      Palpations: Abdomen is soft.      Tenderness: There is no abdominal tenderness.   Musculoskeletal:      Cervical back: Normal range of motion and neck supple.      Right lower leg: No edema.      Left lower leg: No edema.   Skin:     General: Skin is warm and dry.      Findings: No rash.   Neurological:      Mental Status: He is alert and oriented to person, place, and time.   Psychiatric:         Mood and Affect: Mood normal.         Behavior: Behavior normal.         Fluids    Intake/Output Summary (Last 24 hours) at 3/10/2025 1031  Last data filed at 3/10/2025 0555  Gross per 24 hour   Intake 830 ml   Output 575 ml   Net 255 ml         Laboratory  Recent Labs     03/08/25  0525 03/10/25  0630   WBC 11.1* 8.8   RBC 3.44* 3.27*   HEMOGLOBIN 9.8* 9.4*   HEMATOCRIT 32.5* 30.6*   MCV 94.5 93.6   MCH 28.5 28.7   MCHC 30.2* 30.7*   RDW 50.4* 50.8*   PLATELETCT 352 277   MPV 9.8 10.6     Recent Labs     03/10/25  0630   SODIUM 141   POTASSIUM 4.9   CHLORIDE 112   CO2 20   GLUCOSE 118*   BUN 51*   CREATININE 2.29*   CALCIUM 8.6                   Imaging       Assessment/Plan  * Hx of BKA, left (HCC)- (present on admission)  Assessment & Plan  Had angioplasty in 10/2024  Developed left heel ulcer and left toe gangrene  Now s/p left BKA    MSSA bacteremia  Assessment & Plan  Resolved  Dx at [facility] hospital  On Zyvox & Ancef (thru 3/14)    CAD (coronary artery disease)  Assessment & Plan  Hx MI  Hx CABG  Echo (2/28): EF 55%  On Coreg & Lisinopril  On HCTZ  On ASA and Lipitor  Not on any K+ supplements currently  K+ 4.9 (3/10)    Borderline abnormal TFTs  Assessment & Plan  TSH: 5.61  FT4 1.45  Likely subclinical  Cont Synthroid  Needs repeat TFT's as out patient    Anemia  Assessment & Plan  Hb: 9.0 --> 9.8 --> 9.4  Fe: 72, sats 31%  B12: 1108  Monitor    CKD (chronic kidney disease)  Assessment & Plan  Bun/Cr around baseline (baseline wanders)  CO2: 15 --> 19 --> 19 --> 20  Cont bicarb tabs  On HCTZ  Cont to monitor    Leukocytosis- (present on admission)  Assessment & Plan  WBC's: 10.9 --> 11.1 --> 8.8 (3/10)  Has been afebrile  Denies cough  No diarrhea  U/A neg   Note: on Zyvox & Ancef for recent MSSA bacteremia (thru 3/14)  Monitor    Type 2 diabetes mellitus with hyperglycemia (HCC)- (present on admission)  Assessment & Plan  Hba1c: 6.6 (3/7)  BS reently: 128-134 (hit 172 x 1)  Cont Glargine: 23 units qhs --> 30 units qhs (3/7) --> 35 units qhs (3/8) --> will increase to 38 units qhs (3/10)  Now on a diabetic diet  Note: home meds include Humalog 10 units tid at meals, Farxiga, and Mounjaro  Cont to monitor    Primary hypertension- (present on  admission)  Assessment & Plan  BP a little labile   Cont Core.25 mg bid  Cont Lisinopril: 40 mg daily  Cont Hydralazine: 25 mg tid (3/5)  Note: has allergy to Norvasc  Cont to monitor

## 2025-03-10 NOTE — THERAPY
Physical Therapy   Daily Treatment     Patient Name:  Marlo León  Age:  69 y.o., Sex:  male  Medical Record #:  [id number]  Today's Date: 3/10/2025     Precautions  Precautions: Fall Risk  Fall Risk: Other (see comments) (Left BKA)  Weight Bearing: NWB LE (laterality in comments)  Braces: IPOP, Immobilizer LE (laterality in comments)  Comments: L BKA, L IPOP and L knee immobilizer    Subjective: pt seated in wc, agreeable to PT intervention    Objective:    03/10/25 0931   PT Charge Group   PT Gait Training (Units) 1   PT Therapeutic Exercise (Units) 1   Supervising Physical Therapist Jett Kohli   PT Total Time Spent   PT Individual Total Time Spent (Mins) 30   Sit to Stand   Level of Assist Contact Guard Assist   Assistive Devices No AD  (parallel bars)   Additional Description Cueing needed;Extra time needed   Ambulation   Level of Assist Minimal Assist   Assistive Device   (parallel bars)   Additional Description Cueing needed;Extra time needed   Distance 10 x 4   Interdisciplinary Plan of Care Collaboration   Patient Position at End of Therapy Seated;Chair Alarm On;Call Light within Reach;Tray Table within Reach;Phone within Reach         Therapeutic Exercise  Purpose: to improve strength, to improve ROM/flexibility, and to improve functional endurance  Interventions:   Lower body exercises:   Standing program -   Hip flexion, 2 sets of 10 and Left  Hip extension, 2 sets of 10 and Left  Hip abduction, 2 sets of 10 and Left  Description of Intervention(s): L LE exercises while standing in // with B UE support, seated rest btwn sets    Gait Training  Purpose: to improve functional ambulation and to improve walking endurance  Interventions:   Walking endurance  Facilitation of gait  Deviations (laterality in comments):  Other: hopt to L BKA  Comments:   Surface Type:  Level  Description of Intervention(s): Initiated gait training within // 10' x 4 with CGA to Norma, cuing and demonstration for swinging motion vs  "hopping and soft landing on the R foot    Assessment: pt reported mild R hip pain after 2nd bout of amb in the // presents with decreased activity tolerance and will cont to benefit from gait training in // to improve technique before using FWW    Strengths: Effective communication skills, Pleasant and cooperative, Supportive family, Willingly participates in therapeutic activities, Motivated for self care and independence  Barriers: Impaired activity tolerance, Impaired balance, Constipation, Limited mobility    Plan:     Gait training in // progress to FWW as appropriate  cont BKA education  Standing at FWW  Transfer training  Wc mobility    DME    PT DME Recommendations  Wheelchair: 18\" Width, Removable/Flip Back Armrests, Elevating Leg Rests (pt has a wc which he purchased on [employer] however anticipates he will need a new one for DC with left residual limb board and flip back armrest)  Cushion: Standard  Vendor Equipment: Chat[employer]    Goals:     Passport items to be completed:  Get in/out of bed safely, in/out of a vehicle, safely use mobility device, walk or wheel around home/community, navigate up and down stairs, show how to get up/down from the ground, ensure home is accessible, demonstrate HEP, complete caregiver training    Physical Therapy Problems (Active)       Problem: Mobility       Dates: Start:  03/07/25         Goal: STG-Within one week, patient will propel wheelchair household distances: on unit with Mod I.       Dates: Start:  03/07/25            Goal: STG-Within one week, patient will ambulate household distance at least 15' with FWW and Min A.       Dates: Start:  03/07/25               Problem: Mobility Transfers       Dates: Start:  03/07/25         Goal: STG-Within one week, patient will sit to stand Min A from 18.5\" surface to FWW or other hold support.       Dates: Start:  03/07/25               Problem: PT-Long Term Goals       Dates: Start:  03/07/25         Goal: LTG-By discharge, patient " will propel wheelchair on outside surfaces and inside surfaces with Mod I, distance >500'.       Dates: Start:  03/07/25            Goal: LTG-By discharge, patient will ambulate at least 25' with FWW and SBA.       Dates: Start:  03/07/25            Goal: LTG-By discharge, patient will transfer one surface to another [facility].       Dates: Start:  03/07/25            Goal: LTG-By discharge, patient will perform home exercise program Mod I.       Dates: Start:  03/07/25            Goal: LTG-By discharge, patient will transfer in/out of a car CGA from  level.       Dates: Start:  03/07/25

## 2025-03-11 ENCOUNTER — APPOINTMENT (OUTPATIENT)
Dept: PHYSICAL THERAPY | Facility: REHABILITATION | Age: 70
DRG: 560 | End: 2025-03-11
Attending: PHYSICAL MEDICINE & REHABILITATION
Payer: MEDICARE

## 2025-03-11 ENCOUNTER — APPOINTMENT (OUTPATIENT)
Dept: OCCUPATIONAL THERAPY | Facility: REHABILITATION | Age: 70
DRG: 560 | End: 2025-03-11
Attending: PHYSICAL MEDICINE & REHABILITATION
Payer: MEDICARE

## 2025-03-11 PROCEDURE — 700102 HCHG RX REV CODE 250 W/ 637 OVERRIDE(OP): Performed by: HOSPITALIST

## 2025-03-11 PROCEDURE — 99232 SBSQ HOSP IP/OBS MODERATE 35: CPT | Performed by: HOSPITALIST

## 2025-03-11 PROCEDURE — 700111 HCHG RX REV CODE 636 W/ 250 OVERRIDE (IP): Performed by: PHYSICAL MEDICINE & REHABILITATION

## 2025-03-11 PROCEDURE — 97530 THERAPEUTIC ACTIVITIES: CPT

## 2025-03-11 PROCEDURE — 700102 HCHG RX REV CODE 250 W/ 637 OVERRIDE(OP): Performed by: PHYSICAL MEDICINE & REHABILITATION

## 2025-03-11 PROCEDURE — 97110 THERAPEUTIC EXERCISES: CPT

## 2025-03-11 PROCEDURE — A9270 NON-COVERED ITEM OR SERVICE: HCPCS | Performed by: PHYSICAL MEDICINE & REHABILITATION

## 2025-03-11 PROCEDURE — 97140 MANUAL THERAPY 1/> REGIONS: CPT

## 2025-03-11 PROCEDURE — 700105 HCHG RX REV CODE 258: Performed by: PHYSICAL MEDICINE & REHABILITATION

## 2025-03-11 PROCEDURE — A9270 NON-COVERED ITEM OR SERVICE: HCPCS | Performed by: HOSPITALIST

## 2025-03-11 PROCEDURE — 97116 GAIT TRAINING THERAPY: CPT

## 2025-03-11 PROCEDURE — 770010 HCHG ROOM/CARE - REHAB SEMI PRIVAT*

## 2025-03-11 RX ORDER — HYDRALAZINE HYDROCHLORIDE 50 MG/1
50 TABLET, FILM COATED ORAL EVERY 8 HOURS
Status: DISCONTINUED | OUTPATIENT
Start: 2025-03-11 | End: 2025-03-15

## 2025-03-11 RX ADMIN — HYDRALAZINE HYDROCHLORIDE 50 MG: 50 TABLET ORAL at 21:57

## 2025-03-11 RX ADMIN — OMEPRAZOLE 20 MG: 20 CAPSULE, DELAYED RELEASE ORAL at 07:41

## 2025-03-11 RX ADMIN — CEFAZOLIN 2 G: 2 INJECTION, POWDER, FOR SOLUTION INTRAMUSCULAR; INTRAVENOUS at 14:31

## 2025-03-11 RX ADMIN — SODIUM BICARBONATE 650 MG: 650 TABLET ORAL at 19:40

## 2025-03-11 RX ADMIN — HEPARIN SODIUM 5000 UNITS: 5000 INJECTION, SOLUTION INTRAVENOUS; SUBCUTANEOUS at 22:00

## 2025-03-11 RX ADMIN — ATORVASTATIN CALCIUM 40 MG: 40 TABLET, FILM COATED ORAL at 19:40

## 2025-03-11 RX ADMIN — CEFAZOLIN 2 G: 2 INJECTION, POWDER, FOR SOLUTION INTRAMUSCULAR; INTRAVENOUS at 05:28

## 2025-03-11 RX ADMIN — INSULIN LISPRO 2 UNITS: 100 INJECTION, SOLUTION INTRAVENOUS; SUBCUTANEOUS at 19:47

## 2025-03-11 RX ADMIN — SODIUM BICARBONATE 650 MG: 650 TABLET ORAL at 07:41

## 2025-03-11 RX ADMIN — LEVOTHYROXINE SODIUM 175 MCG: 0.05 TABLET ORAL at 05:23

## 2025-03-11 RX ADMIN — HEPARIN SODIUM 5000 UNITS: 5000 INJECTION, SOLUTION INTRAVENOUS; SUBCUTANEOUS at 14:32

## 2025-03-11 RX ADMIN — HYDRALAZINE HYDROCHLORIDE 50 MG: 50 TABLET ORAL at 14:32

## 2025-03-11 RX ADMIN — HEPARIN SODIUM 5000 UNITS: 5000 INJECTION, SOLUTION INTRAVENOUS; SUBCUTANEOUS at 05:23

## 2025-03-11 RX ADMIN — ASPIRIN 81 MG: 81 TABLET, COATED ORAL at 07:42

## 2025-03-11 RX ADMIN — HYDRALAZINE HYDROCHLORIDE 25 MG: 25 TABLET ORAL at 05:23

## 2025-03-11 RX ADMIN — LISINOPRIL 40 MG: 20 TABLET ORAL at 19:40

## 2025-03-11 RX ADMIN — CARVEDILOL 6.25 MG: 3.12 TABLET, FILM COATED ORAL at 17:30

## 2025-03-11 RX ADMIN — ACETAMINOPHEN 650 MG: 325 TABLET ORAL at 17:30

## 2025-03-11 RX ADMIN — INSULIN LISPRO 2 UNITS: 100 INJECTION, SOLUTION INTRAVENOUS; SUBCUTANEOUS at 07:39

## 2025-03-11 RX ADMIN — CEFAZOLIN 2 G: 2 INJECTION, POWDER, FOR SOLUTION INTRAMUSCULAR; INTRAVENOUS at 22:05

## 2025-03-11 RX ADMIN — CARVEDILOL 6.25 MG: 3.12 TABLET, FILM COATED ORAL at 07:41

## 2025-03-11 RX ADMIN — HYDROCHLOROTHIAZIDE 25 MG: 25 TABLET ORAL at 05:23

## 2025-03-11 RX ADMIN — ACETAMINOPHEN 650 MG: 325 TABLET ORAL at 21:56

## 2025-03-11 ASSESSMENT — PATIENT HEALTH QUESTIONNAIRE - PHQ9
2. FEELING DOWN, DEPRESSED, IRRITABLE, OR HOPELESS: NOT AT ALL
1. LITTLE INTEREST OR PLEASURE IN DOING THINGS: NOT AT ALL
SUM OF ALL RESPONSES TO PHQ9 QUESTIONS 1 AND 2: 0

## 2025-03-11 ASSESSMENT — ENCOUNTER SYMPTOMS
NAUSEA: 0
NERVOUS/ANXIOUS: 0
CHILLS: 0
FEVER: 0
ABDOMINAL PAIN: 0
VOMITING: 0
SHORTNESS OF BREATH: 0

## 2025-03-11 ASSESSMENT — PAIN DESCRIPTION - PAIN TYPE
TYPE: ACUTE PAIN;CHRONIC PAIN;SURGICAL PAIN
TYPE: ACUTE PAIN
TYPE: ACUTE PAIN;SURGICAL PAIN;NEUROPATHIC PAIN

## 2025-03-11 NOTE — PROGRESS NOTES
"  Physical Medicine & Rehabilitation Progress Note    Encounter Date: 3/11/2025    Chief Complaint: L BKA    Interval Events (Subjective):  VS: Elevated BP - fluctuates 120's-150's  Last BM: 3/11  Bladder: Voiding  Schedule Meds Not Given: None   PRN Meds Taken: Tylenol 3/10    Patient seen and examined in therapy gym. He is doing well. Has no questions at this time. BP is stabilizing. Hasn't had any low BP symptoms. PT confirmed.       ROS: 14 point ROS negative unless otherwise specified in the HPI    Objective:  VITAL SIGNS: BP (!) 158/73   Pulse 71   Temp 37.1 °C (98.7 °F) (Oral)   Resp 18   Ht 1.778 m (5' 10\")   Wt 110 kg (241 lb 8 oz)   SpO2 96%   BMI 34.65 kg/m²     GEN: No apparent distress  HEENT: Head normocephalic, atraumatic.  Sclera nonicteric bilaterally, no ocular discharge appreciated bilaterally.  CV: Extremities warm and well-perfused, no peripheral edema appreciated bilaterally.  PULMONARY: Breathing nonlabored on room air, no respiratory accessory muscle use.  Not requiring supplemental oxygen.  SKIN:   3/9    PSYCH: Mood and affect within normal limits.  NEURO: Awake alert.  Conversational.  Logical thought content.      Laboratory Values:  Recent Results (from the past 72 hours)   POCT glucose device results    Collection Time: 03/08/25  5:19 PM   Result Value Ref Range    POC Glucose, Blood 128 (H) 65 - 99 mg/dL   POCT glucose device results    Collection Time: 03/08/25 10:17 PM   Result Value Ref Range    POC Glucose, Blood 134 (H) 65 - 99 mg/dL   POCT glucose device results    Collection Time: 03/09/25  7:47 AM   Result Value Ref Range    POC Glucose, Blood 132 (H) 65 - 99 mg/dL   POCT glucose device results    Collection Time: 03/09/25 10:43 AM   Result Value Ref Range    POC Glucose, Blood 172 (H) 65 - 99 mg/dL   POCT glucose device results    Collection Time: 03/09/25  5:38 PM   Result Value Ref Range    POC Glucose, Blood 128 (H) 65 - 99 mg/dL   POCT glucose device results    " Collection Time: 03/09/25 10:20 PM   Result Value Ref Range    POC Glucose, Blood 130 (H) 65 - 99 mg/dL   Basic Metabolic Panel    Collection Time: 03/10/25  6:30 AM   Result Value Ref Range    Sodium 141 135 - 145 mmol/L    Potassium 4.9 3.6 - 5.5 mmol/L    Chloride 112 96 - 112 mmol/L    Co2 20 20 - 33 mmol/L    Glucose 118 (H) 65 - 99 mg/dL    Bun 51 (H) 8 - 22 mg/dL    Creatinine 2.29 (H) 0.50 - 1.40 mg/dL    Calcium 8.6 8.5 - 10.5 mg/dL    Anion Gap 9.0 7.0 - 16.0   CBC WITHOUT DIFFERENTIAL    Collection Time: 03/10/25  6:30 AM   Result Value Ref Range    WBC 8.8 4.8 - 10.8 K/uL    RBC 3.27 (L) 4.70 - 6.10 M/uL    Hemoglobin 9.4 (L) 14.0 - 18.0 g/dL    Hematocrit 30.6 (L) 42.0 - 52.0 %    MCV 93.6 81.4 - 97.8 fL    MCH 28.7 27.0 - 33.0 pg    MCHC 30.7 (L) 32.3 - 36.5 g/dL    RDW 50.8 (H) 35.9 - 50.0 fL    Platelet Count 277 164 - 446 K/uL    MPV 10.6 9.0 - 12.9 fL   ESTIMATED GFR    Collection Time: 03/10/25  6:30 AM   Result Value Ref Range    GFR (CKD-EPI) 30 (A) >60 mL/min/1.73 m 2   POCT glucose device results    Collection Time: 03/10/25  8:06 AM   Result Value Ref Range    POC Glucose, Blood 133 (H) 65 - 99 mg/dL   POCT glucose device results    Collection Time: 03/10/25 11:31 AM   Result Value Ref Range    POC Glucose, Blood 177 (H) 65 - 99 mg/dL   POCT glucose device results    Collection Time: 03/10/25  5:06 PM   Result Value Ref Range    POC Glucose, Blood 121 (H) 65 - 99 mg/dL       Medications:  Scheduled Medications   Medication Dose Frequency    insulin GLARGINE  38 Units Q EVENING    carvedilol  6.25 mg BID WITH MEALS    insulin lispro  2-12 Units 4X/DAY ACHS    hydrALAZINE  25 mg Q8HRS    Pharmacy Consult Request  1 Each PHARMACY TO DOSE    omeprazole  20 mg DAILY    atorvastatin  40 mg Q EVENING    hydroCHLOROthiazide  25 mg Q DAY    lisinopril  40 mg Q EVENING    sodium bicarbonate  650 mg BID    levothyroxine  175 mcg AM ES    ceFAZolin  2 g Q8HRS    Followed by    [START ON 3/15/2025]  linezolid  600 mg Q12HRS    heparin  5,000 Units Q8HRS    aspirin  81 mg DAILY     PRN medications: insulin lispro **AND** POC blood glucose manual result **AND** NOTIFY MD and PharmD **AND** Administer 20 grams of glucose (approximately 8 ounces of fruit juice) every 15 minutes PRN FSBG less than 70 mg/dL **AND** dextrose bolus, lidocaine, hydrALAZINE, lactulose, docusate sodium, bisacodyl EC, magnesium hydroxide, sodium phosphate, carboxymethylcellulose, benzocaine-menthol, mag hydrox-al hydrox-simeth, ondansetron **OR** ondansetron, traZODone, sodium chloride, traMADol **OR** traMADol, Respiratory Therapy Consult, acetaminophen    Diet:  Current Diet Order   Procedures    Diet Order Diet: Consistent CHO (Diabetic) (HIGH PROTEIN AND VEGETABLES. DAIRY OK. LIMIT CARBOHYDRATES); Nutrient modifications: (optional): High Protein       Medical Decision Making and Plan:  L BKA 3/2 Dr. Echavarria at [facility]  MSSA Bacteremia  TTE withough valvular lesions or endocarditis.   PT and OT for mobility and ADLs. Per guidelines, 15 hours per week between PT, OT and/or SLP.  Follow-up Ortho  Follow-up ID  Follow-up Vascular Surgery (Dr. Calderon) - referral placed per hospitalist at [facility]  Complete IV Cefazolin 3/14 --> Linezolid through 3/28   Tunneled cath placement 3/4     Hypertension - Continue Lisinopril, HCTZ, Coreg. 3/7 Elevated but improved in 150's monitor. 3/8 BP improving without med changes, monitor. 3/11 Continue monitor BP - patient reports feeling symptoms with low BP in early AM or late PM. Coreg was 12.5 BID hospitalist note indicates should be 6.25mg BID, will change today. 3/11     ABLA - 3/7 9 Monitor. 3/8 Better 9.8.     Leukocytosis - Mild monitor 3/7. 3/8 Mild increase 10.9--> 11.1. UA negative. Incision c/d/I. Afebrile and not on scheduled tylenol nor taking PRN. 3/10 Resolved.     Thrombocytosis - WNL 3/7, 3/10     Chronic Combined HR, LVH and Pulm HTN - EF 50-55% (Had been 65-70%). Follows with  Beverly Rodriguez     H/o CAD - On ASA and Statin      Hypothyroidism - Continue Synthroid      DARRIAN - RT consult     H/o Gastric Bypass - Has frequent BM due to this.      CKD Stage IV - Avoid Nephrotoxins. GFR 29 baseline. Managed by Nephro Dr. Michaels. 3/10 Stable, monitor trend    Elevated TSH - Free T4 WNL.     Morbid Obesity - Nutrition consult      Type II Diabetes Mellitus with Hyperglycemia - Currently on SSI and Lantus. Hgb A1c 6.6     Pain - Declines pain. Has Tylenol and Tramadol PRN.     Bowel - Patient on Senna-docusate for constipation prophylaxis.      Bladder - TV/PVR/BS PRN           Upcoming Labs/imaging: 3/13     DVT PROPHYLAXIS: Heparin --> D/c ASA 325mg BID     HOSPITALIST FOLLOWING: Yes      CODE STATUS: FULL - c/f with patient on admission      DISPO: Home with spouse      GIANCARLO: TBD     MEDS SENT TO: TBD     DISCHARGE SPECIALIST FOLLOW UP:   Ortho  [facility]  Cardiology  Nephrology      Patient to scheduled follow up with their PCP within 2 weeks from discharge from the [facility].   ____________________________________    Dr. Tracy Tong DO, MS  [facility] - Physical Medicine & Rehabilitation   ____________________________________

## 2025-03-11 NOTE — THERAPY
"Physical Therapy   Daily Treatment     Patient Name:  Marlo León  Age:  69 y.o., Sex:  male  Medical Record #:  [id number]  Today's Date: 3/10/2025     Precautions  Precautions: Fall Risk  Fall Risk: Other (see comments)  Weight Bearing: NWB LE (laterality in comments)  Braces: IPOP, Immobilizer LE (laterality in comments)  Comments: L BKA, L IPOP and L knee immobilizer    Subjective: Patient is doing well and having only mild residual limb pains    Objective:    03/10/25 1301 03/10/25 1531   PT Charge Group   PT Gait Training (Units)  --  2   PT Therapeutic Exercise (Units) 1 2   PT Therapeutic Activities (Units) 1  --    PT Total Time Spent   PT Individual Total Time Spent (Mins) 30 60   Roll Left and Right   Level of Assist Stand By Assist Stand By Assist   Additional Description Use of bed rail Use of bed rail   Sit to Lying   Level of Assist Stand By Assist Stand By Assist   Additional Description  --  Use of bed rail   Lying to Sitting on Side of Bed   Level of Assist Stand By Assist Stand By Assist   Additional Description  --  Use of bed rail   Sit to Stand   Level of Assist Contact Guard Assist Contact Guard Assist   Assistive Devices Use of FWW Use of FWW   Chair/Bed-to-Chair Transfer   Level of Assist Contact Guard Assist Contact Guard Assist   Transfer Type Squat Pivot Transfer  (reach pivot) Squat Pivot Transfer  (reach pivot)   Ambulation   Level of Assist  --  Minimal Assist   Assistive Device  --  Use of FWW  (and parallel bars)   Distance  --  3 x 10 in bars, 1 x 8 feet in FWW   Patient Scheduling Information   PT Time 30/60 minutes  --    Primary or Coverage PT Ran Helms  --    PT DME Recommendations   Wheelchair 20\" Width;Removable/Flip Back Armrests  (pt has a wc which he purchased on [employer] however anticipates he will need a new one for DC with left residual limb board and flip back armrest)  --    Cushion Standard  --    Interdisciplinary Plan of Care Collaboration   Patient Position at End " "of Therapy Seated;Chair Alarm On;Self Releasing Lap Belt Applied Seated     Performed extensive patient education regarding post BKA care including prevention of hip/knee contracture, strengthening of quads/gluteals/right calf. Proper wrapping and protection of residual limb     Therapeutic Exercise  Purpose: to improve strength, to improve ROM/flexibility, and to improve functional endurance  Interventions:   Lower body exercises:   Supine program -   Supine bridges, One legged and 2 sets of 10  Hip abduction, 2 sets of 10 and Bilateral  Straight leg raises, Front, 2 sets of 10, and Bilateral  Quadriceps isometrics, 1 set of 10 and Left  Other, Right SKTC with left hip extension for hip flexor stretch, wheelchair pulling/pushing with well leg for hamstring and quads, wheelchair resisted leg press pushes against staircase, wheelchair mobility around obstacles, banded triceps, chest press, calf strengthening    Description of Intervention(s):       Assessment: Pat is 1 week post BKA and is handling things very well. Was able to hop with FWW a few steps. Decreased right foot clearance and limited by decreased right quad strength/endurance    Strengths: Effective communication skills, Pleasant and cooperative, Supportive family, Willingly participates in therapeutic activities, Motivated for self care and independence  Barriers: Impaired activity tolerance, Impaired balance, Constipation, Limited mobility    Plan: residual limb self wrapping instruction, LE strengthening, gait training, car transfers     DME    PT DME Recommendations  Wheelchair: (P) 20\" Width, Removable/Flip Back Armrests (pt has a wc which he purchased on [employer] however anticipates he will need a new one for DC with left residual limb board and flip back armrest)  Cushion: (P) Standard  Vendor Equipment: IPOP    Goals:     Passport items to be completed:  Get in/out of bed safely, in/out of a vehicle, safely use mobility device, walk or wheel around " "home/community, navigate up and down stairs, show how to get up/down from the ground, ensure home is accessible, demonstrate HEP, complete caregiver training    Physical Therapy Problems (Active)       Problem: Mobility       Dates: Start:  03/07/25         Goal: STG-Within one week, patient will propel wheelchair household distances: on unit with Mod I.       Dates: Start:  03/07/25            Goal: STG-Within one week, patient will ambulate household distance at least 15' with FWW and Min A.       Dates: Start:  03/07/25               Problem: Mobility Transfers       Dates: Start:  03/07/25         Goal: STG-Within one week, patient will sit to stand Min A from 18.5\" surface to FWW or other hold support.       Dates: Start:  03/07/25               Problem: PT-Long Term Goals       Dates: Start:  03/07/25         Goal: LTG-By discharge, patient will propel wheelchair on outside surfaces and inside surfaces with Mod I, distance >500'.       Dates: Start:  03/07/25            Goal: LTG-By discharge, patient will ambulate at least 25' with FWW and SBA.       Dates: Start:  03/07/25            Goal: LTG-By discharge, patient will transfer one surface to another SPV.       Dates: Start:  03/07/25            Goal: LTG-By discharge, patient will perform home exercise program Mod I.       Dates: Start:  03/07/25            Goal: LTG-By discharge, patient will transfer in/out of a car CGA from  level.       Dates: Start:  03/07/25              "

## 2025-03-11 NOTE — PROGRESS NOTES
Hospital Medicine Daily Progress Note        Chief Complaint:  Hypertension  Diabetes  Anemia  Chronic Kidney Disease    Interval History:  Blood pressures trending up; pt asymptomatic.    Review of Systems  Review of Systems   Constitutional:  Negative for chills and fever.   HENT: Negative.     Eyes: Negative.    Respiratory:  Negative for cough and shortness of breath.    Cardiovascular:  Negative for chest pain and palpitations.   Gastrointestinal:  Negative for abdominal pain, nausea and vomiting.   Musculoskeletal:         Wound pain   Skin:  Negative for itching and rash.   Endo/Heme/Allergies:  Negative for polydipsia. Does not bruise/bleed easily.   Psychiatric/Behavioral:  The patient is not nervous/anxious.         Physical Exam  Temp:  [36.3 °C (97.3 °F)-37 °C (98.6 °F)] 37 °C (98.6 °F)  Pulse:  [63-72] 72  Resp:  [16-18] 18  BP: (128-167)/(57-72) 155/70  SpO2:  [95 %-97 %] 97 %    Physical Exam  Vitals reviewed.   Constitutional:       General: He is not in acute distress.     Appearance: Normal appearance. He is not ill-appearing.   HENT:      Head: Normocephalic and atraumatic.      Right Ear: External ear normal.      Left Ear: External ear normal.      Mouth/Throat:      Pharynx: Oropharynx is clear.   Eyes:      General:         Right eye: No discharge.         Left eye: No discharge.      Extraocular Movements: Extraocular movements intact.      Conjunctiva/sclera: Conjunctivae normal.   Cardiovascular:      Rate and Rhythm: Normal rate and regular rhythm.   Pulmonary:      Effort: Pulmonary effort is normal. No respiratory distress.      Breath sounds: Normal breath sounds. No wheezing.   Abdominal:      General: Bowel sounds are normal. There is no distension.      Palpations: Abdomen is soft.      Tenderness: There is no abdominal tenderness.   Musculoskeletal:      Cervical back: Normal range of motion and neck supple.      Right lower leg: No edema.      Comments: JEYSON SAENZ in stump protector    Skin:     General: Skin is warm and dry.   Neurological:      Mental Status: He is alert and oriented to person, place, and time.         Fluids      Laboratory                Assessment/Plan  * Hx of BKA, left (HCC)- (present on admission)  Assessment & Plan  H/O LLE revascularization Oct 2024  Developed left heel ulcer and toe gangrene  Now S/P BKA on 3/2/25 by Dr. Echavarria at [facility]  Pain control and wound care per Physiatry    MSSA bacteremia  Assessment & Plan  On Ancef through 3/14/25 followed by Zyvox through 3/28/24    CAD (coronary artery disease)  Assessment & Plan  H/O CABG  Echo EF 50%  On ASA, Lipitor, Coreg, and Lisinopril    Anemia  Assessment & Plan  Has normocytic indices  Fe 72 and Ferritin 115  Follow H/H    CKD (chronic kidney disease)  Assessment & Plan  On NaHCO3  Avoid nephrotoxins  Renal dose all meds  Monitor electrolytes and volume  Outpt Nephrology F/U    Hypothyroidism- (present on admission)  Assessment & Plan  TSH 5.61 and FT4 1.45  Continue current dose Synthroid  Outpt F/U    Type 2 diabetes mellitus with hyperglycemia (HCC)- (present on admission)  Assessment & Plan  HbA1c 6.6  On Lantus and SSI  Outpt meds include Lantus 28-35 u SQ qhs, Humalog 10 u SQ qac, Farxiga, and Mounjaro    Primary hypertension- (present on admission)  Assessment & Plan  On Coreg, Lisinopril, HCTZ, and Hydralazine  Increase Hydralazine to optimize blood pressure control  Has Norvasc allergy    Full Code

## 2025-03-11 NOTE — THERAPY
Physical Therapy   Daily Treatment     Patient Name:  Marlo León  Age:  69 y.o., Sex:  male  Medical Record #:  [id number]  Today's Date: 3/11/2025     Precautions  Precautions: Fall Risk  Fall Risk: Other (see comments)  Weight Bearing: NWB LE (laterality in comments)  Braces: IPOP, Immobilizer LE (laterality in comments)  Comments: L BKA, L IPOP and L knee immobilizer    Subjective: Patient pleasant and agreeable to participate.     Objective:    03/11/25 1301   PT Charge Group   PT Therapeutic Exercise (Units) 1   PT Therapeutic Activities (Units) 1   PT Total Time Spent   PT Individual Total Time Spent (Mins) 30   Chair/Bed-to-Chair Transfer   Level of Assist Stand By Assist   Transfer Type Squat Pivot Transfer  (WC <> mat table)   Assistive Devices   (WC arm rests)   Wheelchair   Level of Assist Supervised   Type Manual   Wheelchair Distance 150' x 2  (including outside on sidewalks, performing tight turns and up/down small inclines)   Interdisciplinary Plan of Care Collaboration   IDT Collaboration with  Family / Caregiver   Patient Position at End of Therapy Seated;Self Releasing Lap Belt Applied;Call Light within Reach;Tray Table within Reach;Phone within Reach;Family / Friend in Room   Collaboration Comments Patient spouse present in room at end of session         Therapeutic Exercise  Purpose: to improve strength and to improve ROM/flexibility  Interventions:   Lower body exercises:   Supine program -   Hip abduction, 2 sets of 15 and Left (side-lying)  Straight leg raises, Front, 2 sets of 10, and Left  Prone exercises  Prone lying x 2 minutes    Assessment: Patient with good tolerance to activity this afternoon. He was to safely set-up squat pivot transfer without cuing and with good lift-off during transfer. Patient remains well-motivated to participate.     Strengths: Effective communication skills, Pleasant and cooperative, Supportive family, Willingly participates in therapeutic activities,  "Motivated for self care and independence  Barriers: Impaired activity tolerance, Impaired balance, Constipation, Limited mobility    Plan:   residual limb self wrapping instruction, LE strengthening, gait training, car transfers     DME    PT DME Recommendations  Wheelchair: 20\" Width, Removable/Flip Back Armrests (pt has a wc which he purchased on [employer] however anticipates he will need a new one for DC with left residual limb board and flip back armrest)  Cushion: Standard  Vendor Equipment: IPOP    Goals:     Passport items to be completed:  Get in/out of bed safely, in/out of a vehicle, safely use mobility device, walk or wheel around home/community, navigate up and down stairs, show how to get up/down from the ground, ensure home is accessible, demonstrate HEP, complete caregiver training    Physical Therapy Problems (Active)       Problem: Mobility       Dates: Start:  03/07/25         Goal: STG-Within one week, patient will propel wheelchair household distances: on unit with Mod I.       Dates: Start:  03/07/25    Expected End:  03/24/25            Goal: STG-Within one week, patient will ambulate household distance at least 15' with FWW and Min A.       Dates: Start:  03/07/25    Expected End:  03/24/25               Problem: Mobility Transfers       Dates: Start:  03/07/25         Goal: STG-Within one week, patient will sit to stand Min A from 18.5\" surface to FWW or other hold support.       Dates: Start:  03/07/25    Expected End:  03/24/25               Problem: PT-Long Term Goals       Dates: Start:  03/07/25         Goal: LTG-By discharge, patient will propel wheelchair on outside surfaces and inside surfaces with Mod I, distance >500'.       Dates: Start:  03/07/25    Expected End:  03/24/25            Goal: LTG-By discharge, patient will ambulate at least 25' with FWW and SBA.       Dates: Start:  03/07/25    Expected End:  03/24/25            Goal: LTG-By discharge, patient will transfer one surface " to another SPV.       Dates: Start:  03/07/25    Expected End:  03/24/25            Goal: LTG-By discharge, patient will perform home exercise program Mod I.       Dates: Start:  03/07/25    Expected End:  03/24/25            Goal: LTG-By discharge, patient will transfer in/out of a car CGA from  level.       Dates: Start:  03/07/25    Expected End:  03/24/25

## 2025-03-11 NOTE — THERAPY
Occupational Therapy  Daily Treatment     Patient Name:  Marlo León  Age:  69 y.o., Sex:  male  Medical Record #:  [id number]  Today's Date:  3/11/2025     Subjective: Pt agreeable to OT session.      Objective:    03/11/25 1101   OT Charge Group   OT Therapeutic Exercise (Units) 2   OT Total Time Spent   OT Individual Total Time Spent (Mins) 30   Precautions   Precautions Fall Risk   Fall Risk Other (see comments)   Weight Bearing NWB LE (laterality in comments)   Braces IPOP;Immobilizer LE (laterality in comments)   Comments L BKA, L IPOP and L knee immobilizer   Interdisciplinary Plan of Care Collaboration   Patient Position at End of Therapy Seated;Self Releasing Lap Belt Applied         Therapeutic Exercise  Purpose: to improve strength, to improve ROM/flexibility, and to improve functional endurance  Interventions:   Lower body exercises,   X8 STS in // bars, CGA  Prone exercises, 3x10 glut squeeze, 3x10 L hip extension   Description of Intervention(s): CGA for lateral scoot txfr to EOM     Assessment:  Pt tolerated session with focus on there-ex to support ADL performance well.   Strengths: Able to follow instructions, Alert and oriented, Good carryover of learning, Independent prior level of function, Manages pain appropriately, Motivated for self care and independence, Pleasant and cooperative, Supportive family, Willingly participates in therapeutic activities  Barriers: Decreased endurance, Fatigue, Generalized weakness, Impaired activity tolerance, Impaired balance, Limited mobility    Plan:  Continue OT POC    Occupational Therapy Goals (Active)       Problem: Bathing       Dates: Start:  03/07/25         Goal: STG-Within one week, patient will bathe with CGA.        Dates: Start:  03/07/25    Expected End:  03/24/25               Problem: Dressing       Dates: Start:  03/07/25         Goal: STG-Within one week, patient will dress LB with CGA.        Dates: Start:  03/07/25    Expected End:  03/24/25                Problem: OT Long Term Goals       Dates: Start:  03/07/25         Goal: LTG-By discharge, patient will complete basic self care tasks @ sup-mod I level.        Dates: Start:  03/07/25    Expected End:  03/24/25            Goal: LTG-By discharge, patient will perform bathroom transfers @ sup-mod I level.        Dates: Start:  03/07/25    Expected End:  03/24/25               Problem: Toileting       Dates: Start:  03/07/25         Goal: STG-Within one week, patient will complete toileting tasks with min A.        Dates: Start:  03/07/25    Expected End:  03/24/25

## 2025-03-11 NOTE — PROGRESS NOTES
NURSING DAILY NOTE    Name: Marlo León   Date of Admission: 3/6/2025   Admitting Diagnosis: Hx of BKA, left (HCC)  Attending Physician: MICKIE JUAREZ D.O.  Allergies: Neosporin [neomycin-polymyxin b], Tape, Amlodipine, Amoxicillin, Bacitracin-polymyxin b, Cyclobenzaprine, Empagliflozin-metformin hcl, Gabapentin, Gadolinium, Metformin, Neomycin-polymyxin b gu, Nsaids, Pioglitazone, Prednisone, Sulfamethoxazole w-trimethoprim, and Tamsulosin    Safety  Patient Assist  cga  Patient Precautions  Precautions: Fall Risk  Fall Risk: Other (see comments)  Weight Bearing: NWB LE (laterality in comments)  Braces: IPOP, Immobilizer LE (laterality in comments)  Comments: L BKA, L IPOP and L knee immobilizer  Bed Transfer Status  Contact Guard Assist  Toilet Transfer Status   Maximal Assist  Assistive Devices  Rails, Wheelchair  Oxygen  None - Room Air  Diet/Therapeutic Dining  Current Diet Order   Procedures    Diet Order Diet: Consistent CHO (Diabetic) (HIGH PROTEIN AND VEGETABLES. DAIRY OK. LIMIT CARBOHYDRATES); Nutrient modifications: (optional): High Protein     Pill Administration  whole  Agitated Behavioral Scale     ABS Level of Severity       Fall Risk  Has the patient had a fall this admission?      Nikole Swanson Fall Risk Scoring  16, HIGH RISK  Fall Risk Safety Measures  bed alarm, chair alarm, poor balance, and left BKA    Vitals  Temperature: 36.9 °C (98.4 °F)  Temp src: Temporal  Pulse: 71  Respiration: 16  Blood Pressure : (!) 145/69  Blood Pressure MAP (Calculated): 94 MM HG  BP Location: Left, Upper Arm  Patient BP Position: Supine     Oxygen  Pulse Oximetry: 96 %  O2 (LPM): 0  O2 Delivery Device: None - Room Air    Bowel and Bladder  Last Bowel Movement  03/10/25  Stool Type  Type 6: Fluffy pieces with ragged edges, a mushy stool  Bowel Device     Continent  Bladder: Continent void   Bowel: Continent movement  Bladder Function  Urine Void (mL): 200 ml  Number of Times Voided: 1  Urine Color: Unable To  Evaluate  Straight Catheter:  (Pt said, he just needs time to void. He will try again later.)  Genitourinary Assessment   Bladder Assessment (WDL):  WDL Except  Jane Catheter: Not Applicable  Urine Color: Unable To Evaluate  Bladder Device: Urinal  Time Void: Yes  Bladder Scan: Post Void  $ Bladder Scan Results (mL): 165    Skin  Anselmo Score   17  Sensory Interventions   Bed Types: Standard/Trauma Mattress  Skin Preventative Measures: Pillows in Use for Support / Positioning  Moisture Interventions  Moisturizers/Barriers: Barrier Wipes      Pain  Pain Rating Scale  0 - No Pain  Pain Location  Generalized  Pain Location Orientation  Right, Left, Anterior, Posterior  Pain Interventions   Declines    ADLs    Bathing   Shower, * * With Assistance from, Staff  Linen Change      Personal Hygiene     Chlorhexidine Bath   Not Completed - Refused. Education Provided.  Oral Care     Teeth/Dentures     Shave     Nutrition Percentage Eaten  *  * Meal *  *, Dinner, Between % Consumed  Environmental Precautions     Patient Turns/Positioning  Sitting up in wheelchair  Patient Turns Assistance/Tolerance     Bed Positions  Bed Controls On  Head of Bed Elevated  Self regulated      Psychosocial/Neurologic Assessment  Psychosocial Assessment  Psychosocial (WDL):  Within Defined Limits  Neurologic Assessment  Neuro (WDL): Within Defined Limits  Level of Consciousness: Alert  Orientation Level: Oriented X4  Cognition: Follows commands, Appropriate judgement, Appropriate safety awareness  Speech: Clear  Pupil Assesment: No  EENT (WDL):  WDL Except    Cardio/Pulmonary Assessment  Edema      Respiratory Breath Sounds  RUL Breath Sounds: Clear  RML Breath Sounds: Clear  RLL Breath Sounds: Diminished  NOEMY Breath Sounds: Clear  LLL Breath Sounds: Diminished  Cardiac Assessment   Cardiac (WDL):  WDL Except (hx hld, pvd, htn, chf)

## 2025-03-11 NOTE — CARE PLAN
Problem: Infection  Goal: Will remain free from infection  Outcome: Progressing     Problem: Skin Integrity  Goal: Risk for impaired skin integrity will decrease  Outcome: Progressing     Problem: Fall Risk - Rehab  Goal: Patient will remain free from falls  Outcome: Progressing     The patient is Stable - Low risk of patient condition declining or worsening    Shift Goals  Clinical Goals: safety ; DM management  Patient Goals: participate w/ Therapy  Family Goals:

## 2025-03-11 NOTE — CARE PLAN
"The patient is Stable - Low risk of patient condition declining or worsening    Shift Goals  Clinical Goals: safety  Patient Goals: sleep well  Family Goals: no family present    Problem: Skin Integrity  Goal: Risk for impaired skin integrity will decrease  Outcome: Progressing  Note:   Anselmo Score: 17    Patient's skin remains intact and free from new or accidental injury this shift; no s/s of infection. RN wound protocol checked. Encouraged hydration and educated about the importance of nutrition to keep skin integrity. Will continue to monitor.        Problem: Fall Risk - Rehab  Goal: Patient will remain free from falls  Outcome: Progressing  Note: Nikole Swanson Fall risk Assessment Score: 16    High fall risk Interventions     - Bed and strip alarm   - Yellow sign by the door   - Yellow wrist band \"Fall risk\"  - Room near to the nurse station  - Do not leave patient unattended in the bathroom  - Fall risk education provided     Pt using call light appropriately when in need of assistance.            "

## 2025-03-11 NOTE — PROGRESS NOTES
NURSING DAILY NOTE    Name: Marlo León   Date of Admission: 3/6/2025   Admitting Diagnosis: Hx of BKA, left (HCC)  Attending Physician: MICKIE JUAREZ D.O.  Allergies: Neosporin [neomycin-polymyxin b], Tape, Amlodipine, Amoxicillin, Bacitracin-polymyxin b, Cyclobenzaprine, Empagliflozin-metformin hcl, Gabapentin, Gadolinium, Metformin, Neomycin-polymyxin b gu, Nsaids, Pioglitazone, Prednisone, Sulfamethoxazole w-trimethoprim, and Tamsulosin    Safety  Patient Assist  cga  Patient Precautions  Precautions: Fall Risk  Fall Risk: Other (see comments) (Left BKA)  Weight Bearing: NWB LE (laterality in comments)  Braces: IPOP, Immobilizer LE (laterality in comments)  Comments: L BKA, L IPOP and L knee immobilizer  Bed Transfer Status  Contact Guard Assist  Toilet Transfer Status   Maximal Assist  Assistive Devices  Wheelchair, Rails  Oxygen  BIPAP  Diet/Therapeutic Dining  Current Diet Order   Procedures    Diet Order Diet: Consistent CHO (Diabetic) (HIGH PROTEIN AND VEGETABLES. DAIRY OK. LIMIT CARBOHYDRATES); Nutrient modifications: (optional): High Protein     Pill Administration  whole  Agitated Behavioral Scale     ABS Level of Severity       Fall Risk  Has the patient had a fall this admission?      Nikole Swanson Fall Risk Scoring  16, HIGH RISK  Fall Risk Safety Measures  bed alarm and chair alarm    Vitals  Temperature: 36.8 °C (98.2 °F)  Temp src: Temporal  Pulse: 65  Respiration: 18  Blood Pressure : 133/69  Blood Pressure MAP (Calculated): 90 MM HG  BP Location: Right, Upper Arm  Patient BP Position: Martino's Position     Oxygen  Pulse Oximetry: 97 %  O2 (LPM): 0  O2 Delivery Device: BIPAP    Bowel and Bladder  Last Bowel Movement  03/10/25  Stool Type  Type 6: Fluffy pieces with ragged edges, a mushy stool  Bowel Device     Continent  Bladder: Continent void   Bowel: Continent movement  Bladder Function  Urine Void (mL): 400 ml  Number of Times Voided: 1  Urine Color: Yellow  Straight Catheter:  (Pt  said, he just needs time to void. He will try again later.)  Genitourinary Assessment   Bladder Assessment (WDL):  WDL Except  Jane Catheter: Not Applicable  Urine Color: Yellow  Bladder Device: Urinal  Time Void: Yes  Bladder Scan: Re-Void  $ Bladder Scan Results (mL): 120    Skin  Anselmo Score   17  Sensory Interventions   Bed Types: Standard/Trauma Mattress  Skin Preventative Measures: Pillows in Use for Support / Positioning  Moisture Interventions  Moisturizers/Barriers: Barrier Paste      Pain  Pain Rating Scale  3 - Sometimes distracts me  Pain Location  Generalized  Pain Location Orientation  Right, Left, Anterior, Posterior  Pain Interventions   Medication (see MAR)    ADLs    Bathing   Shower, * * With Assistance from, Staff  Linen Change      Personal Hygiene     Chlorhexidine Bath   Completed  Oral Care     Teeth/Dentures     Shave     Nutrition Percentage Eaten  *  * Meal *  *, Dinner, Between % Consumed  Environmental Precautions     Patient Turns/Positioning  Patient turns self independently side to side without assistance, to offload sacral area  Patient Turns Assistance/Tolerance     Bed Positions  Bed Controls On  Head of Bed Elevated  Self regulated      Psychosocial/Neurologic Assessment  Psychosocial Assessment  Psychosocial (WDL):  Within Defined Limits  Neurologic Assessment  Neuro (WDL): Within Defined Limits  Level of Consciousness: Alert  Orientation Level: Oriented X4  Cognition: Follows commands, Appropriate judgement, Appropriate safety awareness  Speech: Clear  Pupil Assesment: No  EENT (WDL):  WDL Except    Cardio/Pulmonary Assessment  Edema      Respiratory Breath Sounds  RUL Breath Sounds: Clear  RML Breath Sounds: Clear  RLL Breath Sounds: Diminished  NOEMY Breath Sounds: Clear  LLL Breath Sounds: Diminished  Cardiac Assessment   Cardiac (WDL):  WDL Except (hx hld, pvd, htn, chf)

## 2025-03-12 ENCOUNTER — APPOINTMENT (OUTPATIENT)
Dept: PHYSICAL THERAPY | Facility: REHABILITATION | Age: 70
DRG: 560 | End: 2025-03-12
Attending: PHYSICAL MEDICINE & REHABILITATION
Payer: MEDICARE

## 2025-03-12 ENCOUNTER — APPOINTMENT (OUTPATIENT)
Dept: OCCUPATIONAL THERAPY | Facility: REHABILITATION | Age: 70
DRG: 560 | End: 2025-03-12
Attending: PHYSICAL MEDICINE & REHABILITATION
Payer: MEDICARE

## 2025-03-12 PROCEDURE — 97110 THERAPEUTIC EXERCISES: CPT

## 2025-03-12 PROCEDURE — 700102 HCHG RX REV CODE 250 W/ 637 OVERRIDE(OP): Performed by: PHYSICAL MEDICINE & REHABILITATION

## 2025-03-12 PROCEDURE — 97530 THERAPEUTIC ACTIVITIES: CPT

## 2025-03-12 PROCEDURE — 94760 N-INVAS EAR/PLS OXIMETRY 1: CPT

## 2025-03-12 PROCEDURE — 97535 SELF CARE MNGMENT TRAINING: CPT

## 2025-03-12 PROCEDURE — 700105 HCHG RX REV CODE 258: Performed by: PHYSICAL MEDICINE & REHABILITATION

## 2025-03-12 PROCEDURE — A9270 NON-COVERED ITEM OR SERVICE: HCPCS | Performed by: HOSPITALIST

## 2025-03-12 PROCEDURE — 99232 SBSQ HOSP IP/OBS MODERATE 35: CPT | Performed by: HOSPITALIST

## 2025-03-12 PROCEDURE — 700111 HCHG RX REV CODE 636 W/ 250 OVERRIDE (IP): Performed by: PHYSICAL MEDICINE & REHABILITATION

## 2025-03-12 PROCEDURE — 97116 GAIT TRAINING THERAPY: CPT

## 2025-03-12 PROCEDURE — 97112 NEUROMUSCULAR REEDUCATION: CPT

## 2025-03-12 PROCEDURE — A9270 NON-COVERED ITEM OR SERVICE: HCPCS | Performed by: PHYSICAL MEDICINE & REHABILITATION

## 2025-03-12 PROCEDURE — 700102 HCHG RX REV CODE 250 W/ 637 OVERRIDE(OP): Performed by: HOSPITALIST

## 2025-03-12 PROCEDURE — 770010 HCHG ROOM/CARE - REHAB SEMI PRIVAT*

## 2025-03-12 RX ADMIN — LEVOTHYROXINE SODIUM 175 MCG: 0.05 TABLET ORAL at 04:26

## 2025-03-12 RX ADMIN — HYDRALAZINE HYDROCHLORIDE 50 MG: 50 TABLET ORAL at 15:40

## 2025-03-12 RX ADMIN — HEPARIN SODIUM 5000 UNITS: 5000 INJECTION, SOLUTION INTRAVENOUS; SUBCUTANEOUS at 15:40

## 2025-03-12 RX ADMIN — ASPIRIN 81 MG: 81 TABLET, COATED ORAL at 08:07

## 2025-03-12 RX ADMIN — HEPARIN SODIUM 5000 UNITS: 5000 INJECTION, SOLUTION INTRAVENOUS; SUBCUTANEOUS at 04:26

## 2025-03-12 RX ADMIN — HYDROCHLOROTHIAZIDE 25 MG: 25 TABLET ORAL at 04:25

## 2025-03-12 RX ADMIN — CEFAZOLIN 2 G: 2 INJECTION, POWDER, FOR SOLUTION INTRAMUSCULAR; INTRAVENOUS at 05:50

## 2025-03-12 RX ADMIN — ACETAMINOPHEN 650 MG: 325 TABLET ORAL at 04:25

## 2025-03-12 RX ADMIN — HEPARIN SODIUM 5000 UNITS: 5000 INJECTION, SOLUTION INTRAVENOUS; SUBCUTANEOUS at 22:14

## 2025-03-12 RX ADMIN — OMEPRAZOLE 20 MG: 20 CAPSULE, DELAYED RELEASE ORAL at 08:07

## 2025-03-12 RX ADMIN — ACETAMINOPHEN 650 MG: 325 TABLET ORAL at 19:54

## 2025-03-12 RX ADMIN — SODIUM BICARBONATE 650 MG: 650 TABLET ORAL at 19:55

## 2025-03-12 RX ADMIN — ATORVASTATIN CALCIUM 40 MG: 40 TABLET, FILM COATED ORAL at 19:54

## 2025-03-12 RX ADMIN — CARVEDILOL 6.25 MG: 3.12 TABLET, FILM COATED ORAL at 17:05

## 2025-03-12 RX ADMIN — SODIUM BICARBONATE 650 MG: 650 TABLET ORAL at 08:07

## 2025-03-12 RX ADMIN — HYDRALAZINE HYDROCHLORIDE 50 MG: 50 TABLET ORAL at 22:14

## 2025-03-12 RX ADMIN — CARVEDILOL 6.25 MG: 3.12 TABLET, FILM COATED ORAL at 08:07

## 2025-03-12 RX ADMIN — LISINOPRIL 40 MG: 20 TABLET ORAL at 19:54

## 2025-03-12 RX ADMIN — HYDRALAZINE HYDROCHLORIDE 50 MG: 50 TABLET ORAL at 04:25

## 2025-03-12 RX ADMIN — CEFAZOLIN 2 G: 2 INJECTION, POWDER, FOR SOLUTION INTRAMUSCULAR; INTRAVENOUS at 15:44

## 2025-03-12 RX ADMIN — CEFAZOLIN 2 G: 2 INJECTION, POWDER, FOR SOLUTION INTRAMUSCULAR; INTRAVENOUS at 22:19

## 2025-03-12 RX ADMIN — INSULIN LISPRO 2 UNITS: 100 INJECTION, SOLUTION INTRAVENOUS; SUBCUTANEOUS at 20:01

## 2025-03-12 ASSESSMENT — PAIN DESCRIPTION - PAIN TYPE
TYPE: ACUTE PAIN;SURGICAL PAIN
TYPE: ACUTE PAIN;SURGICAL PAIN

## 2025-03-12 NOTE — THERAPY
Physical Therapy   Daily Treatment     Patient Name:  Marlo León  Age:  69 y.o., Sex:  male  Medical Record #:  [id number]  Today's Date: 3/12/2025     Precautions  Precautions: Fall Risk  Fall Risk: Other (see comments) (Left BKA)  Weight Bearing: NWB LE (laterality in comments)  Braces: IPOP, Immobilizer LE (laterality in comments)  Comments: L BKA; L IPOP    Subjective: Pt reports RLE soreness and fatigue.    Objective:    03/12/25 1030   PT Charge Group   PT Therapeutic Exercise (Units) 2   PT Neuromuscular Re-Education / Balance (Units) 2   PT Total Time Spent   PT Individual Total Time Spent (Mins) 60   Roll Left and Right   Level of Assist Independent   Additional Description   (completed on mat table)   Sit to Lying   Level of Assist Supervised   Additional Description   (completed on mat table)   Lying to Sitting on Side of Bed   Level of Assist Supervised   Additional Description   (completed on mat table)   Sit to Stand   Level of Assist Stand By Assist   Assistive Devices Use of FWW   Chair/Bed-to-Chair Transfer   Level of Assist Stand By Assist   Transfer Type Squat Pivot Transfer   Wheelchair   Level of Assist Modified Independent   Type Manual   Additional Description Extra time needed   Wheelchair Distance 150ft x1, 100ft x1   Interdisciplinary Plan of Care Collaboration   IDT Collaboration with  Physical Therapist   Patient Position at End of Therapy Seated;Chair Alarm On;Other (Comments)  (in dining room c/ staff for lunch)   Collaboration Comments CLOBEAR, JENNIFER       Therapeutic Exercise  Purpose: to improve strength and to improve ROM/flexibility  Interventions:   Lower body exercises:   Supine program -   Supine bridges, Two legged and 2 sets of 10  Performed c/ BLE on bolster  Straight leg raises, Front, Abduction, Adduction, 2 sets of 10, Right, and Left  Knee to chest c/ over stretch, 2 sets x 30 seconds each LE  Prone exercises -   Hip extension, 2 sets x10 each LE  Gluteal isometrics, 2 sets  "x10 c/ 5 second hold  Hamstring curl c/ resistance from therapist, RLE only, 2 sets x10    Neuro Re-Education  Purpose: to improve balance and to improve postural control  Interventions:   Static/Dynamic sitting balance training  [employer] reaches emphasizing lateral and anterior weight shifts, 2 sets x30 seconds each UE  B shoulder flexion, 2# barbell, 2 sets x10  Unilateral rows c/ trunk rotation, blue theraband, 2 sets x10 each UE  Trunk rotation c/ blue theraband resistance in transverse plane, 2 sets x10 each direction   Static/Dynamic standing balance training  Static standing c/ FWW and visual feedback in mirror and single UE shoulder flexion, 2 sets x5 each UE  Static standing c/ single UE support on FWW c/ eyes closed, 2 sets x20 seconds each UE    Assessment: Pt demonstrated improved standing balance following seated exercises emphasizing abdominal engagement. Pt was limited in anterior weight shifting during seated balance tasks due to flexibility limitations that may create challenges c/ independent residual limb wrapping.    Strengths: Effective communication skills, Pleasant and cooperative, Supportive family, Willingly participates in therapeutic activities, Motivated for self care and independence  Barriers: Impaired activity tolerance, Impaired balance, Constipation, Limited mobility    Plan: residual limb self wrapping instruction, LE strengthening, gait training, car transfers     DME    PT DME Recommendations  Wheelchair: 20\" Width, Removable/Flip Back Armrests (pt has a wc which he purchased on [employer] however anticipates he will need a new one for DC with left residual limb board and flip back armrest)  Cushion: Standard  Vendor Equipment: IPOP    Goals:     Passport items to be completed:  Get in/out of bed safely, in/out of a vehicle, safely use mobility device, walk or wheel around home/community, navigate up and down stairs, show how to get up/down from the ground, ensure home is accessible, " "demonstrate HEP, complete caregiver training    Physical Therapy Problems (Active)       Problem: Mobility       Dates: Start:  03/07/25         Goal: STG-Within one week, patient will propel wheelchair household distances: on unit with Mod I.       Dates: Start:  03/07/25    Expected End:  03/24/25            Goal: STG-Within one week, patient will ambulate household distance at least 15' with FWW and Min A.       Dates: Start:  03/07/25    Expected End:  03/24/25               Problem: Mobility Transfers       Dates: Start:  03/07/25         Goal: STG-Within one week, patient will sit to stand Min A from 18.5\" surface to FWW or other hold support.       Dates: Start:  03/07/25    Expected End:  03/24/25               Problem: PT-Long Term Goals       Dates: Start:  03/07/25         Goal: LTG-By discharge, patient will propel wheelchair on outside surfaces and inside surfaces with Mod I, distance >500'.       Dates: Start:  03/07/25    Expected End:  03/24/25            Goal: LTG-By discharge, patient will ambulate at least 25' with FWW and SBA.       Dates: Start:  03/07/25    Expected End:  03/24/25            Goal: LTG-By discharge, patient will transfer one surface to another SPV.       Dates: Start:  03/07/25    Expected End:  03/24/25            Goal: LTG-By discharge, patient will perform home exercise program Mod I.       Dates: Start:  03/07/25    Expected End:  03/24/25            Goal: LTG-By discharge, patient will transfer in/out of a car CGA from  level.       Dates: Start:  03/07/25    Expected End:  03/24/25              "

## 2025-03-12 NOTE — PROGRESS NOTES
..NURSING DAILY NOTE    Name: Marlo León   Date of Admission: 3/6/2025   Admitting Diagnosis: Hx of BKA, left (HCC)  Attending Physician: MICKIE JUAREZ D.O.  Allergies: Neosporin [neomycin-polymyxin b], Tape, Amlodipine, Amoxicillin, Bacitracin-polymyxin b, Cyclobenzaprine, Empagliflozin-metformin hcl, Gabapentin, Gadolinium, Metformin, Neomycin-polymyxin b gu, Nsaids, Pioglitazone, Prednisone, Sulfamethoxazole w-trimethoprim, and Tamsulosin    Safety  Patient Assist  st by/cg  Patient Precautions  Precautions: Fall Risk  Fall Risk: Other (see comments) (Left BKA)  Weight Bearing: NWB LE (laterality in comments)  Braces: IPOP, Immobilizer LE (laterality in comments)  Comments: L BKA; L IPOP  Bed Transfer Status  Stand By Assist  Toilet Transfer Status   Maximal Assist  Assistive Devices  Rails, Wheelchair  Oxygen  BIPAP  Diet/Therapeutic Dining  Current Diet Order   Procedures    Diet Order Diet: Consistent CHO (Diabetic) (HIGH PROTEIN AND VEGETABLES. DAIRY OK. LIMIT CARBOHYDRATES); Nutrient modifications: (optional): High Protein     Pill Administration  whole    Fall Risk  Nikole Swanson Fall Risk Scoring  16, HIGH RISK  Fall Risk Safety Measures  bed alarm and chair alarm    Vitals  Temperature: 37.2 °C (98.9 °F)  Temp src: Temporal  Pulse: 67  Respiration: 18  Blood Pressure : 138/69  Blood Pressure MAP (Calculated): 92 MM HG  BP Location: Right, Upper Arm  Patient BP Position: Martino's Position     Oxygen  Pulse Oximetry: 96 %  O2 (LPM): 0  O2 Delivery Device: BIPAP    Bowel and Bladder  Last Bowel Movement  03/11/25  Stool Type  Type 4: Like a sausage or snake, smooth and soft  Bladder Function  Urine Void (mL): 600 ml  Number of Times Voided: 1  Urine Color: Yellow  Straight Catheter:  (Pt said, he just needs time to void. He will try again later.)  Genitourinary Assessment   Bladder Assessment (WDL):  WDL Except (retention - mild)  Jane Catheter: Not Applicable  Urine Color: Yellow  Bladder Device:  Urinal  Time Void: Yes  Bladder Scan: Re-Void  $ Bladder Scan Results (mL): 120    Skin  Anselmo Score   18  Sensory Interventions   Bed Types: Standard/Trauma Mattress  Skin Preventative Measures: Pillows in Use for Support / Positioning  Moisture Interventions  Moisturizers/Barriers: Barrier Paste      Pain  Pain Rating Scale  3 - Sometimes distracts me  Pain Location  Leg  Pain Location Orientation  Left  Pain Interventions   Medication (see MAR)    ADLs    Chlorhexidine Bath   Completed  Nutrition Percentage Eaten  *  * Meal *  *, Dinner, Between % Consumed  Patient Turns/Positioning  Patient turns self independently side to side without assistance, to offload sacral area  Bed Positions  Bed Controls On  Head of Bed Elevated  Self regulated      Psychosocial/Neurologic Assessment  Psychosocial Assessment  Psychosocial (WDL):  Within Defined Limits  Neurologic Assessment  Neuro (WDL): Exceptions to WDL (neuropathy)  Level of Consciousness: Alert  Orientation Level: Oriented X4  Cognition: Follows commands, Appropriate judgement, Appropriate safety awareness  Speech: Clear  Pupil Assesment: No  EENT (WDL):  WDL Except    Cardio/Pulmonary Assessment  Respiratory Breath Sounds  RUL Breath Sounds: Clear  RML Breath Sounds: Clear  RLL Breath Sounds: Diminished  NOEMY Breath Sounds: Clear  LLL Breath Sounds: Diminished  Cardiac Assessment   Cardiac (WDL):  WDL Except (hx hld, pvd, htn, chf)

## 2025-03-12 NOTE — DIETARY
Nutrition Services: Diabetic diet class   Day 6 of admit.  Marlo León is a 69 y.o. male with admitting DX of Hx of BKA, left ([facility]) [Z89.512]    Pt attended diabetic diet class.  RD discussed CHO sources, CHO counting, high nutrition quality CHO and fats, inclusion of protein w/ meals and snacks, bedtime snack for pt's on insulin, label reading, My Plate for DM and hyperglycemia treatment. RD provided handout reinforcing topics discussed. Pt demonstrated appropriate readiness and expressed evidence of learning. RD able to answer all questions to patient's satisfaction.     No other education needs identified at this time. Consider referral to outpatient nutrition services for continuation of education as indicated or per pt preferences.     Please re-consult RD as indicated.

## 2025-03-12 NOTE — PROGRESS NOTES
NURSING DAILY NOTE    Name: Marlo León   Date of Admission: 3/6/2025   Admitting Diagnosis: Hx of BKA, left (HCC)  Attending Physician: MICKIE JUAREZ D.O.  Allergies: Neosporin [neomycin-polymyxin b], Tape, Amlodipine, Amoxicillin, Bacitracin-polymyxin b, Cyclobenzaprine, Empagliflozin-metformin hcl, Gabapentin, Gadolinium, Metformin, Neomycin-polymyxin b gu, Nsaids, Pioglitazone, Prednisone, Sulfamethoxazole w-trimethoprim, and Tamsulosin    Safety  Patient Assist  cga  Patient Precautions  Precautions: Fall Risk  Fall Risk: Other (see comments)  Weight Bearing: NWB LE (laterality in comments)  Braces: IPOP, Immobilizer LE (laterality in comments)  Comments: L BKA; L IPOP  Bed Transfer Status  Stand By Assist  Toilet Transfer Status   Maximal Assist  Assistive Devices  Rails, Wheelchair  Oxygen  None - Room Air  Diet/Therapeutic Dining  Current Diet Order   Procedures    Diet Order Diet: Consistent CHO (Diabetic) (HIGH PROTEIN AND VEGETABLES. DAIRY OK. LIMIT CARBOHYDRATES); Nutrient modifications: (optional): High Protein     Pill Administration  whole  Agitated Behavioral Scale     ABS Level of Severity       Fall Risk  Has the patient had a fall this admission?      Nikole Swanson Fall Risk Scoring  16, HIGH RISK  Fall Risk Safety Measures  bed alarm and chair alarm    Vitals  Temperature: 37.2 °C (98.9 °F)  Temp src: Temporal  Pulse: 75  Respiration: 18  Blood Pressure : 136/73  Blood Pressure MAP (Calculated): 94 MM HG  BP Location: Left, Upper Arm  Patient BP Position: Sitting     Oxygen  Pulse Oximetry: 96 %  O2 (LPM): 0  O2 Delivery Device: None - Room Air    Bowel and Bladder  Last Bowel Movement  03/11/25  Stool Type  Type 4: Like a sausage or snake, smooth and soft  Bowel Device     Continent  Bladder: Continent void   Bowel: Continent movement  Bladder Function  Urine Void (mL): 400 ml  Number of Times Voided: 1  Urine Color: Yellow  Straight Catheter:  (Pt said, he just needs time to void. He  will try again later.)  Genitourinary Assessment   Bladder Assessment (WDL):  WDL Except  Jane Catheter: Not Applicable  Urine Color: Yellow  Bladder Device: Urinal  Time Void: Yes  Bladder Scan: Re-Void  $ Bladder Scan Results (mL): 120    Skin  Anselmo Score   17  Sensory Interventions   Bed Types: Standard/Trauma Mattress  Skin Preventative Measures: Pillows in Use for Support / Positioning  Moisture Interventions  Moisturizers/Barriers: Barrier Paste      Pain  Pain Rating Scale  2 - Notice Pain, does not interfere with activities  Pain Location  Neck  Pain Location Orientation  Posterior  Pain Interventions   Medication (see MAR)    ADLs    Bathing   Shower, * * With Assistance from, Staff  Linen Change      Personal Hygiene     Chlorhexidine Bath   Completed  Oral Care     Teeth/Dentures     Shave     Nutrition Percentage Eaten  *  * Meal *  *, Dinner, Between % Consumed  Environmental Precautions     Patient Turns/Positioning  Sitting up in wheelchair  Patient Turns Assistance/Tolerance     Bed Positions  Bed Controls On  Head of Bed Elevated  Self regulated      Psychosocial/Neurologic Assessment  Psychosocial Assessment  Psychosocial (WDL):  Within Defined Limits  Neurologic Assessment  Neuro (WDL): Within Defined Limits  Level of Consciousness: Alert  Orientation Level: Oriented X4  Cognition: Follows commands, Appropriate judgement, Appropriate safety awareness  Speech: Clear  Pupil Assesment: No  EENT (WDL):  WDL Except    Cardio/Pulmonary Assessment  Edema      Respiratory Breath Sounds  RUL Breath Sounds: Clear  RML Breath Sounds: Clear  RLL Breath Sounds: Diminished  NOEMY Breath Sounds: Clear  LLL Breath Sounds: Diminished  Cardiac Assessment   Cardiac (WDL):  WDL Except (hx hld, pvd, htn, chf)

## 2025-03-12 NOTE — CARE PLAN
Problem: Bathing  Goal: STG-Within one week, patient will bathe with CGA.   Outcome: Not Met     Problem: Dressing  Goal: STG-Within one week, patient will dress LB with CGA.   Outcome: Not Met     Problem: Toileting  Goal: STG-Within one week, patient will complete toileting tasks with min A.   Outcome: Not Met     Problem: OT Long Term Goals  Goal: LTG-By discharge, patient will complete basic self care tasks @ sup-mod I level.   Outcome: Progressing  Goal: LTG-By discharge, patient will perform bathroom transfers @ sup-mod I level.   Outcome: Progressing

## 2025-03-12 NOTE — THERAPY
Physical Therapy   Daily Treatment     Patient Name:  Marlo León  Age:  69 y.o., Sex:  male  Medical Record #:  [id number]  Today's Date: 3/12/2025     Precautions  Precautions: Fall Risk  Fall Risk: Other (see comments) (Left BKA)  Weight Bearing: NWB LE (laterality in comments)  Braces: IPOP, Immobilizer LE (laterality in comments)  Comments: L BKA; L IPOP    Subjective: Patient is doing well, some complaints of left medial knee/proximal tibia pain     Objective:    03/11/25 0931   PT Charge Group   PT Gait Training (Units) 2   PT Therapeutic Exercise (Units) 1   PT Manual Therapy (Units) 1   PT Total Time Spent   PT Individual Total Time Spent (Mins) 60   Roll Left and Right   Level of Assist Supervised   Sit to Lying   Level of Assist Supervised   Lying to Sitting on Side of Bed   Level of Assist Supervised   Sit to Stand   Level of Assist Stand By Assist   Chair/Bed-to-Chair Transfer   Level of Assist Stand By Assist   Ambulation   Level of Assist Contact Guard Assist   Assistive Device Use of FWW   Additional Description Cueing needed;Extra time needed   Distance 3 x 10 in bars, 1 x 10 feet in FWW   Wheelchair   Level of Assist Independent   Type Manual   Wheelchair Distance in halls   Patient Scheduling Information   PT Time 30/60 minutes   Primary or Coverage PT Ran CORTES/Jett   Interdisciplinary Plan of Care Collaboration   Patient Position at End of Therapy Seated     Practiced self wrapping of residual limb     Therapeutic Exercise  Purpose: to improve strength, to improve ROM/flexibility, and to improve functional endurance  Interventions:   Lower body exercises:   Supine program -   Supine bridges, One legged and 2 sets of 10  Hip abduction, Side lying and 2 sets of 10  Straight leg raises, Front, 2 sets of 10, and Bilateral  Short arc quad, 2 sets of 10 and Bilateral  Quadriceps isometrics, 1 set of 10  Nustep, Resistance Level 4 and Time 5 minutes with left residual limb resting on W/C leg rest  "  Description of Intervention(s):     Gait Training  Purpose: to improve functional ambulation, to address gait deviations, and to improve walking endurance  Interventions:   Pre-gait activities  Walking endurance  Normalization of gait   Facilitation of gait    Manual Therapy  Purpose: to improve pain  Interventions:   Soft tissue mobilization  Description of Intervention(s): pes anserine muscles on left LE, followed by passive stretching and then ice for 10 minutes    Assessment: Patient is becoming independent at w/c level for mobility and transfers, some signs of left pes anserine irritation, still limited gait distance due to well leg strength/conditioning     Strengths: Effective communication skills, Pleasant and cooperative, Supportive family, Willingly participates in therapeutic activities, Motivated for self care and independence  Barriers: Impaired activity tolerance, Impaired balance, Constipation, Limited mobility    Plan: Sit to Stand Training, Car Transfer, Gait Training, Endurance, Activity Tolerance, Standing Balance, and Family Training:      DME    PT DME Recommendations  Wheelchair: 20\" Width, Removable/Flip Back Armrests (pt has a wc which he purchased on [employer] however anticipates he will need a new one for DC with left residual limb board and flip back armrest)  Cushion: Standard  Vendor Equipment: [employer]    Goals:     Passport items to be completed:  Get in/out of bed safely, in/out of a vehicle, safely use mobility device, walk or wheel around home/community, navigate up and down stairs, show how to get up/down from the ground, ensure home is accessible, demonstrate HEP, complete caregiver training    Physical Therapy Problems (Active)       Problem: Mobility       Dates: Start:  03/07/25         Goal: STG-Within one week, patient will propel wheelchair household distances: on unit with Mod I.       Dates: Start:  03/07/25    Expected End:  03/24/25            Goal: STG-Within one week, " "patient will ambulate household distance at least 15' with FWW and Min A.       Dates: Start:  03/07/25    Expected End:  03/24/25               Problem: Mobility Transfers       Dates: Start:  03/07/25         Goal: STG-Within one week, patient will sit to stand Min A from 18.5\" surface to FWW or other hold support.       Dates: Start:  03/07/25    Expected End:  03/24/25               Problem: PT-Long Term Goals       Dates: Start:  03/07/25         Goal: LTG-By discharge, patient will propel wheelchair on outside surfaces and inside surfaces with Mod I, distance >500'.       Dates: Start:  03/07/25    Expected End:  03/24/25            Goal: LTG-By discharge, patient will ambulate at least 25' with FWW and SBA.       Dates: Start:  03/07/25    Expected End:  03/24/25            Goal: LTG-By discharge, patient will transfer one surface to another SPV.       Dates: Start:  03/07/25    Expected End:  03/24/25            Goal: LTG-By discharge, patient will perform home exercise program Mod I.       Dates: Start:  03/07/25    Expected End:  03/24/25            Goal: LTG-By discharge, patient will transfer in/out of a car CGA from  level.       Dates: Start:  03/07/25    Expected End:  03/24/25              "

## 2025-03-12 NOTE — CARE PLAN
"  Problem: Mobility  Goal: STG-Within one week, patient will ambulate household distance at least 15' with FWW and Min A.  Outcome: Progressing     Problem: Mobility  Goal: STG-Within one week, patient will propel wheelchair household distances: on unit with Mod I.  Outcome: Met     Problem: Mobility Transfers  Goal: STG-Within one week, patient will sit to stand Min A from 18.5\" surface to FWW or other hold support.  Outcome: Met     "

## 2025-03-12 NOTE — DISCHARGE PLANNING
Case Management/IDT follow up.   Projected dc date: 03/20/2025  IDT continues to recommend IRF level of care as patient continue to make progress with all therapies.     DC needs  Home health:  PT/OT/RN/CNA  DME: W/C     Follow up:   PCP: Jhony Gusman   Other: Dr. Echavarria (ortho)    I met with patient and family providing update from IDT and discussed plan of care.  They are in agreement w/ plan.     Patients spouse is getting bathroom reconstructed. Wife will be calling this CM will eta on ramp and toilet.     CM to call spouse with quote for transport and she is looking into home health     Plan:  Continue to follow

## 2025-03-12 NOTE — CARE PLAN
The patient is Stable - Low risk of patient condition declining or worsening    Shift Goals  Clinical Goals: safety ; DM management  Patient Goals: participate w/ Therapy  Family Goals: no family present    Progress made toward(s) clinical / shift goals:    Problem: Knowledge Deficit - Standard  Goal: Patient and family/care givers will demonstrate understanding of plan of care, disease process/condition, diagnostic tests and medications  Outcome: Progressing   Patient educated on the POC and medications administered. All questions and concerns addressed at this time   Problem: Skin Integrity  Goal: Risk for impaired skin integrity will decrease  Outcome: Progressing   Skin assessed. No new wounds or injuries noted on the patient   Problem: Fall Risk - Rehab  Goal: Patient will remain free from falls  Outcome: Progressing   Bed is locked and in low position. Call light and belongings within reach

## 2025-03-12 NOTE — THERAPY
"Occupational Therapy  Daily Treatment     Patient Name:  Marlo León  Age:  69 y.o., Sex:  male  Medical Record #:  [id number]  Today's Date:  3/12/2025     Subjective: \"I'm just really tired today.\"      Objective:    03/12/25 0701   OT Charge Group   OT Self Care / ADL (Units) 3   OT Therapeutic Exercise (Units) 1   OT Total Time Spent   OT Individual Total Time Spent (Mins) 60   Cognition    Level of Consciousness Alert   Lower Body Dressing   Level of Assist Moderate Assist   Patient Position Standing;Seated   Clothing Item(s) Shorts;Underwear   Additional Description Grab bars   Putting On/Taking Off Footwear   Level of Assist Minimal Assist   Patient Position Seated   Additional Description Immobilizer;Extra time needed   Toilet Transfer   Level of Assist Minimal Assist   Transfer Type Stand Pivot Transfer   Adaptive Equipment Grab bars   Lying to Sitting on Side of Bed   Level of Assist Supervised   Additional Description Use of bed rail   Chair/Bed-to-Chair Transfer   Level of Assist Stand By Assist   Transfer Type Squat Pivot Transfer   Additional Description Extra time needed   Interdisciplinary Plan of Care Collaboration   Patient Position at End of Therapy Seated;Chair Alarm On;Self Releasing Lap Belt Applied;Other (Comments)  (left in dining room for breakfast)         Activities of Daily Living (ADL's)  Purpose: to improve function, safety, and independence with activities of daily living  Interventions:   Lower Body Dressing  Toilet Transfers  Toilet Hygiene    UB strengthening: june 3 sets x 15 reps forward with 40 lbs, 1 set x 10 and another set x 3 backward with 30 lbs and then pt too fatigued to continue.     Pt completed w/c mobility from room to back gym ~200 ft with slow shaq.     Assessment:    Pt tolerated session fair. Pt reported not having slept well last night 2/2 pain in LLE- stabbing pain. Pt reported that he is unable to take nerve pain medications but that re-wrapping his stump " 2 in higher helped with pain and taking tylenol. Pt reported this was the first time he's had this kind of pain so far. Increased time taken to educate pt on desensitization for L stump. Pt very fatigued and required increased assist with LBD and toileting.     Strengths: Able to follow instructions, Alert and oriented, Good carryover of learning, Independent prior level of function, Manages pain appropriately, Motivated for self care and independence, Pleasant and cooperative, Supportive family, Willingly participates in therapeutic activities  Barriers: Decreased endurance, Fatigue, Generalized weakness, Impaired activity tolerance, Impaired balance, Limited mobility    Plan:    ADLs: showering, dressing, toileting, IADLs:, Transfers, Strengthening, Endurance, Functional Mobility, Sitting Balance, Standing Balance, and Family Training:     Occupational Therapy Goals (Active)       Problem: Bathing       Dates: Start:  03/07/25         Goal: STG-Within one week, patient will bathe with CGA.        Dates: Start:  03/07/25    Expected End:  03/24/25               Problem: Dressing       Dates: Start:  03/07/25         Goal: STG-Within one week, patient will dress LB with CGA.        Dates: Start:  03/07/25    Expected End:  03/24/25               Problem: OT Long Term Goals       Dates: Start:  03/07/25         Goal: LTG-By discharge, patient will complete basic self care tasks @ sup-mod I level.        Dates: Start:  03/07/25    Expected End:  03/24/25            Goal: LTG-By discharge, patient will perform bathroom transfers @ sup-mod I level.        Dates: Start:  03/07/25    Expected End:  03/24/25               Problem: Toileting       Dates: Start:  03/07/25         Goal: STG-Within one week, patient will complete toileting tasks with min A.        Dates: Start:  03/07/25    Expected End:  03/24/25

## 2025-03-12 NOTE — PROGRESS NOTES
"  Physical Medicine & Rehabilitation Progress Note    Encounter Date: 3/12/2025    Chief Complaint: L BKA    Interval Events (Subjective):  VS: BP better controlled after recent medication changes  Last BM: 3/11  Bladder: Voiding  Schedule Meds Not Given: None   PRN Meds Taken: Tylenol     Patient seen and examined in therapy gym. He is feeling well. Doesn't have any complaints medically at this time. Isnt getting dizzy. No new or worsening pain at his BKA site.       ROS: 14 point ROS negative unless otherwise specified in the HPI    Objective:  VITAL SIGNS: BP (!) 152/69   Pulse 66   Temp 36.8 °C (98.2 °F) (Temporal)   Resp 18   Ht 1.778 m (5' 10\")   Wt 110 kg (241 lb 8 oz)   SpO2 98%   BMI 34.65 kg/m²     GEN: No apparent distress  HEENT: Head normocephalic, atraumatic.  Sclera nonicteric bilaterally, no ocular discharge appreciated bilaterally.  CV: Extremities warm and well-perfused, no peripheral edema appreciated bilaterally.  PULMONARY: Breathing nonlabored on room air, no respiratory accessory muscle use.  Not requiring supplemental oxygen.  SKIN:   3/9    PSYCH: Mood and affect within normal limits.  NEURO: Awake alert.  Conversational.  Logical thought content.      Laboratory Values:  Recent Results (from the past 72 hours)   POCT glucose device results    Collection Time: 03/09/25  7:47 AM   Result Value Ref Range    POC Glucose, Blood 132 (H) 65 - 99 mg/dL   POCT glucose device results    Collection Time: 03/09/25 10:43 AM   Result Value Ref Range    POC Glucose, Blood 172 (H) 65 - 99 mg/dL   POCT glucose device results    Collection Time: 03/09/25  5:38 PM   Result Value Ref Range    POC Glucose, Blood 128 (H) 65 - 99 mg/dL   POCT glucose device results    Collection Time: 03/09/25 10:20 PM   Result Value Ref Range    POC Glucose, Blood 130 (H) 65 - 99 mg/dL   Basic Metabolic Panel    Collection Time: 03/10/25  6:30 AM   Result Value Ref Range    Sodium 141 135 - 145 mmol/L    Potassium 4.9 3.6 " - 5.5 mmol/L    Chloride 112 96 - 112 mmol/L    Co2 20 20 - 33 mmol/L    Glucose 118 (H) 65 - 99 mg/dL    Bun 51 (H) 8 - 22 mg/dL    Creatinine 2.29 (H) 0.50 - 1.40 mg/dL    Calcium 8.6 8.5 - 10.5 mg/dL    Anion Gap 9.0 7.0 - 16.0   CBC WITHOUT DIFFERENTIAL    Collection Time: 03/10/25  6:30 AM   Result Value Ref Range    WBC 8.8 4.8 - 10.8 K/uL    RBC 3.27 (L) 4.70 - 6.10 M/uL    Hemoglobin 9.4 (L) 14.0 - 18.0 g/dL    Hematocrit 30.6 (L) 42.0 - 52.0 %    MCV 93.6 81.4 - 97.8 fL    MCH 28.7 27.0 - 33.0 pg    MCHC 30.7 (L) 32.3 - 36.5 g/dL    RDW 50.8 (H) 35.9 - 50.0 fL    Platelet Count 277 164 - 446 K/uL    MPV 10.6 9.0 - 12.9 fL   ESTIMATED GFR    Collection Time: 03/10/25  6:30 AM   Result Value Ref Range    GFR (CKD-EPI) 30 (A) >60 mL/min/1.73 m 2   POCT glucose device results    Collection Time: 03/10/25  8:06 AM   Result Value Ref Range    POC Glucose, Blood 133 (H) 65 - 99 mg/dL   POCT glucose device results    Collection Time: 03/10/25 11:31 AM   Result Value Ref Range    POC Glucose, Blood 177 (H) 65 - 99 mg/dL   POCT glucose device results    Collection Time: 03/10/25  5:06 PM   Result Value Ref Range    POC Glucose, Blood 121 (H) 65 - 99 mg/dL       Medications:  Scheduled Medications   Medication Dose Frequency    hydrALAZINE  50 mg Q8HRS    insulin GLARGINE  38 Units Q EVENING    carvedilol  6.25 mg BID WITH MEALS    insulin lispro  2-12 Units 4X/DAY [facility] [address]    Pharmacy Consult Request  1 Each PHARMACY TO DOSE    omeprazole  20 mg DAILY    atorvastatin  40 mg Q EVENING    hydroCHLOROthiazide  25 mg Q DAY    lisinopril  40 mg Q EVENING    sodium bicarbonate  650 mg BID    levothyroxine  175 mcg AM ES    ceFAZolin  2 g Q8HRS    Followed by    [START ON 3/15/2025] linezolid  600 mg Q12HRS    heparin  5,000 Units Q8HRS    aspirin  81 mg DAILY     PRN medications: insulin lispro **AND** POC blood glucose manual result **AND** NOTIFY MD and PharmD **AND** Administer 20 grams of glucose (approximately 8 ounces of  fruit juice) every 15 minutes PRN FSBG less than 70 mg/dL **AND** dextrose bolus, lidocaine, hydrALAZINE, lactulose, docusate sodium, bisacodyl EC, magnesium hydroxide, sodium phosphate, carboxymethylcellulose, benzocaine-menthol, mag hydrox-al hydrox-simeth, ondansetron **OR** ondansetron, traZODone, sodium chloride, traMADol **OR** traMADol, Respiratory Therapy Consult, acetaminophen    Diet:  Current Diet Order   Procedures    Diet Order Diet: Consistent CHO (Diabetic) (HIGH PROTEIN AND VEGETABLES. DAIRY OK. LIMIT CARBOHYDRATES); Nutrient modifications: (optional): High Protein       Medical Decision Making and Plan:  L BKA 3/2 Dr. Echavarria at [facility]  MSSA Bacteremia  TTE withough valvular lesions or endocarditis.   PT and OT for mobility and ADLs. Per guidelines, 15 hours per week between PT, OT and/or SLP.  Follow-up Ortho  Follow-up ID  Follow-up Vascular Surgery (Dr. Calderon) - referral placed per hospitalist at [facility]  Complete IV Cefazolin 3/14 --> Linezolid through 3/28   Tunneled cath placement 3/4     Hypertension - Continue Lisinopril, HCTZ, Coreg. 3/7 Elevated but improved in 150's monitor. 3/8 BP improving without med changes, monitor. 3/11 Continue monitor BP - patient reports feeling symptoms with low BP in early AM or late PM. Coreg was 12.5 BID hospitalist note indicates should be 6.25mg BID, will change today. 3/12 BP better controlled with increase in Hydralazine.      ABLA - 3/7 9 Monitor. 3/8 Better 9.8.     Leukocytosis - Mild monitor 3/7. 3/8 Mild increase 10.9--> 11.1. UA negative. Incision c/d/I. Afebrile and not on scheduled tylenol nor taking PRN. 3/10 Resolved.     Thrombocytosis - WNL 3/7, 3/10     Chronic Combined HR, LVH and Pulm HTN - EF 50-55% (Had been 65-70%). Follows with Cards Dr. Rodriguez     H/o CAD - On ASA and Statin      Hypothyroidism - Continue Synthroid      DARRIAN - RT consult     H/o Gastric Bypass - Has frequent BM due to this.      CKD Stage IV - Avoid  Nephrotoxins. GFR 29 baseline. Managed by Nephro Dr. Michaels. 3/10 Stable, monitor trend    Elevated TSH - Free T4 WNL.     Morbid Obesity - Nutrition consult      Type II Diabetes Mellitus with Hyperglycemia - Currently on SSI and Lantus. Hgb A1c 6.6     Pain - Declines pain. Has Tylenol and Tramadol PRN.     Bowel - Patient on Senna-docusate for constipation prophylaxis.      Bladder - TV/PVR/BS PRN           Upcoming Labs/imaging: 3/13     DVT PROPHYLAXIS: Heparin --> D/c ASA 325mg BID     HOSPITALIST FOLLOWING: Yes - d/w hospitalist 3/12     CODE STATUS: FULL - c/f with patient on admission      DISPO: Home with spouse      GIANCARLO: 3/20     MEDS SENT TO: TBD     DISCHARGE SPECIALIST FOLLOW UP:   Ortho  ID  Vasc Surg  Cardiology  Nephrology      Patient to scheduled follow up with their PCP within 2 weeks from discharge from the [facility].   ____________________________________    Dr. Tracy Tong DO, MS  [facility] - Physical Medicine & Rehabilitation   ____________________________________    _____________________________________  Interdisciplinary Team Conference   Most recent IDT on 3/12/2025    I, Dr. Tracy Tong DO, MS, was present and led the interdisciplinary team conference on 3/12/2025.  I led the IDT conference and agree with the IDT conference documentation and plan of care as noted below.       Nursing:  Diet Current Diet Order   Procedures    Diet Order Diet: Consistent CHO (Diabetic) (HIGH PROTEIN AND VEGETABLES. DAIRY OK. LIMIT CARBOHYDRATES); Nutrient modifications: (optional): High Protein       Eating ADL Independent     % of Last Meal  Oral Nutrition: *  * Meal *  *, Dinner, Between % Consumed   Sleep    Bowel Last BM: 03/11/25   Bladder    Continent  Managed Phantom pain      Physical Therapy:  Bed Mobility Roll Left and Right: Stand By Assist  Sit to Lying: Stand By Assist  Lying to Sitting: Stand By Assist   Transfers Sit to Stand: Contact Guard  Assist  Chair/Bed to Chair: Stand By Assist  Toilet: Maximal Assist  Car:     Mobility Ambulation: Minimal Assist  Device: Use of FWW (and parallel bars)  Ambulation Distance: 3 x 10 in bars, 1 x 8 feet in FWW  Wheelchair: Supervised  Type: Manual  Wheelchair Distance: 150' x 2 (including outside on sidewalks, performing tight turns and up/down small inclines)   Stairs/Curbs Stairs: Unable to Participate  Stairs Amount:    Curbs: Unable to Participate   Improving   RLE weak and deconditioned  Home w/c level  Very short distance ambulation expected until prosthesis fitting.     Occupational Therapy:  Oral Hygiene Set Up   Grooming Set Up   Shower/Bathing Minimal Assist   UB Dressing Set Up   LB Dressing Minimal Assist, Moderate Assist  Total Assist   Toileting Transfer: Maximal Assist  Hygiene: Maximal Assist   Shower & Transfer Minimal Assist         Respiratory Therapy:  O2 (LPM): 0  O2 Delivery Device: BIPAP    Case Management:  Continues to work on disposition and DME needs.        Discharge Date/Disposition:  3/20  _____________________________________    Total time:  52 minutes. Time spent included pre-rounding review of vitals and tests, unit/floor time, face-to-face time with the patient including physical examination, care coordination, counseling of patient and/or family, ordering medications/procedures/tests, discussion with CM, PT, OT, SLP and/or other healthcare providers, and documentation in the electronic medical record.

## 2025-03-12 NOTE — THERAPY
Occupational Therapy  Daily Treatment     Patient Name:  Marlo León  Age:  69 y.o., Sex:  male  Medical Record #:  [id number]  Today's Date:  3/12/2025     Subjective: Pt up in chair, willing to work with OT.      Objective:    03/11/25 1431   OT Charge Group   OT Therapy Activity (Units) 1   OT Therapeutic Exercise (Units) 3   OT Total Time Spent   OT Individual Total Time Spent (Mins) 60   Precautions   Weight Bearing NWB LE (laterality in comments)   Braces IPOP;Immobilizer LE (laterality in comments)   Comments L BKA; L IPOP         Therapeutic Exercise  Purpose: to improve strength and to improve functional endurance  Interventions:   Upper body exercises,   Seated program -   Front arm raise, 3 sets of 10 and Weight one 6lb dumbbell held with both hands  Shoulder press, 2 sets of 10 and Weight one time with 6lb held with both hands, one time with 6lb in one hand for 8 reps   Bicep curls, 3 sets of 10 and Weight 6lb  Other, 3x10 seated twists with 6lb dumbbell  Description of Intervention(s): seated at edge of mat    Gross Motor Skills  Purpose: to improve function, safety, and independence with tasks requiring gross motor skills  Interventions:   Other: seated edge of mat to hit balloon back and forth  Description of Intervention(s): focusing on weight shift and compensating for missing limb in sitting w/o back support.     Reviewed Passport to [place]    Assessment:    Pt had good tolerance to interventions. Demonstrates good balance in sitting w/o back support and able to compensate for limb loss as needed. Endorsed some phantom sensations that have been waning.   Strengths: Able to follow instructions, Alert and oriented, Good carryover of learning, Independent prior level of function, Manages pain appropriately, Motivated for self care and independence, Pleasant and cooperative, Supportive family, Willingly participates in therapeutic activities  Barriers: Decreased endurance, Fatigue, Generalized  weakness, Impaired activity tolerance, Impaired balance, Limited mobility    Plan:    ADLs: showering, dressing, toileting, IADLs:, Transfers, Strengthening, Endurance, Functional Mobility, Sitting Balance, Standing Balance, and Family Training:      DME       Passport items to be completed:  Perform bathroom transfers, complete dressing, complete feeding, get ready for the day, prepare a simple meal, participate in household tasks, adapt home for safety needs, demonstrate home exercise program, complete caregiver training     Occupational Therapy Goals (Active)       Problem: Bathing       Dates: Start:  03/07/25         Goal: STG-Within one week, patient will bathe with CGA.        Dates: Start:  03/07/25    Expected End:  03/24/25               Problem: Dressing       Dates: Start:  03/07/25         Goal: STG-Within one week, patient will dress LB with CGA.        Dates: Start:  03/07/25    Expected End:  03/24/25               Problem: OT Long Term Goals       Dates: Start:  03/07/25         Goal: LTG-By discharge, patient will complete basic self care tasks @ sup-mod I level.        Dates: Start:  03/07/25    Expected End:  03/24/25            Goal: LTG-By discharge, patient will perform bathroom transfers @ sup-mod I level.        Dates: Start:  03/07/25    Expected End:  03/24/25               Problem: Toileting       Dates: Start:  03/07/25         Goal: STG-Within one week, patient will complete toileting tasks with min A.        Dates: Start:  03/07/25    Expected End:  03/24/25

## 2025-03-12 NOTE — CARE PLAN
"The patient is Stable - Low risk of patient condition declining or worsening    Shift Goals  Clinical Goals: safety   Patient Goals: Sleep and Pain Management    Patient is not progressing towards the following goals:    Problem: Pain Management  Goal: Pain level will decrease to patient's comfort goal  Outcome: Not Progressing  Note: Patient having sharp shooting pains, along with throbbing and \"hot\" sensations to BKA site and surrounding areas. Pain management discussed with patient. Patient stated that he only tolerates Tylenol and the Tramadol that is ordered PRN makes him loopy and constipated. Tylenol available q4h PRN and administered when available. Patient states that he has an intolerance/allergy to Gabapentin when nerve pain medication discussed. - States that it gives him tremors in his extremities and eventually makes him shake all over (This information updated in patients allergy list). Patient states that he does not recall ever taking any other neuropathy medications other than Gabapentin which was tapered down and discontinued due to the shaking. Patient has tried elevation, ice packs, personal massage, loosening and re-adjusting iPop immobilizer, and re-wrapping as alternate pain relief interventions. Throbbing subsided minimally with re-wrapping, but the sharp shooting, burning or \"hotspot\" sensations still are randomly persistent and sound like neuropathy in nature. Will continue to treat patient alternating pain interventions he is comfortable with.     "

## 2025-03-12 NOTE — THERAPY
"Occupational Therapy  Daily Treatment     Patient Name:  Marlo León  Age:  69 y.o., Sex:  male  Medical Record #:  [id number]  Today's Date:  3/12/2025     Subjective: \"I was up all night\"     Objective:    03/12/25 1231   OT Charge Group   OT Therapy Activity (Units) 2   OT Total Time Spent   OT Individual Total Time Spent (Mins) 30   Toilet Transfer   Level of Assist Contact Guard Assist   Transfer Type Squat Pivot Transfer   Adaptive Equipment Grab bars   Assistive Device   (pt did not want to use FWW)         Therapeutic Exercise  Purpose: to improve strength  Interventions:   Upper body exercises,   Seated program -   Lat pull, 1 set of 10, Bilateral, and Weight 30lb  Bilateral row, 1 set of 10 and Weight 30lb  Description of Intervention(s): done w/Equalizer cable machine    Activities of Daily Living (ADL's)  Purpose: to improve function, safety, and independence with activities of daily living  Interventions:   Toilet Transfers  Description of Intervention(s): Practiced sit to stands in // bars and sit to stand to turning right and left. Pt had less difficulty turning right than turning left. Discussed placing toilet in position for pt to turn to the right or lateral scoot.    Assessment:    Pt reporting increased tiredness due to not sleeping well last night, but he was able to tolerate all mobility well with adequate rest breaks. Pt will benefit from bars in bathroom and seat option in shower.   Strengths: Able to follow instructions, Alert and oriented, Good carryover of learning, Independent prior level of function, Manages pain appropriately, Motivated for self care and independence, Pleasant and cooperative, Supportive family, Willingly participates in therapeutic activities  Barriers: Decreased endurance, Fatigue, Generalized weakness, Impaired activity tolerance, Impaired balance, Limited mobility    Plan:    ADLs: toileting, dressing, showering, IADLs:, Transfers, Sit to Stand Training, " Strengthening, Endurance, Activity Tolerance, Functional Mobility, Sitting Balance, and Standing Balance    DME       Passport items to be completed:  Perform bathroom transfers, complete dressing, complete feeding, get ready for the day, prepare a simple meal, participate in household tasks, adapt home for safety needs, demonstrate home exercise program, complete caregiver training     Occupational Therapy Goals (Active)       Problem: Bathing       Dates: Start:  03/07/25         Goal: STG-Within one week, patient will bathe with CGA.        Dates: Start:  03/07/25    Expected End:  03/24/25               Problem: Dressing       Dates: Start:  03/07/25         Goal: STG-Within one week, patient will dress LB with CGA.        Dates: Start:  03/07/25    Expected End:  03/24/25               Problem: OT Long Term Goals       Dates: Start:  03/07/25         Goal: LTG-By discharge, patient will complete basic self care tasks @ sup-mod I level.        Dates: Start:  03/07/25    Expected End:  03/24/25            Goal: LTG-By discharge, patient will perform bathroom transfers @ sup-mod I level.        Dates: Start:  03/07/25    Expected End:  03/24/25               Problem: Toileting       Dates: Start:  03/07/25         Goal: STG-Within one week, patient will complete toileting tasks with min A.        Dates: Start:  03/07/25    Expected End:  03/24/25

## 2025-03-13 ENCOUNTER — APPOINTMENT (OUTPATIENT)
Dept: PHYSICAL THERAPY | Facility: REHABILITATION | Age: 70
DRG: 560 | End: 2025-03-13
Attending: PHYSICAL MEDICINE & REHABILITATION
Payer: MEDICARE

## 2025-03-13 ENCOUNTER — APPOINTMENT (OUTPATIENT)
Dept: OCCUPATIONAL THERAPY | Facility: REHABILITATION | Age: 70
DRG: 560 | End: 2025-03-13
Attending: PHYSICAL MEDICINE & REHABILITATION
Payer: MEDICARE

## 2025-03-13 LAB
ANION GAP SERPL CALC-SCNC: 8 MMOL/L (ref 7–16)
BUN SERPL-MCNC: 52 MG/DL (ref 8–22)
CALCIUM SERPL-MCNC: 8.4 MG/DL (ref 8.5–10.5)
CHLORIDE SERPL-SCNC: 113 MMOL/L (ref 96–112)
CO2 SERPL-SCNC: 19 MMOL/L (ref 20–33)
CREAT SERPL-MCNC: 2.34 MG/DL (ref 0.5–1.4)
ERYTHROCYTE [DISTWIDTH] IN BLOOD BY AUTOMATED COUNT: 53.8 FL (ref 35.9–50)
GFR SERPLBLD CREATININE-BSD FMLA CKD-EPI: 30 ML/MIN/1.73 M 2
GLUCOSE SERPL-MCNC: 95 MG/DL (ref 65–99)
HCT VFR BLD AUTO: 27.8 % (ref 42–52)
HGB BLD-MCNC: 8.4 G/DL (ref 14–18)
MCH RBC QN AUTO: 28.9 PG (ref 27–33)
MCHC RBC AUTO-ENTMCNC: 30.2 G/DL (ref 32.3–36.5)
MCV RBC AUTO: 95.5 FL (ref 81.4–97.8)
PLATELET # BLD AUTO: 219 K/UL (ref 164–446)
PMV BLD AUTO: 11.8 FL (ref 9–12.9)
POTASSIUM SERPL-SCNC: 4.6 MMOL/L (ref 3.6–5.5)
RBC # BLD AUTO: 2.91 M/UL (ref 4.7–6.1)
SODIUM SERPL-SCNC: 140 MMOL/L (ref 135–145)
WBC # BLD AUTO: 6.2 K/UL (ref 4.8–10.8)

## 2025-03-13 PROCEDURE — 97116 GAIT TRAINING THERAPY: CPT

## 2025-03-13 PROCEDURE — 36415 COLL VENOUS BLD VENIPUNCTURE: CPT

## 2025-03-13 PROCEDURE — 97530 THERAPEUTIC ACTIVITIES: CPT

## 2025-03-13 PROCEDURE — 80048 BASIC METABOLIC PNL TOTAL CA: CPT

## 2025-03-13 PROCEDURE — 97535 SELF CARE MNGMENT TRAINING: CPT

## 2025-03-13 PROCEDURE — 700102 HCHG RX REV CODE 250 W/ 637 OVERRIDE(OP): Performed by: PHYSICAL MEDICINE & REHABILITATION

## 2025-03-13 PROCEDURE — 700105 HCHG RX REV CODE 258: Performed by: PHYSICAL MEDICINE & REHABILITATION

## 2025-03-13 PROCEDURE — 97110 THERAPEUTIC EXERCISES: CPT

## 2025-03-13 PROCEDURE — A9270 NON-COVERED ITEM OR SERVICE: HCPCS | Performed by: PHYSICAL MEDICINE & REHABILITATION

## 2025-03-13 PROCEDURE — 700111 HCHG RX REV CODE 636 W/ 250 OVERRIDE (IP): Performed by: PHYSICAL MEDICINE & REHABILITATION

## 2025-03-13 PROCEDURE — 770010 HCHG ROOM/CARE - REHAB SEMI PRIVAT*

## 2025-03-13 PROCEDURE — 99232 SBSQ HOSP IP/OBS MODERATE 35: CPT | Performed by: HOSPITALIST

## 2025-03-13 PROCEDURE — 700102 HCHG RX REV CODE 250 W/ 637 OVERRIDE(OP): Performed by: HOSPITALIST

## 2025-03-13 PROCEDURE — 85027 COMPLETE CBC AUTOMATED: CPT

## 2025-03-13 PROCEDURE — A9270 NON-COVERED ITEM OR SERVICE: HCPCS | Performed by: HOSPITALIST

## 2025-03-13 RX ADMIN — INSULIN LISPRO 2 UNITS: 100 INJECTION, SOLUTION INTRAVENOUS; SUBCUTANEOUS at 11:23

## 2025-03-13 RX ADMIN — SODIUM BICARBONATE 650 MG: 650 TABLET ORAL at 07:55

## 2025-03-13 RX ADMIN — ASPIRIN 81 MG: 81 TABLET, COATED ORAL at 07:55

## 2025-03-13 RX ADMIN — HEPARIN SODIUM 5000 UNITS: 5000 INJECTION, SOLUTION INTRAVENOUS; SUBCUTANEOUS at 21:57

## 2025-03-13 RX ADMIN — LISINOPRIL 40 MG: 20 TABLET ORAL at 20:46

## 2025-03-13 RX ADMIN — CARVEDILOL 6.25 MG: 3.12 TABLET, FILM COATED ORAL at 17:34

## 2025-03-13 RX ADMIN — CEFAZOLIN 2 G: 2 INJECTION, POWDER, FOR SOLUTION INTRAMUSCULAR; INTRAVENOUS at 06:02

## 2025-03-13 RX ADMIN — INSULIN LISPRO 2 UNITS: 100 INJECTION, SOLUTION INTRAVENOUS; SUBCUTANEOUS at 20:47

## 2025-03-13 RX ADMIN — HEPARIN SODIUM 5000 UNITS: 5000 INJECTION, SOLUTION INTRAVENOUS; SUBCUTANEOUS at 05:57

## 2025-03-13 RX ADMIN — HYDROCHLOROTHIAZIDE 25 MG: 25 TABLET ORAL at 05:56

## 2025-03-13 RX ADMIN — CEFAZOLIN 2 G: 2 INJECTION, POWDER, FOR SOLUTION INTRAMUSCULAR; INTRAVENOUS at 22:09

## 2025-03-13 RX ADMIN — OMEPRAZOLE 20 MG: 20 CAPSULE, DELAYED RELEASE ORAL at 07:55

## 2025-03-13 RX ADMIN — HYDRALAZINE HYDROCHLORIDE 50 MG: 50 TABLET ORAL at 05:56

## 2025-03-13 RX ADMIN — HEPARIN SODIUM 5000 UNITS: 5000 INJECTION, SOLUTION INTRAVENOUS; SUBCUTANEOUS at 14:27

## 2025-03-13 RX ADMIN — HYDRALAZINE HYDROCHLORIDE 50 MG: 50 TABLET ORAL at 14:27

## 2025-03-13 RX ADMIN — CEFAZOLIN 2 G: 2 INJECTION, POWDER, FOR SOLUTION INTRAMUSCULAR; INTRAVENOUS at 14:26

## 2025-03-13 RX ADMIN — LEVOTHYROXINE SODIUM 175 MCG: 0.05 TABLET ORAL at 05:56

## 2025-03-13 RX ADMIN — SODIUM BICARBONATE 650 MG: 650 TABLET ORAL at 20:46

## 2025-03-13 RX ADMIN — ATORVASTATIN CALCIUM 40 MG: 40 TABLET, FILM COATED ORAL at 20:46

## 2025-03-13 RX ADMIN — ACETAMINOPHEN 650 MG: 325 TABLET ORAL at 20:45

## 2025-03-13 RX ADMIN — HYDRALAZINE HYDROCHLORIDE 50 MG: 50 TABLET ORAL at 21:58

## 2025-03-13 RX ADMIN — CARVEDILOL 6.25 MG: 3.12 TABLET, FILM COATED ORAL at 07:55

## 2025-03-13 ASSESSMENT — PAIN DESCRIPTION - PAIN TYPE
TYPE: ACUTE PAIN
TYPE: ACUTE PAIN

## 2025-03-13 NOTE — DISCHARGE PLANNING
[facility] would like [facility] to bring patient home on d/c date. Provided quote.     [facility] will be ready by d/c date.

## 2025-03-13 NOTE — DISCHARGE PLANNING
CM//Discharge :    The following has been ordered:   Home health:     [facility]                Disciplines ordered: RN, OT, PT, Aid  Status: Sent

## 2025-03-13 NOTE — THERAPY
"Occupational Therapy  Daily Treatment     Patient Name:  Marlo León  Age:  69 y.o., Sex:  male  Medical Record #:  [id number]  Today's Date:  3/13/2025     Subjective: \"I was supposed to have glaucoma surgery before this\"     Objective:   03/13/25 1231   OT Charge Group   OT Therapeutic Exercise (Units) 2   OT Total Time Spent   OT Individual Total Time Spent (Mins) 30   Precautions   Precautions Fall Risk   Weight Bearing NWB LE (laterality in comments)  (LEFT)   Comments Left BKA, left residual limb protector on when out of bed         Therapeutic Exercise  Purpose: to improve strength and to improve functional endurance  Interventions:   MotoMed UE Cycle, Gear Level 5-8 and Time four 3 min increments with rest breaks in between  Description of Intervention(s): Completed 3 min increments. For 15 seconds pt \"peddled\" as fast as possible, then 1 min slow. Pt did not show significant increase in pulse (85-88 on pulse oximeter) but demonstrated increased respiratory rate and self reported feeling exertion from exercise.       03/13/25 1501   OT Charge Group   OT Self Care / ADL (Units) 1   OT Therapy Activity (Units) 1   OT Therapeutic Exercise (Units) 2   OT Total Time Spent   OT Individual Total Time Spent (Mins) 60   Precautions   Precautions Fall Risk   Weight Bearing NWB LE (laterality in comments)  (LEFT)   Comments Left BKA, left residual limb protector on when out of bed   Lower Body Dressing   Level of Assist Supervised   Patient Position Seated;Standing   Clothing Item(s) Shorts   Additional Description Grab bars   Putting On/Taking Off Footwear   Level of Assist Supervised   Patient Position Seated   Shoe Type   (Velcro shoes)   Adaptive Equipment   (dressing stick)   Additional Description Immobilizer   Toilet Transfer   Level of Assist Supervised   Transfer Type Squat Pivot Transfer   Adaptive Equipment Grab bars   Toileting Hygiene   Additional Description   (pt able to demonstrate weight shift using WC " arm rest on right side)   IADL Assessments   Kitchen Mobility Stand by Assist  (collecting items at different levels. Mod I from WC; Verbal cues+SBA to stand to retrieve Discussed home setup. Pt has island in kitchen that limits WC mobility, but he will be able to access most of the kitchen via WC)         Therapeutic Exercise  Purpose: to improve strength, to improve ROM/flexibility, and to improve functional endurance  Interventions:   Upper body exercises,   Seated program -   Chest press, 2 sets of 10, Bilateral, and Weight 25lb  Bilateral row, 2 sets of 10, Bilateral, and Weight 25lb  Bicep curls, 2 sets of 10 and Bilateral and weight R: 10lb, L: 15lb  Tricep press, 2 sets of 10 and Bilateral 2 sets of 10 and Bilateral and Weight 50lb  Other, Seated Oblique Twists, 1 set of 10, Bilateral, 5lb dumbbell held with both hands  Description of Intervention(s): done from WC using Equalizer cable machine for first three exercises and rickshaw press for fourth exercise    Assessment:    Pt demonstrated good tolerance to UBE motomed endurance activity during first session. Would benefit from more structured interval program on motomed in order to encourage cardiovascular endurance and promote overall healing. Pt has shown improvements in ADL and is able to dress and toilet w/o physical assist (compared to min-mod A performance on eval).   Strengths: Able to follow instructions, Alert and oriented, Good carryover of learning, Independent prior level of function, Manages pain appropriately, Motivated for self care and independence, Pleasant and cooperative, Supportive family, Willingly participates in therapeutic activities  Barriers: Decreased endurance, Fatigue, Generalized weakness, Impaired activity tolerance, Impaired balance, Limited mobility    Plan:    ADLs: shower, IADLs:, Transfers, Strengthening, Endurance, Activity Tolerance, Functional Mobility, Sitting Balance, and Family Training:      DME       Passport  items to be completed:  Perform bathroom transfers, complete dressing, complete feeding, get ready for the day, prepare a simple meal, participate in household tasks, adapt home for safety needs, demonstrate home exercise program, complete caregiver training     Occupational Therapy Goals (Active)       Problem: Bathing       Dates: Start:  03/07/25         Goal: STG-Within one week, patient will bathe with CGA.        Dates: Start:  03/07/25    Expected End:  03/24/25               Problem: Dressing       Dates: Start:  03/07/25         Goal: STG-Within one week, patient will dress LB with CGA.        Dates: Start:  03/07/25    Expected End:  03/24/25               Problem: OT Long Term Goals       Dates: Start:  03/07/25         Goal: LTG-By discharge, patient will complete basic self care tasks @ sup-mod I level.        Dates: Start:  03/07/25    Expected End:  03/24/25            Goal: LTG-By discharge, patient will perform bathroom transfers @ sup-mod I level.        Dates: Start:  03/07/25    Expected End:  03/24/25               Problem: Toileting       Dates: Start:  03/07/25         Goal: STG-Within one week, patient will complete toileting tasks with min A.        Dates: Start:  03/07/25    Expected End:  03/24/25

## 2025-03-13 NOTE — THERAPY
Physical Therapy   Daily Treatment     Patient Name:  Marlo León  Age:  69 y.o., Sex:  male  Medical Record #:  [id number]  Today's Date: 3/12/2025     Precautions  Precautions: Fall Risk  Fall Risk: Other (see comments) (Left BKA)  Weight Bearing: NWB LE (laterality in comments)  Braces: IPOP, Immobilizer LE (laterality in comments)  Comments: L BKA; L IPOP    Subjective: Patient had some phantom limb pains last night, medial knee pains persist as well     Objective:    03/12/25 0901   PT Charge Group   PT Gait Training (Units) 1   PT Therapeutic Exercise (Units) 1   PT Total Time Spent   PT Individual Total Time Spent (Mins) 30   Roll Left and Right   Level of Assist Independent   Sit to Lying   Level of Assist Supervised   Lying to Sitting on Side of Bed   Level of Assist Supervised   Sit to Stand   Level of Assist Stand By Assist   Assistive Devices Use of FWW   Chair/Bed-to-Chair Transfer   Level of Assist Stand By Assist;Contact Guard Assist  (SBA for reach pivot, CGA for hop/pivot with FWW)   Transfer Type Squat Pivot Transfer;Stand Step Transfer   Assistive Devices Use of FWW;No AD   Car Transfer   Level of Assist Contact Guard Assist   Transfer Type Stand Step Transfer   Assistive Device Use of FWW   Ambulation   Level of Assist Contact Guard Assist   Assistive Device Use of FWW   Distance 8 feet with 1 90 degree turn - right knee buckled at 8 feet and required moderate assist to prevent fall , 2 x 10 in parallel bars   Patient Scheduling Information   PT Time 30/60 minutes   Primary or Coverage PT Ran CORTES/Jett   Interdisciplinary Plan of Care Collaboration   Patient Position at End of Therapy Seated       Therapeutic Exercise  Purpose: to improve strength, to improve ROM/flexibility, and to improve functional endurance  Interventions:   Lower body exercises:   Supine program -   Supine bridges, One legged and 2 sets of 10  Straight leg raises, Front, 2 sets of 10, and Bilateral  Short arc quad, 2 sets of  "10 and Bilateral  Quadriceps isometrics, 1 set of 10 and Bilateral  Seated program -   Long arc quad, 3 sets of 10, Right, and Weight 5#  Description of Intervention(s):     Assessment: Right leg/quad weakness is definite limiting factor to performing safe hopping for short distance ambulation, needs further training on self wrapping for residual limb     Strengths: Effective communication skills, Pleasant and cooperative, Supportive family, Willingly participates in therapeutic activities, Motivated for self care and independence  Barriers: Impaired activity tolerance, Impaired balance, Constipation, Limited mobility    Plan: residual limb self wrapping instruction, LE strengthening, gait training, car transfers     DME    PT DME Recommendations  Wheelchair: 20\" Width, Removable/Flip Back Armrests (pt has a wc which he purchased on [employer] however anticipates he will need a new one for DC with left residual limb board and flip back armrest)  Cushion: Standard  Vendor Equipment: IPOP    Goals:     Passport items to be completed:  Get in/out of bed safely, in/out of a vehicle, safely use mobility device, walk or wheel around home/community, navigate up and down stairs, show how to get up/down from the ground, ensure home is accessible, demonstrate HEP, complete caregiver training    Physical Therapy Problems (Active)       Problem: Mobility       Dates: Start:  03/07/25         Goal: STG-Within one week, patient will ambulate household distance at least 15' with FWW and Min A.       Dates: Start:  03/07/25    Expected End:  03/24/25               Problem: PT-Long Term Goals       Dates: Start:  03/07/25         Goal: LTG-By discharge, patient will propel wheelchair on outside surfaces and inside surfaces with Mod I, distance >500'.       Dates: Start:  03/07/25    Expected End:  03/24/25            Goal: LTG-By discharge, patient will ambulate at least 25' with FWW and SBA.       Dates: Start:  03/07/25    Expected " End:  03/24/25            Goal: LTG-By discharge, patient will transfer one surface to another SPV.       Dates: Start:  03/07/25    Expected End:  03/24/25            Goal: LTG-By discharge, patient will perform home exercise program Mod I.       Dates: Start:  03/07/25    Expected End:  03/24/25            Goal: LTG-By discharge, patient will transfer in/out of a car CGA from  level.       Dates: Start:  03/07/25    Expected End:  03/24/25

## 2025-03-13 NOTE — PROGRESS NOTES
NURSING DAILY NOTE    Name: Marlo León   Date of Admission: 3/6/2025   Admitting Diagnosis: Hx of BKA, left (HCC)  Attending Physician: MICKIE JUAREZ D.O.  Allergies: Neosporin [neomycin-polymyxin b], Tape, Amlodipine, Amoxicillin, Bacitracin-polymyxin b, Cyclobenzaprine, Empagliflozin-metformin hcl, Gabapentin, Gadolinium, Metformin, Neomycin-polymyxin b gu, Nsaids, Pioglitazone, Prednisone, Sulfamethoxazole w-trimethoprim, and Tamsulosin    Safety  Patient Assist  st by/cg  Patient Precautions  Precautions: Fall Risk  Fall Risk: Other (see comments) (Left BKA)  Weight Bearing: NWB LE (laterality in comments)  Braces: Other (see comments) (left residual limb protector)  Comments: Left BKA, left residual limb protector on when OOB  Bed Transfer Status  Stand By Assist  Toilet Transfer Status   Stand By Assist  Assistive Devices  Wheelchair  Oxygen  None - Room Air  Diet/Therapeutic Dining  Current Diet Order   Procedures    Diet Order Diet: Consistent CHO (Diabetic) (HIGH PROTEIN AND VEGETABLES. DAIRY OK. LIMIT CARBOHYDRATES); Nutrient modifications: (optional): High Protein     Pill Administration  whole  Agitated Behavioral Scale     ABS Level of Severity       Fall Risk  Has the patient had a fall this admission?      Nikole Swanson Fall Risk Scoring  16, HIGH RISK  Fall Risk Safety Measures  bed alarm, chair alarm, poor balance, and low vision/hearing    Vitals  Temperature: 36.3 °C (97.4 °F)  Temp src: Temporal  Pulse: 72  Respiration: 20  Blood Pressure : 128/50  Blood Pressure MAP (Calculated): 76 MM HG  BP Location: Upper Arm, Left  Patient BP Position: Sitting     Oxygen  Pulse Oximetry: 94 %  O2 (LPM): 0  O2 Delivery Device: None - Room Air    Bowel and Bladder  Last Bowel Movement  03/13/25  Stool Type  Type 4: Like a sausage or snake, smooth and soft  Bowel Device     Continent  Bladder: Continent void   Bowel: Continent movement  Bladder Function  Urine Void (mL): 200 ml (urinal)  Number of Times  Voided: 1  Urine Color: Unable To Evaluate  Number of Times Incontinent of Urine: 0  Straight Catheter:  (Pt said, he just needs time to void. He will try again later.)  Genitourinary Assessment   Bladder Assessment (WDL):  WDL Except  Jane Catheter: Not Applicable  Urine Color: Unable To Evaluate  Number of Bladder Accidents: 0  Total Number of Bladder of Accidents in Last 7 Days: 0  Number of Times Incontinent of Urine: 0  Bladder Device: Urinal, Bathroom  Time Void: Yes  Bladder Scan: Post Void  $ Bladder Scan Results (mL): 127    Skin  Anselmo Score   18  Sensory Interventions   Bed Types: Standard/Trauma Mattress  Skin Preventative Measures: Pillows in Use for Support / Positioning  Moisture Interventions  Moisturizers/Barriers: Barrier Wipes      Pain  Pain Rating Scale  2 - Notice Pain, does not interfere with activities  Pain Location  Knee, Leg  Pain Location Orientation  Left, Right  Pain Interventions   Declines    ADLs    Bathing   Shower, * * With Assistance from, Staff  Linen Change   Partial  Personal Hygiene     Chlorhexidine Bath   Completed  Oral Care     Teeth/Dentures     Shave     Nutrition Percentage Eaten  Lunch, Between % Consumed  Environmental Precautions  Personal Belongings, Wastebasket, Call Bell etc. in Easy Reach, Treaded Slipper Socks on Patient, Report Given to Other Health Care Providers Regarding Fall Risk, Transferred to [facility] Side, Bed in Low Position, Communication Sign for Patients & Families, Mobility Assessed & Appropriate Sign Placed  Patient Turns/Positioning  Patient turns self independently side to side without assistance, to offload sacral area  Patient Turns Assistance/Tolerance     Bed Positions  Bed Controls On, Bed Locked  Head of Bed Elevated  Self regulated      Psychosocial/Neurologic Assessment  Psychosocial Assessment  Psychosocial (WDL):  Within Defined Limits  Neurologic Assessment  Neuro (WDL): Exceptions to WDL  Level of Consciousness:  Alert  Orientation Level: Oriented X4  Cognition: Follows commands, Appropriate judgement, Appropriate safety awareness  Speech: Clear  Pupil Assesment: No  EENT (WDL):  WDL Except    Cardio/Pulmonary Assessment  Edema      Respiratory Breath Sounds  RUL Breath Sounds: Clear  RML Breath Sounds: Clear  RLL Breath Sounds: Diminished  NOEMY Breath Sounds: Clear  LLL Breath Sounds: Diminished  Cardiac Assessment   Cardiac (WDL):  WDL Except (hx hld, pvd, htn, chf)

## 2025-03-13 NOTE — THERAPY
Physical Therapy   Daily Treatment     Patient Name:  Marlo León  Age:  69 y.o., Sex:  male  Medical Record #:  [id number]  Today's Date: 3/13/2025     Precautions  Precautions: Fall Risk  Fall Risk: Other (see comments) (Left BKA)  Weight Bearing: NWB LE (laterality in comments)  Braces: Other (see comments) (left residual limb protector)  Comments: Left BKA, left residual limb protector on when OOB    Subjective: No new complaints, not sure how much his wife will be able to help with care upon DC home including loading/unloading wheelchair into vehicle     Objective:    03/13/25 0901 03/13/25 1601   PT Charge Group   PT Gait Training (Units) 1  --    PT Therapeutic Exercise (Units) 2  --    PT Therapeutic Activities (Units) 1 2   PT Total Time Spent   PT Individual Total Time Spent (Mins) 60 30   Precautions   Braces Other (see comments)  (left residual limb protector)  --    Comments Left BKA, left residual limb protector on when OOB  --    Roll Left and Right   Level of Assist Independent  --    Sit to Lying   Level of Assist Supervised  --    Lying to Sitting on Side of Bed   Level of Assist Supervised  --    Sit to Stand   Level of Assist Stand By Assist  --    Assistive Devices   (in parallel bars)  --    Chair/Bed-to-Chair Transfer   Level of Assist Stand By Assist Contact Guard Assist   Transfer Type Squat Pivot Transfer  (reach pivot to bed) Stand Step Transfer   Assistive Devices  --  Use of FWW   Toilet Transfer   Level of Assist Stand By Assist  --    Transfer Type Squat Pivot Transfer  --    Adaptive Equipment Grab bars  --    Toileting Hygiene   Level of Assist Contact Guard Assist  --    Additional Description Other (see comments)  (able to manage pants with verbal cues)  --    Ambulation   Level of Assist Contact Guard Assist  --    Assistive Device   (parallel bars)  --    Distance 1 x 10 feet with turn around  --    Patient Scheduling Information   PT Time 30/60 minutes  --    Primary or Coverage  PT Ran Helms  --    Interdisciplinary Plan of Care Collaboration   Patient Position at End of Therapy Seated;Chair Alarm On Seated;Chair Alarm On;Call Light within Reach         Therapeutic Activities  Purpose: to improve performance and function of daily activities, to provide patient and family education, and to increase safety with activities of daily life and mobility related to activities of daily life  Interventions:   Toilet transfer  Bed mobility training (scooting, supine to sit, sit to supine, rolling)  Sit to stand training  Self-care  Donning/doffing brace or orthotic  Patient or family education  Description of Intervention(s): practiced wheelchair management when transferring to/from wheelchair and then chair to chair transfer with FWW  Practiced pivoting via hopping technique using bars and FWW, practiced clearance of foot over threshold in bars   Practiced self wrapping and then video of PT wrapping properly for home study/reference    Therapeutic Exercise  Purpose: to improve strength, to improve ROM/flexibility, and to improve functional endurance  Interventions:   Lower body exercises:   Supine program -   Supine bridges, One legged and 2 sets of 10  Hip abduction, Side lying and 2 sets of 10  Straight leg raises, Front, 2 sets of 10, and Bilateral  Short arc quad, 2 sets of 10, Bilateral, and Weight right 2.5#  Quadriceps isometrics, 1 set of 10 and Bilateral  Seated program -   Long arc quad, 2 sets of 10, Bilateral, and Weight 5# on right LE  Description of Intervention(s):     Assessment: Patient is continuing to be limited by right (well) leg weakness/quad fatigability which makes even short distance gait very challenging. Limited ability to self wrap residual limb due to lumbar spine pain/tightness     Strengths: Effective communication skills, Pleasant and cooperative, Supportive family, Willingly participates in therapeutic activities, Motivated for self care and independence  Barriers:  "Impaired activity tolerance, Impaired balance, Constipation, Limited mobility    Plan: residual limb self wrapping instruction, LE strengthening, gait training, car transfers     DME    PT DME Recommendations  Wheelchair: 20\" Width, Removable/Flip Back Armrests (pt has a wc which he purchased on [employer] however anticipates he will need a new one for DC with left residual limb board and flip back armrest)  Cushion: Standard  Vendor Equipment: IPOP    Goals:     Passport items to be completed:  Get in/out of bed safely, in/out of a vehicle, safely use mobility device, walk or wheel around home/community, navigate up and down stairs, show how to get up/down from the ground, ensure home is accessible, demonstrate HEP, complete caregiver training    Physical Therapy Problems (Active)       Problem: Mobility       Dates: Start:  03/07/25         Goal: STG-Within one week, patient will ambulate household distance at least 15' with FWW and Min A.       Dates: Start:  03/07/25    Expected End:  03/24/25               Problem: PT-Long Term Goals       Dates: Start:  03/07/25         Goal: LTG-By discharge, patient will propel wheelchair on outside surfaces and inside surfaces with Mod I, distance >500'.       Dates: Start:  03/07/25    Expected End:  03/24/25            Goal: LTG-By discharge, patient will ambulate at least 25' with FWW and SBA.       Dates: Start:  03/07/25    Expected End:  03/24/25            Goal: LTG-By discharge, patient will transfer one surface to another [facility].       Dates: Start:  03/07/25    Expected End:  03/24/25            Goal: LTG-By discharge, patient will perform home exercise program Mod I.       Dates: Start:  03/07/25    Expected End:  03/24/25            Goal: LTG-By discharge, patient will transfer in/out of a car CGA from wc level.       Dates: Start:  03/07/25    Expected End:  03/24/25              "

## 2025-03-13 NOTE — CARE PLAN
The patient is Stable - Low risk of patient condition declining or worsening    Shift Goals  Clinical Goals: safety ; DM management  Patient Goals: participate w/ Therapy  Family Goals: no family present    Progress made toward(s) clinical / shift goals:    Problem: Knowledge Deficit - Standard  Goal: Patient and family/care givers will demonstrate understanding of plan of care, disease process/condition, diagnostic tests and medications  Outcome: Progressing   Patient educated on the POC and medications administered. All questions and concerns addressed at this time.   Problem: Pain Management  Goal: Pain level will decrease to patient's comfort goal  Outcome: Progressing   Patient states that has they have minimal pain at this time   Problem: Skin Integrity  Goal: Skin integrity is maintained or improved  Outcome: Progressing     Problem: Pain - Standard  Goal: Alleviation of pain or a reduction in pain to the patient’s comfort goal  Outcome: Progressing

## 2025-03-13 NOTE — PROGRESS NOTES
"  Physical Medicine & Rehabilitation Progress Note    Encounter Date: 3/13/2025    Chief Complaint: L BKA    Interval Events (Subjective):  VS: VSS  Last BM: 3/13  Bladder: Voiding  Schedule Meds Not Given: None   PRN Meds Taken: Tylenol     Patient seen and examined in room. Doing well. No complaints. D/w him BP stable. D/w labs stable.       ROS: 14 point ROS negative unless otherwise specified in the HPI    Objective:  VITAL SIGNS: /50   Pulse 72   Temp 36.3 °C (97.4 °F) (Temporal)   Resp 20   Ht 1.778 m (5' 10\")   Wt 110 kg (241 lb 8 oz)   SpO2 94%   BMI 34.65 kg/m²     GEN: No apparent distress  HEENT: Head normocephalic, atraumatic.  Sclera nonicteric bilaterally, no ocular discharge appreciated bilaterally.  CV: Extremities warm and well-perfused, no peripheral edema appreciated bilaterally.  PULMONARY: Breathing nonlabored on room air, no respiratory accessory muscle use.  Not requiring supplemental oxygen.  SKIN:   3/9    PSYCH: Mood and affect within normal limits.  NEURO: Awake alert.  Conversational.  Logical thought content.      Laboratory Values:  Recent Results (from the past 72 hours)   POCT glucose device results    Collection Time: 03/10/25  5:06 PM   Result Value Ref Range    POC Glucose, Blood 121 (H) 65 - 99 mg/dL   CBC WITHOUT DIFFERENTIAL    Collection Time: 03/13/25  5:45 AM   Result Value Ref Range    WBC 6.2 4.8 - 10.8 K/uL    RBC 2.91 (L) 4.70 - 6.10 M/uL    Hemoglobin 8.4 (L) 14.0 - 18.0 g/dL    Hematocrit 27.8 (L) 42.0 - 52.0 %    MCV 95.5 81.4 - 97.8 fL    MCH 28.9 27.0 - 33.0 pg    MCHC 30.2 (L) 32.3 - 36.5 g/dL    RDW 53.8 (H) 35.9 - 50.0 fL    Platelet Count 219 164 - 446 K/uL    MPV 11.8 9.0 - 12.9 fL   Basic Metabolic Panel    Collection Time: 03/13/25  5:45 AM   Result Value Ref Range    Sodium 140 135 - 145 mmol/L    Potassium 4.6 3.6 - 5.5 mmol/L    Chloride 113 (H) 96 - 112 mmol/L    Co2 19 (L) 20 - 33 mmol/L    Glucose 95 65 - 99 mg/dL    Bun 52 (H) 8 - 22 mg/dL "    Creatinine 2.34 (H) 0.50 - 1.40 mg/dL    Calcium 8.4 (L) 8.5 - 10.5 mg/dL    Anion Gap 8.0 7.0 - 16.0   ESTIMATED GFR    Collection Time: 03/13/25  5:45 AM   Result Value Ref Range    GFR (CKD-EPI) 30 (A) >60 mL/min/1.73 m 2       Medications:  Scheduled Medications   Medication Dose Frequency    [START ON 3/20/2025] influenza vaccine High-Dose  0.5 mL Once    hydrALAZINE  50 mg Q8HRS    insulin GLARGINE  38 Units Q EVENING    carvedilol  6.25 mg BID WITH MEALS    insulin lispro  2-12 Units 4X/DAY [facility]    Pharmacy Consult Request  1 Each PHARMACY TO DOSE    omeprazole  20 mg DAILY    atorvastatin  40 mg Q EVENING    hydroCHLOROthiazide  25 mg Q DAY    lisinopril  40 mg Q EVENING    sodium bicarbonate  650 mg BID    levothyroxine  175 mcg AM ES    ceFAZolin  2 g Q8HRS    Followed by    [START ON 3/15/2025] linezolid  600 mg Q12HRS    heparin  5,000 Units Q8HRS    aspirin  81 mg DAILY     PRN medications: insulin lispro **AND** POC blood glucose manual result **AND** NOTIFY MD and PharmD **AND** Administer 20 grams of glucose (approximately 8 ounces of fruit juice) every 15 minutes PRN FSBG less than 70 mg/dL **AND** dextrose bolus, lidocaine, hydrALAZINE, lactulose, docusate sodium, bisacodyl EC, magnesium hydroxide, sodium phosphate, carboxymethylcellulose, benzocaine-menthol, mag hydrox-al hydrox-simeth, ondansetron **OR** ondansetron, traZODone, sodium chloride, traMADol **OR** traMADol, Respiratory Therapy Consult, acetaminophen    Diet:  Current Diet Order   Procedures    Diet Order Diet: Consistent CHO (Diabetic) (HIGH PROTEIN AND VEGETABLES. DAIRY OK. LIMIT CARBOHYDRATES); Nutrient modifications: (optional): High Protein       Medical Decision Making and Plan:  L BKA 3/2 Dr. Echavarria at [facility] Bacteremia  TTE withough valvular lesions or endocarditis.   PT and OT for mobility and ADLs. Per guidelines, 15 hours per week between PT, OT and/or SLP.  Follow-up Ortho  Follow-up ID  Follow-up  Vascular Surgery (Dr. Calderon) - referral placed per hospitalist at [facility]  Complete IV Cefazolin 3/14 --> Linezolid through 3/28   Tunneled cath placement 3/4     Hypertension - Continue Lisinopril, HCTZ, Coreg. 3/7 Elevated but improved in 150's monitor. 3/8 BP improving without med changes, monitor. 3/11 Continue monitor BP - patient reports feeling symptoms with low BP in early AM or late PM. Coreg was 12.5 BID hospitalist note indicates should be 6.25mg BID, will change today. 3/12 BP better controlled with increase in Hydralazine. 3/13 VSS    ABLA - 3/7 9 Monitor. 3/8 Better 9.8. 3/13 Stable    Leukocytosis - Mild monitor 3/7. 3/8 Mild increase 10.9--> 11.1. UA negative. Incision c/d/I. Afebrile and not on scheduled tylenol nor taking PRN. 3/10, 3/13 Resolved.     Thrombocytosis - WNL 3/7, 3/10, 3/13     Chronic Combined HR, LVH and Pulm HTN - EF 50-55% (Had been 65-70%). Follows with Cards Dr. Rodriguez     H/o CAD - On ASA and Statin      Hypothyroidism - Continue Synthroid      DARRIAN - RT consult     H/o Gastric Bypass - Has frequent BM due to this.      CKD Stage IV - Avoid Nephrotoxins. GFR 29 baseline. Managed by Nephro Dr. Michaels. 3/13 Stable, monitor trend    Elevated TSH - Free T4 WNL.     Morbid Obesity - Nutrition consult      Type II Diabetes Mellitus with Hyperglycemia - Currently on SSI and Lantus. Hgb A1c 6.6     Pain - Declines pain. Has Tylenol and Tramadol PRN.     Bowel - Patient on Senna-docusate for constipation prophylaxis.      Bladder - TV/PVR/BS PRN           Upcoming Labs/imaging: 3/17     DVT PROPHYLAXIS: Heparin --> D/c ASA 325mg BID     HOSPITALIST FOLLOWING: Yes - d/w hospitalist 3/13     CODE STATUS: FULL - c/f with patient on admission      DISPO: Home with spouse      GIANCARLO: 3/20     MEDS SENT TO: TBD     DISCHARGE SPECIALIST FOLLOW UP:   Ortho  ID  Vasc Surg  Cardiology  Nephrology      Patient to scheduled follow up with their PCP within 2 weeks from discharge from the [facility]   ____________________________________    Dr. Tracy Tong DO, MS  [facility] - Physical Medicine & Rehabilitation   ____________________________________

## 2025-03-13 NOTE — PROGRESS NOTES
NURSING DAILY NOTE    Name: Marlo León   Date of Admission: 3/6/2025   Admitting Diagnosis: Hx of BKA, left (HCC)  Attending Physician: MICKIE JUAREZ D.O.  Allergies: Neosporin [neomycin-polymyxin b], Tape, Amlodipine, Amoxicillin, Bacitracin-polymyxin b, Cyclobenzaprine, Empagliflozin-metformin hcl, Gabapentin, Gadolinium, Metformin, Neomycin-polymyxin b gu, Nsaids, Pioglitazone, Prednisone, Sulfamethoxazole w-trimethoprim, and Tamsulosin    Safety  Patient Assist  st by/cg  Patient Precautions  Precautions: Fall Risk  Fall Risk: Other (see comments) (Left BKA)  Weight Bearing: NWB LE (laterality in comments)  Braces: IPOP, Immobilizer LE (laterality in comments)  Comments: L BKA; L IPOP  Bed Transfer Status  Stand By Assist  Toilet Transfer Status   Contact Guard Assist  Assistive Devices  Rails, Wheelchair  Oxygen  None - Room Air  Diet/Therapeutic Dining  Current Diet Order   Procedures    Diet Order Diet: Consistent CHO (Diabetic) (HIGH PROTEIN AND VEGETABLES. DAIRY OK. LIMIT CARBOHYDRATES); Nutrient modifications: (optional): High Protein     Pill Administration  whole  Agitated Behavioral Scale     ABS Level of Severity       Fall Risk  Has the patient had a fall this admission?      Nikole Swanson Fall Risk Scoring  16, HIGH RISK  Fall Risk Safety Measures  bed alarm, chair alarm, poor balance, and low vision/hearing    Vitals  Temperature: 36.3 °C (97.4 °F)  Temp src: Temporal  Pulse: 66  Respiration: 18  Blood Pressure : 130/46  Blood Pressure MAP (Calculated): 74 MM HG  BP Location: Right, Upper Arm  Patient BP Position: Supine     Oxygen  Pulse Oximetry: 96 %  O2 (LPM): 0  O2 Delivery Device: None - Room Air    Bowel and Bladder  Last Bowel Movement  03/11/25  Stool Type  Type 4: Like a sausage or snake, smooth and soft  Bowel Device     Continent  Bladder: Continent void   Bowel: Continent movement  Bladder Function  Urine Void (mL): 250 ml  Number of Times Voided: 1  Urine Color:  Yellow  Straight Catheter:  (Pt said, he just needs time to void. He will try again later.)  Genitourinary Assessment   Bladder Assessment (WDL):  WDL Except  Jane Catheter: Not Applicable  Urine Color: Yellow  Bladder Device: Urinal  Time Void: Yes  Bladder Scan: Re-Void  $ Bladder Scan Results (mL): 120    Skin  Anselmo Score   18  Sensory Interventions   Bed Types: Standard/Trauma Mattress  Skin Preventative Measures: Pillows in Use for Support / Positioning  Moisture Interventions  Moisturizers/Barriers: Barrier Wipes      Pain  Pain Rating Scale  4 - Distracts me, can do usual activities  Pain Location  Leg  Pain Location Orientation  Left  Pain Interventions   Repositioned    ADLs    Bathing   Shower, * * With Assistance from, Staff  Linen Change   Partial  Personal Hygiene     Chlorhexidine Bath   Completed  Oral Care     Teeth/Dentures     Shave     Nutrition Percentage Eaten  *  * Meal *  *, Lunch, Between % Consumed  Environmental Precautions  Treaded Slipper Socks on Patient, Personal Belongings, Wastebasket, Call Bell etc. in Easy Reach, Transferred to Stronger Side, Report Given to Other Health Care Providers Regarding Fall Risk, Bed in Low Position, Communication Sign for Patients & Families, Mobility Assessed & Appropriate Sign Placed  Patient Turns/Positioning  Patient turns self independently side to side without assistance, to offload sacral area  Patient Turns Assistance/Tolerance     Bed Positions  Bed Locked, Bed Controls On  Head of Bed Elevated  Self regulated      Psychosocial/Neurologic Assessment  Psychosocial Assessment  Psychosocial (WDL):  Within Defined Limits  Neurologic Assessment  Neuro (WDL): Exceptions to WDL  Level of Consciousness: Alert  Orientation Level: Oriented X4  Cognition: Follows commands, Appropriate judgement, Appropriate safety awareness  Speech: Clear  Pupil Assesment: No  EENT (WDL):  WDL Except    Cardio/Pulmonary Assessment  Edema      Respiratory Breath  Sounds  RUL Breath Sounds: Clear  RML Breath Sounds: Clear  RLL Breath Sounds: Diminished  NOEMY Breath Sounds: Clear  LLL Breath Sounds: Diminished  Cardiac Assessment   Cardiac (WDL):  (hx hld, pvd, htn, chf)

## 2025-03-13 NOTE — PROGRESS NOTES
..NURSING DAILY NOTE    Name: Marlo León   Date of Admission: 3/6/2025   Admitting Diagnosis: Hx of BKA, left (HCC)  Attending Physician: MICKIE JUARZE D.O.  Allergies: Neosporin [neomycin-polymyxin b], Tape, Amlodipine, Amoxicillin, Bacitracin-polymyxin b, Cyclobenzaprine, Empagliflozin-metformin hcl, Gabapentin, Gadolinium, Metformin, Neomycin-polymyxin b gu, Nsaids, Pioglitazone, Prednisone, Sulfamethoxazole w-trimethoprim, and Tamsulosin    Safety  Patient Assist  st by/cg  Patient Precautions  Precautions: Fall Risk  Fall Risk: Other (see comments) (Left BKA)  Weight Bearing: NWB LE (laterality in comments)  Braces: IPOP, Immobilizer LE (laterality in comments)  Comments: L BKA; L IPOP  Bed Transfer Status  Stand By Assist  Toilet Transfer Status   Contact Guard Assist  Assistive Devices  Rails, Wheelchair  Oxygen  BIPAP  Diet/Therapeutic Dining  Current Diet Order   Procedures    Diet Order Diet: Consistent CHO (Diabetic) (HIGH PROTEIN AND VEGETABLES. DAIRY OK. LIMIT CARBOHYDRATES); Nutrient modifications: (optional): High Protein     Pill Administration  whole    Fall Risk  Has the patient had a fall this admission?    NO  Nikole Swanson Fall Risk Scoring  16, HIGH RISK  Fall Risk Safety Measures  bed alarm and chair alarm    Vitals  Temperature: 36.3 °C (97.4 °F)  Temp src: Temporal  Pulse: 62  Respiration: 18  Blood Pressure : 123/66  Blood Pressure MAP (Calculated): 85 MM HG  BP Location: Right, Upper Arm  Patient BP Position: Supine     Oxygen  Pulse Oximetry: 96 %  O2 (LPM): 0  O2 Delivery Device: BIPAP    Bowel and Bladder  Last Bowel Movement  03/12/25  Stool Type  Type 4: Like a sausage or snake, smooth and soft  Continent  Bladder: Continent void   Bowel: Continent movement  Bladder Function  Urine Void (mL): 250 ml  Number of Times Voided: 1  Urine Color: Yellow  Genitourinary Assessment   Bladder Assessment (WDL):  WDL Except (retention - mild)  Jane Catheter: Not Applicable  Urine Color:  Yellow  Bladder Device: Urinal    Skin  Anselmo Score   18  Sensory Interventions   Bed Types: Standard/Trauma Mattress  Skin Preventative Measures: Pillows in Use for Support / Positioning  Moisture Interventions  Moisturizers/Barriers: Barrier Wipes      Pain  Pain Rating Scale  2 - Notice Pain, does not interfere with activities  Pain Location  Generalized  Pain Location Orientation  Right, Left, Anterior, Posterior  Pain Interventions   Medication (see MAR)    ADLs    Linen Change   Partial  Nutrition Percentage Eaten  *  * Meal *  *, Lunch, Between % Consumed  Environmental Precautions  Treaded Slipper Socks on Patient, Personal Belongings, Wastebasket, Call Bell etc. in Easy Reach, Transferred to Stronger Side, Report Given to Other Health Care Providers Regarding Fall Risk, Bed in Low Position, Communication Sign for Patients & Families, Mobility Assessed & Appropriate Sign Placed  Patient Turns/Positioning  Patient turns self independently side to side without assistance, to offload sacral area  Patient Turns Assistance/Tolerance   SELF  Bed Positions  Bed Controls On, Bed Locked  Head of Bed Elevated  Self regulated      Psychosocial/Neurologic Assessment  Psychosocial Assessment  Psychosocial (WDL):  Within Defined Limits  Neurologic Assessment  Neuro (WDL): Exceptions to WDL (neuropathy)  Level of Consciousness: Alert  Orientation Level: Oriented X4  Cognition: Follows commands, Appropriate judgement, Appropriate safety awareness  Speech: Clear  Pupil Assesment: No  EENT (WDL):  WDL Except    Cardio/Pulmonary Assessment  Respiratory Breath Sounds  RUL Breath Sounds: Clear  RML Breath Sounds: Clear  RLL Breath Sounds: Diminished  NOEMY Breath Sounds: Clear  LLL Breath Sounds: Diminished  Cardiac Assessment   Cardiac (WDL):  WDL Except (hx hld, pvd, htn, chf)

## 2025-03-14 ENCOUNTER — APPOINTMENT (OUTPATIENT)
Dept: OCCUPATIONAL THERAPY | Facility: REHABILITATION | Age: 70
DRG: 560 | End: 2025-03-14
Attending: PHYSICAL MEDICINE & REHABILITATION
Payer: MEDICARE

## 2025-03-14 ENCOUNTER — APPOINTMENT (OUTPATIENT)
Dept: PHYSICAL THERAPY | Facility: REHABILITATION | Age: 70
DRG: 560 | End: 2025-03-14
Attending: PHYSICAL MEDICINE & REHABILITATION
Payer: MEDICARE

## 2025-03-14 ENCOUNTER — HOME HEALTH ADMISSION (OUTPATIENT)
Dept: HOME HEALTH SERVICES | Facility: HOME HEALTHCARE | Age: 70
End: 2025-03-14
Payer: MEDICARE

## 2025-03-14 PROCEDURE — 97110 THERAPEUTIC EXERCISES: CPT

## 2025-03-14 PROCEDURE — 97530 THERAPEUTIC ACTIVITIES: CPT

## 2025-03-14 PROCEDURE — A9270 NON-COVERED ITEM OR SERVICE: HCPCS | Performed by: PHYSICAL MEDICINE & REHABILITATION

## 2025-03-14 PROCEDURE — 770010 HCHG ROOM/CARE - REHAB SEMI PRIVAT*

## 2025-03-14 PROCEDURE — 700102 HCHG RX REV CODE 250 W/ 637 OVERRIDE(OP): Performed by: HOSPITALIST

## 2025-03-14 PROCEDURE — 700111 HCHG RX REV CODE 636 W/ 250 OVERRIDE (IP): Performed by: PHYSICAL MEDICINE & REHABILITATION

## 2025-03-14 PROCEDURE — 97116 GAIT TRAINING THERAPY: CPT

## 2025-03-14 PROCEDURE — A9270 NON-COVERED ITEM OR SERVICE: HCPCS | Performed by: HOSPITALIST

## 2025-03-14 PROCEDURE — 99232 SBSQ HOSP IP/OBS MODERATE 35: CPT | Performed by: HOSPITALIST

## 2025-03-14 PROCEDURE — 700102 HCHG RX REV CODE 250 W/ 637 OVERRIDE(OP): Performed by: PHYSICAL MEDICINE & REHABILITATION

## 2025-03-14 PROCEDURE — 700105 HCHG RX REV CODE 258: Performed by: PHYSICAL MEDICINE & REHABILITATION

## 2025-03-14 RX ORDER — ACETAMINOPHEN 500 MG
1000 TABLET ORAL
Status: DISCONTINUED | OUTPATIENT
Start: 2025-03-14 | End: 2025-03-21 | Stop reason: HOSPADM

## 2025-03-14 RX ORDER — TRAMADOL HYDROCHLORIDE 50 MG/1
50 TABLET ORAL EVERY 4 HOURS PRN
Status: DISCONTINUED | OUTPATIENT
Start: 2025-03-14 | End: 2025-03-21 | Stop reason: HOSPADM

## 2025-03-14 RX ORDER — TRAMADOL HYDROCHLORIDE 50 MG/1
25 TABLET ORAL EVERY 4 HOURS PRN
Status: DISCONTINUED | OUTPATIENT
Start: 2025-03-14 | End: 2025-03-21 | Stop reason: HOSPADM

## 2025-03-14 RX ORDER — SODIUM BICARBONATE 650 MG/1
650 TABLET ORAL
Status: DISCONTINUED | OUTPATIENT
Start: 2025-03-14 | End: 2025-03-21 | Stop reason: HOSPADM

## 2025-03-14 RX ADMIN — HYDRALAZINE HYDROCHLORIDE 50 MG: 50 TABLET ORAL at 05:21

## 2025-03-14 RX ADMIN — ASPIRIN 81 MG: 81 TABLET, COATED ORAL at 07:47

## 2025-03-14 RX ADMIN — HYDROCHLOROTHIAZIDE 25 MG: 25 TABLET ORAL at 05:21

## 2025-03-14 RX ADMIN — HEPARIN SODIUM 5000 UNITS: 5000 INJECTION, SOLUTION INTRAVENOUS; SUBCUTANEOUS at 13:40

## 2025-03-14 RX ADMIN — HEPARIN SODIUM 5000 UNITS: 5000 INJECTION, SOLUTION INTRAVENOUS; SUBCUTANEOUS at 05:22

## 2025-03-14 RX ADMIN — SODIUM BICARBONATE 650 MG: 650 TABLET ORAL at 11:34

## 2025-03-14 RX ADMIN — HYDRALAZINE HYDROCHLORIDE 50 MG: 50 TABLET ORAL at 13:40

## 2025-03-14 RX ADMIN — CARVEDILOL 6.25 MG: 3.12 TABLET, FILM COATED ORAL at 16:59

## 2025-03-14 RX ADMIN — SODIUM BICARBONATE 650 MG: 650 TABLET ORAL at 07:47

## 2025-03-14 RX ADMIN — OMEPRAZOLE 20 MG: 20 CAPSULE, DELAYED RELEASE ORAL at 07:47

## 2025-03-14 RX ADMIN — SODIUM BICARBONATE 650 MG: 650 TABLET ORAL at 16:58

## 2025-03-14 RX ADMIN — CEFAZOLIN 2 G: 2 INJECTION, POWDER, FOR SOLUTION INTRAMUSCULAR; INTRAVENOUS at 05:30

## 2025-03-14 RX ADMIN — ACETAMINOPHEN 1000 MG: 500 TABLET ORAL at 21:30

## 2025-03-14 RX ADMIN — CEFAZOLIN 2 G: 2 INJECTION, POWDER, FOR SOLUTION INTRAMUSCULAR; INTRAVENOUS at 21:26

## 2025-03-14 RX ADMIN — ATORVASTATIN CALCIUM 40 MG: 40 TABLET, FILM COATED ORAL at 21:26

## 2025-03-14 RX ADMIN — CARVEDILOL 6.25 MG: 3.12 TABLET, FILM COATED ORAL at 07:47

## 2025-03-14 RX ADMIN — CEFAZOLIN 2 G: 2 INJECTION, POWDER, FOR SOLUTION INTRAMUSCULAR; INTRAVENOUS at 13:32

## 2025-03-14 RX ADMIN — HYDRALAZINE HYDROCHLORIDE 50 MG: 50 TABLET ORAL at 21:27

## 2025-03-14 RX ADMIN — HEPARIN SODIUM 5000 UNITS: 5000 INJECTION, SOLUTION INTRAVENOUS; SUBCUTANEOUS at 21:31

## 2025-03-14 RX ADMIN — LEVOTHYROXINE SODIUM 175 MCG: 0.05 TABLET ORAL at 05:33

## 2025-03-14 RX ADMIN — LISINOPRIL 40 MG: 20 TABLET ORAL at 21:30

## 2025-03-14 RX ADMIN — TRAMADOL HYDROCHLORIDE 50 MG: 50 TABLET, COATED ORAL at 00:14

## 2025-03-14 RX ADMIN — INSULIN LISPRO 2 UNITS: 100 INJECTION, SOLUTION INTRAVENOUS; SUBCUTANEOUS at 07:50

## 2025-03-14 ASSESSMENT — PATIENT HEALTH QUESTIONNAIRE - PHQ9
SUM OF ALL RESPONSES TO PHQ9 QUESTIONS 1 AND 2: 0
9. THOUGHTS THAT YOU WOULD BE BETTER OFF DEAD, OR OF HURTING YOURSELF: NOT AT ALL
6. FEELING BAD ABOUT YOURSELF - OR THAT YOU ARE A FAILURE OR HAVE LET YOURSELF OR YOUR FAMILY DOWN: NOT AL ALL
2. FEELING DOWN, DEPRESSED, IRRITABLE, OR HOPELESS: NOT AT ALL
1. LITTLE INTEREST OR PLEASURE IN DOING THINGS: NOT AT ALL
3. TROUBLE FALLING OR STAYING ASLEEP OR SLEEPING TOO MUCH: NOT AT ALL
8. MOVING OR SPEAKING SO SLOWLY THAT OTHER PEOPLE COULD HAVE NOTICED. OR THE OPPOSITE, BEING SO FIGETY OR RESTLESS THAT YOU HAVE BEEN MOVING AROUND A LOT MORE THAN USUAL: NOT AT ALL
5. POOR APPETITE OR OVEREATING: NOT AT ALL
4. FEELING TIRED OR HAVING LITTLE ENERGY: NOT AT ALL
SUM OF ALL RESPONSES TO PHQ QUESTIONS 1-9: 0
7. TROUBLE CONCENTRATING ON THINGS, SUCH AS READING THE NEWSPAPER OR WATCHING TELEVISION: NOT AT ALL

## 2025-03-14 ASSESSMENT — PAIN DESCRIPTION - PAIN TYPE
TYPE: ACUTE PAIN

## 2025-03-14 NOTE — PROGRESS NOTES
NURSING DAILY NOTE    Name: Marlo León   Date of Admission: 3/6/2025   Admitting Diagnosis: Hx of BKA, left (HCC)  Attending Physician: MICKIE JUAREZ D.O.  Allergies: Neosporin [neomycin-polymyxin b], Tape, Amlodipine, Amoxicillin, Bacitracin-polymyxin b, Cyclobenzaprine, Empagliflozin-metformin hcl, Gabapentin, Gadolinium, Metformin, Neomycin-polymyxin b gu, Nsaids, Pioglitazone, Prednisone, Sulfamethoxazole w-trimethoprim, and Tamsulosin    Safety  Patient Assist  MOD  Patient Precautions  Precautions: Fall Risk  Fall Risk: Other (see comments) (Left BKA)  Weight Bearing: NWB LE (laterality in comments) (LEFT)  Braces: Other (see comments) (left residual limb protector)  Comments: Left BKA, left residual limb protector on when out of bed  Bed Transfer Status  Contact Guard Assist  Toilet Transfer Status   Supervised  Assistive Devices  Rails, Wheelchair  Oxygen  None - Room Air  Diet/Therapeutic Dining  Current Diet Order   Procedures    Diet Order Diet: Consistent CHO (Diabetic) (HIGH PROTEIN AND VEGETABLES. DAIRY OK. LIMIT CARBOHYDRATES); Nutrient modifications: (optional): High Protein     Pill Administration  whole  Agitated Behavioral Scale     ABS Level of Severity       Fall Risk  Has the patient had a fall this admission?      Nikole Swanson Fall Risk Scoring  16, HIGH RISK  Fall Risk Safety Measures  bed alarm, chair alarm, poor balance, and low vision/hearing    Vitals  Temperature: 36.4 °C (97.5 °F)  Temp src: Oral  Pulse: 78  Respiration: 20  Blood Pressure : 136/56  Blood Pressure MAP (Calculated): 83 MM HG  BP Location: Right, Upper Arm  Patient BP Position: Martino's Position     Oxygen  Pulse Oximetry: 95 %  O2 (LPM): 0  O2 Delivery Device: None - Room Air    Bowel and Bladder  Last Bowel Movement  03/14/25  Stool Type  Type 5: Soft blob with clear cut edges (passed easily)  Bowel Device     Continent  Bladder: Continent void   Bowel: Continent movement  Bladder Function  Urine Void (mL):   (BATHROOM)  Number of Times Voided: 1  Urine Color: Yellow  Number of Times Incontinent of Urine: 0  Straight Catheter:  (Pt said, he just needs time to void. He will try again later.)  Genitourinary Assessment   Bladder Assessment (WDL):  Within Defined Limits  Jane Catheter: Not Applicable  Urine Color: Yellow  Number of Bladder Accidents: 0  Total Number of Bladder of Accidents in Last 7 Days: 0  Number of Times Incontinent of Urine: 0  Bladder Device: Urinal  Time Void: Yes  Bladder Scan: Post Void  $ Bladder Scan Results (mL): 127    Skin  Anselmo Score   18  Sensory Interventions   Bed Types: Standard/Trauma Mattress  Skin Preventative Measures: Pillows in Use for Support / Positioning  Moisture Interventions  Moisturizers/Barriers: Barrier Wipes      Pain  Pain Rating Scale  6 - Hard to ignore, avoid usual activities  Pain Location  Leg  Pain Location Orientation  Left  Pain Interventions   Medication (see MAR), Warm Pack    ADLs    Bathing   Patient Refused Bathing (notified RN Lina)  Linen Change   Partial  Personal Hygiene     Chlorhexidine Bath   Completed  Oral Care     Teeth/Dentures     Shave     Nutrition Percentage Eaten  Dinner, Between % Consumed  Environmental Precautions  Treaded Slipper Socks on Patient, Personal Belongings, Wastebasket, Call Bell etc. in Easy Reach, Report Given to Other Health Care Providers Regarding Fall Risk  Patient Turns/Positioning  Patient turns self independently side to side without assistance, to offload sacral area  Patient Turns Assistance/Tolerance     Bed Positions  Bed Controls On, Bed Locked  Head of Bed Elevated  Self regulated      Psychosocial/Neurologic Assessment  Psychosocial Assessment  Psychosocial (WDL):  Within Defined Limits  Neurologic Assessment  Neuro (WDL): Exceptions to WDL  Level of Consciousness: Alert  Orientation Level: Oriented X4  Cognition: Follows commands, Appropriate judgement, Appropriate safety awareness  Speech: Clear  Pupil  Assesment: No  EENT (WDL):  WDL Except    Cardio/Pulmonary Assessment  Edema      Respiratory Breath Sounds  RUL Breath Sounds: Clear  RML Breath Sounds: Clear  RLL Breath Sounds: Diminished  NOEMY Breath Sounds: Clear  LLL Breath Sounds: Diminished  Cardiac Assessment   Cardiac (WDL):  WDL Except (hx hld, pvd, htn, chf)

## 2025-03-14 NOTE — PROGRESS NOTES
"  Physical Medicine & Rehabilitation Progress Note    Encounter Date: 3/14/2025    Chief Complaint: L BKA    Interval Events (Subjective):  VS: VSS  Last BM: 3/14  Bladder: Voiding  Schedule Meds Not Given: None   PRN Meds Taken: Tramadol and Tylenol     Patient seen and examined in gym. He feels well. Did have more pain last night, some of it was phantom and some of it was sharp stabbing. He doesn't do well with Gabapentin, gives him the shakes. He also doesn't like taking Tramadol gave him drug hangover this morning.       ROS: 14 point ROS negative unless otherwise specified in the HPI    Objective:  VITAL SIGNS: /48   Pulse 68   Temp 36.5 °C (97.7 °F) (Oral)   Resp 16   Ht 1.778 m (5' 10\")   Wt 110 kg (241 lb 8 oz)   SpO2 97%   BMI 34.65 kg/m²     GEN: No apparent distress  HEENT: Head normocephalic, atraumatic.  Sclera nonicteric bilaterally, no ocular discharge appreciated bilaterally.  CV: Extremities warm and well-perfused, no peripheral edema appreciated bilaterally.  PULMONARY: Breathing nonlabored on room air, no respiratory accessory muscle use.  Not requiring supplemental oxygen.  SKIN:   3/13      3/9    PSYCH: Mood and affect within normal limits.  NEURO: Awake alert.  Conversational.  Logical thought content.      Laboratory Values:  Recent Results (from the past 72 hours)   CBC WITHOUT DIFFERENTIAL    Collection Time: 03/13/25  5:45 AM   Result Value Ref Range    WBC 6.2 4.8 - 10.8 K/uL    RBC 2.91 (L) 4.70 - 6.10 M/uL    Hemoglobin 8.4 (L) 14.0 - 18.0 g/dL    Hematocrit 27.8 (L) 42.0 - 52.0 %    MCV 95.5 81.4 - 97.8 fL    MCH 28.9 27.0 - 33.0 pg    MCHC 30.2 (L) 32.3 - 36.5 g/dL    RDW 53.8 (H) 35.9 - 50.0 fL    Platelet Count 219 164 - 446 K/uL    MPV 11.8 9.0 - 12.9 fL   Basic Metabolic Panel    Collection Time: 03/13/25  5:45 AM   Result Value Ref Range    Sodium 140 135 - 145 mmol/L    Potassium 4.6 3.6 - 5.5 mmol/L    Chloride 113 (H) 96 - 112 mmol/L    Co2 19 (L) 20 - 33 mmol/L "    Glucose 95 65 - 99 mg/dL    Bun 52 (H) 8 - 22 mg/dL    Creatinine 2.34 (H) 0.50 - 1.40 mg/dL    Calcium 8.4 (L) 8.5 - 10.5 mg/dL    Anion Gap 8.0 7.0 - 16.0   ESTIMATED GFR    Collection Time: 03/13/25  5:45 AM   Result Value Ref Range    GFR (CKD-EPI) 30 (A) >60 mL/min/1.73 m 2       Medications:  Scheduled Medications   Medication Dose Frequency    sodium bicarbonate  650 mg TID WITH MEALS    [START ON 3/20/2025] influenza vaccine High-Dose  0.5 mL Once    hydrALAZINE  50 mg Q8HRS    insulin GLARGINE  38 Units Q EVENING    carvedilol  6.25 mg BID WITH MEALS    insulin lispro  2-12 Units [facility]X/[facility]    Pharmacy Consult Request  1 Each PHARMACY TO DOSE    omeprazole  20 mg DAILY    atorvastatin  40 mg Q EVENING    hydroCHLOROthiazide  25 mg Q DAY    lisinopril  40 mg Q EVENING    levothyroxine  175 mcg AM ES    ceFAZolin  2 g Q8HRS    Followed by    [START ON 3/15/2025] linezolid  600 mg Q12HRS    heparin  5,000 Units Q8HRS    aspirin  81 mg DAILY     PRN medications: insulin lispro **AND** POC blood glucose manual result **AND** NOTIFY MD and PharmD **AND** Administer 20 grams of glucose (approximately 8 ounces of fruit juice) every 15 minutes PRN FSBG less than 70 mg/dL **AND** dextrose bolus, lidocaine, hydrALAZINE, lactulose, docusate sodium, bisacodyl EC, magnesium hydroxide, sodium phosphate, carboxymethylcellulose, benzocaine-menthol, mag hydrox-al hydrox-simeth, ondansetron **OR** ondansetron, traZODone, sodium chloride, traMADol **OR** traMADol, Respiratory Therapy Consult, acetaminophen    Diet:  Current Diet Order   Procedures    Diet Order Diet: Consistent CHO (Diabetic) (HIGH PROTEIN AND VEGETABLES. DAIRY OK. LIMIT CARBOHYDRATES); Nutrient modifications: (optional): High Protein       Medical Decision Making and Plan:  [facility]  TTE withough valvular lesions or endocarditis.   PT and OT for mobility and ADLs. Per guidelines, 15 hours per week between  PT, OT and/or SLP.  Follow-up Ortho  Follow-up ID  Follow-up Vascular Surgery (Dr. Calderon) - referral placed per hospitalist at [facility]  Complete IV Cefazolin 3/14 --> Linezolid through 3/28   Tunneled cath placement 3/4     Hypertension - Continue Lisinopril, HCTZ, Coreg. 3/7 Elevated but improved in 150's monitor. 3/8 BP improving without med changes, monitor. 3/11 Continue monitor BP - patient reports feeling symptoms with low BP in early AM or late PM. Coreg was 12.5 BID hospitalist note indicates should be 6.25mg BID, will change today. 3/12 BP better controlled with increase in Hydralazine. 3/14 VSS    ABLA - 3/7 9 Monitor. 3/8 Better 9.8. 3/13 Stable    Leukocytosis - Mild monitor 3/7. 3/8 Mild increase 10.9--> 11.1. UA negative. Incision c/d/I. Afebrile and not on scheduled tylenol nor taking PRN. 3/10, 3/13 Resolved.     Thrombocytosis - WNL 3/7, 3/10, 3/13     Chronic Combined HR, LVH and Pulm HTN - EF 50-55% (Had been 65-70%). Follows with Cards Dr. Rodriguez     H/o CAD - On ASA and Statin      Hypothyroidism - Continue Synthroid      DARRIAN - RT consult     H/o Gastric Bypass - Has frequent BM due to this.      CKD Stage IV - Avoid Nephrotoxins. GFR 29 baseline. Managed by Nephro Dr. Michaels. 3/13 Stable, monitor trend    Elevated TSH - Free T4 WNL.     Morbid Obesity - Nutrition consult      Type II Diabetes Mellitus with Hyperglycemia - Currently on SSI and Lantus. Hgb A1c 6.6     Pain - Declines pain. Has Tylenol and Tramadol PRN.    3/14 More neuropathic pain, reduce Tramadol due to drug hangover. Cannot take Kaitlin has side effects. Pain varies, patient would like to stick mostly with tylenol for now. Schedule Tylenol at night.      Bowel - Patient on Senna-docusate for constipation prophylaxis.      Bladder - TV/PVR/BS PRN           Upcoming Labs/imaging: 3/17     DVT PROPHYLAXIS: Heparin --> D/c ASA 325mg BID     HOSPITALIST FOLLOWING: Yes - d/w hospitalist 3/14     CODE STATUS: FULL - c/f with patient on  admission      DISPO: Home with spouse      GIANCARLO: 3/20     MEDS SENT TO: TBD     DISCHARGE SPECIALIST FOLLOW UP:   Ortho  ID  [facility]  Cardiology  Nephrology      Patient to scheduled follow up with their PCP within 2 weeks from discharge from the [facility].   ____________________________________    Dr. Tracy Tong DO, MS  [facility] - Physical Medicine & Rehabilitation   ____________________________________

## 2025-03-14 NOTE — CARE PLAN
The patient is Stable - Low risk of patient condition declining or worsening    Shift Goals  Clinical Goals: safety ; DM management  Patient Goals: participate w/ Therapy  Family Goals: no family present    Progress made toward(s) clinical / shift goals:    Problem: Knowledge Deficit - Standard  Goal: Patient and family/care givers will demonstrate understanding of plan of care, disease process/condition, diagnostic tests and medications  Outcome: Progressing   Patient educated on the POC and medications administered. All questions and concerns addressed at this time   Problem: Safety  Goal: Will remain free from injury  Outcome: Progressing   Patient takes the proper safety precautions when transferring from bed to wheelchair. Patient calls appropriately for assistance   Problem: Pain Management  Goal: Pain level will decrease to patient's comfort goal  Outcome: Progressing   Use of 0-10- pain scale. Patient is able to verbalize pain and discomfort.   Problem: Skin Integrity  Goal: Risk for impaired skin integrity will decrease  Outcome: Progressing  No new wounds or injuries noted on the patient

## 2025-03-14 NOTE — DISCHARGE PLANNING
CM//Discharge :      DME:       [employer]                    Following equipment has been ordered: w/c and cushion  Status:Sent

## 2025-03-14 NOTE — PROGRESS NOTES
Pt refused bowel program at 2000 hrs, pt stated had loose stools in the morning.Charge CM Sainz made aware.

## 2025-03-14 NOTE — CARE PLAN
"  Problem: Fall Risk - Rehab  Goal: Patient will remain free from falls  Outcome: Progressing  Note: Nikole Swanson Fall risk Assessment Score: 23    High fall risk Interventions   - Alarming seatbelt  - Bed and strip alarm   - Yellow sign by the door   - Yellow wrist band \"Fall risk\"  - Room near to the nurse station  - Do not leave patient unattended in the bathroom  - Fall risk education provided     Problem: Pain Management  Goal: Pain level will decrease to patient's comfort goal  Outcome: Progressing  Note: Assessed for pain and discomfort , left bka stump dressing and ace bandage intact , IPOP in use. Pt on pain mgt [ see mar] with relief. Needs anticipated and attended.   The patient is Stable - Low risk of patient condition declining or worsening    Shift Goals  Clinical Goals: safety ; DM management  Patient Goals: participate w/ Therapy  Family Goals: no family present    Progress made toward(s) clinical / shift goals:  Pt free from fall and injury,    "

## 2025-03-14 NOTE — THERAPY
"Occupational Therapy  Daily Treatment     Patient Name:  Marlo León  Age:  69 y.o., Sex:  male  Medical Record #:  [id number]  Today's Date:  3/14/2025     Subjective: Patient seen for 2 treatment sessions, [id number] and [id number]. Pt willing to work with OT for both sessions.     Objective:    03/14/25 0831   OT Charge Group   OT Therapy Activity (Units) 2   OT Therapeutic Exercise (Units) 2   OT Total Time Spent   OT Individual Total Time Spent (Mins) 60   Precautions   Precautions Fall Risk   Weight Bearing NWB LE (laterality in comments)   Comments left BKA, left limb protector       Therapeutic Exercise  Purpose: to improve strength  Interventions:   Upper body exercises,   Seated program -   Shoulder press, 1 set of 10, Right, Left, and Medium Resistance Theraband  Bilateral row, 1 set of 10, Right, Left, and Medium Resistance Theraband  Tricep press, 1 set of 10, Right, Left, and Medium Resistance Theraband  Lower body exercises,   Seated program -   Long arc quad, 1 set of 10, Right, and Medium Resistance Theraband  Hamstring curl, 1 set of 10, Right, and Medium Resistance Theraband  Leg press, 1 set of 10, Right, and Medium Resistance Theraband  Description of Intervention(s): LB exercises done to encourage strength needed for safety with transfers and balance    Neuro Re-Education  Purpose: to improve balance  Interventions:   Static/Dynamic standing balance training  Facilitation/Cueing: Facilitation  Description of Intervention(s): stood in // bars to play \"ladder ball\". Hopped in // bars to retrieve balls w/reacher. Needed to sit down for rest break but able to stand and reach w/reacher after rest break. Played multiple rounds of ladder ball but only retrieved balls one time.     Therapeutic Activities  Pt transferred to therapy mat table and back to chair 5x to simulate toilet transfers to the right without a grab bar for stability.          03/14/25 1430   OT Charge Group   OT Therapeutic Exercise " (Units) 2   OT Total Time Spent   OT Individual Total Time Spent (Mins) 30  (session done at 1430)   Precautions   Precautions Fall Risk   Weight Bearing NWB LE (laterality in comments)   Comments left BKA, left limb protector   Chair/Bed-to-Chair Transfer   Level of Assist Contact Guard Assist   Transfer Type Squat Pivot Transfer   Assistive Devices No AD       Therapeutic Exercise  Purpose: to improve strength and encourage mobility needed for ADL  Interventions:   Upper body exercises,   Seated program -   Lat pull, 3 sets of 10 and Weight 25lb, 30lb, 30lb  Lower body exercises,   Supine program -   Other, leg press with 4 bands of resistance on shuttle board, 10 reps      Neuro Re-Education  Purpose: to improve balance and to improve proprioception  Interventions:   Static/Dynamic standing balance training  Facilitation/Cueing: Facilitation  Description of Intervention(s): stood in // bars 7x while holding a ball in one hand. Passed ball from one hand to the other in standing. Rest breaks in between each stand with min A to maintain balance throughout.     Assessment:    Pt seen for OT tx session twice today focusing on balance and strengthening needed for ADL and IADL. Pt is unable to maintain standing on R leg without upper body support.  Strengths: Able to follow instructions, Alert and oriented, Good carryover of learning, Independent prior level of function, Manages pain appropriately, Motivated for self care and independence, Pleasant and cooperative, Supportive family, Willingly participates in therapeutic activities  Barriers: Decreased endurance, Fatigue, Generalized weakness, Impaired activity tolerance, Impaired balance, Limited mobility    Plan:    ADLs: dressing, showering, toileting, IADLs:, Bed Mobility, Transfers, Sit to Stand Training, Strengthening, Endurance, Activity Tolerance, Functional Mobility, Standing Balance, and Family Training:      DME       Passport items to be completed:  Perform  bathroom transfers, complete dressing, complete feeding, get ready for the day, prepare a simple meal, participate in household tasks, adapt home for safety needs, demonstrate home exercise program, complete caregiver training     Occupational Therapy Goals (Active)       Problem: Bathing       Dates: Start:  03/07/25         Goal: STG-Within one week, patient will bathe with CGA.        Dates: Start:  03/07/25    Expected End:  03/24/25               Problem: Dressing       Dates: Start:  03/07/25         Goal: STG-Within one week, patient will dress LB with CGA.        Dates: Start:  03/07/25    Expected End:  03/24/25               Problem: OT Long Term Goals       Dates: Start:  03/07/25         Goal: LTG-By discharge, patient will complete basic self care tasks @ sup-mod I level.        Dates: Start:  03/07/25    Expected End:  03/24/25            Goal: LTG-By discharge, patient will perform bathroom transfers @ sup-mod I level.        Dates: Start:  03/07/25    Expected End:  03/24/25               Problem: Toileting       Dates: Start:  03/07/25         Goal: STG-Within one week, patient will complete toileting tasks with min A.        Dates: Start:  03/07/25    Expected End:  03/24/25

## 2025-03-14 NOTE — DISCHARGE PLANNING
ATTN: Case Management  RE: Referral for Home Health    As of 3/14, we have accepted the Home Health referral for the patient listed above.    [facility]  will contact the patient within 48 hours. If you have any questions or concerns regarding the patient’s transition to Home Health, please do not hesitate to contact us at [phone].      We look forward to collaborating with you,  [facility] Team

## 2025-03-14 NOTE — THERAPY
Physical Therapy   Daily Treatment     Patient Name:  Marlo León  Age:  69 y.o., Sex:  male  Medical Record #:  [id number]  Today's Date: 3/14/2025     Precautions  Precautions: Fall Risk  Fall Risk: Other (see comments) (Left BKA)  Weight Bearing: NWB LE (laterality in comments) (LEFT)  Braces: Other (see comments) (left residual limb protector)  Comments: Left BKA, left residual limb protector on when out of bed    Subjective: Patient had some phantom limb pain last night, had Tramadol at 3am which helped but makes him groggy this morning     Objective:    03/14/25 1031   PT Charge Group   PT Gait Training (Units) 1   PT Therapeutic Exercise (Units) 2   PT Therapeutic Activities (Units) 1   PT Total Time Spent   PT Individual Total Time Spent (Mins) 60   Roll Left and Right   Level of Assist Independent   Sit to Lying   Level of Assist Independent   Additional Description Use of bed rail   Lying to Sitting on Side of Bed   Level of Assist Independent   Additional Description Use of bed rail   Sit to Stand   Level of Assist Stand By Assist   Assistive Devices Use of FWW   Additional Description Cueing needed;Extra time needed   Chair/Bed-to-Chair Transfer   Level of Assist Contact Guard Assist   Transfer Type Squat Pivot Transfer  (reach pivot)   Assistive Devices Use of FWW   Car Transfer   Level of Assist Contact Guard Assist   Transfer Type Stand Step Transfer   Assistive Device No AD   Wheelchair   Level of Assist Independent   Wheelchair Distance in halls   Patient Scheduling Information   PT Time 30/60 minutes   Primary or Coverage PT Ran CORTES/Jett   Interdisciplinary Plan of Care Collaboration   IDT Collaboration with  Physician   Patient Position at End of Therapy Seated;Chair Alarm On   Collaboration Comments CLOF       Therapeutic Exercise  Purpose: to improve strength, to improve ROM/flexibility, and to improve functional endurance  Interventions:   Lower body exercises:   Supine program -   Supine  "bridges, One legged and 2 sets of 10  Hip abduction, Side lying and 2 sets of 10  Straight leg raises, Front, 2 sets of 10, and Bilateral  Short arc quad, 2 sets of 10 and Bilateral  Quadriceps isometrics, 1 set of 10 and Bilateral  Seated program -   Long arc quad, 2 sets of 10  Nustep, Resistance Level 4 and Time 10 minutes  Description of Intervention(s):     Gait Training  Purpose: to improve functional ambulation, to address gait deviations, and to improve walking endurance  Interventions:   Pre-gait activities  Safe use of device  Performed lateral weight shifting, small hop forward and backwards, practiced how to pivot from chair to chair that is straight across from him using 1 grab bar  Deviations (laterality in comments):  Other: patient maintains right knee flexion and has pains that travel to the lateral knee and quad      Assessment: Patient will need to remain as primary wheelchair user for mobility for the time being due to right quad weakness and buckling episodes     Strengths: Effective communication skills, Pleasant and cooperative, Supportive family, Willingly participates in therapeutic activities, Motivated for self care and independence  Barriers: Impaired activity tolerance, Impaired balance, Constipation, Limited mobility    Plan: Transfers, Gait Training, Strengthening, Endurance, Activity Tolerance, and Standing Balance    DME    PT DME Recommendations  Wheelchair: 20\" Width, Removable/Flip Back Armrests (pt has a wc which he purchased on [employer] however anticipates he will need a new one for DC with left residual limb board and flip back armrest)  Cushion: Standard  Vendor Equipment: IPOP    Goals:     Passport items to be completed:  Get in/out of bed safely, in/out of a vehicle, safely use mobility device, walk or wheel around home/community, navigate up and down stairs, show how to get up/down from the ground, ensure home is accessible, demonstrate HEP, complete caregiver " training    Physical Therapy Problems (Active)       Problem: Mobility       Dates: Start:  03/07/25         Goal: STG-Within one week, patient will ambulate household distance at least 15' with FWW and Min A.       Dates: Start:  03/07/25    Expected End:  03/24/25               Problem: PT-Long Term Goals       Dates: Start:  03/07/25         Goal: LTG-By discharge, patient will propel wheelchair on outside surfaces and inside surfaces with Mod I, distance >500'.       Dates: Start:  03/07/25    Expected End:  03/24/25            Goal: LTG-By discharge, patient will ambulate at least 25' with FWW and SBA.       Dates: Start:  03/07/25    Expected End:  03/24/25            Goal: LTG-By discharge, patient will transfer one surface to another SPV.       Dates: Start:  03/07/25    Expected End:  03/24/25            Goal: LTG-By discharge, patient will perform home exercise program Mod I.       Dates: Start:  03/07/25    Expected End:  03/24/25            Goal: LTG-By discharge, patient will transfer in/out of a car CGA from  level.       Dates: Start:  03/07/25    Expected End:  03/24/25

## 2025-03-15 ENCOUNTER — APPOINTMENT (OUTPATIENT)
Dept: PHYSICAL THERAPY | Facility: REHABILITATION | Age: 70
DRG: 560 | End: 2025-03-15
Attending: PHYSICAL MEDICINE & REHABILITATION
Payer: MEDICARE

## 2025-03-15 ENCOUNTER — APPOINTMENT (OUTPATIENT)
Dept: OCCUPATIONAL THERAPY | Facility: REHABILITATION | Age: 70
DRG: 560 | End: 2025-03-15
Attending: PHYSICAL MEDICINE & REHABILITATION
Payer: MEDICARE

## 2025-03-15 PROBLEM — R94.6 BORDERLINE ABNORMAL TFTS: Status: RESOLVED | Noted: 2025-03-07 | Resolved: 2025-03-15

## 2025-03-15 LAB
BUN SERPL-MCNC: 53 MG/DL (ref 8–22)
CREAT SERPL-MCNC: 2.28 MG/DL (ref 0.5–1.4)
GFR SERPLBLD CREATININE-BSD FMLA CKD-EPI: 31 ML/MIN/1.73 M 2
HCT VFR BLD AUTO: 29.5 % (ref 42–52)
HGB BLD-MCNC: 8.7 G/DL (ref 14–18)

## 2025-03-15 PROCEDURE — 700111 HCHG RX REV CODE 636 W/ 250 OVERRIDE (IP): Performed by: PHYSICAL MEDICINE & REHABILITATION

## 2025-03-15 PROCEDURE — 97116 GAIT TRAINING THERAPY: CPT

## 2025-03-15 PROCEDURE — 770010 HCHG ROOM/CARE - REHAB SEMI PRIVAT*

## 2025-03-15 PROCEDURE — 85014 HEMATOCRIT: CPT

## 2025-03-15 PROCEDURE — 97530 THERAPEUTIC ACTIVITIES: CPT

## 2025-03-15 PROCEDURE — 700102 HCHG RX REV CODE 250 W/ 637 OVERRIDE(OP): Performed by: HOSPITALIST

## 2025-03-15 PROCEDURE — 99232 SBSQ HOSP IP/OBS MODERATE 35: CPT | Performed by: HOSPITALIST

## 2025-03-15 PROCEDURE — 97110 THERAPEUTIC EXERCISES: CPT

## 2025-03-15 PROCEDURE — 97150 GROUP THERAPEUTIC PROCEDURES: CPT

## 2025-03-15 PROCEDURE — 36415 COLL VENOUS BLD VENIPUNCTURE: CPT

## 2025-03-15 PROCEDURE — 85018 HEMOGLOBIN: CPT

## 2025-03-15 PROCEDURE — A9270 NON-COVERED ITEM OR SERVICE: HCPCS | Performed by: PHYSICAL MEDICINE & REHABILITATION

## 2025-03-15 PROCEDURE — 84520 ASSAY OF UREA NITROGEN: CPT

## 2025-03-15 PROCEDURE — 82565 ASSAY OF CREATININE: CPT

## 2025-03-15 PROCEDURE — 700102 HCHG RX REV CODE 250 W/ 637 OVERRIDE(OP): Performed by: PHYSICAL MEDICINE & REHABILITATION

## 2025-03-15 PROCEDURE — A9270 NON-COVERED ITEM OR SERVICE: HCPCS | Performed by: HOSPITALIST

## 2025-03-15 RX ADMIN — ATORVASTATIN CALCIUM 40 MG: 40 TABLET, FILM COATED ORAL at 22:29

## 2025-03-15 RX ADMIN — HYDRALAZINE HYDROCHLORIDE 75 MG: 25 TABLET ORAL at 22:30

## 2025-03-15 RX ADMIN — CARVEDILOL 6.25 MG: 3.12 TABLET, FILM COATED ORAL at 17:02

## 2025-03-15 RX ADMIN — HYDRALAZINE HYDROCHLORIDE 75 MG: 25 TABLET ORAL at 17:02

## 2025-03-15 RX ADMIN — ASPIRIN 81 MG: 81 TABLET, COATED ORAL at 08:18

## 2025-03-15 RX ADMIN — LINEZOLID 600 MG: 600 TABLET, FILM COATED ORAL at 08:18

## 2025-03-15 RX ADMIN — INSULIN LISPRO 2 UNITS: 100 INJECTION, SOLUTION INTRAVENOUS; SUBCUTANEOUS at 08:14

## 2025-03-15 RX ADMIN — CARVEDILOL 6.25 MG: 3.12 TABLET, FILM COATED ORAL at 08:18

## 2025-03-15 RX ADMIN — HEPARIN SODIUM 5000 UNITS: 5000 INJECTION, SOLUTION INTRAVENOUS; SUBCUTANEOUS at 05:13

## 2025-03-15 RX ADMIN — ACETAMINOPHEN 1000 MG: 500 TABLET ORAL at 22:29

## 2025-03-15 RX ADMIN — LEVOTHYROXINE SODIUM 175 MCG: 0.05 TABLET ORAL at 05:13

## 2025-03-15 RX ADMIN — HYDRALAZINE HYDROCHLORIDE 50 MG: 50 TABLET ORAL at 05:13

## 2025-03-15 RX ADMIN — SODIUM BICARBONATE 650 MG: 650 TABLET ORAL at 13:00

## 2025-03-15 RX ADMIN — HEPARIN SODIUM 5000 UNITS: 5000 INJECTION, SOLUTION INTRAVENOUS; SUBCUTANEOUS at 15:00

## 2025-03-15 RX ADMIN — OMEPRAZOLE 20 MG: 20 CAPSULE, DELAYED RELEASE ORAL at 08:18

## 2025-03-15 RX ADMIN — HEPARIN SODIUM 5000 UNITS: 5000 INJECTION, SOLUTION INTRAVENOUS; SUBCUTANEOUS at 22:30

## 2025-03-15 RX ADMIN — SODIUM BICARBONATE 650 MG: 650 TABLET ORAL at 17:02

## 2025-03-15 RX ADMIN — HYDROCHLOROTHIAZIDE 25 MG: 25 TABLET ORAL at 05:13

## 2025-03-15 RX ADMIN — SODIUM BICARBONATE 650 MG: 650 TABLET ORAL at 08:18

## 2025-03-15 RX ADMIN — LISINOPRIL 40 MG: 20 TABLET ORAL at 22:29

## 2025-03-15 RX ADMIN — LINEZOLID 600 MG: 600 TABLET, FILM COATED ORAL at 22:29

## 2025-03-15 ASSESSMENT — ENCOUNTER SYMPTOMS
BRUISES/BLEEDS EASILY: 0
COUGH: 0
EYES NEGATIVE: 1
POLYDIPSIA: 0
NAUSEA: 0
ABDOMINAL PAIN: 0
SHORTNESS OF BREATH: 0
FEVER: 0
PALPITATIONS: 0
CHILLS: 0
VOMITING: 0

## 2025-03-15 ASSESSMENT — PATIENT HEALTH QUESTIONNAIRE - PHQ9
SUM OF ALL RESPONSES TO PHQ QUESTIONS 1-9: 0
9. THOUGHTS THAT YOU WOULD BE BETTER OFF DEAD, OR OF HURTING YOURSELF: NOT AT ALL
2. FEELING DOWN, DEPRESSED, IRRITABLE, OR HOPELESS: NOT AT ALL
SUM OF ALL RESPONSES TO PHQ9 QUESTIONS 1 AND 2: 0
2. FEELING DOWN, DEPRESSED, IRRITABLE, OR HOPELESS: NOT AT ALL
4. FEELING TIRED OR HAVING LITTLE ENERGY: NOT AT ALL
5. POOR APPETITE OR OVEREATING: NOT AT ALL
SUM OF ALL RESPONSES TO PHQ QUESTIONS 1-9: 0
3. TROUBLE FALLING OR STAYING ASLEEP OR SLEEPING TOO MUCH: NOT AT ALL
9. THOUGHTS THAT YOU WOULD BE BETTER OFF DEAD, OR OF HURTING YOURSELF: NOT AT ALL
6. FEELING BAD ABOUT YOURSELF - OR THAT YOU ARE A FAILURE OR HAVE LET YOURSELF OR YOUR FAMILY DOWN: NOT AL ALL
6. FEELING BAD ABOUT YOURSELF - OR THAT YOU ARE A FAILURE OR HAVE LET YOURSELF OR YOUR FAMILY DOWN: NOT AL ALL
4. FEELING TIRED OR HAVING LITTLE ENERGY: NOT AT ALL
7. TROUBLE CONCENTRATING ON THINGS, SUCH AS READING THE NEWSPAPER OR WATCHING TELEVISION: NOT AT ALL
SUM OF ALL RESPONSES TO PHQ9 QUESTIONS 1 AND 2: 0
1. LITTLE INTEREST OR PLEASURE IN DOING THINGS: NOT AT ALL
3. TROUBLE FALLING OR STAYING ASLEEP OR SLEEPING TOO MUCH: NOT AT ALL
1. LITTLE INTEREST OR PLEASURE IN DOING THINGS: NOT AT ALL
7. TROUBLE CONCENTRATING ON THINGS, SUCH AS READING THE NEWSPAPER OR WATCHING TELEVISION: NOT AT ALL
8. MOVING OR SPEAKING SO SLOWLY THAT OTHER PEOPLE COULD HAVE NOTICED. OR THE OPPOSITE, BEING SO FIGETY OR RESTLESS THAT YOU HAVE BEEN MOVING AROUND A LOT MORE THAN USUAL: NOT AT ALL
8. MOVING OR SPEAKING SO SLOWLY THAT OTHER PEOPLE COULD HAVE NOTICED. OR THE OPPOSITE, BEING SO FIGETY OR RESTLESS THAT YOU HAVE BEEN MOVING AROUND A LOT MORE THAN USUAL: NOT AT ALL
5. POOR APPETITE OR OVEREATING: NOT AT ALL

## 2025-03-15 ASSESSMENT — PAIN DESCRIPTION - PAIN TYPE: TYPE: PHANTOM PAIN

## 2025-03-15 NOTE — THERAPY
Occupational Therapy  Daily Treatment     Patient Name:  Marlo León  Age:  69 y.o., Sex:  male  Medical Record #:  [id number]  Today's Date:  3/15/2025     Subjective: Pt states that he did not need to take tramadol last night.     Objective:    03/15/25 0945 03/15/25 1531   OT Charge Group   OT Therapy Activity (Units)  --  1   OT Therapeutic Exercise (Units) 1  --    OT Total Time Spent   OT Individual Total Time Spent (Mins) 15 15   Precautions   Precautions Fall Risk Fall Risk   Weight Bearing NWB LE (laterality in comments) NWB LE (laterality in comments)   Braces IPOP;Immobilizer LE (laterality in comments) IPOP;Immobilizer LE (laterality in comments)   Comments IPOP;Immobilizer LE (laterality in comments); L BKA, L IPOP and L knee immobilizer   Toilet Transfer   Level of Assist  --  Contact Guard Assist   Transfer Type  --  Stand Pivot Transfer   Assistive Device  --  Use of FWW   Additional Description  --    (unable to use FWW to transfer back to  and needed to do lateral scoot back to )   Sit to Stand   Level of Assist  --  Stand By Assist  (done about 10x throughout session)   Assistive Devices  --  Use of FWW   Additional Description  --  Extra time needed         Therapeutic Activities  Purpose: to increase safety with activities of daily life and mobility related to activities of daily life  Interventions:   Other: sit to stand training, stand pivot training   Description of Intervention(s): done in room and in bathroom with FWW with goal of improving toilet transfers using FWW only    Therapeutic Exercise  Purpose: to improve strength  Interventions:   Upper body exercises, done seated on airex balance pad   Seated program -   Bicep curls, 3 sets of 10 and Weight 6lb  Other, twists with no weight 2 x 10; seated crunches 2 x 10 w/6lb weight     Assessment:    Pt seen for OT tx sessions with focus on core stability and safety with functional transfers using FWW. At this time pt is unable to  transfer to and from toilet using FWW only, and it is recommended that he use a grab bar vs FWW. Pt would benefit from continued practices using FWW only in order to simulate bathroom mobility in the community if a handicap bathroom is not available. Pt will have grab bars at home.   Strengths: Able to follow instructions, Alert and oriented, Good carryover of learning, Independent prior level of function, Manages pain appropriately, Motivated for self care and independence, Pleasant and cooperative, Supportive family, Willingly participates in therapeutic activities  Barriers: Decreased endurance, Fatigue, Generalized weakness, Impaired activity tolerance, Impaired balance, Limited mobility    Plan:    ADLs: showering, dressing, toileting, IADLs:, Transfers, Strengthening, Endurance, Functional Mobility, Sitting Balance, Standing Balance, and Family Training:      DME       Passport items to be completed:  Perform bathroom transfers, complete dressing, complete feeding, get ready for the day, prepare a simple meal, participate in household tasks, adapt home for safety needs, demonstrate home exercise program, complete caregiver training     Occupational Therapy Goals (Active)       Problem: Bathing       Dates: Start:  03/07/25         Goal: STG-Within one week, patient will bathe with CGA.        Dates: Start:  03/07/25    Expected End:  03/24/25               Problem: Dressing       Dates: Start:  03/07/25         Goal: STG-Within one week, patient will dress LB with CGA.        Dates: Start:  03/07/25    Expected End:  03/24/25               Problem: OT Long Term Goals       Dates: Start:  03/07/25         Goal: LTG-By discharge, patient will complete basic self care tasks @ sup-mod I level.        Dates: Start:  03/07/25    Expected End:  03/24/25            Goal: LTG-By discharge, patient will perform bathroom transfers @ sup-mod I level.        Dates: Start:  03/07/25    Expected End:  03/24/25                Problem: Toileting       Dates: Start:  03/07/25         Goal: STG-Within one week, patient will complete toileting tasks with min A.        Dates: Start:  03/07/25    Expected End:  03/24/25

## 2025-03-15 NOTE — CARE PLAN
Problem: Skin Integrity  Goal: Risk for impaired skin integrity will decrease  Outcome: Progressing     Problem: Fall Risk - Rehab  Goal: Patient will remain free from falls  Outcome: Progressing   The patient is Stable - Low risk of patient condition declining or worsening    Shift Goals  Clinical Goals: safety, BP management  Patient Goals: sleep well  Family Goals: no family present    Progress made toward(s) clinical / shift goals:      Patient is not progressing towards the following goals:

## 2025-03-15 NOTE — THERAPY
Physical Therapy   Daily Treatment     Patient Name:  Marlo León  Age:  69 y.o., Sex:  male  Medical Record #:  [id number]  Today's Date: 3/15/2025     Precautions  Precautions: Fall Risk  Fall Risk: Other (see comments) (Left BKA)  Weight Bearing: NWB LE (laterality in comments)  Braces: IPOP, Immobilizer LE (laterality in comments)  Comments: L BKA, L IPOP and L knee immobilizer    Subjective: Received in gym for group activity, is agreeable to participate.     Objective:    03/15/25 1031   PT Charge Group   PT Group Therapy Group Activities   PT Total Time Spent   PT Group Total Time Spent (Mins) 60   Interdisciplinary Plan of Care Collaboration   Patient Position at End of Therapy Seated;Chair Alarm On  (self mobilizing to dining room for lunch)         Group Therapy  Purpose: to increase active participation through peer pressure, to enhance motivation, to increase patient interaction during physical recovery, and to reinforce strengthening and movement through group format demonstration  Interventions:   LE exercise group  Description of Intervention(s): seated on table for duration of session to encourage core strength. Gross stretching for warm up: cervical, thoraco-lumbar range of motion, hamstring stretches. Core stability with 5# hand weight with overhead reaches, trunk rotation, forward pushes. Seated LE therex with 2# ankle weight and green band. Sit to stand x 5 from mat table. Wheelchair self propulsion x 75 ft towards lunch room with BUEs and RLE.     Assessment: Patient is motivated to participate with therapy. He will benefit from further LE strengthening to improve single limb stance, sit to stand, transfer stability. He was able to tolerate sitting unsupported for duration of session.     Strengths: Effective communication skills, Pleasant and cooperative, Supportive family, Willingly participates in therapeutic activities, Motivated for self care and independence  Barriers: Impaired activity  "tolerance, Impaired balance, Constipation, Limited mobility    Plan:  residual limb self wrapping instruction, LE strengthening, gait training, car transfers, stand/hop transfer training with FWW     DME    PT DME Recommendations  Wheelchair: 20\" Width, Removable/Flip Back Armrests (pt has a wc which he purchased on [employer] however anticipates he will need a new one for DC with left residual limb board and flip back armrest)  Cushion: Standard  Vendor Equipment: IPOP    Goals:     Passport items to be completed:      Physical Therapy Problems (Active)       Problem: Mobility       Dates: Start:  03/07/25         Goal: STG-Within one week, patient will ambulate household distance at least 15' with FWW and Min A.       Dates: Start:  03/07/25    Expected End:  03/24/25               Problem: PT-Long Term Goals       Dates: Start:  03/07/25         Goal: LTG-By discharge, patient will propel wheelchair on outside surfaces and inside surfaces with Mod I, distance >500'.       Dates: Start:  03/07/25    Expected End:  03/24/25            Goal: LTG-By discharge, patient will ambulate at least 25' with FWW and SBA.       Dates: Start:  03/07/25    Expected End:  03/24/25            Goal: LTG-By discharge, patient will transfer one surface to another SPV.       Dates: Start:  03/07/25    Expected End:  03/24/25            Goal: LTG-By discharge, patient will perform home exercise program Mod I.       Dates: Start:  03/07/25    Expected End:  03/24/25            Goal: LTG-By discharge, patient will transfer in/out of a car CGA from wc level.       Dates: Start:  03/07/25    Expected End:  03/24/25              "

## 2025-03-15 NOTE — CARE PLAN
"The patient is Watcher - Medium risk of patient condition declining or worsening    Shift Goals  Clinical Goals: safety, BP management  Patient Goals: sleep well  Family Goals: no family present    Progress made toward(s) clinical / shift goals:    Problem: Knowledge Deficit - Standard  Goal: Patient and family/care givers will demonstrate understanding of plan of care, disease process/condition, diagnostic tests and medications  Outcome: Progressing  Reviewed POC, all questions answered at this time.      Problem: Pain Management  Goal: Pain level will decrease to patient's comfort goal  Outcome: Progressing  Patient able to verbalize pain level and verbalize an acceptable level of pain.     Problem: Fall Risk - Rehab  Goal: Patient will remain free from falls  Outcome: Progressing  Nikole Swanson Fall risk Assessment Score: 16    High fall risk Interventions   - Alarming seatbelt  - Wander guard  - Bed and strip alarm   - Yellow sign by the door   - Yellow wrist band \"Fall risk\"  - Room near to the nurse station  - Do not leave patient unattended in the bathroom  - Fall risk education provided     Problem: Pain - Standard  Goal: Alleviation of pain or a reduction in pain to the patient’s comfort goal  Outcome: Progressing  Patient able to verbalize pain level and verbalize an acceptable level of pain.  "

## 2025-03-15 NOTE — THERAPY
Physical Therapy   Daily Treatment     Patient Name:  Marlo León  Age:  69 y.o., Sex:  male  Medical Record #:  [id number]  Today's Date: 3/15/2025     Precautions  Precautions: Fall Risk  Fall Risk: Other (see comments) (Left BKA)  Weight Bearing: NWB LE (laterality in comments)  Braces: IPOP, Immobilizer LE (laterality in comments)  Comments: L BKA, L IPOP and L knee immobilizer    Subjective: Patient slept very well in new air mattress last night, did not need Tramadol since he increased the Tylenol and had no pain in residual limb     Objective:    03/15/25 0831   PT Charge Group   PT Gait Training (Units) 1   PT Therapeutic Exercise (Units) 1   PT Total Time Spent   PT Individual Total Time Spent (Mins) 30   Sit to Stand   Level of Assist Stand By Assist   Assistive Devices   (parallel bars)   Ambulation   Level of Assist Contact Guard Assist   Assistive Device   (parallel bars)   Additional Description Cueing needed;Extra time needed   Distance 4 x 10 feet in bars   Wheelchair   Level of Assist Independent   Wheelchair Distance in halls   Patient Scheduling Information   PT Time 30/60 minutes   Primary or Coverage PT Ran CORTES/Jett   Interdisciplinary Plan of Care Collaboration   Patient Position at End of Therapy Seated;Chair Alarm On;Self Releasing Lap Belt Applied         Therapeutic Exercise  Purpose: to improve strength, to improve ROM/flexibility, and to improve functional endurance  Interventions:   Other, standing left hip flexion/abduction/extension 2 x 10 each, seated scapular depression with green band  Description of Intervention(s):     Assessment: Patient had better foot clearance with parallel bar gait today and no episode of right knee buckling     Strengths: Effective communication skills, Pleasant and cooperative, Supportive family, Willingly participates in therapeutic activities, Motivated for self care and independence  Barriers: Impaired activity tolerance, Impaired balance, Constipation,  "Limited mobility    Plan: residual limb self wrapping instruction, LE strengthening, gait training, car transfers, stand/hop transfer training with FWW    DME    PT DME Recommendations  Wheelchair: 20\" Width, Removable/Flip Back Armrests (pt has a wc which he purchased on [employer] however anticipates he will need a new one for DC with left residual limb board and flip back armrest)  Cushion: Standard  Vendor Equipment: IPOP    Goals:     Passport items to be completed:  Get in/out of bed safely, in/out of a vehicle, safely use mobility device, walk or wheel around home/community, navigate up and down stairs, show how to get up/down from the ground, ensure home is accessible, demonstrate HEP, complete caregiver training    Physical Therapy Problems (Active)       Problem: Mobility       Dates: Start:  03/07/25         Goal: STG-Within one week, patient will ambulate household distance at least 15' with FWW and Min A.       Dates: Start:  03/07/25    Expected End:  03/24/25               Problem: PT-Long Term Goals       Dates: Start:  03/07/25         Goal: LTG-By discharge, patient will propel wheelchair on outside surfaces and inside surfaces with Mod I, distance >500'.       Dates: Start:  03/07/25    Expected End:  03/24/25            Goal: LTG-By discharge, patient will ambulate at least 25' with FWW and SBA.       Dates: Start:  03/07/25    Expected End:  03/24/25            Goal: LTG-By discharge, patient will transfer one surface to another SPV.       Dates: Start:  03/07/25    Expected End:  03/24/25            Goal: LTG-By discharge, patient will perform home exercise program Mod I.       Dates: Start:  03/07/25    Expected End:  03/24/25            Goal: LTG-By discharge, patient will transfer in/out of a car CGA from wc level.       Dates: Start:  03/07/25    Expected End:  03/24/25              "

## 2025-03-15 NOTE — PROGRESS NOTES
NURSING DAILY NOTE    Name: Marlo León   Date of Admission: 3/6/2025   Admitting Diagnosis: Hx of BKA, left (HCC)  Attending Physician: MICKIE JUAREZ D.O.  Allergies: Neosporin [neomycin-polymyxin b], Tape, Amlodipine, Amoxicillin, Bacitracin-polymyxin b, Cyclobenzaprine, Empagliflozin-metformin hcl, Gabapentin, Gadolinium, Metformin, Neomycin-polymyxin b gu, Nsaids, Pioglitazone, Prednisone, Sulfamethoxazole w-trimethoprim, and Tamsulosin    Safety  Patient Assist  Mod  Patient Precautions  Precautions: Fall Risk  Fall Risk: Other (see comments) (Left BKA)  Weight Bearing: NWB LE (laterality in comments)  Braces: IPOP, Immobilizer LE (laterality in comments)  Comments: L BKA, L IPOP and L knee immobilizer  Bed Transfer Status  Contact Guard Assist  Toilet Transfer Status   Supervised  Assistive Devices  Wheelchair  Oxygen  CPAP  Diet/Therapeutic Dining  Current Diet Order   Procedures    Diet Order Diet: Consistent CHO (Diabetic) (HIGH PROTEIN AND VEGETABLES. DAIRY OK. LIMIT CARBOHYDRATES); Nutrient modifications: (optional): High Protein     Pill Administration  whole  Agitated Behavioral Scale     ABS Level of Severity       Fall Risk  Has the patient had a fall this admission?      Nikole Swanson Fall Risk Scoring  16, HIGH RISK  Fall Risk Safety Measures  bed alarm, chair alarm, poor balance, and low vision/hearing    Vitals  Temperature: 36.6 °C (97.9 °F)  Temp src: Temporal  Pulse: 65  Respiration: 18  Blood Pressure : (!) 149/57  Blood Pressure MAP (Calculated): 88 MM HG  BP Location: Left, Upper Arm  Patient BP Position: Martino's Position     Oxygen  Pulse Oximetry: 96 %  O2 (LPM): 0  O2 Delivery Device: CPAP    Bowel and Bladder  Last Bowel Movement  03/14/25  Stool Type  Type 6: Fluffy pieces with ragged edges, a mushy stool  Bowel Device     Continent  Bladder: Continent void   Bowel: Continent movement  Bladder Function  Urine Void (mL): 500 ml  Number of Times Voided: 1  Urine Color:  Yellow  Number of Times Incontinent of Urine: 0  Straight Catheter:  (Pt said, he just needs time to void. He will try again later.)  Genitourinary Assessment   Bladder Assessment (WDL):  WDL Except  Jane Catheter: Not Applicable  Urine Color: Yellow  Number of Bladder Accidents: 0  Total Number of Bladder of Accidents in Last 7 Days: 0  Number of Times Incontinent of Urine: 0  Bladder Device: Urinal  Time Void: Yes  Bladder Scan: Post Void  $ Bladder Scan Results (mL): 713 (will try to revod)    Skin  Anselmo Score   18  Sensory Interventions   Bed Types: Low Airloss  Skin Preventative Measures: Pillows in Use for Support / Positioning  Moisture Interventions  Moisturizers/Barriers: Barrier Wipes      Pain  Pain Rating Scale  2 - Notice Pain, does not interfere with activities  Pain Location  Leg  Pain Location Orientation  Left  Pain Interventions   Medication (see MAR)    ADLs    Bathing   Shower, Staff  Linen Change   Partial  Personal Hygiene     Chlorhexidine Bath   Not Completed - Refused. Education Provided. (Pt is sound asleep.)  Oral Care     Teeth/Dentures     Shave     Nutrition Percentage Eaten  *  * Meal *  *, Dinner, Between % Consumed  Environmental Precautions  Treaded Slipper Socks on Patient, Personal Belongings, Wastebasket, Call Bell etc. in Easy Reach, Transferred to Stronger Side, Report Given to Other Health Care Providers Regarding Fall Risk, Bed in Low Position, Communication Sign for Patients & Families, Mobility Assessed & Appropriate Sign Placed  Patient Turns/Positioning  Patient turns self independently side to side without assistance, to offload sacral area  Patient Turns Assistance/Tolerance     Bed Positions  Bed Locked, Bed Controls On  Head of Bed Elevated  Self regulated      Psychosocial/Neurologic Assessment  Psychosocial Assessment  Psychosocial (WDL):  Within Defined Limits  Neurologic Assessment  Neuro (WDL): Within Defined Limits  Level of Consciousness:  Alert  Orientation Level: Oriented X4  Cognition: Follows commands, Appropriate judgement, Appropriate safety awareness  Speech: Clear  Pupil Assesment: No  EENT (WDL):  WDL Except    Cardio/Pulmonary Assessment  Edema      Respiratory Breath Sounds  RUL Breath Sounds: Clear  RML Breath Sounds: Clear  RLL Breath Sounds: Diminished  NOEMY Breath Sounds: Clear  LLL Breath Sounds: Diminished  Cardiac Assessment   Cardiac (WDL):  Within Defined Limits

## 2025-03-15 NOTE — PROGRESS NOTES
NURSING DAILY NOTE    Name: Marlo León   Date of Admission: 3/6/2025   Admitting Diagnosis: Hx of BKA, left (HCC)  Attending Physician: MICKIE JUAREZ D.O.  Allergies: Neosporin [neomycin-polymyxin b], Tape, Amlodipine, Amoxicillin, Bacitracin-polymyxin b, Cyclobenzaprine, Empagliflozin-metformin hcl, Gabapentin, Gadolinium, Metformin, Neomycin-polymyxin b gu, Nsaids, Pioglitazone, Prednisone, Sulfamethoxazole w-trimethoprim, and Tamsulosin    Safety  Patient Assist  Mod  Patient Precautions  Precautions: Fall Risk  Fall Risk: Other (see comments) (Left BKA)  Weight Bearing: NWB LE (laterality in comments)  Braces: IPOP, Immobilizer LE (laterality in comments)  Comments: L BKA, L IPOP and L knee immobilizer  Bed Transfer Status  Contact Guard Assist  Toilet Transfer Status   Supervised  Assistive Devices  Wheelchair  Oxygen  CPAP  Diet/Therapeutic Dining  Current Diet Order   Procedures    Diet Order Diet: Consistent CHO (Diabetic) (HIGH PROTEIN AND VEGETABLES. DAIRY OK. LIMIT CARBOHYDRATES); Nutrient modifications: (optional): High Protein     Pill Administration  whole  Agitated Behavioral Scale     ABS Level of Severity       Fall Risk  Has the patient had a fall this admission?      Nikole Swanson Fall Risk Scoring  16, HIGH RISK  Fall Risk Safety Measures  bed alarm, chair alarm, and seatbelt alarm    Vitals  Temperature: 37 °C (98.6 °F)  Temp src: Temporal  Pulse: 72  Respiration: 18  Blood Pressure : (!) 155/70  Blood Pressure MAP (Calculated): 98 MM HG  BP Location: Left, Upper Arm  Patient BP Position: Sitting     Oxygen  Pulse Oximetry: 97 %  O2 (LPM): 0  O2 Delivery Device: CPAP    Bowel and Bladder  Last Bowel Movement  03/15/25  Stool Type  Type 6: Fluffy pieces with ragged edges, a mushy stool  Bowel Device     Continent  Bladder: Continent void   Bowel: Continent movement  Bladder Function  Urine Void (mL):  (large)  Number of Times Voided: 1  Urine Color: Yellow  Number of Times Incontinent of  Urine: 0  Straight Catheter:  (Refused. Pt said he will revoid again later)  Genitourinary Assessment   Bladder Assessment (WDL):  WDL Except  Jane Catheter: Not Applicable  Urine Color: Yellow  Number of Bladder Accidents: 0  Total Number of Bladder of Accidents in Last 7 Days: 0  Number of Times Incontinent of Urine: 0  Bladder Device: Urinal  Time Void: Yes  Bladder Scan: Re-Void  $ Bladder Scan Results (mL): 333    Skin  Anselmo Score   18  Sensory Interventions   Bed Types: Low Airloss  Skin Preventative Measures: Pillows in Use for Support / Positioning  Moisture Interventions  Moisturizers/Barriers: Barrier Wipes      Pain  Pain Rating Scale  2 - Notice Pain, does not interfere with activities  Pain Location  Leg  Pain Location Orientation  Left  Pain Interventions   Emotional Support    ADLs    Bathing   Shower, Staff  Linen Change   Partial  Personal Hygiene  Perineal Care, Moist Joceline Wipes  Chlorhexidine Bath   Not Completed - Refused. Education Provided. (Pt is sound asleep.)  Oral Care     Teeth/Dentures     Shave     Nutrition Percentage Eaten  Lunch, Between % Consumed  Environmental Precautions  Treaded Slipper Socks on Patient, Personal Belongings, Wastebasket, Call Bell etc. in Easy Reach, Transferred to Stronger Side, Report Given to Other Health Care Providers Regarding Fall Risk, Bed in Low Position, Communication Sign for Patients & Families, Mobility Assessed & Appropriate Sign Placed  Patient Turns/Positioning  Patient turns self independently side to side without assistance, to offload sacral area  Patient Turns Assistance/Tolerance     Bed Positions  Bed Locked, Bed Controls On  Head of Bed Elevated  Self regulated      Psychosocial/Neurologic Assessment  Psychosocial Assessment  Psychosocial (WDL):  Within Defined Limits  Neurologic Assessment  Neuro (WDL): Within Defined Limits  Level of Consciousness: Alert  Orientation Level: Oriented X4  Cognition: Follows commands, Appropriate  judgement, Appropriate safety awareness  Speech: Clear  Pupil Assesment: No  EENT (WDL):  WDL Except    Cardio/Pulmonary Assessment  Edema      Respiratory Breath Sounds  RUL Breath Sounds: Clear  RML Breath Sounds: Clear  RLL Breath Sounds: Diminished  NOEMY Breath Sounds: Clear  LLL Breath Sounds: Diminished  Cardiac Assessment   Cardiac (WDL):  Within Defined Limits

## 2025-03-16 ENCOUNTER — APPOINTMENT (OUTPATIENT)
Dept: PHYSICAL THERAPY | Facility: REHABILITATION | Age: 70
DRG: 560 | End: 2025-03-16
Attending: PHYSICAL MEDICINE & REHABILITATION
Payer: MEDICARE

## 2025-03-16 PROCEDURE — 99232 SBSQ HOSP IP/OBS MODERATE 35: CPT | Performed by: HOSPITALIST

## 2025-03-16 PROCEDURE — A9270 NON-COVERED ITEM OR SERVICE: HCPCS | Performed by: HOSPITALIST

## 2025-03-16 PROCEDURE — A9270 NON-COVERED ITEM OR SERVICE: HCPCS | Performed by: PHYSICAL MEDICINE & REHABILITATION

## 2025-03-16 PROCEDURE — 700102 HCHG RX REV CODE 250 W/ 637 OVERRIDE(OP): Performed by: HOSPITALIST

## 2025-03-16 PROCEDURE — 97116 GAIT TRAINING THERAPY: CPT

## 2025-03-16 PROCEDURE — 700111 HCHG RX REV CODE 636 W/ 250 OVERRIDE (IP): Performed by: PHYSICAL MEDICINE & REHABILITATION

## 2025-03-16 PROCEDURE — 97530 THERAPEUTIC ACTIVITIES: CPT

## 2025-03-16 PROCEDURE — 97110 THERAPEUTIC EXERCISES: CPT

## 2025-03-16 PROCEDURE — 700102 HCHG RX REV CODE 250 W/ 637 OVERRIDE(OP): Performed by: PHYSICAL MEDICINE & REHABILITATION

## 2025-03-16 PROCEDURE — 770010 HCHG ROOM/CARE - REHAB SEMI PRIVAT*

## 2025-03-16 RX ADMIN — SODIUM BICARBONATE 650 MG: 650 TABLET ORAL at 16:59

## 2025-03-16 RX ADMIN — HEPARIN SODIUM 5000 UNITS: 5000 INJECTION, SOLUTION INTRAVENOUS; SUBCUTANEOUS at 20:29

## 2025-03-16 RX ADMIN — LEVOTHYROXINE SODIUM 175 MCG: 0.05 TABLET ORAL at 05:33

## 2025-03-16 RX ADMIN — OMEPRAZOLE 20 MG: 20 CAPSULE, DELAYED RELEASE ORAL at 08:03

## 2025-03-16 RX ADMIN — SODIUM BICARBONATE 650 MG: 650 TABLET ORAL at 12:49

## 2025-03-16 RX ADMIN — INSULIN LISPRO 2 UNITS: 100 INJECTION, SOLUTION INTRAVENOUS; SUBCUTANEOUS at 20:34

## 2025-03-16 RX ADMIN — HEPARIN SODIUM 5000 UNITS: 5000 INJECTION, SOLUTION INTRAVENOUS; SUBCUTANEOUS at 14:17

## 2025-03-16 RX ADMIN — SODIUM BICARBONATE 650 MG: 650 TABLET ORAL at 08:02

## 2025-03-16 RX ADMIN — HYDRALAZINE HYDROCHLORIDE 75 MG: 25 TABLET ORAL at 14:16

## 2025-03-16 RX ADMIN — LINEZOLID 600 MG: 600 TABLET, FILM COATED ORAL at 20:28

## 2025-03-16 RX ADMIN — HYDROCHLOROTHIAZIDE 25 MG: 25 TABLET ORAL at 05:32

## 2025-03-16 RX ADMIN — CARVEDILOL 6.25 MG: 3.12 TABLET, FILM COATED ORAL at 08:02

## 2025-03-16 RX ADMIN — LINEZOLID 600 MG: 600 TABLET, FILM COATED ORAL at 08:03

## 2025-03-16 RX ADMIN — HYDRALAZINE HYDROCHLORIDE 75 MG: 25 TABLET ORAL at 20:27

## 2025-03-16 RX ADMIN — ASPIRIN 81 MG: 81 TABLET, COATED ORAL at 08:03

## 2025-03-16 RX ADMIN — LISINOPRIL 40 MG: 20 TABLET ORAL at 20:28

## 2025-03-16 RX ADMIN — ATORVASTATIN CALCIUM 40 MG: 40 TABLET, FILM COATED ORAL at 20:32

## 2025-03-16 RX ADMIN — ACETAMINOPHEN 1000 MG: 500 TABLET ORAL at 20:27

## 2025-03-16 RX ADMIN — HEPARIN SODIUM 5000 UNITS: 5000 INJECTION, SOLUTION INTRAVENOUS; SUBCUTANEOUS at 05:32

## 2025-03-16 RX ADMIN — CARVEDILOL 6.25 MG: 3.12 TABLET, FILM COATED ORAL at 16:59

## 2025-03-16 RX ADMIN — HYDRALAZINE HYDROCHLORIDE 75 MG: 25 TABLET ORAL at 05:32

## 2025-03-16 ASSESSMENT — ENCOUNTER SYMPTOMS
EYES NEGATIVE: 1
POLYDIPSIA: 0
VOMITING: 0
COUGH: 0
CHILLS: 0
PALPITATIONS: 0
BRUISES/BLEEDS EASILY: 0
FEVER: 0
ABDOMINAL PAIN: 0
SHORTNESS OF BREATH: 0
NAUSEA: 0

## 2025-03-16 ASSESSMENT — PATIENT HEALTH QUESTIONNAIRE - PHQ9
3. TROUBLE FALLING OR STAYING ASLEEP OR SLEEPING TOO MUCH: NOT AT ALL
8. MOVING OR SPEAKING SO SLOWLY THAT OTHER PEOPLE COULD HAVE NOTICED. OR THE OPPOSITE, BEING SO FIGETY OR RESTLESS THAT YOU HAVE BEEN MOVING AROUND A LOT MORE THAN USUAL: NOT AT ALL
7. TROUBLE CONCENTRATING ON THINGS, SUCH AS READING THE NEWSPAPER OR WATCHING TELEVISION: NOT AT ALL
SUM OF ALL RESPONSES TO PHQ9 QUESTIONS 1 AND 2: 0
8. MOVING OR SPEAKING SO SLOWLY THAT OTHER PEOPLE COULD HAVE NOTICED. OR THE OPPOSITE, BEING SO FIGETY OR RESTLESS THAT YOU HAVE BEEN MOVING AROUND A LOT MORE THAN USUAL: NOT AT ALL
1. LITTLE INTEREST OR PLEASURE IN DOING THINGS: NOT AT ALL
2. FEELING DOWN, DEPRESSED, IRRITABLE, OR HOPELESS: NOT AT ALL
5. POOR APPETITE OR OVEREATING: NOT AT ALL
6. FEELING BAD ABOUT YOURSELF - OR THAT YOU ARE A FAILURE OR HAVE LET YOURSELF OR YOUR FAMILY DOWN: NOT AL ALL
SUM OF ALL RESPONSES TO PHQ QUESTIONS 1-9: 0
2. FEELING DOWN, DEPRESSED, IRRITABLE, OR HOPELESS: NOT AT ALL
4. FEELING TIRED OR HAVING LITTLE ENERGY: NOT AT ALL
9. THOUGHTS THAT YOU WOULD BE BETTER OFF DEAD, OR OF HURTING YOURSELF: NOT AT ALL
9. THOUGHTS THAT YOU WOULD BE BETTER OFF DEAD, OR OF HURTING YOURSELF: NOT AT ALL
3. TROUBLE FALLING OR STAYING ASLEEP OR SLEEPING TOO MUCH: NOT AT ALL
7. TROUBLE CONCENTRATING ON THINGS, SUCH AS READING THE NEWSPAPER OR WATCHING TELEVISION: NOT AT ALL
6. FEELING BAD ABOUT YOURSELF - OR THAT YOU ARE A FAILURE OR HAVE LET YOURSELF OR YOUR FAMILY DOWN: NOT AL ALL
SUM OF ALL RESPONSES TO PHQ QUESTIONS 1-9: 0
1. LITTLE INTEREST OR PLEASURE IN DOING THINGS: NOT AT ALL
SUM OF ALL RESPONSES TO PHQ9 QUESTIONS 1 AND 2: 0
4. FEELING TIRED OR HAVING LITTLE ENERGY: NOT AT ALL
5. POOR APPETITE OR OVEREATING: NOT AT ALL

## 2025-03-16 ASSESSMENT — PAIN DESCRIPTION - PAIN TYPE: TYPE: ACUTE PAIN

## 2025-03-16 NOTE — PROGRESS NOTES
Hospital Medicine Daily Progress Note        Chief Complaint:  Hypertension  Diabetes  Anemia  Chronic Kidney Disease    Interval History:  No acute events overnight.  Laboratory results reviewed and discussed.    Review of Systems  Review of Systems   Constitutional:  Negative for chills and fever.   HENT: Negative.     Eyes: Negative.    Respiratory:  Negative for cough and shortness of breath.    Cardiovascular:  Negative for chest pain and palpitations.   Gastrointestinal:  Negative for abdominal pain, nausea and vomiting.   Musculoskeletal:         Wound pain   Skin:  Negative for itching and rash.   Endo/Heme/Allergies:  Negative for polydipsia. Does not bruise/bleed easily.        Physical Exam  Temp:  [36.3 °C (97.3 °F)-37 °C (98.6 °F)] 37 °C (98.6 °F)  Pulse:  [63-72] 72  Resp:  [16-18] 18  BP: (128-167)/(57-72) 155/70  SpO2:  [95 %-97 %] 97 %    Physical Exam  Vitals reviewed.   Constitutional:       General: He is not in acute distress.     Appearance: Normal appearance. He is not ill-appearing.   HENT:      Head: Normocephalic and atraumatic.      Right Ear: External ear normal.      Left Ear: External ear normal.      Mouth/Throat:      Pharynx: Oropharynx is clear.   Eyes:      General:         Right eye: No discharge.         Left eye: No discharge.      Extraocular Movements: Extraocular movements intact.      Conjunctiva/sclera: Conjunctivae normal.   Cardiovascular:      Rate and Rhythm: Normal rate and regular rhythm.   Pulmonary:      Effort: Pulmonary effort is normal. No respiratory distress.      Breath sounds: Normal breath sounds. No wheezing.   Abdominal:      General: Bowel sounds are normal. There is no distension.      Palpations: Abdomen is soft.      Tenderness: There is no abdominal tenderness.   Musculoskeletal:      Cervical back: Normal range of motion and neck supple.      Right lower leg: No edema.      Comments: L BKA in stump protector   Skin:     General: Skin is warm and dry.    Neurological:      Mental Status: He is alert and oriented to person, place, and time.         Fluids      Laboratory                Assessment/Plan  * Hx of BKA, left (HCC)- (present on admission)  Assessment & Plan  H/O LLE revascularization Oct 2024  Developed left heel ulcer and toe gangrene  Now S/P BKA on 3/2/25 by Dr. Echavarria at [facility]  Pain control and wound care per Physiatry    MSSA bacteremia  Assessment & Plan  On Ancef through 3/14/25 followed by Zyvox through 3/28/24    CAD (coronary artery disease)  Assessment & Plan  H/O CABG  Echo EF 50%  On ASA, Lipitor, Coreg, and Lisinopril    Anemia  Assessment & Plan  Has normocytic indices  Fe 72 and Ferritin 115  Follow H/H    CKD (chronic kidney disease)  Assessment & Plan  On NaHCO3  Avoid nephrotoxins  Renal dose all meds  Monitor electrolytes and volume  Outpt Nephrology F/U    Hypothyroidism- (present on admission)  Assessment & Plan  TSH 5.61 and FT4 1.45  Continue current dose Synthroid  Outpt F/U    Type 2 diabetes mellitus with hyperglycemia (HCC)- (present on admission)  Assessment & Plan  HbA1c 6.6  Monitor serum glucose trends on Lantus and SSI  Outpt meds include Lantus 28-35 u SQ qhs, Humalog 10 u SQ qac, Farxiga, and Mounjaro  Has allergies to Metformin, Actos, and Jardiance    Primary hypertension- (present on admission)  Assessment & Plan  On Coreg, Lisinopril, HCTZ, and Hydralazine  Blood pressure trends improving on increased Hydralazine  Has Norvasc allergy    Full Code

## 2025-03-16 NOTE — PROGRESS NOTES
Hospital Medicine Daily Progress Note        Chief Complaint:  Hypertension  Diabetes  Anemia  Chronic Kidney Disease    Interval History:  Pt requesting to decrease insulin to avoid occasional low blood sugars.  Laboratory results reviewed and discussed.    Review of Systems  Review of Systems   Constitutional:  Negative for chills and fever.   HENT: Negative.     Eyes: Negative.    Respiratory:  Negative for cough and shortness of breath.    Cardiovascular:  Negative for chest pain and palpitations.   Gastrointestinal:  Negative for abdominal pain, nausea and vomiting.   Musculoskeletal:         Wound pain   Skin:  Negative for itching and rash.   Endo/Heme/Allergies:  Negative for polydipsia. Does not bruise/bleed easily.        Physical Exam  Temp:  [36.3 °C (97.3 °F)-37 °C (98.6 °F)] 37 °C (98.6 °F)  Pulse:  [63-72] 72  Resp:  [16-18] 18  BP: (128-167)/(57-72) 155/70  SpO2:  [95 %-97 %] 97 %    Physical Exam  Vitals reviewed.   Constitutional:       General: He is not in acute distress.     Appearance: Normal appearance. He is not ill-appearing.   HENT:      Head: Normocephalic and atraumatic.      Right Ear: External ear normal.      Left Ear: External ear normal.      Mouth/Throat:      Pharynx: Oropharynx is clear.   Eyes:      General:         Right eye: No discharge.         Left eye: No discharge.      Extraocular Movements: Extraocular movements intact.      Conjunctiva/sclera: Conjunctivae normal.   Cardiovascular:      Rate and Rhythm: Normal rate and regular rhythm.   Pulmonary:      Effort: Pulmonary effort is normal. No respiratory distress.      Breath sounds: Normal breath sounds. No wheezing.   Abdominal:      General: Bowel sounds are normal. There is no distension.      Palpations: Abdomen is soft.      Tenderness: There is no abdominal tenderness.   Musculoskeletal:      Cervical back: Normal range of motion and neck supple.      Right lower leg: No edema.      Comments: JEYSON MAURICEJASPAL in stump  protector   Skin:     General: Skin is warm and dry.   Neurological:      Mental Status: He is alert and oriented to person, place, and time.         Fluids      Laboratory  Recent Labs     03/13/25  0545 03/15/25  0521   WBC 6.2  --    RBC 2.91*  --    HEMOGLOBIN 8.4* 8.7*   HEMATOCRIT 27.8* 29.5*   MCV 95.5  --    MCH 28.9  --    MCHC 30.2*  --    RDW 53.8*  --    PLATELETCT 219  --    MPV 11.8  --      Recent Labs     03/13/25  0545 03/15/25  0521   SODIUM 140  --    POTASSIUM 4.6  --    CHLORIDE 113*  --    CO2 19*  --    GLUCOSE 95  --    BUN 52* 53*   CREATININE 2.34* 2.28*   CALCIUM 8.4*  --                  Assessment/Plan  * Hx of BKA, left (HCC)- (present on admission)  Assessment & Plan  H/O LLE revascularization Oct 2024  Developed left heel ulcer and toe gangrene  Now S/P BKA on 3/2/25 by Dr. Echavarria at [facility]  Pain control and wound care per Physiatry    MSSA bacteremia  Assessment & Plan  Completed Ancef 3/14/25  Now on Zyvox through 3/28/24    CAD (coronary artery disease)  Assessment & Plan  H/O CABG  Echo EF 50%  On ASA, Lipitor, Coreg, and Lisinopril    Anemia  Assessment & Plan  Has normocytic indices  Fe 72 and Ferritin 115  Follow H/H    CKD (chronic kidney disease)  Assessment & Plan  Continue NaHCO3  Avoid nephrotoxins  Renal dose all meds  Monitor electrolytes and volume  Outpt Nephrology F/U    Hypothyroidism- (present on admission)  Assessment & Plan  TSH 5.61 and FT4 1.45  Continue current dose Synthroid  Outpt F/U    Type 2 diabetes mellitus with hyperglycemia (HCC)- (present on admission)  Assessment & Plan  HbA1c 6.6  On Lantus and SSI  Decrease Lantus for occasional FSBS below goal  Outpt meds include Lantus 28-35 u SQ qhs, Humalog 10 u SQ qac, Farxiga, and Mounjaro  Has allergies to Metformin, Actos, and Jardiance    Primary hypertension- (present on admission)  Assessment & Plan  On Coreg, Lisinopril, HCTZ, and Hydralazine  Increase Hydralazine for hypertensive trends  Has Norvasc  allergy    Full Code

## 2025-03-16 NOTE — PROGRESS NOTES
Hospital Medicine Daily Progress Note        Chief Complaint:  Hypertension  Diabetes  Anemia  Chronic Kidney Disease    Interval History:  Pt seen and examined while ambulating via wheelchair in corridor.  Denies new complaints.    Review of Systems  Review of Systems   Constitutional:  Negative for chills and fever.   HENT: Negative.     Eyes: Negative.    Respiratory:  Negative for cough and shortness of breath.    Cardiovascular:  Negative for chest pain and palpitations.   Gastrointestinal:  Negative for abdominal pain, nausea and vomiting.   Musculoskeletal:         Wound pain   Skin:  Negative for itching and rash.   Endo/Heme/Allergies:  Negative for polydipsia. Does not bruise/bleed easily.        Physical Exam  Temp:  [36.3 °C (97.3 °F)-37 °C (98.6 °F)] 37 °C (98.6 °F)  Pulse:  [63-72] 72  Resp:  [16-18] 18  BP: (128-167)/(57-72) 155/70  SpO2:  [95 %-97 %] 97 %    Physical Exam  Vitals reviewed.   Constitutional:       General: He is not in acute distress.     Appearance: Normal appearance. He is not ill-appearing.   HENT:      Head: Normocephalic and atraumatic.      Right Ear: External ear normal.      Left Ear: External ear normal.      Mouth/Throat:      Pharynx: Oropharynx is clear.   Eyes:      General:         Right eye: No discharge.         Left eye: No discharge.      Extraocular Movements: Extraocular movements intact.      Conjunctiva/sclera: Conjunctivae normal.   Cardiovascular:      Rate and Rhythm: Normal rate and regular rhythm.   Pulmonary:      Effort: Pulmonary effort is normal. No respiratory distress.      Breath sounds: Normal breath sounds. No wheezing.   Abdominal:      General: Bowel sounds are normal. There is no distension.      Palpations: Abdomen is soft.      Tenderness: There is no abdominal tenderness.   Musculoskeletal:      Cervical back: Normal range of motion and neck supple.      Right lower leg: No edema.      Comments: L BKA in stump protector   Skin:     General:  Skin is warm and dry.   Neurological:      Mental Status: He is alert and oriented to person, place, and time.         Fluids      Laboratory                Assessment/Plan  * Hx of BKA, left (HCC)- (present on admission)  Assessment & Plan  H/O LLE revascularization Oct 2024  Developed left heel ulcer and toe gangrene  Now S/P BKA on 3/2/25 by Dr. Echavarria at [facility]  Pain control and wound care per Physiatry    MSSA bacteremia  Assessment & Plan  On Ancef through 3/14/25 followed by Zyvox through 3/28/24    CAD (coronary artery disease)  Assessment & Plan  H/O CABG  Echo EF 50%  On ASA, Lipitor, Coreg, and Lisinopril    Anemia  Assessment & Plan  Has normocytic indices  Fe 72 and Ferritin 115  Follow H/H  Check F/U labs in AM     CKD (chronic kidney disease)  Assessment & Plan  Increase NaHCO3  Avoid nephrotoxins  Renal dose all meds  Monitor electrolytes and volume  Outpt Nephrology F/U  Check F/U labs in AM     Hypothyroidism- (present on admission)  Assessment & Plan  TSH 5.61 and FT4 1.45  Continue current dose Synthroid  Outpt F/U    Type 2 diabetes mellitus with hyperglycemia (HCC)- (present on admission)  Assessment & Plan  HbA1c 6.6  Monitor serum glucose trends on Lantus and SSI  Outpt meds include Lantus 28-35 u SQ qhs, Humalog 10 u SQ qac, Farxiga, and Mounjaro  Has allergies to Metformin, Actos, and Jardiance    Primary hypertension- (present on admission)  Assessment & Plan  Monitor blood pressure trends on Coreg, Lisinopril, HCTZ, and Hydralazine  Has Norvasc allergy    Full Code

## 2025-03-16 NOTE — PROGRESS NOTES
Hospital Medicine Daily Progress Note        Chief Complaint:  Hypertension  Diabetes  Anemia  Chronic Kidney Disease    Interval History:  Pt reports no recurrent low blood sugars on decreased Lantus.    Review of Systems  Review of Systems   Constitutional:  Negative for chills and fever.   HENT: Negative.     Eyes: Negative.    Respiratory:  Negative for cough and shortness of breath.    Cardiovascular:  Negative for chest pain and palpitations.   Gastrointestinal:  Negative for abdominal pain, nausea and vomiting.   Musculoskeletal:         Wound pain   Skin:  Negative for itching and rash.   Endo/Heme/Allergies:  Negative for polydipsia. Does not bruise/bleed easily.        Physical Exam  Temp:  [36.7 °C (98 °F)-37 °C (98.6 °F)] 37 °C (98.6 °F)  Pulse:  [68-78] 71  Resp:  [18] 18  BP: (129-158)/(55-68) 158/68  SpO2:  [96 %-99 %] 96 %    Physical Exam  Vitals reviewed.   Constitutional:       General: He is not in acute distress.     Appearance: Normal appearance. He is not ill-appearing.   HENT:      Head: Normocephalic and atraumatic.      Right Ear: External ear normal.      Left Ear: External ear normal.      Mouth/Throat:      Pharynx: Oropharynx is clear.   Eyes:      General:         Right eye: No discharge.         Left eye: No discharge.      Extraocular Movements: Extraocular movements intact.      Conjunctiva/sclera: Conjunctivae normal.   Cardiovascular:      Rate and Rhythm: Normal rate and regular rhythm.   Pulmonary:      Effort: Pulmonary effort is normal. No respiratory distress.      Breath sounds: Normal breath sounds. No wheezing.   Abdominal:      General: Bowel sounds are normal. There is no distension.      Palpations: Abdomen is soft.      Tenderness: There is no abdominal tenderness.   Musculoskeletal:      Cervical back: Normal range of motion and neck supple.      Right lower leg: No edema.      Comments: L BKA in stump protector   Skin:     General: Skin is warm and dry.    Neurological:      Mental Status: He is alert and oriented to person, place, and time.         Fluids      Laboratory  Recent Labs     03/13/25  0545 03/15/25  0521   WBC 6.2  --    RBC 2.91*  --    HEMOGLOBIN 8.4* 8.7*   HEMATOCRIT 27.8* 29.5*   MCV 95.5  --    MCH 28.9  --    MCHC 30.2*  --    RDW 53.8*  --    PLATELETCT 219  --    MPV 11.8  --      Recent Labs     03/13/25  0545 03/15/25  0521   SODIUM 140  --    POTASSIUM 4.6  --    CHLORIDE 113*  --    CO2 19*  --    GLUCOSE 95  --    BUN 52* 53*   CREATININE 2.34* 2.28*   CALCIUM 8.4*  --                  Assessment/Plan  * Hx of BKA, left (HCC)- (present on admission)  Assessment & Plan  H/O LLE revascularization Oct 2024  Developed left heel ulcer and toe gangrene  Now S/P BKA on 3/2/25 by Dr. Echavarria at [facility]  Pain control and wound care per Physiatry    MSSA bacteremia  Assessment & Plan  Completed Ancef 3/14/25  Now on Zyvox through 3/28/24    CAD (coronary artery disease)  Assessment & Plan  H/O CABG  Echo EF 50%  On ASA, Lipitor, Coreg, and Lisinopril    Anemia  Assessment & Plan  Has normocytic indices  Fe 72 and Ferritin 115  Follow H/H  Check F/U labs in AM     CKD (chronic kidney disease)  Assessment & Plan  Continue NaHCO3  Avoid nephrotoxins  Renal dose all meds  Monitor electrolytes and volume  Outpt Nephrology F/U  Check F/U labs in AM     Hypothyroidism- (present on admission)  Assessment & Plan  TSH 5.61 and FT4 1.45  Continue current dose Synthroid  Outpt F/U    Type 2 diabetes mellitus with hyperglycemia (HCC)- (present on admission)  Assessment & Plan  HbA1c 6.6  Monitor serum glucose trends on Lantus and SSI  Outpt meds include Lantus 28-35 u SQ qhs, Humalog 10 u SQ qac, Farxiga, and Mounjaro  Has allergies to Metformin, Actos, and Jardiance    Primary hypertension- (present on admission)  Assessment & Plan  On Coreg, Lisinopril, HCTZ, and Hydralazine  Monitor blood pressure trends on increased Hydralazine  Has Norvasc allergy    Full  Code

## 2025-03-16 NOTE — CARE PLAN
"The patient is Watcher - Medium risk of patient condition declining or worsening    Shift Goals  Clinical Goals: safety, BP management  Patient Goals: sleep well  Family Goals: no family present    Progress made toward(s) clinical / shift goals:    Problem: Knowledge Deficit - Standard  Goal: Patient and family/care givers will demonstrate understanding of plan of care, disease process/condition, diagnostic tests and medications  Outcome: Progressing  Reviewed POC, all questions answered at this time.      Problem: Pain Management  Goal: Pain level will decrease to patient's comfort goal  Outcome: Progressing  Patient able to verbalize pain level and verbalize an acceptable level of pain. Pain is well controlled with routine Tylenol.     Problem: Fall Risk - Rehab  Goal: Patient will remain free from falls  Outcome: Progressing   Nikole Swanson Fall risk Assessment Score: 16    High fall risk Interventions   - Alarming seatbelt  - Wander guard  - Bed and strip alarm   - Yellow sign by the door   - Yellow wrist band \"Fall risk\"  - Room near to the nurse station  - Do not leave patient unattended in the bathroom  - Fall risk education provided  "

## 2025-03-16 NOTE — CARE PLAN
Problem: Fall Risk - Rehab  Goal: Patient will remain free from falls  Outcome: Progressing     Problem: Pain - Standard  Goal: Alleviation of pain or a reduction in pain to the patient’s comfort goal  Outcome: Progressing   The patient is Stable - Low risk of patient condition declining or worsening    Shift Goals  Clinical Goals: safety, BP management  Patient Goals: sleep well  Family Goals: no family present    Progress made toward(s) clinical / shift goals:      Patient is not progressing towards the following goals:

## 2025-03-16 NOTE — PROGRESS NOTES
NURSING DAILY NOTE    Name: Marlo León   Date of Admission: 3/6/2025   Admitting Diagnosis: Hx of BKA, left (HCC)  Attending Physician: MICKIE JUAREZ D.O.  Allergies: Neosporin [neomycin-polymyxin b], Tape, Amlodipine, Amoxicillin, Bacitracin-polymyxin b, Cyclobenzaprine, Empagliflozin-metformin hcl, Gabapentin, Gadolinium, Metformin, Neomycin-polymyxin b gu, Nsaids, Pioglitazone, Prednisone, Sulfamethoxazole w-trimethoprim, and Tamsulosin    Safety  Patient Assist  Mod  Patient Precautions  Precautions: Fall Risk  Fall Risk: Other (see comments) (Left BKA)  Weight Bearing: NWB LE (laterality in comments)  Braces: IPOP, Immobilizer LE (laterality in comments)  Comments: L BKA, L IPOP and L knee immobilizer  Bed Transfer Status  Contact Guard Assist  Toilet Transfer Status   Contact Guard Assist  Assistive Devices  Wheelchair  Oxygen  None - Room Air  Diet/Therapeutic Dining  Current Diet Order   Procedures    Diet Order Diet: Consistent CHO (Diabetic) (HIGH PROTEIN AND VEGETABLES. DAIRY OK. LIMIT CARBOHYDRATES); Nutrient modifications: (optional): High Protein     Pill Administration  whole  Agitated Behavioral Scale     ABS Level of Severity       Fall Risk  Has the patient had a fall this admission?      Nikole Swanson Fall Risk Scoring  16, HIGH RISK  Fall Risk Safety Measures  bed alarm, chair alarm, and seatbelt alarm    Vitals  Temperature: 37 °C (98.6 °F)  Temp src: Temporal  Pulse: 68  Respiration: 18  Blood Pressure : 137/56  Blood Pressure MAP (Calculated): 83 MM HG  BP Location: Left, Upper Arm  Patient BP Position: Supine     Oxygen  Pulse Oximetry: 99 %  O2 (LPM): 0  O2 Delivery Device: None - Room Air    Bowel and Bladder  Last Bowel Movement  03/16/25  Stool Type  Type 6: Fluffy pieces with ragged edges, a mushy stool  Bowel Device     Continent  Bladder: Continent void   Bowel: Continent movement  Bladder Function  Urine Void (mL):  (moderate)  Number of Times Voided: 1  Urine Color:  Yellow  Number of Times Incontinent of Urine: 0  Straight Catheter:  (Refused. Pt said he will revoid again later)  Genitourinary Assessment   Bladder Assessment (WDL):  WDL Except  Jane Catheter: Not Applicable  Urine Color: Yellow  Number of Bladder Accidents: 0  Total Number of Bladder of Accidents in Last 7 Days: 0  Number of Times Incontinent of Urine: 0  Bladder Device: Bathroom  Time Void: Yes  Bladder Scan: Re-Void  $ Bladder Scan Results (mL): 613 (ref icp)    Skin  Anselmo Score   18  Sensory Interventions   Bed Types: Low Airlo  Skin Preventative Measures: Pillows in Use for Support / Positioning  Moisture Interventions  Moisturizers/Barriers: Barrier Wipes      Pain  Pain Rating Scale  3 - Sometimes distracts me  Pain Location  Leg  Pain Location Orientation  Left  Pain Interventions   Emotional Support    ADLs    Bathing   Shower, Staff  Linen Change   Complete  Personal Hygiene  Perineal Care, Moist Joceline Wipes  Chlorhexidine Bath   Completed  Oral Care     Teeth/Dentures     Shave     Nutrition Percentage Eaten  Dinner, Between % Consumed  Environmental Precautions  Treaded Slipper Socks on Patient, Personal Belongings, Wastebasket, Call Bell etc. in Easy Reach, Transferred to Stronger Side, Report Given to Other Health Care Providers Regarding Fall Risk, Bed in Low Position, Communication Sign for Patients & Families, Mobility Assessed & Appropriate Sign Placed  Patient Turns/Positioning  Patient turns self independently side to side without assistance, to offload sacral area  Patient Turns Assistance/Tolerance     Bed Positions  Bed Locked, Bed Controls On  Head of Bed Elevated  Self regulated      Psychosocial/Neurologic Assessment  Psychosocial Assessment  Psychosocial (WDL):  Within Defined Limits  Neurologic Assessment  Neuro (WDL): Within Defined Limits  Level of Consciousness: Alert  Orientation Level: Oriented X4  Cognition: Follows commands, Appropriate judgement, Appropriate safety  awareness  Speech: Clear  Pupil Assesment: No  EENT (WDL):  WDL Except    Cardio/Pulmonary Assessment  Edema      Respiratory Breath Sounds  RUL Breath Sounds: Clear  RML Breath Sounds: Clear  RLL Breath Sounds: Diminished  NOEMY Breath Sounds: Clear  LLL Breath Sounds: Diminished  Cardiac Assessment   Cardiac (WDL):  Within Defined Limits

## 2025-03-16 NOTE — PROGRESS NOTES
NURSING DAILY NOTE    Name: Marlo León   Date of Admission: 3/6/2025   Admitting Diagnosis: Hx of BKA, left (HCC)  Attending Physician: MICKIE JUAREZ D.O.  Allergies: Neosporin [neomycin-polymyxin b], Tape, Amlodipine, Amoxicillin, Bacitracin-polymyxin b, Cyclobenzaprine, Empagliflozin-metformin hcl, Gabapentin, Gadolinium, Metformin, Neomycin-polymyxin b gu, Nsaids, Pioglitazone, Prednisone, Sulfamethoxazole w-trimethoprim, and Tamsulosin    Safety  Patient Assist  Mod  Patient Precautions  Precautions: Fall Risk  Fall Risk: Other (see comments) (Left BKA)  Weight Bearing: NWB LE (laterality in comments)  Braces: IPOP, Immobilizer LE (laterality in comments)  Comments: L BKA, L IPOP and L knee immobilizer  Bed Transfer Status  Contact Guard Assist  Toilet Transfer Status   Contact Guard Assist  Assistive Devices  Wheelchair  Oxygen  None - Room Air  Diet/Therapeutic Dining  Current Diet Order   Procedures    Diet Order Diet: Consistent CHO (Diabetic) (HIGH PROTEIN AND VEGETABLES. DAIRY OK. LIMIT CARBOHYDRATES); Nutrient modifications: (optional): High Protein     Pill Administration  whole  Agitated Behavioral Scale     ABS Level of Severity       Fall Risk  Has the patient had a fall this admission?      Nikole Swanson Fall Risk Scoring  16, HIGH RISK  Fall Risk Safety Measures  bed alarm, chair alarm, poor balance, and low vision/hearing    Vitals  Temperature: 36.7 °C (98 °F)  Temp src: Oral  Pulse: 72  Respiration: 18  Blood Pressure : 129/55  Blood Pressure MAP (Calculated): 80 MM HG  BP Location: Left, Upper Arm  Patient BP Position: Supine     Oxygen  Pulse Oximetry: 97 %  O2 (LPM): 0  O2 Delivery Device: None - Room Air    Bowel and Bladder  Last Bowel Movement  03/15/25  Stool Type  Type 6: Fluffy pieces with ragged edges, a mushy stool  Bowel Device     Continent  Bladder: Continent void   Bowel: Continent movement  Bladder Function  Urine Void (mL): 300 ml  Number of Times Voided: 1  Urine Color:  Yellow  Number of Times Incontinent of Urine: 0  Straight Catheter:  (Refused. Pt said he will revoid again later)  Genitourinary Assessment   Bladder Assessment (WDL):  WDL Except  Jane Catheter: Not Applicable  Urine Color: Yellow  Number of Bladder Accidents: 0  Total Number of Bladder of Accidents in Last 7 Days: 0  Number of Times Incontinent of Urine: 0  Bladder Device: Urinal  Time Void: Yes  Bladder Scan: Re-Void  $ Bladder Scan Results (mL): 333    Skin  Anselmo Score   18  Sensory Interventions   Bed Types: Low Airloss  Skin Preventative Measures: Pillows in Use for Support / Positioning  Moisture Interventions  Moisturizers/Barriers: Barrier Wipes      Pain  Pain Rating Scale  3 - Sometimes distracts me  Pain Location  Leg  Pain Location Orientation  Left  Pain Interventions   Medication (see MAR)    ADLs    Bathing   Shower, Staff  Linen Change   Complete  Personal Hygiene  Perineal Care, Moist Joceline Wipes  Chlorhexidine Bath   Completed  Oral Care     Teeth/Dentures     Shave     Nutrition Percentage Eaten  Dinner, Between % Consumed  Environmental Precautions  Treaded Slipper Socks on Patient, Personal Belongings, Wastebasket, Call Bell etc. in Easy Reach, Transferred to Stronger Side, Report Given to Other Health Care Providers Regarding Fall Risk, Bed in Low Position, Communication Sign for Patients & Families, Mobility Assessed & Appropriate Sign Placed  Patient Turns/Positioning  Patient turns self independently side to side without assistance, to offload sacral area  Patient Turns Assistance/Tolerance     Bed Positions  Bed Locked, Bed Controls On  Head of Bed Elevated  Self regulated      Psychosocial/Neurologic Assessment  Psychosocial Assessment  Psychosocial (WDL):  Within Defined Limits  Neurologic Assessment  Neuro (WDL): Within Defined Limits  Level of Consciousness: Alert  Orientation Level: Oriented X4  Cognition: Follows commands, Appropriate judgement, Appropriate safety  awareness  Speech: Clear  Pupil Assesment: No  EENT (WDL):  WDL Except    Cardio/Pulmonary Assessment  Edema      Respiratory Breath Sounds  RUL Breath Sounds: Clear  RML Breath Sounds: Clear  RLL Breath Sounds: Diminished  NOEMY Breath Sounds: Clear  LLL Breath Sounds: Diminished  Cardiac Assessment   Cardiac (WDL):  Within Defined Limits

## 2025-03-16 NOTE — THERAPY
Physical Therapy   Daily Treatment     Patient Name:  Marlo León  Age:  69 y.o., Sex:  male  Medical Record #:  [id number]  Today's Date: 3/16/2025     Precautions  Precautions: Fall Risk  Fall Risk: Other (see comments) (L BKA)  Weight Bearing: NWB LE (laterality in comments)  Braces: IPOP, Immobilizer LE (laterality in comments)  Comments: L BKA, L IPOP and L knee immobilizer    Subjective: Pt in room seated in w/c upon entry and agreeable to therapy session.    Objective:    03/16/25 0705   PT Charge Group   PT Gait Training (Units) 1   PT Therapeutic Exercise (Units) 2   PT Therapeutic Activities (Units) 1   PT Total Time Spent   PT Individual Total Time Spent (Mins) 60   Precautions   Precautions Fall Risk   Fall Risk Other (see comments)  (L BKA)   Weight Bearing NWB LE (laterality in comments)   Braces IPOP;Immobilizer LE (laterality in comments)   Comments L BKA, L IPOP and L knee immobilizer   Roll Left and Right   Level of Assist Independent   Additional Description Use of bed rail   Sit to Lying   Level of Assist Independent   Additional Description Extra time needed   Lying to Sitting on Side of Bed   Level of Assist Independent   Additional Description Extra time needed   Sit to Stand   Level of Assist Stand By Assist   Assistive Devices Use of FWW   Additional Description Extra time needed   Chair/Bed-to-Chair Transfer   Level of Assist Contact Guard Assist   Transfer Type Squat Pivot Transfer  (chair <> mat)   Assistive Devices No AD   Additional Description Extra time needed   Ambulation   Level of Assist Minimal Assist  (for steadying and trunk support)   Additional Description Cueing needed;Extra time needed;Wheelchair follow needed   Distance 10ft x 3 in parallel bars   Wheelchair   Level of Assist Independent   Type Manual   Additional Description Extra time needed   Wheelchair Distance in halls   Interdisciplinary Plan of Care Collaboration   Patient Position at End of Therapy Seated;Chair  Alarm On;Tray Table within Reach;Phone within Reach  (Patient in dining room for breakfast after session.)         Therapeutic Activities  Purpose: to improve performance and function of daily activities and to increase safety with activities of daily life and mobility related to activities of daily life  Interventions:   Bed/chair transfer training  Bed mobility training (scooting, supine to sit, sit to supine, rolling)  Sit to stand training    Therapeutic Exercise  Purpose: to improve strength and to improve functional endurance  Interventions:   Lower body exercises:   Supine program -   Supine bridges, One legged and 3 sets of 10  Hip flexion, 3 sets of 10 and Right  Hip abduction, 3 sets of 10 and Bilateral  Hip adduction, 3 sets of 10 and Bilateral  Straight leg raises, 3 sets of 10 and Bilateral  Knees to chest, 3 sets of 10 and Right  Short arc quad, 3 sets of 10 and Bilateral  Seated program -   Long arc quad, 3 sets of 10 and Bilateral  Hamstring curl, 3 sets of 10 and Right  Sit to stand, 2 sets of 10  Nustep, Resistance Level 6, decreased to 3 after 3 mins and Time 6 mins  Description of Intervention(s): use of LUE and RLE only during propulsion    Gait Training  Purpose: to improve functional ambulation and to improve walking endurance  Interventions:   Safe use of device  Walking endurance  Normalization of gait   Facilitation of gait  Deviations (laterality in comments):  Bradykinetic  Forward trunk lean  Decreased Razia  Comments: hopping with RLE in parallel bars  Surface Type:  Level    Assessment:     Pt making steady progress during functional tasks and highly motivated to participate in session. Pt limited with gait in parallel bars due to increased weakness in RLE and demonstrated mild buckling during stance phase with gait. Will continue to benefit from strengthening of RLE  to improve stability during standing activities.  Strengths: Effective communication skills, Pleasant and cooperative,  "Supportive family, Willingly participates in therapeutic activities, Motivated for self care and independence  Barriers: Impaired activity tolerance, Impaired balance, Constipation, Limited mobility    Plan:  residual limb self wrapping instruction, LE strengthening, gait training, car transfers, stand/hop transfer training with FWW     DME    PT DME Recommendations  Wheelchair: 20\" Width, Removable/Flip Back Armrests (pt has a wc which he purchased on [employer] however anticipates he will need a new one for DC with left residual limb board and flip back armrest)  Cushion: Standard  Vendor Equipment: [employer]    Goals:     Passport items to be completed:  Get in/out of bed safely, in/out of a vehicle, safely use mobility device, walk or wheel around home/community, navigate up and down stairs, show how to get up/down from the ground, ensure home is accessible, demonstrate HEP, complete caregiver training    Physical Therapy Problems (Active)       Problem: Mobility       Dates: Start:  03/07/25         Goal: STG-Within one week, patient will ambulate household distance at least 15' with FWW and Min A.       Dates: Start:  03/07/25    Expected End:  03/24/25               Problem: PT-Long Term Goals       Dates: Start:  03/07/25         Goal: LTG-By discharge, patient will propel wheelchair on outside surfaces and inside surfaces with Mod I, distance >500'.       Dates: Start:  03/07/25    Expected End:  03/24/25            Goal: LTG-By discharge, patient will ambulate at least 25' with FWW and SBA.       Dates: Start:  03/07/25    Expected End:  03/24/25            Goal: LTG-By discharge, patient will transfer one surface to another [facility].       Dates: Start:  03/07/25    Expected End:  03/24/25            Goal: LTG-By discharge, patient will perform home exercise program Mod I.       Dates: Start:  03/07/25    Expected End:  03/24/25            Goal: LTG-By discharge, patient will transfer in/out of a car CGA from wc level. "       Dates: Start:  03/07/25    Expected End:  03/24/25

## 2025-03-16 NOTE — PROGRESS NOTES
Hospital Medicine Daily Progress Note        Chief Complaint:  Hypertension  Diabetes  Anemia  Chronic Kidney Disease    Interval History:  Pt denies new complaints.    Review of Systems  Review of Systems   Constitutional:  Negative for chills and fever.   HENT: Negative.     Eyes: Negative.    Respiratory:  Negative for cough and shortness of breath.    Cardiovascular:  Negative for chest pain and palpitations.   Gastrointestinal:  Negative for abdominal pain, nausea and vomiting.   Musculoskeletal:         Wound pain   Skin:  Negative for itching and rash.   Endo/Heme/Allergies:  Negative for polydipsia. Does not bruise/bleed easily.        Physical Exam  Temp:  [36.3 °C (97.3 °F)-37 °C (98.6 °F)] 37 °C (98.6 °F)  Pulse:  [63-72] 72  Resp:  [16-18] 18  BP: (128-167)/(57-72) 155/70  SpO2:  [95 %-97 %] 97 %    Physical Exam  Vitals reviewed.   Constitutional:       General: He is not in acute distress.     Appearance: Normal appearance. He is not ill-appearing.   HENT:      Head: Normocephalic and atraumatic.      Right Ear: External ear normal.      Left Ear: External ear normal.      Mouth/Throat:      Pharynx: Oropharynx is clear.   Eyes:      General:         Right eye: No discharge.         Left eye: No discharge.      Extraocular Movements: Extraocular movements intact.      Conjunctiva/sclera: Conjunctivae normal.   Cardiovascular:      Rate and Rhythm: Normal rate and regular rhythm.   Pulmonary:      Effort: Pulmonary effort is normal. No respiratory distress.      Breath sounds: Normal breath sounds. No wheezing.   Abdominal:      General: Bowel sounds are normal. There is no distension.      Palpations: Abdomen is soft.      Tenderness: There is no abdominal tenderness.   Musculoskeletal:      Cervical back: Normal range of motion and neck supple.      Right lower leg: No edema.      Comments: L BKA in stump protector   Skin:     General: Skin is warm and dry.   Neurological:      Mental Status: He is  alert and oriented to person, place, and time.         Fluids      Laboratory                Assessment/Plan  * Hx of BKA, left (HCC)- (present on admission)  Assessment & Plan  H/O LLE revascularization Oct 2024  Developed left heel ulcer and toe gangrene  Now S/P BKA on 3/2/25 by Dr. Echavarria at [facility]  Pain control and wound care per Physiatry    MSSA bacteremia  Assessment & Plan  On Ancef through 3/14/25 followed by Zyvox through 3/28/24    CAD (coronary artery disease)  Assessment & Plan  H/O CABG  Echo EF 50%  On ASA, Lipitor, Coreg, and Lisinopril    Anemia  Assessment & Plan  Has normocytic indices  Fe 72 and Ferritin 115  Follow H/H  Check F/U labs in AM     CKD (chronic kidney disease)  Assessment & Plan  On NaHCO3  Avoid nephrotoxins  Renal dose all meds  Monitor electrolytes and volume  Outpt Nephrology F/U  Check F/U labs in AM     Hypothyroidism- (present on admission)  Assessment & Plan  TSH 5.61 and FT4 1.45  Continue current dose Synthroid  Outpt F/U    Type 2 diabetes mellitus with hyperglycemia (HCC)- (present on admission)  Assessment & Plan  HbA1c 6.6  Monitor serum glucose trends on Lantus and SSI  Outpt meds include Lantus 28-35 u SQ qhs, Humalog 10 u SQ qac, Farxiga, and Mounjaro  Has allergies to Metformin, Actos, and Jardiance    Primary hypertension- (present on admission)  Assessment & Plan  On Coreg, Lisinopril, HCTZ, and Hydralazine  Monitor blood pressure trends on increased Hydralazine  Has Norvasc allergy    Full Code

## 2025-03-17 ENCOUNTER — APPOINTMENT (OUTPATIENT)
Dept: PHYSICAL THERAPY | Facility: REHABILITATION | Age: 70
DRG: 560 | End: 2025-03-17
Attending: PHYSICAL MEDICINE & REHABILITATION
Payer: MEDICARE

## 2025-03-17 ENCOUNTER — APPOINTMENT (OUTPATIENT)
Dept: OCCUPATIONAL THERAPY | Facility: REHABILITATION | Age: 70
DRG: 560 | End: 2025-03-17
Attending: PHYSICAL MEDICINE & REHABILITATION
Payer: MEDICARE

## 2025-03-17 LAB
ANION GAP SERPL CALC-SCNC: 6 MMOL/L (ref 7–16)
BUN SERPL-MCNC: 54 MG/DL (ref 8–22)
CALCIUM SERPL-MCNC: 8.4 MG/DL (ref 8.5–10.5)
CHLORIDE SERPL-SCNC: 112 MMOL/L (ref 96–112)
CO2 SERPL-SCNC: 20 MMOL/L (ref 20–33)
CREAT SERPL-MCNC: 2.77 MG/DL (ref 0.5–1.4)
ERYTHROCYTE [DISTWIDTH] IN BLOOD BY AUTOMATED COUNT: 59.4 FL (ref 35.9–50)
GFR SERPLBLD CREATININE-BSD FMLA CKD-EPI: 24 ML/MIN/1.73 M 2
GLUCOSE SERPL-MCNC: 135 MG/DL (ref 65–99)
HCT VFR BLD AUTO: 28.4 % (ref 42–52)
HGB BLD-MCNC: 8.2 G/DL (ref 14–18)
MCH RBC QN AUTO: 28.5 PG (ref 27–33)
MCHC RBC AUTO-ENTMCNC: 28.9 G/DL (ref 32.3–36.5)
MCV RBC AUTO: 98.6 FL (ref 81.4–97.8)
PLATELET # BLD AUTO: 212 K/UL (ref 164–446)
PMV BLD AUTO: 12 FL (ref 9–12.9)
POTASSIUM SERPL-SCNC: 5.1 MMOL/L (ref 3.6–5.5)
RBC # BLD AUTO: 2.88 M/UL (ref 4.7–6.1)
SODIUM SERPL-SCNC: 138 MMOL/L (ref 135–145)
WBC # BLD AUTO: 5.7 K/UL (ref 4.8–10.8)

## 2025-03-17 PROCEDURE — 700102 HCHG RX REV CODE 250 W/ 637 OVERRIDE(OP): Performed by: PHYSICAL MEDICINE & REHABILITATION

## 2025-03-17 PROCEDURE — 700102 HCHG RX REV CODE 250 W/ 637 OVERRIDE(OP): Performed by: HOSPITALIST

## 2025-03-17 PROCEDURE — 85027 COMPLETE CBC AUTOMATED: CPT

## 2025-03-17 PROCEDURE — 97110 THERAPEUTIC EXERCISES: CPT

## 2025-03-17 PROCEDURE — A9270 NON-COVERED ITEM OR SERVICE: HCPCS | Performed by: PHYSICAL MEDICINE & REHABILITATION

## 2025-03-17 PROCEDURE — 97530 THERAPEUTIC ACTIVITIES: CPT

## 2025-03-17 PROCEDURE — A9270 NON-COVERED ITEM OR SERVICE: HCPCS | Performed by: HOSPITALIST

## 2025-03-17 PROCEDURE — 700111 HCHG RX REV CODE 636 W/ 250 OVERRIDE (IP): Performed by: PHYSICAL MEDICINE & REHABILITATION

## 2025-03-17 PROCEDURE — 99232 SBSQ HOSP IP/OBS MODERATE 35: CPT | Performed by: HOSPITALIST

## 2025-03-17 PROCEDURE — 97535 SELF CARE MNGMENT TRAINING: CPT

## 2025-03-17 PROCEDURE — 770010 HCHG ROOM/CARE - REHAB SEMI PRIVAT*

## 2025-03-17 PROCEDURE — 80048 BASIC METABOLIC PNL TOTAL CA: CPT

## 2025-03-17 PROCEDURE — 36415 COLL VENOUS BLD VENIPUNCTURE: CPT

## 2025-03-17 RX ORDER — DEXTROSE MONOHYDRATE 25 G/50ML
25 INJECTION, SOLUTION INTRAVENOUS
Status: DISCONTINUED | OUTPATIENT
Start: 2025-03-17 | End: 2025-03-21 | Stop reason: HOSPADM

## 2025-03-17 RX ORDER — INSULIN LISPRO 100 [IU]/ML
2-12 INJECTION, SOLUTION INTRAVENOUS; SUBCUTANEOUS
Status: DISCONTINUED | OUTPATIENT
Start: 2025-03-17 | End: 2025-03-21 | Stop reason: HOSPADM

## 2025-03-17 RX ADMIN — LINEZOLID 600 MG: 600 TABLET, FILM COATED ORAL at 08:22

## 2025-03-17 RX ADMIN — LISINOPRIL 40 MG: 20 TABLET ORAL at 21:35

## 2025-03-17 RX ADMIN — ASPIRIN 81 MG: 81 TABLET, COATED ORAL at 08:22

## 2025-03-17 RX ADMIN — HYDRALAZINE HYDROCHLORIDE 75 MG: 25 TABLET ORAL at 21:34

## 2025-03-17 RX ADMIN — ATORVASTATIN CALCIUM 40 MG: 40 TABLET, FILM COATED ORAL at 21:31

## 2025-03-17 RX ADMIN — HEPARIN SODIUM 5000 UNITS: 5000 INJECTION, SOLUTION INTRAVENOUS; SUBCUTANEOUS at 21:32

## 2025-03-17 RX ADMIN — HYDRALAZINE HYDROCHLORIDE 75 MG: 25 TABLET ORAL at 05:11

## 2025-03-17 RX ADMIN — OMEPRAZOLE 20 MG: 20 CAPSULE, DELAYED RELEASE ORAL at 08:22

## 2025-03-17 RX ADMIN — HEPARIN SODIUM 5000 UNITS: 5000 INJECTION, SOLUTION INTRAVENOUS; SUBCUTANEOUS at 05:11

## 2025-03-17 RX ADMIN — CARVEDILOL 6.25 MG: 3.12 TABLET, FILM COATED ORAL at 08:22

## 2025-03-17 RX ADMIN — CARVEDILOL 6.25 MG: 3.12 TABLET, FILM COATED ORAL at 17:25

## 2025-03-17 RX ADMIN — HYDROCHLOROTHIAZIDE 25 MG: 25 TABLET ORAL at 05:11

## 2025-03-17 RX ADMIN — LINEZOLID 600 MG: 600 TABLET, FILM COATED ORAL at 21:32

## 2025-03-17 RX ADMIN — ACETAMINOPHEN 1000 MG: 500 TABLET ORAL at 21:31

## 2025-03-17 RX ADMIN — SODIUM BICARBONATE 650 MG: 650 TABLET ORAL at 08:22

## 2025-03-17 RX ADMIN — HEPARIN SODIUM 5000 UNITS: 5000 INJECTION, SOLUTION INTRAVENOUS; SUBCUTANEOUS at 14:01

## 2025-03-17 RX ADMIN — SODIUM BICARBONATE 650 MG: 650 TABLET ORAL at 17:25

## 2025-03-17 RX ADMIN — TRAMADOL HYDROCHLORIDE 25 MG: 50 TABLET, COATED ORAL at 01:17

## 2025-03-17 RX ADMIN — SODIUM BICARBONATE 650 MG: 650 TABLET ORAL at 11:05

## 2025-03-17 RX ADMIN — HYDRALAZINE HYDROCHLORIDE 75 MG: 25 TABLET ORAL at 14:01

## 2025-03-17 RX ADMIN — LEVOTHYROXINE SODIUM 175 MCG: 0.05 TABLET ORAL at 05:11

## 2025-03-17 ASSESSMENT — PAIN DESCRIPTION - PAIN TYPE
TYPE: ACUTE PAIN

## 2025-03-17 ASSESSMENT — ENCOUNTER SYMPTOMS
VOMITING: 0
SHORTNESS OF BREATH: 0
NAUSEA: 0
EYES NEGATIVE: 1
COUGH: 0
FEVER: 0
ABDOMINAL PAIN: 0
CHILLS: 0
POLYDIPSIA: 0
PALPITATIONS: 0
BRUISES/BLEEDS EASILY: 0

## 2025-03-17 ASSESSMENT — PATIENT HEALTH QUESTIONNAIRE - PHQ9
2. FEELING DOWN, DEPRESSED, IRRITABLE, OR HOPELESS: NOT AT ALL
1. LITTLE INTEREST OR PLEASURE IN DOING THINGS: NOT AT ALL
SUM OF ALL RESPONSES TO PHQ QUESTIONS 1-9: 0
6. FEELING BAD ABOUT YOURSELF - OR THAT YOU ARE A FAILURE OR HAVE LET YOURSELF OR YOUR FAMILY DOWN: NOT AL ALL
8. MOVING OR SPEAKING SO SLOWLY THAT OTHER PEOPLE COULD HAVE NOTICED. OR THE OPPOSITE, BEING SO FIGETY OR RESTLESS THAT YOU HAVE BEEN MOVING AROUND A LOT MORE THAN USUAL: NOT AT ALL
7. TROUBLE CONCENTRATING ON THINGS, SUCH AS READING THE NEWSPAPER OR WATCHING TELEVISION: NOT AT ALL
2. FEELING DOWN, DEPRESSED, IRRITABLE, OR HOPELESS: NOT AT ALL
SUM OF ALL RESPONSES TO PHQ9 QUESTIONS 1 AND 2: 0
3. TROUBLE FALLING OR STAYING ASLEEP OR SLEEPING TOO MUCH: NOT AT ALL
4. FEELING TIRED OR HAVING LITTLE ENERGY: NOT AT ALL
SUM OF ALL RESPONSES TO PHQ9 QUESTIONS 1 AND 2: 0
5. POOR APPETITE OR OVEREATING: NOT AT ALL
9. THOUGHTS THAT YOU WOULD BE BETTER OFF DEAD, OR OF HURTING YOURSELF: NOT AT ALL
1. LITTLE INTEREST OR PLEASURE IN DOING THINGS: NOT AT ALL

## 2025-03-17 NOTE — PROGRESS NOTES
"  Physical Medicine & Rehabilitation Progress Note    Encounter Date: 3/17/2025    Chief Complaint: L BKA    Interval Events (Subjective):  VS: VSS  Last BM: 3/16  Bladder: Voiding  Schedule Meds Not Given: None   PRN Meds Taken: Tramadol     Patient seen and examined in the therapy gym. He feels well. Doing well with therapy has been cleared to be more independent in his room.       ROS: 14 point ROS negative unless otherwise specified in the HPI    Objective:  VITAL SIGNS: /54   Pulse 70   Temp 36.9 °C (98.5 °F)   Resp 18   Ht 1.778 m (5' 10\")   Wt 110 kg (241 lb 8 oz)   SpO2 97%   BMI 34.65 kg/m²     GEN: No apparent distress  HEENT: Head normocephalic, atraumatic.  Sclera nonicteric bilaterally, no ocular discharge appreciated bilaterally.  CV: Extremities warm and well-perfused, no peripheral edema appreciated bilaterally.  PULMONARY: Breathing nonlabored on room air, no respiratory accessory muscle use.  Not requiring supplemental oxygen.  SKIN:   3/13      3/9    PSYCH: Mood and affect within normal limits.  NEURO: Awake alert.  Conversational.  Logical thought content.      Laboratory Values:  Recent Results (from the past 72 hours)   HEMOGLOBIN AND HEMATOCRIT    Collection Time: 03/15/25  5:21 AM   Result Value Ref Range    Hemoglobin 8.7 (L) 14.0 - 18.0 g/dL    Hematocrit 29.5 (L) 42.0 - 52.0 %   BUN    Collection Time: 03/15/25  5:21 AM   Result Value Ref Range    Bun 53 (H) 8 - 22 mg/dL   CREATININE    Collection Time: 03/15/25  5:21 AM   Result Value Ref Range    Creatinine 2.28 (H) 0.50 - 1.40 mg/dL   ESTIMATED GFR    Collection Time: 03/15/25  5:21 AM   Result Value Ref Range    GFR (CKD-EPI) 31 (A) >60 mL/min/1.73 m 2   CBC WITHOUT DIFFERENTIAL    Collection Time: 03/17/25  5:20 AM   Result Value Ref Range    WBC 5.7 4.8 - 10.8 K/uL    RBC 2.88 (L) 4.70 - 6.10 M/uL    Hemoglobin 8.2 (L) 14.0 - 18.0 g/dL    Hematocrit 28.4 (L) 42.0 - 52.0 %    MCV 98.6 (H) 81.4 - 97.8 fL    MCH 28.5 27.0 " - 33.0 pg    MCHC 28.9 (L) 32.3 - 36.5 g/dL    RDW 59.4 (H) 35.9 - 50.0 fL    Platelet Count 212 164 - 446 K/uL    MPV 12.0 9.0 - 12.9 fL   Basic Metabolic Panel    Collection Time: 03/17/25  5:20 AM   Result Value Ref Range    Sodium 138 135 - 145 mmol/L    Potassium 5.1 3.6 - 5.5 mmol/L    Chloride 112 96 - 112 mmol/L    Co2 20 20 - 33 mmol/L    Glucose 135 (H) 65 - 99 mg/dL    Bun 54 (H) 8 - 22 mg/dL    Creatinine 2.77 (H) 0.50 - 1.40 mg/dL    Calcium 8.4 (L) 8.5 - 10.5 mg/dL    Anion Gap 6.0 (L) 7.0 - 16.0   ESTIMATED GFR    Collection Time: 03/17/25  5:20 AM   Result Value Ref Range    GFR (CKD-EPI) 24 (A) >60 mL/min/1.73 m 2       Medications:  Scheduled Medications   Medication Dose Frequency    insulin lispro  2-12 Units TID AC    insulin GLARGINE  37 Units Q EVENING    hydrALAZINE  75 mg Q8HRS    sodium bicarbonate  650 mg TID WITH MEALS    acetaminophen  1,000 mg QHS    [START ON 3/20/2025] influenza vaccine High-Dose  0.5 mL Once    carvedilol  6.25 mg BID WITH MEALS    Pharmacy Consult Request  1 Each PHARMACY TO DOSE    omeprazole  20 mg DAILY    atorvastatin  40 mg Q EVENING    hydroCHLOROthiazide  25 mg Q DAY    lisinopril  40 mg Q EVENING    levothyroxine  175 mcg AM ES    linezolid  600 mg Q12HRS    heparin  5,000 Units Q8HRS    aspirin  81 mg DAILY     PRN medications: insulin lispro **AND** POC blood glucose manual result **AND** NOTIFY MD and PharmD **AND** Administer 20 grams of glucose (approximately 8 ounces of fruit juice) every 15 minutes PRN FSBG less than 70 mg/dL **AND** dextrose bolus, traMADol **OR** traMADol, lidocaine, hydrALAZINE, lactulose, docusate sodium, bisacodyl EC, magnesium hydroxide, sodium phosphate, carboxymethylcellulose, benzocaine-menthol, mag hydrox-al hydrox-simeth, ondansetron **OR** ondansetron, traZODone, sodium chloride, Respiratory Therapy Consult, acetaminophen    Diet:  Current Diet Order   Procedures    Diet Order Diet: Consistent CHO (Diabetic) (HIGH PROTEIN  AND VEGETABLES. DAIRY OK. LIMIT CARBOHYDRATES); Nutrient modifications: (optional): High Protein       Medical Decision Making and Plan:  L BKA 3/2 Dr. Echavarria at [facility]  MSSA Bacteremia  TTE withough valvular lesions or endocarditis.   PT and OT for mobility and ADLs. Per guidelines, 15 hours per week between PT, OT and/or SLP.  Follow-up Ortho  Follow-up ID  Follow-up Vascular Surgery (Dr. Calderon) - referral placed per hospitalist at [facility]  Complete IV Cefazolin 3/14 --> Linezolid through 3/28   Tunneled cath placement 3/4     Hypertension - Continue Lisinopril, HCTZ, Coreg. 3/7 Elevated but improved in 150's monitor. 3/8 BP improving without med changes, monitor. 3/11 Continue monitor BP - patient reports feeling symptoms with low BP in early AM or late PM. Coreg was 12.5 BID hospitalist note indicates should be 6.25mg BID, will change today. 3/12 BP better controlled with increase in Hydralazine. 3/17 VSS    ABLA - 3/7 9 Monitor. 3/8 Better 9.8. 3/17 Stable    Leukocytosis - Mild monitor 3/7. 3/8 Mild increase 10.9--> 11.1. UA negative. Incision c/d/I. Afebrile and not on scheduled tylenol nor taking PRN. 3/10, 3/13 Resolved.     Thrombocytosis - WNL resolved.      Chronic Combined HR, LVH and Pulm HTN - EF 50-55% (Had been 65-70%). Follows with Cards Dr. Rodriguez     H/o CAD - On ASA and Statin      Hypothyroidism - Continue Synthroid      DARRIAN - RT consult     H/o Gastric Bypass - Has frequent BM due to this.      CKD Stage IV - Avoid Nephrotoxins. GFR 29 baseline. Managed by Nephro Dr. Micahels. 3/17 Stable, monitor trend    Elevated TSH - Free T4 WNL.     Morbid Obesity - Nutrition consult      Type II Diabetes Mellitus with Hyperglycemia - Currently on SSI and Lantus. Hgb A1c 6.6     Pain - Declines pain. Has Tylenol and Tramadol PRN.    3/14 More neuropathic pain, reduce Tramadol due to drug hangover. Cannot take Kaitlin has side effects. Pain varies, patient would like to stick mostly with tylenol for  now. Schedule Tylenol at night.      Bowel - Patient on Senna-docusate for constipation prophylaxis.      Bladder - TV/PVR/BS PRN              DVT PROPHYLAXIS: Heparin --> D/c ASA 325mg BID     HOSPITALIST FOLLOWING: Yes - d/w hospitalist 3/17     CODE STATUS: FULL - c/f with patient on admission      DISPO: Home with spouse      GIANCARLO: 3/20     MEDS SENT TO: TBD     DISCHARGE SPECIALIST FOLLOW UP:   Ortho  ID  [facility]  Cardiology  Nephrology      Patient to scheduled follow up with their PCP within 2 weeks from discharge from the [facility].   ____________________________________    Dr. Tracy Tong DO, MS  [facility] - Physical Medicine & Rehabilitation   ____________________________________

## 2025-03-17 NOTE — PROGRESS NOTES
NURSING DAILY NOTE    Name: Marlo León   Date of Admission: 3/6/2025   Admitting Diagnosis: Hx of BKA, left (HCC)  Attending Physician: MICKIE JUAREZ D.O.  Allergies: Neosporin [neomycin-polymyxin b], Tape, Amlodipine, Amoxicillin, Bacitracin-polymyxin b, Cyclobenzaprine, Empagliflozin-metformin hcl, Gabapentin, Gadolinium, Metformin, Neomycin-polymyxin b gu, Nsaids, Pioglitazone, Prednisone, Sulfamethoxazole w-trimethoprim, and Tamsulosin    Safety  Patient Assist  CGA  Patient Precautions  Precautions: Fall Risk  Fall Risk: Other (see comments) (Left BKA)  Weight Bearing: NWB LE (laterality in comments)  Braces: IPOP, Immobilizer LE (laterality in comments)  Comments: L BKA, L IPOP and L knee immobilizer  Bed Transfer Status  Contact Guard Assist  Toilet Transfer Status   Contact Guard Assist  Assistive Devices  Wheelchair  Oxygen  None - Room Air, BIPAP  Diet/Therapeutic Dining  Current Diet Order   Procedures    Diet Order Diet: Consistent CHO (Diabetic) (HIGH PROTEIN AND VEGETABLES. DAIRY OK. LIMIT CARBOHYDRATES); Nutrient modifications: (optional): High Protein     Pill Administration  whole  Agitated Behavioral Scale     ABS Level of Severity       Fall Risk  Has the patient had a fall this admission?      Nikole Swanson Fall Risk Scoring  11, MODERATE RISK  Fall Risk Safety Measures  bed alarm, chair alarm, poor balance, and low vision/hearing    Vitals  Temperature: 37 °C (98.6 °F)  Temp src: Temporal  Pulse: 75  Respiration: 18  Blood Pressure : (!) 158/68  Blood Pressure MAP (Calculated): 98 MM HG  BP Location: Left, Upper Arm  Patient BP Position: Martino's Position     Oxygen  Pulse Oximetry: 95 %  O2 (LPM): 0  O2 Delivery Device: None - Room Air, BIPAP    Bowel and Bladder  Last Bowel Movement  03/16/25  Stool Type  Type 6: Fluffy pieces with ragged edges, a mushy stool  Bowel Device  Bathroom  Continent  Bladder: Continent void   Bowel: Continent movement  Bladder Function  Urine Void (mL):   (moderate)  Number of Times Voided: 1  Urine Color: Yellow  Number of Times Incontinent of Urine: 0  Straight Catheter:  (Refused. Pt said he will revoid again later)  Genitourinary Assessment   Bladder Assessment (WDL):  WDL Except  Jane Catheter: Not Applicable  Urine Color: Yellow  Number of Bladder Accidents: 0  Total Number of Bladder of Accidents in Last 7 Days: 0  Number of Times Incontinent of Urine: 0  Bladder Device: Bathroom  Time Void: Yes  Bladder Scan: Re-Void  $ Bladder Scan Results (mL): 613 (ref icp)    Skin  Anselmo Score   18  Sensory Interventions   Bed Types: Low Airloss  Skin Preventative Measures: Pillows in Use for Support / Positioning  Moisture Interventions  Moisturizers/Barriers: Barrier Paste, Barrier Wipes      Pain  Pain Rating Scale  6 - Hard to ignore, avoid usual activities  Pain Location  Generalized  Pain Location Orientation  Right, Left  Pain Interventions   Medication (see MAR)    ADLs    Bathing   Shower, Staff  Linen Change   Complete  Personal Hygiene  Perineal Care, Moist Joceline Wipes  Chlorhexidine Bath   Completed  Oral Care     Teeth/Dentures     Shave     Nutrition Percentage Eaten  Dinner, Between % Consumed  Environmental Precautions  Treaded Slipper Socks on Patient, Personal Belongings, Wastebasket, Call Bell etc. in Easy Reach, Transferred to Stronger Side, Report Given to Other Health Care Providers Regarding Fall Risk, Bed in Low Position, Communication Sign for Patients & Families, Mobility Assessed & Appropriate Sign Placed  Patient Turns/Positioning  Patient turns self independently side to side without assistance, to offload sacral area  Patient Turns Assistance/Tolerance     Bed Positions  Bed Locked, Bed Controls On  Head of Bed Elevated  Self regulated      Psychosocial/Neurologic Assessment  Psychosocial Assessment  Psychosocial (WDL):  Within Defined Limits  Neurologic Assessment  Neuro (WDL): Within Defined Limits  Level of Consciousness:  Alert  Orientation Level: Oriented X4  Cognition: Follows commands, Appropriate judgement, Appropriate safety awareness  Speech: Clear  Pupil Assesment: No  EENT (WDL):  WDL Except    Cardio/Pulmonary Assessment  Edema      Respiratory Breath Sounds  RUL Breath Sounds: Clear  RML Breath Sounds: Clear  RLL Breath Sounds: Diminished  NOEMY Breath Sounds: Clear  LLL Breath Sounds: Diminished  Cardiac Assessment   Cardiac (WDL):  Within Defined Limits

## 2025-03-17 NOTE — CARE PLAN
"The patient is Stable - Low risk of patient condition declining or worsening    Shift Goals  Clinical Goals: safety  Patient Goals: sleep well  Family Goals: no family present    Progress made toward(s) clinical / shift goals:    Problem: Knowledge Deficit - Standard  Goal: Patient and family/care givers will demonstrate understanding of plan of care, disease process/condition, diagnostic tests and medications  Outcome: Progressing  Reviewed POC, all questions answered at this time.      Problem: Pain Management  Goal: Pain level will decrease to patient's comfort goal  Outcome: Progressing  Patient able to verbalize pain level and verbalize an acceptable level of pain.     Problem: Fall Risk - Rehab  Goal: Patient will remain free from falls  Outcome: Progressing  Nikole Swanson Fall risk Assessment Score: 11    Moderate fall risk Interventions  - Bed and strip alarm   - Yellow sign by the door   - Yellow wrist band \"Fall risk\"  - Room near to the nurse station  - Do not leave patient unattended in the bathroom  - Fall risk education provided  "

## 2025-03-17 NOTE — THERAPY
Occupational Therapy  Daily Treatment     Patient Name:  Marlo León  Age:  69 y.o., Sex:  male  Medical Record #:  [id number]  Today's Date:  3/17/2025     Subjective: Pt states that he had trouble sleeping last night     Objective:    03/17/25 1031 03/17/25 1301   OT Charge Group   OT Self Care / ADL (Units) 3  --    OT Therapy Activity (Units) 1  --    OT Therapeutic Exercise (Units)  --  2   OT Total Time Spent   OT Individual Total Time Spent (Mins) 60 30   Shower/Bathe Self   Level of Assist Modified Independent  --    Patient Position Seated  --    Adaptive Equipment Use of fold down shower bench;Grab bars  --    Tub/Shower Transfer   Level of Assist Modified Independent  --    Transfer Type Stand pivot transfer  --    Adaptive Equipment Grab bars;Use of showerchair  --          Therapeutic Activities  Purpose: to improve performance and function of daily activities and to increase safety with activities of daily life and mobility related to activities of daily life  Interventions:   Sit to stand training  Description of Intervention(s): sit to stands and balance with one arm support on // bars for 10-30 seconds. Tried a balance pad which caused pt pain in right leg.    Therapeutic Exercise  Purpose: to improve strength and to improve functional endurance  Interventions:   Upper body exercises,   Seated program -   Chest press, 2 sets of 10 and Weight 20# on cable machine  Bilateral row, Bilateral and Weight 30# on cable machine  M[employer] UE Cycle, Gear Level 5-11 minute long intervals and Time 5 minutes      Neuro Re-Education  Purpose: to improve balance  Interventions:   Static/Dynamic standing balance training  Facilitation/Cueing: Facilitation  Description of Intervention(s): Pt stood in // bars and threw bean bags at 2 cones with the goal of knocking them over. Performed exercise 2x with one rest break in between.     Assessment:    Pt has made progress with ADL and is cleared for Mod I in the room. Pt  is mod I for showering and dressing, using AE.   Strengths: Able to follow instructions, Alert and oriented, Good carryover of learning, Independent prior level of function, Manages pain appropriately, Motivated for self care and independence, Pleasant and cooperative, Supportive family, Willingly participates in therapeutic activities  Barriers: Decreased endurance, Fatigue, Generalized weakness, Impaired activity tolerance, Impaired balance, Limited mobility    Plan:    IADLs:, Transfers, Sit to Stand Training, Strengthening, Endurance, Activity Tolerance, Functional Mobility, and Standing Balance    DME       Passport items to be completed:  Perform bathroom transfers, complete dressing, complete feeding, get ready for the day, prepare a simple meal, participate in household tasks, adapt home for safety needs, demonstrate home exercise program, complete caregiver training     Occupational Therapy Goals (Active)       Problem: Bathing       Dates: Start:  03/07/25         Goal: STG-Within one week, patient will bathe with CGA.        Dates: Start:  03/07/25    Expected End:  03/24/25               Problem: Dressing       Dates: Start:  03/07/25         Goal: STG-Within one week, patient will dress LB with CGA.        Dates: Start:  03/07/25    Expected End:  03/24/25               Problem: OT Long Term Goals       Dates: Start:  03/07/25         Goal: LTG-By discharge, patient will complete basic self care tasks @ sup-mod I level.        Dates: Start:  03/07/25    Expected End:  03/24/25            Goal: LTG-By discharge, patient will perform bathroom transfers @ sup-mod I level.        Dates: Start:  03/07/25    Expected End:  03/24/25               Problem: Toileting       Dates: Start:  03/07/25         Goal: STG-Within one week, patient will complete toileting tasks with min A.        Dates: Start:  03/07/25    Expected End:  03/24/25

## 2025-03-17 NOTE — CARE PLAN
The patient is Stable - Low risk of patient condition declining or worsening    Shift Goals  Clinical Goals: safety  Patient Goals: sleep well  Family Goals: no family present    Progress made toward(s) clinical / shift goals:    Problem: Safety  Goal: Will remain free from falls  Outcome: Progressing       Patient demonstrates good safety technique this shift.  Asks for assistance when needed, Mod I in the room wheel chair level for transfers.  Able to verbalize needs.  Will continue to monitor.

## 2025-03-17 NOTE — PROGRESS NOTES
Hospital Medicine Daily Progress Note        Chief Complaint:  Hypertension  Diabetes  Anemia  Chronic Kidney Disease    Interval History:  Pt reports having symptomatic hypoglycemia at around 2 am this morning w/ his CGM reading 85.  Laboratory results reviewed.    Review of Systems  Review of Systems   Constitutional:  Negative for chills and fever.   HENT: Negative.     Eyes: Negative.    Respiratory:  Negative for cough and shortness of breath.    Cardiovascular:  Negative for chest pain and palpitations.   Gastrointestinal:  Negative for abdominal pain, nausea and vomiting.   Musculoskeletal:         Wound pain   Skin:  Negative for itching and rash.   Endo/Heme/Allergies:  Negative for polydipsia. Does not bruise/bleed easily.        Physical Exam  Temp:  [36.8 °C (98.2 °F)-37 °C (98.6 °F)] 36.9 °C (98.5 °F)  Pulse:  [66-78] 70  Resp:  [18] 18  BP: (129-158)/(54-68) 129/54  SpO2:  [95 %-97 %] 97 %    Physical Exam  Vitals reviewed.   Constitutional:       General: He is not in acute distress.     Appearance: Normal appearance. He is not ill-appearing.   HENT:      Head: Normocephalic and atraumatic.      Right Ear: External ear normal.      Left Ear: External ear normal.      Nose: Nose normal.      Mouth/Throat:      Pharynx: Oropharynx is clear.   Eyes:      General:         Right eye: No discharge.         Left eye: No discharge.      Extraocular Movements: Extraocular movements intact.      Conjunctiva/sclera: Conjunctivae normal.   Cardiovascular:      Rate and Rhythm: Normal rate and regular rhythm.   Pulmonary:      Effort: Pulmonary effort is normal. No respiratory distress.      Breath sounds: Normal breath sounds. No wheezing.   Abdominal:      General: Bowel sounds are normal. There is no distension.      Palpations: Abdomen is soft.      Tenderness: There is no abdominal tenderness.   Musculoskeletal:      Cervical back: Normal range of motion and neck supple.      Right lower leg: No edema.       Comments: L BKA in stump protector   Skin:     General: Skin is warm and dry.   Neurological:      Mental Status: He is alert and oriented to person, place, and time.         Fluids      Laboratory            Assessment/Plan  * Hx of BKA, left (HCC)- (present on admission)  Assessment & Plan  H/O LLE revascularization Oct 2024  Developed left heel ulcer and toe gangrene  Now S/P BKA on 3/2/25 by Dr. Echavarria at [facility]  Pain control and wound care per Physiatry    MSSA bacteremia  Assessment & Plan  Completed Ancef 3/14/25  Now on Zyvox through 3/28/24    CAD (coronary artery disease)  Assessment & Plan  H/O CABG  Echo EF 50%  On ASA, Lipitor, Coreg, and Lisinopril    Anemia  Assessment & Plan  Has normocytic indices  Fe 72 and Ferritin 115  Follow H/H    CKD (chronic kidney disease)  Assessment & Plan  Continue NaHCO3  Avoid nephrotoxins  Renal dose all meds  Monitor electrolytes and volume  Outpt Nephrology F/U    Hypothyroidism- (present on admission)  Assessment & Plan  TSH 5.61 and FT4 1.45  Continue current dose Synthroid  Outpt F/U    Type 2 diabetes mellitus with hyperglycemia (HCC)- (present on admission)  Assessment & Plan  HbA1c 6.6  On Lantus and SSI  Will discontinue qhs SSI dose for recurrent 2 am hypoglycemia  Outpt meds include Lantus 28-35 u SQ qhs, Humalog 10 u SQ qac, Farxiga, and Mounjaro  Has allergies to Metformin, Actos, and Jardiance    Primary hypertension- (present on admission)  Assessment & Plan  On Coreg, Lisinopril, HCTZ, and Hydralazine  Monitor blood pressure trends on increased Hydralazine  Has Norvasc allergy    Full Code       Unable to assess due to medical condition

## 2025-03-17 NOTE — THERAPY
"Physical Therapy   Daily Treatment     Patient Name:  Marlo León  Age:  69 y.o., Sex:  male  Medical Record #:  [id number]  Today's Date: 3/17/2025     Precautions  Precautions: Fall Risk  Fall Risk: Other (see comments) (Left BKA)  Weight Bearing: NWB LE (laterality in comments)  Braces: IPOP, Immobilizer LE (laterality in comments)  Comments: L BKA, L IPOP and L knee immobilizer    Subjective: Patient had pains throughout entire body last night - needed 1/2 Tramadol     Objective:    03/17/25 0831   PT Charge Group   PT Therapeutic Exercise (Units) 2   PT Therapeutic Activities (Units) 2   PT Total Time Spent   PT Individual Total Time Spent (Mins) 60   Roll Left and Right   Level of Assist Independent   Additional Description Use of bed rail   Sit to Lying   Level of Assist Independent   Lying to Sitting on Side of Bed   Level of Assist Independent   Additional Description Use of bed rail   Chair/Bed-to-Chair Transfer   Level of Assist Supervised   Transfer Type Squat Pivot Transfer   Car Transfer   Level of Assist Stand By Assist   Transfer Type Stand Step Transfer   Assistive Device No AD   Patient Scheduling Information   PT Time 30/60 minutes   Primary or Coverage PT Ran CORTES/Jett   Interdisciplinary Plan of Care Collaboration   Patient Position at End of Therapy Seated         Therapeutic Activities  Purpose: to improve performance and function of daily activities, to provide patient and family education, and to increase safety with activities of daily life and mobility related to activities of daily life  Interventions:   Bed/chair transfer training  Sit to stand training  Car transfer  Self-care  Donning/doffing brace or orthotic  Patient or family education  Description of Intervention(s): Focused transfer training on safe management of wheelchair and positioning while going in/out of bed. Car transfers using \"handybar\" and no FWW    Therapeutic Exercise  Purpose: to improve strength, to improve " "ROM/flexibility, and to improve functional endurance  Interventions:   Lower body exercises:   Supine program -   Supine bridges, One legged and 2 sets of 10  Hip abduction, Side lying, 2 sets of 10, and Bilateral  Straight leg raises, Front, 2 sets of 10, and Bilateral  Knees to chest, Other Resistance 3 x 20 second holds each side  Description of Intervention(s): practiced proper management of residual limb protector     Assessment: Patient is showing competence with transfers bed to chair, car transfers, wheelchair management. Still requires assist with self wrapping and residual limb protector     Strengths: Effective communication skills, Pleasant and cooperative, Supportive family, Willingly participates in therapeutic activities, Motivated for self care and independence  Barriers: Impaired activity tolerance, Impaired balance, Constipation, Limited mobility    Plan: continue with LE strengthening, short distance gait with FWW (right knee leandra), self wrapping     DME    PT DME Recommendations  Wheelchair: 20\" Width, Removable/Flip Back Armrests (pt has a wc which he purchased on [employer] however anticipates he will need a new one for DC with left residual limb board and flip back armrest)  Cushion: Standard  Vendor Equipment: [employer]    Goals:     Passport items to be completed:      Physical Therapy Problems (Active)       Problem: Mobility       Dates: Start:  03/07/25         Goal: STG-Within one week, patient will ambulate household distance at least 15' with FWW and Min A.       Dates: Start:  03/07/25    Expected End:  03/24/25               Problem: PT-Long Term Goals       Dates: Start:  03/07/25         Goal: LTG-By discharge, patient will propel wheelchair on outside surfaces and inside surfaces with Mod I, distance >500'.       Dates: Start:  03/07/25    Expected End:  03/24/25            Goal: LTG-By discharge, patient will ambulate at least 25' with FWW and SBA.       Dates: Start:  03/07/25    " Expected End:  03/24/25            Goal: LTG-By discharge, patient will transfer one surface to another SPV.       Dates: Start:  03/07/25    Expected End:  03/24/25            Goal: LTG-By discharge, patient will perform home exercise program Mod I.       Dates: Start:  03/07/25    Expected End:  03/24/25            Goal: LTG-By discharge, patient will transfer in/out of a car CGA from  level.       Dates: Start:  03/07/25    Expected End:  03/24/25

## 2025-03-18 ENCOUNTER — APPOINTMENT (OUTPATIENT)
Dept: PHYSICAL THERAPY | Facility: REHABILITATION | Age: 70
DRG: 560 | End: 2025-03-18
Attending: PHYSICAL MEDICINE & REHABILITATION
Payer: MEDICARE

## 2025-03-18 ENCOUNTER — APPOINTMENT (OUTPATIENT)
Dept: OCCUPATIONAL THERAPY | Facility: REHABILITATION | Age: 70
DRG: 560 | End: 2025-03-18
Attending: PHYSICAL MEDICINE & REHABILITATION
Payer: MEDICARE

## 2025-03-18 PROCEDURE — 770010 HCHG ROOM/CARE - REHAB SEMI PRIVAT*

## 2025-03-18 PROCEDURE — A9270 NON-COVERED ITEM OR SERVICE: HCPCS | Performed by: HOSPITALIST

## 2025-03-18 PROCEDURE — 700102 HCHG RX REV CODE 250 W/ 637 OVERRIDE(OP): Performed by: HOSPITALIST

## 2025-03-18 PROCEDURE — 700102 HCHG RX REV CODE 250 W/ 637 OVERRIDE(OP): Performed by: PHYSICAL MEDICINE & REHABILITATION

## 2025-03-18 PROCEDURE — 99232 SBSQ HOSP IP/OBS MODERATE 35: CPT | Performed by: HOSPITALIST

## 2025-03-18 PROCEDURE — 97110 THERAPEUTIC EXERCISES: CPT | Mod: CQ

## 2025-03-18 PROCEDURE — 97110 THERAPEUTIC EXERCISES: CPT

## 2025-03-18 PROCEDURE — 97116 GAIT TRAINING THERAPY: CPT

## 2025-03-18 PROCEDURE — A9270 NON-COVERED ITEM OR SERVICE: HCPCS | Performed by: PHYSICAL MEDICINE & REHABILITATION

## 2025-03-18 PROCEDURE — 700111 HCHG RX REV CODE 636 W/ 250 OVERRIDE (IP): Performed by: PHYSICAL MEDICINE & REHABILITATION

## 2025-03-18 PROCEDURE — 97530 THERAPEUTIC ACTIVITIES: CPT | Mod: CQ

## 2025-03-18 RX ADMIN — SODIUM BICARBONATE 650 MG: 650 TABLET ORAL at 17:06

## 2025-03-18 RX ADMIN — INSULIN LISPRO 2 UNITS: 100 INJECTION, SOLUTION INTRAVENOUS; SUBCUTANEOUS at 07:42

## 2025-03-18 RX ADMIN — HEPARIN SODIUM 5000 UNITS: 5000 INJECTION, SOLUTION INTRAVENOUS; SUBCUTANEOUS at 20:33

## 2025-03-18 RX ADMIN — LINEZOLID 600 MG: 600 TABLET, FILM COATED ORAL at 20:32

## 2025-03-18 RX ADMIN — OMEPRAZOLE 20 MG: 20 CAPSULE, DELAYED RELEASE ORAL at 07:38

## 2025-03-18 RX ADMIN — HYDRALAZINE HYDROCHLORIDE 75 MG: 25 TABLET ORAL at 04:59

## 2025-03-18 RX ADMIN — CARVEDILOL 6.25 MG: 3.12 TABLET, FILM COATED ORAL at 17:06

## 2025-03-18 RX ADMIN — LEVOTHYROXINE SODIUM 175 MCG: 0.05 TABLET ORAL at 04:59

## 2025-03-18 RX ADMIN — TRAMADOL HYDROCHLORIDE 25 MG: 50 TABLET, COATED ORAL at 19:50

## 2025-03-18 RX ADMIN — ASPIRIN 81 MG: 81 TABLET, COATED ORAL at 07:38

## 2025-03-18 RX ADMIN — HYDRALAZINE HYDROCHLORIDE 75 MG: 25 TABLET ORAL at 20:32

## 2025-03-18 RX ADMIN — HEPARIN SODIUM 5000 UNITS: 5000 INJECTION, SOLUTION INTRAVENOUS; SUBCUTANEOUS at 14:44

## 2025-03-18 RX ADMIN — CARVEDILOL 6.25 MG: 3.12 TABLET, FILM COATED ORAL at 07:39

## 2025-03-18 RX ADMIN — ACETAMINOPHEN 1000 MG: 500 TABLET ORAL at 20:32

## 2025-03-18 RX ADMIN — ATORVASTATIN CALCIUM 40 MG: 40 TABLET, FILM COATED ORAL at 20:32

## 2025-03-18 RX ADMIN — LISINOPRIL 40 MG: 20 TABLET ORAL at 20:32

## 2025-03-18 RX ADMIN — HEPARIN SODIUM 5000 UNITS: 5000 INJECTION, SOLUTION INTRAVENOUS; SUBCUTANEOUS at 04:59

## 2025-03-18 RX ADMIN — LINEZOLID 600 MG: 600 TABLET, FILM COATED ORAL at 07:38

## 2025-03-18 RX ADMIN — SODIUM BICARBONATE 650 MG: 650 TABLET ORAL at 10:48

## 2025-03-18 RX ADMIN — ACETAMINOPHEN 650 MG: 325 TABLET ORAL at 15:48

## 2025-03-18 RX ADMIN — HYDRALAZINE HYDROCHLORIDE 75 MG: 25 TABLET ORAL at 14:44

## 2025-03-18 RX ADMIN — HYDROCHLOROTHIAZIDE 25 MG: 25 TABLET ORAL at 04:59

## 2025-03-18 RX ADMIN — SODIUM BICARBONATE 650 MG: 650 TABLET ORAL at 07:38

## 2025-03-18 ASSESSMENT — ENCOUNTER SYMPTOMS
NERVOUS/ANXIOUS: 0
CHILLS: 0
ABDOMINAL PAIN: 0
DIARRHEA: 0
FEVER: 0
SHORTNESS OF BREATH: 0
NAUSEA: 0
VOMITING: 0

## 2025-03-18 ASSESSMENT — PAIN DESCRIPTION - PAIN TYPE
TYPE: ACUTE PAIN

## 2025-03-18 ASSESSMENT — PATIENT HEALTH QUESTIONNAIRE - PHQ9
1. LITTLE INTEREST OR PLEASURE IN DOING THINGS: NOT AT ALL
2. FEELING DOWN, DEPRESSED, IRRITABLE, OR HOPELESS: NOT AT ALL
SUM OF ALL RESPONSES TO PHQ9 QUESTIONS 1 AND 2: 0

## 2025-03-18 NOTE — CARE PLAN
The patient is Stable - Low risk of patient condition declining or worsening    Shift Goals  Clinical Goals: safety  Patient Goals: rest, pain control  Family Goals: no family present    Progress made toward(s) clinical / shift goals:    Problem: Pain Management  Goal: Pain level will decrease to patient's comfort goal  Outcome: Progressing     Problem: Infection - Standard  Goal: Patient will remain free from infection  Outcome: Progressing     Problem: Fall Risk - Rehab  Goal: Patient will remain free from falls  Outcome: Progressing

## 2025-03-18 NOTE — THERAPY
Occupational Therapy  Daily Treatment     Patient Name:  Marlo León  Age:  69 y.o., Sex:  male  Medical Record #:  [id number]  Today's Date:  3/18/2025    Precautions  Precautions: (P) Fall Risk  Fall Risk: (P) Low vision  Weight Bearing: (P) NWB LLE  Braces: (P) Immobilizer LE (laterality in comments)  Comments: (P) L BKA w/L immobilizer    Subjective: Pt willing to work with OT for both sessions.     Objective:    03/18/25 0831 03/18/25 1331   OT Charge Group   OT Therapeutic Exercise (Units) 2 4   OT Total Time Spent   OT Individual Total Time Spent (Mins) 30 60   Precautions   Precautions Fall Risk  --    Fall Risk Low vision  --    Weight Bearing NWB LLE  --    Braces Immobilizer LE (laterality in comments)  --    Comments L BKA w/L immobilizer  --    Interdisciplinary Plan of Care Collaboration   Patient Position at End of Therapy Seated;Self Releasing Lap Belt Applied  (mod I in room)  --          Therapeutic Activities  Purpose: to improve performance and function of daily activities and to increase safety with activities of daily life and mobility related to activities of daily life  Interventions:  Sit to stand training 1 set of 10, 1 set of 8, in // bars  Pt crossed over barrier (ankle weight laying flat on the floor) 2x in parallel bars to simulate barrier to a shower. Pt relied heavily on UE support with // bars in order to clear barrier. Pt clarified after that his shower will be roll in with no barrier whatsoever.     Therapeutic Exercise  Purpose: to improve strength, to improve ROM/flexibility, and to improve functional endurance  Interventions:   Upper body exercises:   Supine program -   Chest press, 2 sets of 10, Bilateral, and Weight 10lb left arm, 7lb right arm  Seated program -   Lat pull, 3 sets of 10, Bilateral, and Weight 35lb  Bilateral row, 3 sets of 10, Bilateral, and Weight 20lb  Tricep press, 3 sets of 15, Bilateral, and Weight 45lb  Lower body exercises:   Supine program -   Supine  bridges, 2 sets of 10  Hip abduction, Side lying, 1 set of 10, and Left  Straight leg raises, Front, 2 sets of 10, Right, and Left  Seated  Long arc quad, 1 set of 10, Right (w/2.5lb weight), Left  Nani Med Cycle: Gear Level 10 for 3 mins, 7 for 2 mins     Assessment:    Pt seen for OT tx sessions with focus on upper and lower body therapeutic exercise with the goal of improving independence w/functional mobility, IADL, transfers, and ADL. Pt tolerated exercises well and in general improving mobility. He will have a fully adaptive bathroom w/roll in shower and accessible toilet.   Strengths: Able to follow instructions, Alert and oriented, Good carryover of learning, Independent prior level of function, Manages pain appropriately, Motivated for self care and independence, Pleasant and cooperative, Supportive family, Willingly participates in therapeutic activities  Barriers: Decreased endurance, Fatigue, Generalized weakness, Impaired activity tolerance, Impaired balance, Limited mobility    Plan:  DC IRFPAIs    DME       Passport items to be completed:  Perform bathroom transfers, complete dressing, complete feeding, get ready for the day, prepare a simple meal, participate in household tasks, adapt home for safety needs, demonstrate home exercise program, complete caregiver training     Occupational Therapy Goals (Active)       Problem: Bathing       Dates: Start:  03/07/25         Goal: STG-Within one week, patient will bathe with CGA.        Dates: Start:  03/07/25    Expected End:  03/24/25               Problem: Dressing       Dates: Start:  03/07/25         Goal: STG-Within one week, patient will dress LB with CGA.        Dates: Start:  03/07/25    Expected End:  03/24/25               Problem: OT Long Term Goals       Dates: Start:  03/07/25         Goal: LTG-By discharge, patient will complete basic self care tasks @ sup-mod I level.        Dates: Start:  03/07/25    Expected End:  03/24/25            Goal:  LTG-By discharge, patient will perform bathroom transfers @ sup-mod I level.        Dates: Start:  03/07/25    Expected End:  03/24/25               Problem: Toileting       Dates: Start:  03/07/25         Goal: STG-Within one week, patient will complete toileting tasks with min A.        Dates: Start:  03/07/25    Expected End:  03/24/25

## 2025-03-18 NOTE — THERAPY
Physical Therapy   Daily Treatment     Patient Name:  Marlo León  Age:  69 y.o., Sex:  male  Medical Record #:  [id number]  Today's Date: 3/18/2025     Precautions  Precautions: Fall Risk  Fall Risk: Other (see comments) (Left BKA)  Weight Bearing: NWB LE (laterality in comments)  Braces: IPOP, Immobilizer LE (laterality in comments)  Comments: L BKA, L IPOP and L knee immobilizer    Subjective: Patient is kind of sore all over today    Objective:    03/18/25 1045   PT Charge Group   PT Gait Training (Units) 1   PT Therapeutic Exercise (Units) 1   PT Therapeutic Activities (Units) 1   PT Total Time Spent   PT Individual Total Time Spent (Mins) 45   Roll Left and Right   Level of Assist Independent   Sit to Lying   Level of Assist Independent   Lying to Sitting on Side of Bed   Level of Assist Independent   Sit to Stand   Level of Assist Stand By Assist   Assistive Devices FWW   Chair/Bed-to-Chair Transfer   Level of Assist Supervised   Transfer Type Squat Pivot Transfer   Assistive Devices No AD   Ambulation   Level of Assist Contact Guard Assist   Assistive Device FWW   Distance 8 feet   Interdisciplinary Plan of Care Collaboration   Patient Position at End of Therapy Seated     Practiced self wrapping/unwrapping of residual limb, management of immobilizer/residual limb protector     Therapeutic Exercise  Purpose: to improve strength, to improve ROM/flexibility, and to improve functional endurance  Interventions:   Lower body exercises:   Supine program -   Supine bridges, One legged and 3 sets of 10  Hip abduction, Side lying and 2 sets of 10  Straight leg raises, Front, 2 sets of 10, and Bilateral  Knees to chest, Other Resistance 3 x 20 second holds bilateral   Short arc quad, 2 sets of 10 and Bilateral  Seated program -   Scapular depression, 1 set of 10  Long arc quad, 1 set of 10 and Other Resistance 5 second holds    Assessment: Patient was able to safely ambulate with FWW very short distances today,  "transfers continue to be safe, understanding of self care of residual limb is improving     Strengths: Effective communication skills, Pleasant and cooperative, Supportive family, Willingly participates in therapeutic activities, Motivated for self care and independence  Barriers: Impaired activity tolerance, Impaired balance, Constipation, Limited mobility    Plan:   Family training for transfers, car transfers, wheelchair management, residual limb wrapping, LE exercises     DME    PT DME Recommendations  Wheelchair: 20\" Width, Removable/Flip Back Armrests (pt has a wc which he purchased on [employer] however anticipates he will need a new one for DC with left residual limb board and flip back armrest)  Cushion: Standard  Vendor Equipment: [employer]      Passport items to be completed:  Get in/out of bed safely, in/out of a vehicle, safely use mobility device, walk or wheel around home/community, navigate up and down stairs, show how to get up/down from the ground, ensure home is accessible, demonstrate HEP, complete caregiver training    Physical Therapy Problems (Active)       Problem: Mobility       Dates: Start:  03/07/25         Goal: STG-Within one week, patient will ambulate household distance at least 15' with FWW and Min A.       Dates: Start:  03/07/25    Expected End:  03/24/25               Problem: PT-Long Term Goals       Dates: Start:  03/07/25         Goal: LTG-By discharge, patient will propel wheelchair on outside surfaces and inside surfaces with Mod I, distance >500'.       Dates: Start:  03/07/25    Expected End:  03/24/25            Goal: LTG-By discharge, patient will ambulate at least 25' with FWW and SBA.       Dates: Start:  03/07/25    Expected End:  03/24/25            Goal: LTG-By discharge, patient will transfer one surface to another SPV.       Dates: Start:  03/07/25    Expected End:  03/24/25            Goal: LTG-By discharge, patient will perform home exercise program Mod I.       Dates: " Start:  03/07/25    Expected End:  03/24/25            Goal: LTG-By discharge, patient will transfer in/out of a car CGA from  level.       Dates: Start:  03/07/25    Expected End:  03/24/25

## 2025-03-18 NOTE — DISCHARGE PLANNING
Case management  Reviewed signed copy of IMM and answered all questions.    Dc date /disposition: 3/20/25 Home with spouse and [facility].

## 2025-03-18 NOTE — PROGRESS NOTES
NURSING DAILY NOTE    Name: Marlo León  Date of Admission: 3/6/2025  Admitting Diagnosis: Hx of BKA, left (HCC)  Attending Physician: Tracy Tong D.o.  Allergies: Neosporin [neomycin-polymyxin b], Tape, Amlodipine, Amoxicillin, Bacitracin-polymyxin b, Cyclobenzaprine, Empagliflozin-metformin hcl, Gabapentin, Gadolinium, Metformin, Neomycin-polymyxin b gu, Nsaids, Pioglitazone, Prednisone, Sulfamethoxazole w-trimethoprim, and Tamsulosin    Safety  Patient Assist  oSBA/ CGA  Patient Precautions  o   Precaution Comments  o   Bed Transfer Status  o   Toilet Transfer Status  o   Assistive Devices  oWheelchair  Oxygen  oNone - Room Air  Diet/Therapeutic Dining  o  Current Diet Order   Procedures    Diet Order Diet: Consistent CHO (Diabetic) (HIGH PROTEIN AND VEGETABLES. DAIRY OK. LIMIT CARBOHYDRATES); Nutrient modifications: (optional): High Protein     Pill Administration  owhole  Agitated Behavioral Scale  o   ABS Level of Severity  o     Fall Risk  Has the patient had a fall this admission?  Itzel  Nikole Swanson Fall Risk Scoring  o11, MODERATE RISK  Fall Risk Safety Measures  ochair alarm    Vitals  Temperature: 36.8 °C (98.2 °F)  Temp src: Oral  Pulse: 73  Respiration: 18  Blood Pressure : 119/59  Blood Pressure MAP (Calculated): 79 MM HG  BP Location: Right, Forearm  Patient BP Position: Sitting    Oxygen  Pulse Oximetry: 97 %  O2 (LPM): 0  O2 Delivery Device: None - Room Air    Bowel and Bladder  Last Bowel Movement  o03/16/25  Stool Type  oType 6: Fluffy pieces with ragged edges, a mushy stool  Bowel Device  oBathroom  Continent  oBladder: Continent void  oBowel: Continent movement  Bladder Function  oUrine Void (mL): 350 ml  Number of Times Voided: 1  Urine Color: Yellow  Number of Times Incontinent of Urine: 0  Straight Catheter:  (Refused. Pt said he will revoid again later)  Genitourinary Assessment  oBladder Assessment (WDL):  WDL Except  Jane Catheter: Not Applicable  Urine Color: Yellow  Number of  Bladder Accidents: 0  Total Number of Bladder of Accidents in Last 7 Days: 0  Number of Times Incontinent of Urine: 0  Bladder Device: Urinal  Time Void: Yes  Bladder Scan: Post Void  $ Bladder Scan Results (mL): 501 (refused icp)    Skin  Anselmo Score  o 18  Sensory Interventions  o Bed Types: Low Airloss  Skin Preventative Measures: Pillows in Use for Support / Positioning  Moisture Interventions  oMoisturizers/Barriers: Barrier Wipes, Barrier Paste      Pain  Pain Rating Scale  o2 - Notice Pain, does not interfere with activities  Pain Location  oGeneralized  Pain Location Orientation  oRight, Left  Pain Interventions  oMedication (see MAR) (scheduled tylenol per patient)    ADLs    Bathing  oShower, Staff  Linen Change  oComplete  Personal Hygiene  oPerineal Care, Moist Joceline Wipes  Chlorhexidine Bath  oCompleted  Oral Care  o   Teeth/Dentures  o   Shave  o   Nutrition Percentage Eaten  oDinner, Between % Consumed  Environmental Precautions  oTreaded Slipper Socks on Patient, Personal Belongings, Wastebasket, Call Bell etc. in Easy Reach, Transferred to Stronger Side, Report Given to Other Health Care Providers Regarding Fall Risk, Bed in Low Position, Communication Sign for Patients & Families, Mobility Assessed & Appropriate Sign Placed  Patient Turns/Positioning  oPatient turns self independently side to side without assistance, to offload sacral area  Patient Turns Assistance/Tolerance  o   Bed Positions  Vero Locked, Bed Controls On  Head of Bed Elevated  oSelf regulated      Psychosocial/Neurologic Assessment  Psychosocial Assessment  oPsychosocial (WDL):  Within Defined Limits  Neurologic Assessment  oNeuro (WDL): Within Defined Limits  Level of Consciousness: Alert  Orientation Level: Oriented X4  Cognition: Follows commands, Appropriate judgement, Appropriate safety awareness  Speech: Clear  Pupil Assesment: No  oEENT (WDL):  WDL Except    Cardio/Pulmonary Assessment  Edema  o   Respiratory Breath  Sounds  oRUL Breath Sounds: Clear  RML Breath Sounds: Clear  RLL Breath Sounds: Diminished  NOEMY Breath Sounds: Clear  LLL Breath Sounds: Diminished  Cardiac Assessment  oCardiac (WDL):  Within Defined Limits

## 2025-03-18 NOTE — PROGRESS NOTES
"  Physical Medicine & Rehabilitation Progress Note    Encounter Date: 3/18/2025    Chief Complaint: L BKA    Interval Events (Subjective):  VS: VSS  Last BM: 3/17  Bladder: Voiding, large PVRs no CIC  Schedule Meds Not Given: None   PRN Meds Taken: Tramadol last night    Patient seen and examined in his room. Renovations are in place. Family coming for training tomorrow.       ROS: 14 point ROS negative unless otherwise specified in the HPI    Objective:  VITAL SIGNS: BP (!) 149/58   Pulse 72   Temp 36.7 °C (98.1 °F) (Temporal)   Resp 17   Ht 1.778 m (5' 10\")   Wt 110 kg (241 lb 8 oz)   SpO2 97%   BMI 34.65 kg/m²     GEN: No apparent distress  HEENT: Head normocephalic, atraumatic.  Sclera nonicteric bilaterally, no ocular discharge appreciated bilaterally.  CV: Extremities warm and well-perfused, no peripheral edema appreciated bilaterally.  PULMONARY: Breathing nonlabored on room air, no respiratory accessory muscle use.  Not requiring supplemental oxygen.  SKIN:   3/13      3/9    PSYCH: Mood and affect within normal limits.  NEURO: Awake alert.  Conversational.  Logical thought content.      Laboratory Values:  Recent Results (from the past 72 hours)   CBC WITHOUT DIFFERENTIAL    Collection Time: 03/17/25  5:20 AM   Result Value Ref Range    WBC 5.7 4.8 - 10.8 K/uL    RBC 2.88 (L) 4.70 - 6.10 M/uL    Hemoglobin 8.2 (L) 14.0 - 18.0 g/dL    Hematocrit 28.4 (L) 42.0 - 52.0 %    MCV 98.6 (H) 81.4 - 97.8 fL    MCH 28.5 27.0 - 33.0 pg    MCHC 28.9 (L) 32.3 - 36.5 g/dL    RDW 59.4 (H) 35.9 - 50.0 fL    Platelet Count 212 164 - 446 K/uL    MPV 12.0 9.0 - 12.9 fL   Basic Metabolic Panel    Collection Time: 03/17/25  5:20 AM   Result Value Ref Range    Sodium 138 135 - 145 mmol/L    Potassium 5.1 3.6 - 5.5 mmol/L    Chloride 112 96 - 112 mmol/L    Co2 20 20 - 33 mmol/L    Glucose 135 (H) 65 - 99 mg/dL    Bun 54 (H) 8 - 22 mg/dL    Creatinine 2.77 (H) 0.50 - 1.40 mg/dL    Calcium 8.4 (L) 8.5 - 10.5 mg/dL    Anion " Gap 6.0 (L) 7.0 - 16.0   ESTIMATED GFR    Collection Time: 03/17/25  5:20 AM   Result Value Ref Range    GFR (CKD-EPI) 24 (A) >60 mL/min/1.73 m 2       Medications:  Scheduled Medications   Medication Dose Frequency    insulin lispro  2-12 Units TID AC    insulin GLARGINE  37 Units Q EVENING    hydrALAZINE  75 mg Q8HRS    sodium bicarbonate  650 mg TID WITH MEALS    acetaminophen  1,000 mg QHS    [START ON 3/20/2025] influenza vaccine High-Dose  0.5 mL Once    carvedilol  6.25 mg BID WITH MEALS    Pharmacy Consult Request  1 Each PHARMACY TO DOSE    omeprazole  20 mg DAILY    atorvastatin  40 mg Q EVENING    hydroCHLOROthiazide  25 mg Q DAY    lisinopril  40 mg Q EVENING    levothyroxine  175 mcg AM ES    linezolid  600 mg Q12HRS    heparin  5,000 Units Q8HRS    aspirin  81 mg DAILY     PRN medications: insulin lispro **AND** POC blood glucose manual result **AND** NOTIFY MD and PharmD **AND** Administer 20 grams of glucose (approximately 8 ounces of fruit juice) every 15 minutes PRN FSBG less than 70 mg/dL **AND** dextrose bolus, traMADol **OR** traMADol, lidocaine, hydrALAZINE, lactulose, docusate sodium, bisacodyl EC, magnesium hydroxide, sodium phosphate, carboxymethylcellulose, benzocaine-menthol, mag hydrox-al hydrox-simeth, ondansetron **OR** ondansetron, traZODone, sodium chloride, Respiratory Therapy Consult, acetaminophen    Diet:  Current Diet Order   Procedures    Diet Order Diet: Consistent CHO (Diabetic) (HIGH PROTEIN AND VEGETABLES. DAIRY OK. LIMIT CARBOHYDRATES); Nutrient modifications: (optional): High Protein       Medical Decision Making and Plan:  L BKA 3/2 Dr. Echavarria at [facility]  MSSA Bacteremia  TTE withough valvular lesions or endocarditis.   PT and OT for mobility and ADLs. Per guidelines, 15 hours per week between PT, OT and/or SLP.  Follow-up Ortho  Follow-up ID  Follow-up Vascular Surgery (Dr. Calderno) - referral placed per hospitalist at [facility]  Complete IV Cefazolin 3/14 -->  Linezolid through 3/28   Tunneled cath placement 3/4     Hypertension - Continue Lisinopril, HCTZ, Coreg. 3/7 Elevated but improved in 150's monitor. 3/8 BP improving without med changes, monitor. 3/11 Continue monitor BP - patient reports feeling symptoms with low BP in early AM or late PM. Coreg was 12.5 BID hospitalist note indicates should be 6.25mg BID, will change today. 3/12 BP better controlled with increase in Hydralazine. 3/17 VSS    ABLA - 3/7 9 Monitor. 3/8 Better 9.8. 3/17 Stable    Leukocytosis - Mild monitor 3/7. 3/8 Mild increase 10.9--> 11.1. UA negative. Incision c/d/I. Afebrile and not on scheduled tylenol nor taking PRN. 3/10, 3/13, 3/17 Resolved.     Thrombocytosis - WNL resolved.      Chronic Combined HR, LVH and Pulm HTN - EF 50-55% (Had been 65-70%). Follows with Cards Dr. Rodriguez     H/o CAD - On ASA and Statin      Hypothyroidism - Continue Synthroid      DARRIAN - RT consult     H/o Gastric Bypass - Has frequent BM due to this.      CKD Stage IV - Avoid Nephrotoxins. GFR 29 baseline. Managed by Nephro Dr. Michaels. 3/17 Stable, monitor trend    Elevated TSH - Free T4 WNL.     Morbid Obesity - Nutrition consult      Type II Diabetes Mellitus with Hyperglycemia - Currently on SSI and Lantus. Hgb A1c 6.6     Pain - Declines pain. Has Tylenol and Tramadol PRN.    3/14 More neuropathic pain, reduce Tramadol due to drug hangover. Cannot take Kaitlin has side effects. Pain varies, patient would like to stick mostly with tylenol for now. Schedule Tylenol at night.      Bowel - Patient on Senna-docusate for constipation prophylaxis.      Bladder - TV/PVR/BS PRN              DVT PROPHYLAXIS: Heparin --> D/c ASA 325mg BID     HOSPITALIST FOLLOWING: Yes - d/w hospitalist 3/18     CODE STATUS: FULL - c/f with patient on admission      DISPO: Home with spouse      GIANCARLO: 3/20     MEDS SENT TO: [facility]     DISCHARGE SPECIALIST FOLLOW UP:   Ortho  ID  Vasc Surg  Cardiology  Nephrology      Patient to scheduled follow up with  their PCP within 2 weeks from discharge from the [facility].   ____________________________________    Dr. Tracy Tong DO, MS  [facility] - Physical Medicine & Rehabilitation   ____________________________________

## 2025-03-18 NOTE — THERAPY
Physical Therapy   Daily Treatment     Patient Name:  Marlo León  Age:  69 y.o., Sex:  male  Medical Record #:  [id number]  Today's Date: 3/18/2025     Precautions  Precautions: Fall Risk  Fall Risk: Other (see comments) (Left BKA)  Weight Bearing: NWB LE (laterality in comments)  Braces: IPOP, Immobilizer LE (laterality in comments)  Comments: L BKA, L IPOP and L knee immobilizer    Subjective: pt greeted in the room seated in wc, agreeable to participate in PT session    Objective:    03/18/25 0901   PT Charge Group   PT Therapeutic Exercise (Units) 2   PT Therapeutic Activities (Units) 2   Supervising Physical Therapist Jett Kohli   PT Total Time Spent   PT Individual Total Time Spent (Mins) 60   Interdisciplinary Plan of Care Collaboration   Patient Position at End of Therapy Seated;Self Releasing Lap Belt Applied  (Michelle wc level)         Therapeutic Activities  Purpose: to improve performance and function of daily activities, to provide patient and family education, and to increase safety with activities of daily life and mobility related to activities of daily life  Interventions:   Bed/chair transfer training  Bed mobility training (scooting, supine to sit, sit to supine, rolling)  Donning/doffing brace or orthotic  Patient or family education  Cont BKA education:  Unwrapped pt's ace wrap and removed dressing, provided a long handled mirror and edu pt on importance of daily skin checks, discussed blanchable/non-blanchable redness. Instructed pt on desensitization and he completed some light tapping, applied pressure and rubbing the limb. Re dressed/re wrapped pt's residual limb using figure 8 method  Pt able to clau/doff limb guard indep    Therapeutic Exercise  Purpose: to improve strength, to improve ROM/flexibility, and to improve functional endurance  Interventions:   Lower body exercises:   Supine program -   Supine bridges, Two legged and Other resistance: hips on bolster 2 x 15  Hip abduction, Side  "lying and 3 sets of 10  Gluteal isometrics, 2 sets of 10 and Right  Quadriceps isometrics, 2 sets of 10 and Left  Other, prone exercises  Hip flexor stretch x 15 minutes with pillow under abdomen   Prone L LE hip ext/knee flexion 3 x 10  Prone to quadruped 2 times with SBA-vc for sequencing and provided edu on purpose of hands/knees position for floor transfer training/fall recovery    Assessment: Marlo continues to progress with all mobility, he participated in BA education and mat program, asks appropriate questions and returns verbal comprehension of all education provided. Pt's LBP limits his ability to complete residual limb wrapping, wife will likely be completing this for him    Strengths: Effective communication skills, Pleasant and cooperative, Supportive family, Willingly participates in therapeutic activities, Motivated for self care and independence  Barriers: Impaired activity tolerance, Impaired balance, Constipation, Limited mobility    Plan:   continue with LE strengthening, short distance gait with FWW (right knee leandra), self wrapping   Prepare for DC home collection of DC IRF-GARY data       DME    PT DME Recommendations  Wheelchair: 20\" Width, Removable/Flip Back Armrests (pt has a wc which he purchased on [employer] however anticipates he will need a new one for DC with left residual limb board and flip back armrest)  Cushion: Standard  Vendor Equipment: IPOP      Passport items to be completed:  Get in/out of bed safely, in/out of a vehicle, safely use mobility device, walk or wheel around home/community, navigate up and down stairs, show how to get up/down from the ground, ensure home is accessible, demonstrate HEP, complete caregiver training    Physical Therapy Problems (Active)       Problem: Mobility       Dates: Start:  03/07/25         Goal: STG-Within one week, patient will ambulate household distance at least 15' with FWW and Min A.       Dates: Start:  03/07/25    Expected End:  03/24/25  "              Problem: PT-Long Term Goals       Dates: Start:  03/07/25         Goal: LTG-By discharge, patient will propel wheelchair on outside surfaces and inside surfaces with Mod I, distance >500'.       Dates: Start:  03/07/25    Expected End:  03/24/25            Goal: LTG-By discharge, patient will ambulate at least 25' with FWW and SBA.       Dates: Start:  03/07/25    Expected End:  03/24/25            Goal: LTG-By discharge, patient will transfer one surface to another [facility].       Dates: Start:  03/07/25    Expected End:  03/24/25            Goal: LTG-By discharge, patient will perform home exercise program Mod I.       Dates: Start:  03/07/25    Expected End:  03/24/25            Goal: LTG-By discharge, patient will transfer in/out of a car CGA from  level.       Dates: Start:  03/07/25    Expected End:  03/24/25

## 2025-03-18 NOTE — CARE PLAN
The patient is Stable - Low risk of patient condition declining or worsening    Shift Goals  Clinical Goals: Safety, wound dressing  Patient Goals: Rest  Family Goals: no family present    Problem: Communication  Goal: The ability to communicate needs accurately and effectively will improve  Outcome: Progressing    Patient is able to express needs and wishes effectively.       Problem: Infection  Goal: Will remain free from infection  Outcome: Progressing    Patient is afebrile without s/s of infection at this time.

## 2025-03-18 NOTE — PROGRESS NOTES
[facility] Medicine Daily Progress Note    Date of Service  3/18/2025    Chief Complaint:  Hypertension  Diabetes  CKD  Anemia    Interval History:  No complaints.  Doing ok.    Review of Systems  Review of Systems   Constitutional:  Negative for chills and fever.   Respiratory:  Negative for shortness of breath.    Cardiovascular:  Negative for chest pain.   Gastrointestinal:  Negative for abdominal pain, diarrhea, nausea and vomiting.   Psychiatric/Behavioral:  The patient is not nervous/anxious.         Physical Exam  Temp:  [36.7 °C (98.1 °F)-36.8 °C (98.3 °F)] 36.7 °C (98.1 °F)  Pulse:  [69-79] 72  Resp:  [17-18] 17  BP: (119-170)/(54-77) 149/58  SpO2:  [95 %-97 %] 97 %    Physical Exam  Vitals and nursing note reviewed.   Constitutional:       Appearance: Normal appearance.   HENT:      Head: Atraumatic.   Eyes:      Conjunctiva/sclera: Conjunctivae normal.      Pupils: Pupils are equal, round, and reactive to light.   Cardiovascular:      Rate and Rhythm: Normal rate and regular rhythm.      Heart sounds: No murmur heard.  Pulmonary:      Effort: Pulmonary effort is normal.      Breath sounds: No stridor. No wheezing or rales.   Abdominal:      General: There is no distension.      Palpations: Abdomen is soft.      Tenderness: There is no abdominal tenderness.   Musculoskeletal:      Cervical back: Normal range of motion and neck supple.      Right lower leg: No edema.      Left lower leg: No edema.      Comments: Has left BKA   Skin:     General: Skin is warm and dry.      Findings: No rash.   Neurological:      Mental Status: He is alert and oriented to person, place, and time.   Psychiatric:         Mood and Affect: Mood normal.         Behavior: Behavior normal.         Fluids    Intake/Output Summary (Last 24 hours) at 3/18/2025 1045  Last data filed at 3/18/2025 0814  Gross per 24 hour   Intake 200 ml   Output 350 ml   Net -150 ml        Laboratory  Recent Labs     03/17/25  0520   WBC 5.7   RBC 2.88*    HEMOGLOBIN 8.2*   HEMATOCRIT 28.4*   MCV 98.6*   MCH 28.5   MCHC 28.9*   RDW 59.4*   PLATELETCT 212   MPV 12.0     Recent Labs     25  0520   SODIUM 138   POTASSIUM 5.1   CHLORIDE 112   CO2 20   GLUCOSE 135*   BUN 54*   CREATININE 2.77*   CALCIUM 8.4*                   Imaging       Assessment/Plan  * Hx of BKA, left (HCC)- (present on admission)  Assessment & Plan  Had angioplasty in 10/2024  Developed left heel ulcer and left toe gangrene  Now s/p left BKA    MSSA bacteremia  Assessment & Plan  Completed Ancef 3/14/25  Now on Zyvox through 3/28/24    CAD (coronary artery disease)  Assessment & Plan  Hx MI  Hx CABG  Echo (): EF 55%  On Coreg & Lisinopril  On HCTZ  On ASA and Lipitor  Not on any K+ supplements currently  K+ 5.1 (3/17)    Anemia  Assessment & Plan  Hb: 8.2  Fe: 72, sats 31%  B12: 1108  Monitor    CKD (chronic kidney disease)  Assessment & Plan  Bun/Cr around baseline (baseline wanders)  CO2: 20 (3/17)  Cont bicarb tabs  On HCTZ  Cont to monitor    Hypothyroidism- (present on admission)  Assessment & Plan  TSH: 5.61  FT4 1.45  Likely subclinical  Cont Synthroid  Needs repeat TFT's as out patient    Type 2 diabetes mellitus with hyperglycemia (HCC)- (present on admission)  Assessment & Plan  Hba1c: 6.6 (3/7)  BS: 150 --> 112 --> 183  On Glargine: 37 units qhs  Note: off SS coverage at night  Note: home meds include Glargine 28-35 units qhs, Humalog 10 units tid at meals, Farxiga, and Mounjaro  Cont to monitor    Primary hypertension- (present on admission)  Assessment & Plan  BP labile   Cont Core.25 mg bid  Cont Lisinopril: 40 mg daily  Cont Hydralazine: 75 mg tid -- dose increased 3/15  Note: has allergy to Norvasc  Cont to monitor

## 2025-03-19 ENCOUNTER — APPOINTMENT (OUTPATIENT)
Dept: PHYSICAL THERAPY | Facility: REHABILITATION | Age: 70
DRG: 560 | End: 2025-03-19
Attending: PHYSICAL MEDICINE & REHABILITATION
Payer: MEDICARE

## 2025-03-19 ENCOUNTER — PHARMACY VISIT (OUTPATIENT)
Dept: PHARMACY | Facility: MEDICAL CENTER | Age: 70
End: 2025-03-19
Payer: COMMERCIAL

## 2025-03-19 ENCOUNTER — APPOINTMENT (OUTPATIENT)
Dept: OCCUPATIONAL THERAPY | Facility: REHABILITATION | Age: 70
DRG: 560 | End: 2025-03-19
Attending: PHYSICAL MEDICINE & REHABILITATION
Payer: MEDICARE

## 2025-03-19 LAB
ANION GAP SERPL CALC-SCNC: 8 MMOL/L (ref 7–16)
ANISOCYTOSIS BLD QL SMEAR: ABNORMAL
BASOPHILS # BLD AUTO: 0.9 % (ref 0–1.8)
BASOPHILS # BLD: 0.05 K/UL (ref 0–0.12)
BUN SERPL-MCNC: 55 MG/DL (ref 8–22)
CALCIUM SERPL-MCNC: 8.5 MG/DL (ref 8.5–10.5)
CHLORIDE SERPL-SCNC: 110 MMOL/L (ref 96–112)
CO2 SERPL-SCNC: 19 MMOL/L (ref 20–33)
COMMENT 1642: NORMAL
CREAT SERPL-MCNC: 2.9 MG/DL (ref 0.5–1.4)
EOSINOPHIL # BLD AUTO: 0.12 K/UL (ref 0–0.51)
EOSINOPHIL NFR BLD: 2.2 % (ref 0–6.9)
ERYTHROCYTE [DISTWIDTH] IN BLOOD BY AUTOMATED COUNT: 60.9 FL (ref 35.9–50)
GFR SERPLBLD CREATININE-BSD FMLA CKD-EPI: 23 ML/MIN/1.73 M 2
GLUCOSE SERPL-MCNC: 110 MG/DL (ref 65–99)
HCT VFR BLD AUTO: 30.6 % (ref 42–52)
HGB BLD-MCNC: 9.1 G/DL (ref 14–18)
IMM GRANULOCYTES # BLD AUTO: 0.02 K/UL (ref 0–0.11)
IMM GRANULOCYTES NFR BLD AUTO: 0.4 % (ref 0–0.9)
LYMPHOCYTES # BLD AUTO: 1.25 K/UL (ref 1–4.8)
LYMPHOCYTES NFR BLD: 22.4 % (ref 22–41)
MACROCYTES BLD QL SMEAR: ABNORMAL
MAGNESIUM SERPL-MCNC: 2.5 MG/DL (ref 1.5–2.5)
MCH RBC QN AUTO: 29 PG (ref 27–33)
MCHC RBC AUTO-ENTMCNC: 29.7 G/DL (ref 32.3–36.5)
MCV RBC AUTO: 97.5 FL (ref 81.4–97.8)
MONOCYTES # BLD AUTO: 0.47 K/UL (ref 0–0.85)
MONOCYTES NFR BLD AUTO: 8.4 % (ref 0–13.4)
MORPHOLOGY BLD-IMP: NORMAL
NEUTROPHILS # BLD AUTO: 3.66 K/UL (ref 1.82–7.42)
NEUTROPHILS NFR BLD: 65.7 % (ref 44–72)
NRBC # BLD AUTO: 0 K/UL
NRBC BLD-RTO: 0 /100 WBC (ref 0–0.2)
PHOSPHATE SERPL-MCNC: 4.3 MG/DL (ref 2.5–4.5)
PLATELET # BLD AUTO: 230 K/UL (ref 164–446)
PLATELET BLD QL SMEAR: NORMAL
PMV BLD AUTO: 11.4 FL (ref 9–12.9)
POTASSIUM SERPL-SCNC: 6.3 MMOL/L (ref 3.6–5.5)
RBC # BLD AUTO: 3.14 M/UL (ref 4.7–6.1)
RBC BLD AUTO: PRESENT
SODIUM SERPL-SCNC: 137 MMOL/L (ref 135–145)
WBC # BLD AUTO: 5.6 K/UL (ref 4.8–10.8)

## 2025-03-19 PROCEDURE — 770010 HCHG ROOM/CARE - REHAB SEMI PRIVAT*

## 2025-03-19 PROCEDURE — 80048 BASIC METABOLIC PNL TOTAL CA: CPT

## 2025-03-19 PROCEDURE — 85025 COMPLETE CBC W/AUTO DIFF WBC: CPT

## 2025-03-19 PROCEDURE — 97530 THERAPEUTIC ACTIVITIES: CPT

## 2025-03-19 PROCEDURE — 83735 ASSAY OF MAGNESIUM: CPT

## 2025-03-19 PROCEDURE — 700102 HCHG RX REV CODE 250 W/ 637 OVERRIDE(OP): Performed by: HOSPITALIST

## 2025-03-19 PROCEDURE — RXMED WILLOW AMBULATORY MEDICATION CHARGE: Performed by: PHYSICAL MEDICINE & REHABILITATION

## 2025-03-19 PROCEDURE — A9270 NON-COVERED ITEM OR SERVICE: HCPCS | Performed by: HOSPITALIST

## 2025-03-19 PROCEDURE — A9270 NON-COVERED ITEM OR SERVICE: HCPCS | Performed by: PHYSICAL MEDICINE & REHABILITATION

## 2025-03-19 PROCEDURE — 700102 HCHG RX REV CODE 250 W/ 637 OVERRIDE(OP): Performed by: PHYSICAL MEDICINE & REHABILITATION

## 2025-03-19 PROCEDURE — 84100 ASSAY OF PHOSPHORUS: CPT

## 2025-03-19 PROCEDURE — 700111 HCHG RX REV CODE 636 W/ 250 OVERRIDE (IP): Performed by: PHYSICAL MEDICINE & REHABILITATION

## 2025-03-19 PROCEDURE — 97110 THERAPEUTIC EXERCISES: CPT

## 2025-03-19 PROCEDURE — 99232 SBSQ HOSP IP/OBS MODERATE 35: CPT | Performed by: HOSPITALIST

## 2025-03-19 PROCEDURE — 94760 N-INVAS EAR/PLS OXIMETRY 1: CPT

## 2025-03-19 PROCEDURE — 97535 SELF CARE MNGMENT TRAINING: CPT

## 2025-03-19 RX ORDER — HYDRALAZINE HYDROCHLORIDE 100 MG/1
100 TABLET, FILM COATED ORAL EVERY 8 HOURS
Qty: 90 TABLET | Refills: 2 | Status: SHIPPED | OUTPATIENT
Start: 2025-03-19

## 2025-03-19 RX ORDER — LISINOPRIL 40 MG/1
40 TABLET ORAL EVERY EVENING
Qty: 30 TABLET | Refills: 2 | Status: SHIPPED | OUTPATIENT
Start: 2025-03-19

## 2025-03-19 RX ORDER — CARVEDILOL 6.25 MG/1
6.25 TABLET ORAL 2 TIMES DAILY WITH MEALS
Qty: 60 TABLET | Refills: 2 | Status: SHIPPED | OUTPATIENT
Start: 2025-03-19

## 2025-03-19 RX ORDER — LINEZOLID 600 MG/1
600 TABLET, FILM COATED ORAL EVERY 12 HOURS
Qty: 18 TABLET | Refills: 0 | Status: ACTIVE | OUTPATIENT
Start: 2025-03-19 | End: 2025-03-28

## 2025-03-19 RX ORDER — HEPARIN SODIUM 5000 [USP'U]/ML
5000 INJECTION, SOLUTION INTRAVENOUS; SUBCUTANEOUS EVERY 8 HOURS
Status: COMPLETED | OUTPATIENT
Start: 2025-03-19 | End: 2025-03-19

## 2025-03-19 RX ORDER — HYDRALAZINE HYDROCHLORIDE 50 MG/1
100 TABLET, FILM COATED ORAL EVERY 8 HOURS
Status: DISCONTINUED | OUTPATIENT
Start: 2025-03-19 | End: 2025-03-21 | Stop reason: HOSPADM

## 2025-03-19 RX ORDER — SODIUM BICARBONATE 650 MG/1
650 TABLET ORAL
Qty: 90 TABLET | Refills: 2 | Status: SHIPPED | OUTPATIENT
Start: 2025-03-19

## 2025-03-19 RX ORDER — HYDROCHLOROTHIAZIDE 25 MG/1
25 TABLET ORAL DAILY
Qty: 30 TABLET | Refills: 2 | Status: SHIPPED | OUTPATIENT
Start: 2025-03-19

## 2025-03-19 RX ORDER — TRAMADOL HYDROCHLORIDE 50 MG/1
25-50 TABLET ORAL EVERY 6 HOURS PRN
Qty: 28 TABLET | Refills: 0 | Status: SHIPPED | OUTPATIENT
Start: 2025-03-19 | End: 2025-03-26

## 2025-03-19 RX ORDER — ASPIRIN 325 MG
325 TABLET ORAL 2 TIMES DAILY
Status: DISCONTINUED | OUTPATIENT
Start: 2025-03-20 | End: 2025-03-21 | Stop reason: HOSPADM

## 2025-03-19 RX ORDER — INSULIN DEGLUDEC 200 U/ML
28-35 INJECTION, SOLUTION SUBCUTANEOUS
Qty: 3 ML | Refills: 0 | Status: ACTIVE
Start: 2025-03-19

## 2025-03-19 RX ORDER — ASPIRIN 325 MG
325 TABLET ORAL 2 TIMES DAILY
Qty: 60 TABLET | Refills: 0 | Status: SHIPPED | OUTPATIENT
Start: 2025-03-20

## 2025-03-19 RX ADMIN — LINEZOLID 600 MG: 600 TABLET, FILM COATED ORAL at 07:23

## 2025-03-19 RX ADMIN — LEVOTHYROXINE SODIUM 175 MCG: 0.05 TABLET ORAL at 05:21

## 2025-03-19 RX ADMIN — HYDRALAZINE HYDROCHLORIDE 100 MG: 50 TABLET ORAL at 21:26

## 2025-03-19 RX ADMIN — CARVEDILOL 6.25 MG: 3.12 TABLET, FILM COATED ORAL at 07:23

## 2025-03-19 RX ADMIN — ATORVASTATIN CALCIUM 40 MG: 40 TABLET, FILM COATED ORAL at 20:37

## 2025-03-19 RX ADMIN — SODIUM BICARBONATE 650 MG: 650 TABLET ORAL at 11:09

## 2025-03-19 RX ADMIN — CARVEDILOL 6.25 MG: 3.12 TABLET, FILM COATED ORAL at 17:41

## 2025-03-19 RX ADMIN — ACETAMINOPHEN 650 MG: 325 TABLET ORAL at 05:20

## 2025-03-19 RX ADMIN — HEPARIN SODIUM 5000 UNITS: 5000 INJECTION, SOLUTION INTRAVENOUS; SUBCUTANEOUS at 21:26

## 2025-03-19 RX ADMIN — OMEPRAZOLE 20 MG: 20 CAPSULE, DELAYED RELEASE ORAL at 07:23

## 2025-03-19 RX ADMIN — HEPARIN SODIUM 5000 UNITS: 5000 INJECTION, SOLUTION INTRAVENOUS; SUBCUTANEOUS at 05:21

## 2025-03-19 RX ADMIN — HYDRALAZINE HYDROCHLORIDE 75 MG: 25 TABLET ORAL at 05:20

## 2025-03-19 RX ADMIN — LINEZOLID 600 MG: 600 TABLET, FILM COATED ORAL at 20:36

## 2025-03-19 RX ADMIN — HYDRALAZINE HYDROCHLORIDE 25 MG: 25 TABLET ORAL at 11:09

## 2025-03-19 RX ADMIN — SODIUM BICARBONATE 650 MG: 650 TABLET ORAL at 17:40

## 2025-03-19 RX ADMIN — HYDRALAZINE HYDROCHLORIDE 100 MG: 50 TABLET ORAL at 13:26

## 2025-03-19 RX ADMIN — SODIUM BICARBONATE 650 MG: 650 TABLET ORAL at 07:23

## 2025-03-19 RX ADMIN — ASPIRIN 81 MG: 81 TABLET, COATED ORAL at 07:23

## 2025-03-19 RX ADMIN — HYDROCHLOROTHIAZIDE 25 MG: 25 TABLET ORAL at 05:21

## 2025-03-19 RX ADMIN — ACETAMINOPHEN 1000 MG: 500 TABLET ORAL at 20:36

## 2025-03-19 RX ADMIN — LISINOPRIL 40 MG: 20 TABLET ORAL at 20:37

## 2025-03-19 RX ADMIN — HEPARIN SODIUM 5000 UNITS: 5000 INJECTION, SOLUTION INTRAVENOUS; SUBCUTANEOUS at 13:25

## 2025-03-19 ASSESSMENT — PATIENT HEALTH QUESTIONNAIRE - PHQ9
SUM OF ALL RESPONSES TO PHQ9 QUESTIONS 1 AND 2: 0
1. LITTLE INTEREST OR PLEASURE IN DOING THINGS: NOT AT ALL
2. FEELING DOWN, DEPRESSED, IRRITABLE, OR HOPELESS: NOT AT ALL

## 2025-03-19 ASSESSMENT — BRIEF INTERVIEW FOR MENTAL STATUS (BIMS)
WHAT DAY OF THE WEEK IS IT: CORRECT
ASKED TO RECALL BLUE: YES, NO CUE REQUIRED
INITIAL REPETITION OF BED BLUE SOCK - FIRST ATTEMPT: 3
WHAT YEAR IS IT: CORRECT
ASKED TO RECALL SOCK: YES, NO CUE REQUIRED
WHAT MONTH IS IT: ACCURATE WITHIN 5 DAYS
BIMS SUMMARY SCORE: 13
ASKED TO RECALL BED: NO, COULD NOT RECALL

## 2025-03-19 ASSESSMENT — ENCOUNTER SYMPTOMS
PALPITATIONS: 0
NAUSEA: 0
SHORTNESS OF BREATH: 0
FEVER: 0
DIZZINESS: 0
HALLUCINATIONS: 0
VOMITING: 0
HEADACHES: 0
BLURRED VISION: 0

## 2025-03-19 ASSESSMENT — ACTIVITIES OF DAILY LIVING (ADL)
TOILET_TRANSFER_LEVEL_OF_ASSIST: ABLE TO COMPLETE TOILET TRANSFER WITHOUT ASSIST
SHOWER_TRANSFER_LEVEL_OF_ASSIST: ABLE TO COMPLETE SHOWER TRANSFER WITHOUT ASSIST
TOILETING_LEVEL_OF_ASSIST: ABLE TO COMPLETE TOILETING WITHOUT ASSIST

## 2025-03-19 ASSESSMENT — PAIN DESCRIPTION - PAIN TYPE
TYPE: ACUTE PAIN
TYPE: ACUTE PAIN

## 2025-03-19 NOTE — CARE PLAN
Problem: PT-Long Term Goals  Goal: LTG-By discharge, patient will propel wheelchair on outside surfaces and inside surfaces with Mod I, distance >500'.  Outcome: Met  Goal: LTG-By discharge, patient will transfer one surface to another SPV.  Outcome: Met  Goal: LTG-By discharge, patient will perform home exercise program Mod I.  Outcome: Met  Goal: LTG-By discharge, patient will transfer in/out of a car CGA from  level.  Outcome: Met     Problem: Mobility  Goal: STG-Within one week, patient will ambulate household distance at least 15' with FWW and Min A.  3/19/2025 1528 by Jett Kohli, PT  Outcome: Discharged - Not Met  3/19/2025 1246 by Jett Kohli, PT  Outcome: Progressing     Problem: PT-Long Term Goals  Goal: LTG-By discharge, patient will ambulate at least 25' with FWW and SBA.  Outcome: Discharged - Not Met

## 2025-03-19 NOTE — CARE PLAN
Problem: Bathing  Goal: STG-Within one week, patient will bathe with CGA.   Outcome: Met     Problem: Dressing  Goal: STG-Within one week, patient will dress LB with CGA.   Outcome: Met     Problem: Toileting  Goal: STG-Within one week, patient will complete toileting tasks with min A.   Outcome: Met     Problem: OT Long Term Goals  Goal: LTG-By discharge, patient will complete basic self care tasks @ sup-mod I level.   Outcome: Progressing  Goal: LTG-By discharge, patient will perform bathroom transfers @ sup-mod I level.   Outcome: Progressing

## 2025-03-19 NOTE — PROGRESS NOTES
[facility] Medicine Daily Progress Note    Date of Service  3/19/2025    Chief Complaint:  Hypertension  Diabetes  CKD  Anemia    Interval History:  Discussed about his BP being elevated and have increased his Hydralazine dose.    Review of Systems  Review of Systems   Constitutional:  Negative for fever.   Eyes:  Negative for blurred vision.   Respiratory:  Negative for shortness of breath.    Cardiovascular:  Negative for palpitations.   Gastrointestinal:  Negative for nausea and vomiting.   Neurological:  Negative for dizziness and headaches.   Psychiatric/Behavioral:  Negative for hallucinations.         Physical Exam  Temp:  [36.7 °C (98.1 °F)-37.1 °C (98.8 °F)] 36.9 °C (98.5 °F)  Pulse:  [72-88] 73  Resp:  [16-18] 18  BP: (149-166)/(58-70) 154/61  SpO2:  [95 %-99 %] 99 %    Physical Exam  Vitals and nursing note reviewed.   Constitutional:       General: He is not in acute distress.  HENT:      Mouth/Throat:      Mouth: Mucous membranes are moist.      Pharynx: Oropharynx is clear.   Eyes:      General: No scleral icterus.  Cardiovascular:      Rate and Rhythm: Normal rate and regular rhythm.      Heart sounds: No murmur heard.  Pulmonary:      Effort: Pulmonary effort is normal.      Breath sounds: No stridor.   Abdominal:      General: There is no distension.      Palpations: Abdomen is soft.      Tenderness: There is no abdominal tenderness.   Musculoskeletal:      Cervical back: Normal range of motion. No rigidity.      Right lower leg: No edema.      Left lower leg: No edema.      Comments: Has left BKA   Skin:     General: Skin is warm and dry.      Findings: No rash.   Neurological:      Mental Status: He is alert and oriented to person, place, and time.   Psychiatric:         Mood and Affect: Mood normal.         Behavior: Behavior normal.         Fluids    Intake/Output Summary (Last 24 hours) at 3/19/2025 0926  Last data filed at 3/19/2025 0825  Gross per 24 hour   Intake 880 ml   Output 1000 ml   Net  -120 ml        Laboratory  Recent Labs     25  0520   WBC 5.7   RBC 2.88*   HEMOGLOBIN 8.2*   HEMATOCRIT 28.4*   MCV 98.6*   MCH 28.5   MCHC 28.9*   RDW 59.4*   PLATELETCT 212   MPV 12.0     Recent Labs     25  0520   SODIUM 138   POTASSIUM 5.1   CHLORIDE 112   CO2 20   GLUCOSE 135*   BUN 54*   CREATININE 2.77*   CALCIUM 8.4*                   Imaging       Assessment/Plan  * Hx of BKA, left (HCC)- (present on admission)  Assessment & Plan  Had angioplasty in 10/2024  Developed left heel ulcer and left toe gangrene  Now s/p left BKA    MSSA bacteremia  Assessment & Plan  Completed Ancef 3/14/25  Now on Zyvox through 3/28/24    CAD (coronary artery disease)  Assessment & Plan  Hx MI  Hx CABG  Echo (): EF 55%  On Coreg & Lisinopril  On HCTZ  On ASA and Lipitor  Not on any K+ supplements currently  K+ 5.1 (3/17)    Anemia  Assessment & Plan  Hb: 8.2  Fe: 72, sats 31%  B12: 1108  Monitor    CKD (chronic kidney disease)  Assessment & Plan  Bun/Cr around baseline (baseline wanders)  CO2: 20 (3/17)  Cont bicarb tabs  On HCTZ  Cont to monitor    Hypothyroidism- (present on admission)  Assessment & Plan  TSH: 5.61  FT4 1.45  Likely subclinical  Cont Synthroid  Needs repeat TFT's as out patient    Type 2 diabetes mellitus with hyperglycemia (HCC)- (present on admission)  Assessment & Plan  Hba1c: 6.6 (3/7)  BS: 150 --> 112 --> 183 (am) --> 130 --> 132 --> 98 (am)  On Glargine: 37 units qhs  Note: off SS coverage at night  Note: home meds include Glargine 28-35 units qhs, Humalog 10 units tid at meals, Farxiga, and Mounjaro  Cont to monitor    Primary hypertension- (present on admission)  Assessment & Plan  BP more elevated recently   Cont Core.25 mg bid  Cont Lisinopril: 40 mg daily  Cont Hydralazine: 75 mg tid (3/15) -- will increase dose  Note: has allergy to Norvasc  Cont to monitor

## 2025-03-19 NOTE — PROGRESS NOTES
"  Physical Medicine & Rehabilitation Progress Note    Encounter Date: 3/19/2025    Chief Complaint: L BKA    Interval Events (Subjective):  VS: VSS - BP elevated at times  Last BM: 3/19  Bladder: Voiding, large PVRs no CIC  Schedule Meds Not Given: SSI not required  PRN Meds Taken: Tramadol last night, Tylenol this AM    Patient seen and examined in his room. Renovations are in place. Family coming for training tomorrow.       ROS: 14 point ROS negative unless otherwise specified in the HPI    Objective:  VITAL SIGNS: BP (!) 154/61   Pulse 73   Temp 36.9 °C (98.5 °F) (Tympanic)   Resp 18   Ht 1.778 m (5' 10\")   Wt 110 kg (241 lb 8 oz)   SpO2 99%   BMI 34.65 kg/m²     GEN: No apparent distress  HEENT: Head normocephalic, atraumatic.  Sclera nonicteric bilaterally, no ocular discharge appreciated bilaterally.  CV: Extremities warm and well-perfused, no peripheral edema appreciated bilaterally.  PULMONARY: Breathing nonlabored on room air, no respiratory accessory muscle use.  Not requiring supplemental oxygen.  SKIN:   3/19     3/13      3/9    PSYCH: Mood and affect within normal limits.  NEURO: Awake alert.  Conversational.  Logical thought content.      Laboratory Values:  Recent Results (from the past 72 hours)   CBC WITHOUT DIFFERENTIAL    Collection Time: 03/17/25  5:20 AM   Result Value Ref Range    WBC 5.7 4.8 - 10.8 K/uL    RBC 2.88 (L) 4.70 - 6.10 M/uL    Hemoglobin 8.2 (L) 14.0 - 18.0 g/dL    Hematocrit 28.4 (L) 42.0 - 52.0 %    MCV 98.6 (H) 81.4 - 97.8 fL    MCH 28.5 27.0 - 33.0 pg    MCHC 28.9 (L) 32.3 - 36.5 g/dL    RDW 59.4 (H) 35.9 - 50.0 fL    Platelet Count 212 164 - 446 K/uL    MPV 12.0 9.0 - 12.9 fL   Basic Metabolic Panel    Collection Time: 03/17/25  5:20 AM   Result Value Ref Range    Sodium 138 135 - 145 mmol/L    Potassium 5.1 3.6 - 5.5 mmol/L    Chloride 112 96 - 112 mmol/L    Co2 20 20 - 33 mmol/L    Glucose 135 (H) 65 - 99 mg/dL    Bun 54 (H) 8 - 22 mg/dL    Creatinine 2.77 (H) 0.50 " - 1.40 mg/dL    Calcium 8.4 (L) 8.5 - 10.5 mg/dL    Anion Gap 6.0 (L) 7.0 - 16.0   ESTIMATED GFR    Collection Time: 03/17/25  5:20 AM   Result Value Ref Range    GFR (CKD-EPI) 24 (A) >60 mL/min/1.73 m 2       Medications:  Scheduled Medications   Medication Dose Frequency    hydrALAZINE  100 mg Q8HRS    insulin lispro  2-12 Units TID AC    insulin GLARGINE  37 Units Q EVENING    sodium bicarbonate  650 mg TID WITH MEALS    acetaminophen  1,000 mg QHS    [START ON 3/20/2025] influenza vaccine High-Dose  0.5 mL Once    carvedilol  6.25 mg BID WITH MEALS    Pharmacy Consult Request  1 Each PHARMACY TO DOSE    omeprazole  20 mg DAILY    atorvastatin  40 mg Q EVENING    hydroCHLOROthiazide  25 mg Q DAY    lisinopril  40 mg Q EVENING    levothyroxine  175 mcg AM ES    linezolid  600 mg Q12HRS    heparin  5,000 Units Q8HRS    aspirin  81 mg DAILY     PRN medications: insulin lispro **AND** POC blood glucose manual result **AND** NOTIFY MD and PharmD **AND** Administer 20 grams of glucose (approximately 8 ounces of fruit juice) every 15 minutes PRN FSBG less than 70 mg/dL **AND** dextrose bolus, traMADol **OR** traMADol, lidocaine, hydrALAZINE, lactulose, docusate sodium, bisacodyl EC, magnesium hydroxide, sodium phosphate, carboxymethylcellulose, benzocaine-menthol, mag hydrox-al hydrox-simeth, ondansetron **OR** ondansetron, traZODone, sodium chloride, Respiratory Therapy Consult, acetaminophen    Diet:  Current Diet Order   Procedures    Diet Order Diet: Consistent CHO (Diabetic) (HIGH PROTEIN AND VEGETABLES. DAIRY OK. LIMIT CARBOHYDRATES); Nutrient modifications: (optional): High Protein       Medical Decision Making and Plan:  L BKA 3/2 Dr. Echavarria at [facility] Bacteremia  TTE without valvular lesions or endocarditis.   PT and OT for mobility and ADLs. Per guidelines, 15 hours per week between PT, OT and/or SLP.  Follow-up Ortho  Follow-up ID  Follow-up Vascular Surgery (Dr. Calderon) - referral placed per  hospitalist at [facility]  Complete IV Cefazolin 3/14 --> Linezolid through 3/28   Tunneled cath placement 3/4     Hypertension - Continue Lisinopril, HCTZ, Coreg. 3/7 Elevated but improved in 150's monitor. 3/8 BP improving without med changes, monitor. 3/11 Continue monitor BP - patient reports feeling symptoms with low BP in early AM or late PM. Coreg was 12.5 BID hospitalist note indicates should be 6.25mg BID, will change today. 3/12 BP better controlled with increase in Hydralazine. 3/17 VSS    3/19 Hydralazine increased to 100mg q8hrs    ABLA - 3/7 9 Monitor. 3/8 Better 9.8. 3/17 Stable    Leukocytosis - Mild monitor 3/7. 3/8 Mild increase 10.9--> 11.1. UA negative. Incision c/d/I. Afebrile and not on scheduled tylenol nor taking PRN. 3/10, 3/13, 3/17 Resolved.     Thrombocytosis - WNL resolved.      Chronic Combined HR, LVH and Pulm HTN - EF 50-55% (Had been 65-70%). Follows with Cards Dr. Rodriguez     H/o CAD - On ASA and Statin      Hypothyroidism - Continue Synthroid      DARRIAN - RT consult     H/o Gastric Bypass - Has frequent BM due to this.      CKD Stage IV - Avoid Nephrotoxins. GFR 29 baseline. Managed by Nephro Dr. Michaels. 3/17 Stable, monitor trend    Elevated TSH - Free T4 WNL.     Morbid Obesity - Nutrition consult      Type II Diabetes Mellitus with Hyperglycemia - Currently on SSI and Lantus. Hgb A1c 6.6     Pain - Declines pain. Has Tylenol and Tramadol PRN.    3/14 More neuropathic pain, reduce Tramadol due to drug hangover. Cannot take Kaitlin has side effects. Pain varies, patient would like to stick mostly with tylenol for now. Schedule Tylenol at night.      Bowel - Patient on Senna-docusate for constipation prophylaxis.      Bladder - TV/PVR/BS PRN           DVT PROPHYLAXIS: Heparin --> D/c ASA 325mg BID     HOSPITALIST FOLLOWING: Yes - d/w hospitalist 3/19     CODE STATUS: FULL - c/f with patient on admission      DISPO: Home with spouse      GIANCARLO: 3/20     MEDS SENT TO: [facility]     DISCHARGE  SPECIALIST FOLLOW UP:   Ortho - Appnt scheduled 3/25  ID - New referral placed  Vasc Surg - established  Cardiology - established  Nephrology - established     Patient to scheduled follow up with their PCP within 2 weeks from discharge from the [facility].   ____________________________________    Dr. Tracy Tong DO, MS  [facility] - Physical Medicine & Rehabilitation   ____________________________________    _____________________________________  Interdisciplinary Team Conference   Most recent IDT on 3/19/2025    I, Dr. Tracy Tong DO, MS, was present and led the interdisciplinary team conference on 3/19/2025.  I led the IDT conference and agree with the IDT conference documentation and plan of care as noted below.       Nursing:  Diet Current Diet Order   Procedures    Diet Order Diet: Consistent CHO (Diabetic) (HIGH PROTEIN AND VEGETABLES. DAIRY OK. LIMIT CARBOHYDRATES); Nutrient modifications: (optional): High Protein       Eating ADL Independent     % of Last Meal  Oral Nutrition: *  * Meal *  *, Breakfast, Between 50-75% Consumed   Sleep    Bowel Last BM: 03/19/25   Bladder        Physical Therapy:  Bed Mobility Roll Left and Right: Independent  Sit to Lying: Independent  Lying to Sitting: Independent   Transfers Sit to Stand: Stand By Assist  Chair/Bed to Chair: Supervised  Toilet: Contact Guard Assist  Car: Stand By Assist   Mobility Ambulation: Contact Guard Assist  Device: FWW  Ambulation Distance: 8 feet  Wheelchair: Independent  Type: Manual  Wheelchair Distance: in halls   Stairs/Curbs Stairs: Unable to Participate  Stairs Amount:    Curbs: Unable to Participate       Occupational Therapy:  Oral Hygiene Set Up   Grooming Set Up   Shower/Bathing Modified Independent   UB Dressing Set Up   LB Dressing Supervised  Supervised   Toileting Transfer: Contact Guard Assist  Hygiene: Contact Guard Assist   Shower & Transfer Modified Independent         Respiratory Therapy:  O2 (LPM):  0  O2 Delivery Device: None - Room Air    Case Management:  Continues to work on disposition and DME needs.        Discharge Date/Disposition:  3/20  _____________________________________

## 2025-03-19 NOTE — PROGRESS NOTES
NURSING DAILY NOTE    Name: Marlo León  Date of Admission: 3/6/2025  Admitting Diagnosis: Hx of BKA, left (HCC)  Attending Physician: Tracy Tong D.o.  Allergies: Neosporin [neomycin-polymyxin b], Tape, Amlodipine, Amoxicillin, Bacitracin-polymyxin b, Cyclobenzaprine, Empagliflozin-metformin hcl, Gabapentin, Gadolinium, Metformin, Neomycin-polymyxin b gu, Nsaids, Pioglitazone, Prednisone, Sulfamethoxazole w-trimethoprim, and Tamsulosin    Safety  Patient Assist  osba during hs hours, mod-i daytime hours  Patient Precautions  o   Precaution Comments  o   Bed Transfer Status  o   Toilet Transfer Status  o   Assistive Devices  oRails, Wheelchair  Oxygen  oNone - Room Air  Diet/Therapeutic Dining  o  Current Diet Order   Procedures    Diet Order Diet: Consistent CHO (Diabetic) (HIGH PROTEIN AND VEGETABLES. DAIRY OK. LIMIT CARBOHYDRATES); Nutrient modifications: (optional): High Protein     Pill Administration  owhole  Agitated Behavioral Scale  o   ABS Level of Severity  o     Fall Risk  Has the patient had a fall this admission?  Itzel  Nikole Swanson Fall Risk Scoring  o12, MODERATE RISK  Fall Risk Safety Measures  ochair alarm    Vitals  Temperature: 36.9 °C (98.5 °F)  Temp src: Tympanic  Pulse: 73  Respiration: 18  Blood Pressure : (!) 154/61  Blood Pressure MAP (Calculated): 92 MM HG  BP Location: Right, Upper Arm  Patient BP Position: Supine    Oxygen  Pulse Oximetry: 99 %  O2 (LPM): 0  O2 Delivery Device: None - Room Air    Bowel and Bladder  Last Bowel Movement  o03/19/25  Stool Type  oPatient stated  Bowel Device  oBathroom  Continent  oBladder: Continent void  oBowel: Continent movement  Bladder Function  oUrine Void (mL): 500 ml  Number of Times Voided: 1  Urine Color: Yellow  Number of Times Incontinent of Urine: 0  Straight Catheter:  (Refused. Pt said he will revoid again later)  Genitourinary Assessment  oBladder Assessment (WDL):  WDL Except  Jane Catheter: Not Applicable  Urine Color:  Yellow  Number of Bladder Accidents: 0  Total Number of Bladder of Accidents in Last 7 Days: 0  Number of Times Incontinent of Urine: 0  Bladder Device: Urinal, Bathroom  Time Void: Yes  Bladder Scan: Post Void  $ Bladder Scan Results (mL): 245    Skin  Anselmo Score  o 18  Sensory Interventions  o Bed Types: Low Airloss  Skin Preventative Measures: Pillows in Use for Support / Positioning  Moisture Interventions  oMoisturizers/Barriers: Barrier Wipes, Barrier Paste      Pain  Pain Rating Scale  o2 - Notice Pain, does not interfere with activities  Pain Location  oGeneralized  Pain Location Orientation  oLeft, Right  Pain Interventions  oMedication (see MAR)    ADLs    Bathing  oShower, Staff  Linen Change  oComplete  Personal Hygiene  oPerineal Care, Moist Joceline Wipes  Chlorhexidine Bath  oCompleted  Oral Care  oBrushed Teeth  Teeth/Dentures  o   Shave  o   Nutrition Percentage Eaten  o*  * Meal *  *, Dinner, *  * Amount Consumed *  *, Between % Consumed  Environmental Precautions  oTreaded Slipper Socks on Patient, Personal Belongings, Wastebasket, Call Bell etc. in Easy Reach, Transferred to Stronger Side, Report Given to Other Health Care Providers Regarding Fall Risk, Bed in Low Position, Communication Sign for Patients & Families, Mobility Assessed & Appropriate Sign Placed  Patient Turns/Positioning  oPatient turns self independently side to side without assistance, to offload sacral area  Patient Turns Assistance/Tolerance  o   Bed Positions  Vero Locked, Bed Controls On  Head of Bed Elevated  oSelf regulated      Psychosocial/Neurologic Assessment  Psychosocial Assessment  oPsychosocial (WDL):  WDL Except  Patient Behaviors: Fatigue, Depressed  Neurologic Assessment  oNeuro (WDL): Within Defined Limits  Level of Consciousness: Alert  Orientation Level: Oriented X4  Cognition: Follows commands, Appropriate judgement, Appropriate safety awareness  Speech: Clear  Pupil Assesment: No  oEENT (WDL):  WDL  Except    Cardio/Pulmonary Assessment  Edema  o   Respiratory Breath Sounds  oRUL Breath Sounds: Clear  RML Breath Sounds: Clear  RLL Breath Sounds: Diminished  NOEMY Breath Sounds: Clear  LLL Breath Sounds: Diminished  Cardiac Assessment  oCardiac (WDL):  Within Defined Limits

## 2025-03-19 NOTE — DISCHARGE PLANNING
Case Management/IDT follow up.   Projected dc date: 3/20/25  IDT continues to recommend IRF level of care as patient continue to make progress with all therapies.     DC needs  Home health:  PT/OT/RN/CNA  DME: [facility]  Family training:Will be done today.    Follow up:   PCP:  Other:     I will meet with patient and family to provide update from IDT and discuss plan of care.      Plan:  Continue to follow

## 2025-03-19 NOTE — PROGRESS NOTES
Patient participated in Diabetic Education class. Reviewed concepts of blood sugar monitoring, hypoglycemia, hyperglycemia, diabetes medications, and wound monitoring. Referred patient to outpatient  Diabetes Program at [facility].

## 2025-03-19 NOTE — PROGRESS NOTES
NURSING DAILY NOTE    Name: Marlo León   Date of Admission: 3/6/2025   Admitting Diagnosis: Hx of BKA, left (HCC)  Attending Physician: MICKIE JUAREZ D.O.  Allergies: Neosporin [neomycin-polymyxin b], Tape, Amlodipine, Amoxicillin, Bacitracin-polymyxin b, Cyclobenzaprine, Empagliflozin-metformin hcl, Gabapentin, Gadolinium, Metformin, Neomycin-polymyxin b gu, Nsaids, Pioglitazone, Prednisone, Sulfamethoxazole w-trimethoprim, and Tamsulosin    Safety  Patient Assist  SBA/ CGA  Patient Precautions  Precautions: Fall Risk  Fall Risk: Low vision  Weight Bearing: NWB LLE  Braces: Immobilizer LE (laterality in comments)  Comments: L BKA w/L immobilizer  Bed Transfer Status  Supervised  Toilet Transfer Status   Contact Guard Assist  Assistive Devices  Wheelchair  Oxygen  None - Room Air  Diet/Therapeutic Dining  Current Diet Order   Procedures    Diet Order Diet: Consistent CHO (Diabetic) (HIGH PROTEIN AND VEGETABLES. DAIRY OK. LIMIT CARBOHYDRATES); Nutrient modifications: (optional): High Protein     Pill Administration  whole  Agitated Behavioral Scale     ABS Level of Severity       Fall Risk  Has the patient had a fall this admission?      Nikole Swanson Fall Risk Scoring  11, MODERATE RISK  Fall Risk Safety Measures  ok to leave pt in bathroom and Mod I (Day)    Vitals  Temperature: 37.1 °C (98.8 °F)  Temp src: Temporal  Pulse: 88  Respiration: 16  Blood Pressure : (!) 160/70  Blood Pressure MAP (Calculated): 100 MM HG  BP Location: Right, Upper Arm  Patient BP Position: Supine     Oxygen  Pulse Oximetry: 95 %  O2 (LPM): 0  O2 Delivery Device: None - Room Air    Bowel and Bladder  Last Bowel Movement  03/16/25  Stool Type  Type 6: Fluffy pieces with ragged edges, a mushy stool  Bowel Device  Bathroom  Continent  Bladder: Continent void   Bowel: Continent movement  Bladder Function  Urine Void (mL): 350 ml  Number of Times Voided: 1  Urine Color: Yellow  Number of Times Incontinent of Urine: 0  Straight  Catheter:  (Refused. Pt said he will revoid again later)  Genitourinary Assessment   Bladder Assessment (WDL):  WDL Except  Jane Catheter: Not Applicable  Urine Color: Yellow  Number of Bladder Accidents: 0  Total Number of Bladder of Accidents in Last 7 Days: 0  Number of Times Incontinent of Urine: 0  Bladder Device: Bathroom  Time Void: Yes  Bladder Scan: Post Void  $ Bladder Scan Results (mL): 39    Skin  Anselmo Score   18  Sensory Interventions   Bed Types: Low Airloss  Skin Preventative Measures: Pillows in Use for Support / Positioning  Moisture Interventions  Moisturizers/Barriers: Barrier Wipes, Barrier Paste      Pain  Pain Rating Scale  3 - Sometimes distracts me  Pain Location  Generalized  Pain Location Orientation  Right, Left  Pain Interventions   Medication (see MAR)    ADLs    Bathing   Shower, Staff  Linen Change   Complete  Personal Hygiene  Perineal Care, Moist Joceline Wipes  Chlorhexidine Bath   Completed  Oral Care     Teeth/Dentures     Shave     Nutrition Percentage Eaten  Lunch, Between % Consumed  Environmental Precautions  Treaded Slipper Socks on Patient, Personal Belongings, Wastebasket, Call Bell etc. in Easy Reach, Transferred to Stronger Side, Report Given to Other Health Care Providers Regarding Fall Risk, Bed in Low Position, Communication Sign for Patients & Families, Mobility Assessed & Appropriate Sign Placed  Patient Turns/Positioning  Patient turns self independently side to side without assistance, to offload sacral area  Patient Turns Assistance/Tolerance     Bed Positions  Bed Locked, Bed Controls On  Head of Bed Elevated  Self regulated      Psychosocial/Neurologic Assessment  Psychosocial Assessment  Psychosocial (WDL):  Within Defined Limits  Neurologic Assessment  Neuro (WDL): Within Defined Limits  Level of Consciousness: Alert  Orientation Level: Oriented X4  Cognition: Follows commands, Appropriate judgement, Appropriate safety awareness  Speech: Clear  Pupil  Assesment: No  EENT (WDL):  WDL Except    Cardio/Pulmonary Assessment  Edema      Respiratory Breath Sounds  RUL Breath Sounds: Clear  RML Breath Sounds: Clear  RLL Breath Sounds: Diminished  NOEMY Breath Sounds: Clear  LLL Breath Sounds: Diminished  Cardiac Assessment   Cardiac (WDL):  Within Defined Limits

## 2025-03-19 NOTE — CARE PLAN
Problem: Mobility  Goal: STG-Within one week, patient will ambulate household distance at least 15' with FWW and Min A.  Outcome: Progressing

## 2025-03-19 NOTE — CARE PLAN
The patient is Stable - Low risk of patient condition declining or worsening    Shift Goals  Clinical Goals: safety  Patient Goals: pain control, rest  Family Goals: no family present    Progress made toward(s) clinical / shift goals:    Problem: Knowledge Deficit - Standard  Goal: Patient and family/care givers will demonstrate understanding of plan of care, disease process/condition, diagnostic tests and medications  Outcome: Progressing     Problem: Safety  Goal: Will remain free from falls  Outcome: Progressing     Problem: Self Care  Goal: Patient will have the ability to perform ADLs independently or with assistance (bathe, groom, dress, toilet and feed)  Outcome: Progressing     Problem: Fall Risk - Rehab  Goal: Patient will remain free from falls  Outcome: Progressing

## 2025-03-19 NOTE — THERAPY
Occupational Therapy  Daily Treatment     Patient Name:  Marlo León  Age:  69 y.o., Sex:  male  Medical Record #:  [id number]  Today's Date:  3/19/2025    Precautions  Precautions: (P) Fall Risk  Fall Risk: (P) Low vision  Weight Bearing: (P) NWB LLE  Braces: (P) Immobilizer LE (laterality in comments)  Comments: (P) L BKA w/ L immobilizer    Subjective: Pt and wife pleasant and agreeable to OT session.      Objective:    03/19/25 1331   OT Charge Group   OT Self Care / ADL (Units) 4   OT Total Time Spent   OT Individual Total Time Spent (Mins) 60   Precautions   Precautions Fall Risk   Fall Risk Low vision   Weight Bearing NWB LLE   Braces Immobilizer LE (laterality in comments)   Comments L BKA w/ L immobilizer   Interdisciplinary Communication   Mobility [place] Patient is modified independent in room using wheelchair   Interdisciplinary Plan of Care Collaboration   IDT Collaboration with  Physical Therapist;Occupational Therapist;Family / Caregiver   Patient Position at End of Therapy Seated   Collaboration Comments FT         Discharge Interventions  Purpose: to review final discharge recommendations and education  Interventions:   reviewed relevant DME and adaptive equipment recommendations  discussed home safety  reviewed relevant precautions to maximize safety during home and community mobility, ADLs, and IADLs  Recommended 3/1 commode due to pt bathroom set-up currently being remodeled; provided handout for purchase    Assessment:  Pt and wife receptive to all education and cueing to support safe d/c to home. No overt barriers or impairments presented during FT session.   Strengths: Able to follow instructions, Alert and oriented, Good carryover of learning, Independent prior level of function, Manages pain appropriately, Motivated for self care and independence, Pleasant and cooperative, Supportive family, Willingly participates in therapeutic activities  Barriers: Decreased endurance, Fatigue,  Generalized weakness, Impaired activity tolerance, Impaired balance, Limited mobility    Plan:  Anticipate d/c tomorrow    Occupational Therapy Goals (Active)       Problem: OT Long Term Goals       Dates: Start:  03/07/25         Goal: LTG-By discharge, patient will complete basic self care tasks @ sup-mod I level.        Dates: Start:  03/07/25    Expected End:  03/24/25            Goal: LTG-By discharge, patient will perform bathroom transfers @ sup-mod I level.        Dates: Start:  03/07/25    Expected End:  03/24/25

## 2025-03-19 NOTE — DISCHARGE SUMMARY
Physical Medicine & Rehabilitation Discharge Summary    Admission Date: 3/6/2025    Discharge Date: 3/21/2025    Attending Provider: Tracy Tong DO, MS    Admission Diagnosis:   Active Hospital Problems    Diagnosis     *Hx of BKA, left (HCC)     CKD (chronic kidney disease)     Anemia     CAD (coronary artery disease)     MSSA bacteremia     Hypothyroidism     Primary hypertension     Type 2 diabetes mellitus with hyperglycemia (HCC)        Discharge Diagnosis:  Active Hospital Problems    Diagnosis     *Hx of BKA, left (HCC)     CKD (chronic kidney disease)     Anemia     CAD (coronary artery disease)     MSSA bacteremia     Hypothyroidism     Primary hypertension     Type 2 diabetes mellitus with hyperglycemia (HCC)        HPI per Admission History & Physical:  Marlo León is an 69 year old male w/ PMH of significant for peripheral neuropathy, DARRIAN, type 2 DM, CKD stage 4, pulmonary hypertension, hypovitaminosis D, diabetic retinopathy, obesity class 2, essential hypertension, CAD, COPD, chronic diastolic heart failure, BPH, acquired hypothyroidism, celiac disease, status post gastric bypass, osteoarthritis, history of kidney stones, PVD, hyperlipidemia, s/p Left lower extremity angioplasty 10/2024 who presented to [facility] with left heel ulcer and necrotic L toe. He was found to have MSSA bacteremia and is s/p L BKA 3/2/25 Dr. Echavarria. ID consulted and recommended 4 weeks of antibiotics. His stay was complicated by hypertension. Discharged to [facility]. On admission patient reports feeling comfortable without pain. He has frequent bowel movements due to previous bypass and also has lost weight with Manjouro. Looking forward to getting stronger.     Patient was admitted to [facility] on 3/6/2025.     Hospital Course by Problem List:  L BKA 3/2 Dr. Echavarria at [facility]  MSSA Bacteremia  TTE without valvular lesions or endocarditis.   PT and OT for  mobility and ADLs. Per guidelines, 15 hours per week between PT, OT and/or SLP.  Follow-up Ortho  Follow-up ID  Follow-up Vascular Surgery (Dr. Calderon) - referral placed per hospitalist at [facility]  Complete IV Cefazolin 3/14 --> Linezolid through 3/28   Tunneled cath placement 3/4     Hypertension - Continue Lisinopril, HCTZ, Coreg. 3/7 Elevated but improved in 150's monitor. 3/8 BP improving without med changes, monitor. 3/11 Continue monitor BP - patient reports feeling symptoms with low BP in early AM or late PM. Coreg was 12.5 BID hospitalist note indicates should be 6.25mg BID, will change today. 3/12 BP better controlled with increase in Hydralazine. 3/21 VSS     3/19 Hydralazine increased to 100mg q8hrs  3/21 BP better.      Hyperkalemia - 6.3 3/19. Lokelma with repeats 3/20 and 3/21. Improving and WNL on day of discharge 5.1. Will give 2 more days of Lokelma (3 including today) and has follow up with PCP on Monday for lab recheck. Is on Lisinopril. Has been chronically. D/w hospitalist on discharge will continue for now. Has PCP and Nephrology follow up. Has lab order for  to draw repeat BMP.      ABLA - 3/7 9 Monitor. 3/8 Better 9.8. 3/20 Stable     Leukocytosis - Mild monitor 3/7. 3/8 Mild increase 10.9--> 11.1. UA negative. Incision c/d/I. Afebrile and not on scheduled tylenol nor taking PRN. 3/10, 3/13, 3/17, 3/20 Resolved.      Thrombocytosis - WNL resolved.      Chronic Combined HR, LVH and Pulm HTN - EF 50-55% (Had been 65-70%). Follows with Cards Dr. Rodriguez     H/o CAD - On ASA and Statin      Hypothyroidism - Continue Synthroid      DARRIAN - RT consult     H/o Gastric Bypass - Has frequent BM due to this.      CKD Stage IV - Avoid Nephrotoxins. GFR 29 baseline. Managed by Nephro Dr. Michaels. 3/20 Stable, monitor trend     Elevated TSH - Free T4 WNL.     Morbid Obesity - Nutrition consult      Type II Diabetes Mellitus with Hyperglycemia - Currently on SSI and Lantus. Hgb A1c 6.6     Pain - Declines  pain. Has Tylenol and Tramadol PRN.     3/14 More neuropathic pain, reduce Tramadol due to drug hangover. Cannot take Kaitlin has side effects. Pain varies, patient would like to stick mostly with tylenol for now. Schedule Tylenol at night.     I discussed the risks and benefits of using opiate medications for pain control.  I discussed the risk of addiction, potential for overdose, and respiratory depression (and the potential need for opiate antagonist therapy if this occurs).  I encouraged the patient to take this medication sparingly with the expressed goal of weaning off the medication as soon as is clinically appropriate.  I informed the patient that we are only able to provide a 7 day supply of these medications at discharge and that they will be responsible for requesting any refills needed from their primary care provider or their surgeon.  We discussed the need to safely secure these medications to prevent theft, inadvertent ingestion, or misuse.  Any unused medication should be immediately disposed of through a sanctioned medication disposal program.  We discussed adjunctive pain medications and conservative therapies at length.      I answered the patient's questions regarding this treatment, and the patient indicated understanding and willingness to proceed.       Bowel - Patient on Senna-docusate for constipation prophylaxis.      Bladder - TV/PVR/BS PRN           DVT PROPHYLAXIS: Heparin --> D/c ASA 325mg BID     HOSPITALIST FOLLOWING: Yes - d/w hospitalist 3/20     CODE STATUS: FULL - c/f with patient on admission      DISPO: Home with spouse      GIANCARLO: 3/21      MEDS SENT TO: M2B+     DISCHARGE SPECIALIST FOLLOW UP:   Ortho - Appnt scheduled 3/25  ID - New referral placed  Vasc Surg - established  Cardiology - established  Nephrology - established     Patient to scheduled follow up with their PCP within 2 weeks from discharge from the [facility].   ___________________    Functional  Status at Discharge    Bed Mobility Roll Left and Right: Independent  Sit to Lying: Independent  Lying to Sitting: Independent   Transfers Sit to Stand: Stand By Assist  Chair/Bed to Chair: Supervised  Toilet: Contact Guard Assist  Car: Stand By Assist   Mobility Ambulation: Contact Guard Assist  Device: FWW  Ambulation Distance: 8 feet  Wheelchair: Independent  Type: Manual  Wheelchair Distance: in halls   Stairs/Curbs Stairs: Unable to Participate  Stairs Amount:    Curbs: Unable to Participate     Oral Hygiene Set Up   Grooming Set Up   Shower/Bathing Modified Independent   UB Dressing Set Up   LB Dressing Supervised  Supervised   Toileting Transfer: Contact Guard Assist  Hygiene: Contact Guard Assist   Shower & Transfer Modified Independent       Tracy RUBIO D.O., personally performed a complete drug regimen review and no potential clinically significant medication issues were identified.       Discharge Medication:     Medication List        START taking these medications        Instructions   aspirin 325 MG Tabs  Commonly known as: Asa  Replaces: ASPIRIN 81 PO   Take 1 Tablet by mouth 2 times a day. Until Ortho follow up then continue with 81mg daily.  Indications: Clot prevention  Dose: 325 mg     hydrALAZINE 100 MG tablet  Commonly known as: Apresoline   Take 1 Tablet by mouth every 8 hours.  Dose: 100 mg     linezolid 600 MG Tabs  Commonly known as: Zyvox   Take 1 Tablet by mouth every 12 hours for 9 days. Indications: Bacteria in the Blood  Dose: 600 mg     sodium bicarbonate 650 MG Tabs  Commonly known as: Sodium Bicarbonate  Replaces: Sodium Bicarbonate Powd   Take 1 Tablet by mouth 3 times a day with meals.  Dose: 650 mg     sodium zirconium cyclosilicate 10 g packet  Commonly known as: Lokelma   Take 10 g by mouth every day for 2 days.  Dose: 10 g     traMADol 50 MG Tabs  Commonly known as: Ultram   Take 0.5-1 Tablets by mouth every 6 hours as needed for Severe Pain for up to 7 days.  Indications: Acute Pain  Dose: 25-50 mg            CHANGE how you take these medications        Instructions   atorvastatin 40 MG Tabs  What changed: Another medication with the same name was removed. Continue taking this medication, and follow the directions you see here.  Commonly known as: Lipitor   Take 40 mg by mouth every evening.  Dose: 40 mg     carvedilol 6.25 MG Tabs  What changed: Another medication with the same name was removed. Continue taking this medication, and follow the directions you see here.  Commonly known as: Coreg   Take 1 Tablet by mouth 2 times a day with meals.  Dose: 6.25 mg     Eylea 2 MG/0.05ML Soln  What changed: Another medication with the same name was removed. Continue taking this medication, and follow the directions you see here.  Generic drug: Aflibercept   by Intravitreal route as needed. Both eyes     Farxiga 10 MG Tabs  What changed: Another medication with the same name was removed. Continue taking this medication, and follow the directions you see here.  Generic drug: dapagliflozin propanediol   TAKE 1 TABLET DAILY for 90     HumaLOG 100 UNIT/ML  What changed: Another medication with the same name was removed. Continue taking this medication, and follow the directions you see here.  Generic drug: insulin lispro   Inject 2-30 Units under the skin 3 times a day before meals. Sliding scale  Dose: 2-30 Units     hydroCHLOROthiazide 25 MG Tabs  What changed:   when to take this  Another medication with the same name was removed. Continue taking this medication, and follow the directions you see here.   Take 1 Tablet by mouth every day.  Dose: 25 mg     lisinopril 40 MG tablet  What changed: Another medication with the same name was removed. Continue taking this medication, and follow the directions you see here.  Commonly known as: Prinivil   Take 1 Tablet by mouth every evening.  Dose: 40 mg     Synthroid 175 MCG Tabs  What changed: Another medication with the same name was  removed. Continue taking this medication, and follow the directions you see here.  Generic drug: levothyroxine   1 tablet every morning on an empty stomach Orally Once a day for 90 days     TRAVATAN OP  What changed: Another medication with the same name was removed. Continue taking this medication, and follow the directions you see here.   Travatan     Tresiba FlexTouch 200 UNIT/ML Sopn  What changed: See the new instructions.  Generic drug: Insulin Degludec   Inject 28-35 Units under the skin at bedtime. inject 28-35 Subcutaneous at hour of sleep for 90 days  Indications: Type 2 Diabetes  Dose: 28-35 Units     TYLENOL PO  What changed: Another medication with the same name was removed. Continue taking this medication, and follow the directions you see here.   Tylenol            CONTINUE taking these medications        Instructions   GAVISCON-2 PO   Take 2 Tablets by mouth every 1 hour as needed.  Dose: 2 Tablet     Mounjaro 7.5 MG/0.5ML Sopn  Generic drug: Tirzepatide      MULTIVITAMIN PO   Multivitamin            STOP taking these medications      albuterol 108 (90 Base) MCG/ACT Aers inhalation aerosol     ALBUTEROL SULFATE HFA INH     aspirin 81 MG EC tablet     ASPIRIN 81 PO  Replaced by: aspirin 325 MG Tabs     chlorthalidone 25 MG Tabs  Commonly known as: Hygroton     CHLORTHALIDONE PO     cyclobenzaprine 10 mg Tabs  Commonly known as: Flexeril     dexamethasone 0.5 MG Tabs  Commonly known as: Decadron     Fe Tabs 325 (65 Fe) MG EC tablet  Generic drug: ferrous sulfate     furosemide 40 MG Tabs  Commonly known as: Lasix     gabapentin 300 MG Caps  Commonly known as: Neurontin     High Potency Iron 65 MG Tabs     HYDROCODONE BITARTRATE PO     HYDROcodone-acetaminophen 5-325 MG Tabs per tablet  Commonly known as: Norco     insulin glargine 100 UNIT/ML Soln  Commonly known as: Lantus     IRON PO     Lantus 100 UNIT/ML Soln  Generic drug: insulin glargine     MULTIVITAMINS PO     NIFEdipine 60 MG CR  tablet  Commonly known as: Adalat CC     NIFEDIPINE PO     ondansetron 4 MG Tbdp  Commonly known as: Zofran ODT     ONDANSETRON HCL PO     PROSTATE SUPPORT PO     sodium bicarbonate Powd     Sodium Bicarbonate Powd  Replaced by: sodium bicarbonate 650 MG Tabs     sulfamethoxazole-trimethoprim 800-160 MG tablet  Commonly known as: Bactrim DS     TAMSULOSIN HCL PO     Trulicity 1.5 MG/0.5ML Sopn  Generic drug: Dulaglutide     Trulicity 4.5 MG/0.5ML Sopn  Generic drug: Dulaglutide     VITAMIN D-3 PO     vitamin D3 125 MCG (5000 UT) Caps     vitamin E 100 UNIT capsule              Discharge Diet:  Current Diet Order   Procedures    Diet Order Diet: Consistent CHO (Diabetic) (HIGH PROTEIN AND VEGETABLES. DAIRY OK. LIMIT CARBOHYDRATES); Nutrient modifications: (optional): High Protein       Discharge Activity:  Per PT/OT    Disposition:  Patient to discharge home with family support and community resources.    Equipment:  Per PT/OT    Follow-up & Discharge Instructions:  Follow up with your primary care provider (PCP) within 7-10 days of discharge to review your medications and take over your care.     If you develop chest pain, fever, chills, change in neurologic function (weakness, sensation changes, vision changes), or other concerning sxs, seek immediate medical attention or call 911.      Future Appointments   Date Time Provider Department Center   3/19/2025  1:30 PM Shanita Strickland, OT [facility] None   3/19/2025  2:30 PM Jett Kohli, PT [facility] None   3/25/2025  1:15 PM Arlen Simental P.A.-C. [facility]       Condition on Discharge:  Good    More than 35 minutes was spent on discharging this patient, including face-to-face time, prescription management, and the dictation of this note.    Dr. Tracy Tong DO, MS  Department of Physical Medicine & Rehabilitation    Date of Service: 3/21/2025

## 2025-03-19 NOTE — THERAPY
Physical Therapy   Discharge Summary     Patient Name:  Marlo León  Age:  69 y.o., Sex:  male  Medical Record #:  [id number]  Today's Date: 3/19/2025     Precautions  Precautions: Fall Risk  Fall Risk: Low vision  Weight Bearing: NWB LLE  Braces: Immobilizer LE (laterality in comments)  Comments: L BKA w/ L immobilizer    Subjective: Spouse is present for training     Objective:    03/19/25 1431   PT Charge Group   PT Therapeutic Activities (Units) 4   PT Total Time Spent   PT Individual Total Time Spent (Mins) 60   Roll Left and Right   Assistance Needed Independent   CARE Score - Roll Left and Right 6   Roll Left and Right   Level of Assist Independent   Sit to Lying   Assistance Needed Independent   CARE Score - Sit to Lying 6   Sit to Lying   Level of Assist Independent   Lying to Sitting on Side of Bed   Assistance Needed Independent   CARE Score - Lying to Sitting on Side of Bed 6   Lying to Sitting on Side of Bed   Level of Assist Independent   Sit to Stand   Assistance Needed Independent   CARE Score - Sit to Stand 6   Sit to Stand   Level of Assist Independent   Assistive Devices FWW   Chair/Bed-to-Chair Transfer   Assistance Needed Independent   CARE Score - Chair/Bed-to-Chair Transfer 6   Chair/Bed-to-Chair Transfer   Level of Assist Independent   Transfer Type Squat Pivot Transfer   Car Transfer   Assistance Needed Set-up / clean-up   CARE Score - Car Transfer 5   Car Transfer   Level of Assist Stand By Assist   Walk 10 Feet   Assistance Needed Incidental touching   CARE Score - Walk 10 Feet 4   Walk 50 Feet with Two Turns   Reason if not Attempted Safety concerns   CARE Score - Walk 50 Feet with Two Turns 88   Walk 150 Feet   Reason if not Attempted Safety concerns   CARE Score - Walk 150 Feet 88   Walking 10 Feet on Uneven Surfaces   Reason if not Attempted Safety concerns   CARE Score - Walking 10 Feet on Uneven Surfaces 88   Ambulation   Level of Assist Contact Guard Assist   Assistive Device FWW    Additional Description Cueing needed;Extra time needed   Distance 10 feet   1 Step (Curb)   Reason if not Attempted Safety concerns   CARE Score - 1 Step (Curb) 88   Curbs   Level of Assist Unable to Participate   4 Steps   Reason if not Attempted Safety concerns   CARE Score - 4 Steps 88   12 Steps   Reason if not Attempted Safety concerns   CARE Score - 12 Steps 88   Stairs   Level of Assist Unable to Participate   Picking Up Object   Reason if not Attempted Safety concerns   CARE Score - Picking Up Object 88   Wheel 50 Feet with Two Turns   Assistance Needed Independent   CARE Score - Wheel 50 Feet with Two Turns 6   Wheel 150 Feet   Assistance Needed Independent   CARE Score - Wheel 150 Feet 6   Wheelchair   Level of Assist Independent   Type Manual   Wheelchair Distance >500 feet   Family Training   Training Completed   Date 03/19/25   Time 1430   Family Member(s) Present Spouse/Significant other   Minutes 60 minutes   Training Items Completed Car Transfers;Bed mobility;DME recommendations;Brace donning/doffing;Home safety;HEP;Diagnosis specific education (see comments)   PT Discharge Summary   Discharge Location Home   Patient Discharging with Assist of Spouse / Significant Other   Level of Supervision Required Upon Discharge Intermittent Supervision   Recommended Equipment for Discharge Manual Wheelchair;Front-Wheeled Walker   Recommeded Services Upon Discharge Home Health Physical Therapy   Long Term Goals Met 4   Long Term Goals Not Met 1   Reason(s) for Goals Not Met unable to safely anbulate > 15 feet   Criteria for Termination of Services Maximum Function Achieved for Inpatient Rehabilitation       Therapeutic Activities  Purpose: to improve performance and function of daily activities, to provide patient and family education, and to increase safety with activities of daily life and mobility related to activities of daily life  Interventions:   Bed/chair transfer training  Toilet transfer  Bed mobility  "training (scooting, supine to sit, sit to supine, rolling)  Sit to stand training  Car transfer  Self-care  Donning/doffing brace or orthotic  Patient or family education  Spouse is present for and performs all necessary family training showing understanding and compliance.   This therapist adjusted his new personal wheelchair to a lower level to allow for LE propulsion     Assessment: Patient has done an excellent job with his recovery in acute rehab and is ready for DC home with home modifications and wife support. Limited ambulation distances due to weakness in right quad     Strengths: Effective communication skills, Pleasant and cooperative, Supportive family, Willingly participates in therapeutic activities, Motivated for self care and independence  Barriers: Impaired activity tolerance, Impaired balance, Constipation, Limited mobility    Plan:   DC home     DME    PT DME Recommendations  Wheelchair: 20\" Width, Removable/Flip Back Armrests (pt has a wc which he purchased on [employer] however anticipates he will need a new one for DC with left residual limb board and flip back armrest)  Cushion: Standard  Vendor Equipment: IPOP      Passport items to be completed:  Get in/out of bed safely, in/out of a vehicle, safely use mobility device, walk or wheel around home/community, navigate up and down stairs, show how to get up/down from the ground, ensure home is accessible, demonstrate HEP, complete caregiver training    PT Care Plan (Active)       There are no active problems.            "

## 2025-03-19 NOTE — CARE PLAN
Problem: PT-Long Term Goals  Goal: LTG-By discharge, patient will propel wheelchair on outside surfaces and inside surfaces with Mod I, distance >500'.  Outcome: Met  Goal: LTG-By discharge, patient will transfer one surface to another SPV.  Outcome: Met     Problem: PT-Long Term Goals  Goal: LTG-By discharge, patient will ambulate at least 25' with FWW and SBA.  Outcome: Discharged - Not Met

## 2025-03-20 LAB — POTASSIUM SERPL-SCNC: 5.9 MMOL/L (ref 3.6–5.5)

## 2025-03-20 PROCEDURE — A9270 NON-COVERED ITEM OR SERVICE: HCPCS | Performed by: PHYSICAL MEDICINE & REHABILITATION

## 2025-03-20 PROCEDURE — 700102 HCHG RX REV CODE 250 W/ 637 OVERRIDE(OP): Performed by: HOSPITALIST

## 2025-03-20 PROCEDURE — 99232 SBSQ HOSP IP/OBS MODERATE 35: CPT | Performed by: HOSPITALIST

## 2025-03-20 PROCEDURE — A9270 NON-COVERED ITEM OR SERVICE: HCPCS | Performed by: HOSPITALIST

## 2025-03-20 PROCEDURE — 84132 ASSAY OF SERUM POTASSIUM: CPT

## 2025-03-20 PROCEDURE — 700102 HCHG RX REV CODE 250 W/ 637 OVERRIDE(OP): Performed by: PHYSICAL MEDICINE & REHABILITATION

## 2025-03-20 PROCEDURE — 770010 HCHG ROOM/CARE - REHAB SEMI PRIVAT*

## 2025-03-20 RX ADMIN — HYDRALAZINE HYDROCHLORIDE 100 MG: 50 TABLET ORAL at 21:05

## 2025-03-20 RX ADMIN — HYDRALAZINE HYDROCHLORIDE 25 MG: 25 TABLET ORAL at 17:01

## 2025-03-20 RX ADMIN — LISINOPRIL 40 MG: 20 TABLET ORAL at 21:05

## 2025-03-20 RX ADMIN — ASPIRIN 325 MG: 325 TABLET ORAL at 08:36

## 2025-03-20 RX ADMIN — CARVEDILOL 6.25 MG: 3.12 TABLET, FILM COATED ORAL at 18:18

## 2025-03-20 RX ADMIN — ATORVASTATIN CALCIUM 40 MG: 40 TABLET, FILM COATED ORAL at 21:06

## 2025-03-20 RX ADMIN — HYDRALAZINE HYDROCHLORIDE 100 MG: 50 TABLET ORAL at 14:54

## 2025-03-20 RX ADMIN — SODIUM BICARBONATE 650 MG: 650 TABLET ORAL at 08:36

## 2025-03-20 RX ADMIN — SODIUM BICARBONATE 650 MG: 650 TABLET ORAL at 18:18

## 2025-03-20 RX ADMIN — HYDRALAZINE HYDROCHLORIDE 100 MG: 50 TABLET ORAL at 05:08

## 2025-03-20 RX ADMIN — SODIUM BICARBONATE 650 MG: 650 TABLET ORAL at 12:56

## 2025-03-20 RX ADMIN — LEVOTHYROXINE SODIUM 175 MCG: 0.05 TABLET ORAL at 05:07

## 2025-03-20 RX ADMIN — HYDROCHLOROTHIAZIDE 25 MG: 25 TABLET ORAL at 05:08

## 2025-03-20 RX ADMIN — ASPIRIN 325 MG: 325 TABLET ORAL at 21:05

## 2025-03-20 RX ADMIN — OMEPRAZOLE 20 MG: 20 CAPSULE, DELAYED RELEASE ORAL at 08:36

## 2025-03-20 RX ADMIN — CARVEDILOL 6.25 MG: 3.12 TABLET, FILM COATED ORAL at 08:36

## 2025-03-20 RX ADMIN — SODIUM ZIRCONIUM CYCLOSILICATE 10 G: 10 POWDER, FOR SUSPENSION ORAL at 09:55

## 2025-03-20 RX ADMIN — LINEZOLID 600 MG: 600 TABLET, FILM COATED ORAL at 08:37

## 2025-03-20 RX ADMIN — LINEZOLID 600 MG: 600 TABLET, FILM COATED ORAL at 21:05

## 2025-03-20 RX ADMIN — ACETAMINOPHEN 1000 MG: 500 TABLET ORAL at 21:05

## 2025-03-20 RX ADMIN — SODIUM ZIRCONIUM CYCLOSILICATE 10 G: 10 POWDER, FOR SUSPENSION ORAL at 17:01

## 2025-03-20 RX ADMIN — SODIUM ZIRCONIUM CYCLOSILICATE 10 G: 10 POWDER, FOR SUSPENSION ORAL at 21:05

## 2025-03-20 ASSESSMENT — PATIENT HEALTH QUESTIONNAIRE - PHQ9
2. FEELING DOWN, DEPRESSED, IRRITABLE, OR HOPELESS: NOT AT ALL
8. MOVING OR SPEAKING SO SLOWLY THAT OTHER PEOPLE COULD HAVE NOTICED. OR THE OPPOSITE, BEING SO FIGETY OR RESTLESS THAT YOU HAVE BEEN MOVING AROUND A LOT MORE THAN USUAL: NOT AT ALL
4. FEELING TIRED OR HAVING LITTLE ENERGY: NOT AT ALL
SUM OF ALL RESPONSES TO PHQ9 QUESTIONS 1 AND 2: 0
6. FEELING BAD ABOUT YOURSELF - OR THAT YOU ARE A FAILURE OR HAVE LET YOURSELF OR YOUR FAMILY DOWN: NOT AL ALL
5. POOR APPETITE OR OVEREATING: NOT AT ALL
1. LITTLE INTEREST OR PLEASURE IN DOING THINGS: NOT AT ALL
7. TROUBLE CONCENTRATING ON THINGS, SUCH AS READING THE NEWSPAPER OR WATCHING TELEVISION: NOT AT ALL
3. TROUBLE FALLING OR STAYING ASLEEP OR SLEEPING TOO MUCH: NOT AT ALL
SUM OF ALL RESPONSES TO PHQ QUESTIONS 1-9: 0
9. THOUGHTS THAT YOU WOULD BE BETTER OFF DEAD, OR OF HURTING YOURSELF: NOT AT ALL

## 2025-03-20 ASSESSMENT — ENCOUNTER SYMPTOMS
DIARRHEA: 0
FEVER: 0
DIZZINESS: 0
BLURRED VISION: 0
COUGH: 0
NERVOUS/ANXIOUS: 0

## 2025-03-20 ASSESSMENT — PAIN DESCRIPTION - PAIN TYPE
TYPE: ACUTE PAIN
TYPE: ACUTE PAIN

## 2025-03-20 NOTE — DISCHARGE PLANNING
Case Management Discharge Instructions        Discharge Location: Home with Home Health   Agency Name / Address / Phone: [facility]   Home Health: Registered Nurse, Home Health Aide, Occupational Therapist, Physical Therapist           NOTE: This information can be found in your final discharge packet that your nurse will give you.         Follow-up Information:     Surya Echavarria M.D.  [address]  [facility] [address]  [phone]     Follow up on 3/25/2025  1:15pm. With SURYA Simental.     Per  office in [place] is booking out to end of April and May.     Meredith Gonzáles M.D.  [address]  [facility] [id number] [phone]     Follow up on 3/24/2025  at 1pm     Located within [facility]  [address]  [facility] [address]  [phone]  Follow up  Wheelchair     [facility]  [address]  [facility] [facility] [address]  [phone]  Follow up  They will call you to schedule.

## 2025-03-20 NOTE — CARE PLAN
"The patient is Watcher - Medium risk of patient condition declining or worsening    Shift Goals  Clinical Goals: safety  Patient Goals: safety, sleep, pain management,  Family Goals: no family present    Problem: Fall Risk - Rehab  Goal: Patient will remain free from falls  Outcome: Progressing  Note: Nikole Swanson Fall risk Assessment Score: 15    High fall risk Interventions   - Alarming seatbelt  - Bed and strip alarm   - Yellow sign by the door   - Yellow wrist band \"Fall risk\"  - Room near to the nurse station  - Do not leave patient unattended in the bathroom  - Fall risk education provided   Pt using call light appropriately when in need of assistance.          Problem: Pain - Standard  Goal: Alleviation of pain or a reduction in pain to the patient’s comfort goal  Outcome: Progressing     Problem: Skin Integrity  Goal: Risk for impaired skin integrity will decrease  Outcome: Progressing     Problem: Infection  Goal: Will remain free from infection  Outcome: Progressing     Problem: Pain Management  Goal: Pain level will decrease to patient's comfort goal  Outcome: Progressing     "

## 2025-03-20 NOTE — PROGRESS NOTES
[facility] Medicine Daily Progress Note    Date of Service  3/20/2025    Chief Complaint:  Hypertension  Diabetes  CKD  Anemia    Interval History:  K+ is high this am at 6.3.  Will give Lokelma and monitor.    Review of Systems  Review of Systems   Constitutional:  Negative for fever.   Eyes:  Negative for blurred vision.   Respiratory:  Negative for cough.    Cardiovascular:  Negative for chest pain.   Gastrointestinal:  Negative for diarrhea.   Musculoskeletal:  Negative for joint pain.   Neurological:  Negative for dizziness.   Psychiatric/Behavioral:  The patient is not nervous/anxious.         Physical Exam  Temp:  [36.3 °C (97.4 °F)-37.1 °C (98.7 °F)] 36.6 °C (97.9 °F)  Pulse:  [68-81] 70  Resp:  [18] 18  BP: (115-163)/(51-64) 134/51  SpO2:  [96 %-98 %] 96 %    Physical Exam  Constitutional:       Appearance: He is not diaphoretic.   HENT:      Mouth/Throat:      Pharynx: No oropharyngeal exudate or posterior oropharyngeal erythema.   Eyes:      Extraocular Movements: Extraocular movements intact.   Neck:      Vascular: No carotid bruit.   Cardiovascular:      Rate and Rhythm: Normal rate and regular rhythm.      Heart sounds: No murmur heard.  Pulmonary:      Breath sounds: Normal breath sounds. No stridor.   Abdominal:      General: Bowel sounds are normal.      Palpations: Abdomen is soft.   Musculoskeletal:      Right lower leg: No edema.      Left lower leg: No edema.      Comments: Has left BKA   Skin:     Findings: No rash.   Neurological:      Mental Status: He is oriented to person, place, and time.   Psychiatric:         Mood and Affect: Mood normal.         Behavior: Behavior normal.         Fluids    Intake/Output Summary (Last 24 hours) at 3/20/2025 0922  Last data filed at 3/20/2025 0821  Gross per 24 hour   Intake 1020 ml   Output 700 ml   Net 320 ml        Laboratory  Recent Labs     03/19/25  1117   WBC 5.6   RBC 3.14*   HEMOGLOBIN 9.1*   HEMATOCRIT 30.6*   MCV 97.5   MCH 29.0   MCHC 29.7*    RDW 60.9*   PLATELETCT 230   MPV 11.4     Recent Labs     25  1117   SODIUM 137   POTASSIUM 6.3*   CHLORIDE 110   CO2 19*   GLUCOSE 110*   BUN 55*   CREATININE 2.90*   CALCIUM 8.5                   Imaging       Assessment/Plan  * Hx of BKA, left (HCC)- (present on admission)  Assessment & Plan  Had angioplasty in 10/2024  Developed left heel ulcer and left toe gangrene  Now s/p left BKA    MSSA bacteremia  Assessment & Plan  Completed Ancef 3/14/25  Now on Zyvox through 3/28/24    CAD (coronary artery disease)  Assessment & Plan  Hx MI  Hx CABG  Echo (): EF 55%  On Coreg & Lisinopril  On HCTZ  On ASA and Lipitor  Not on any K+ supplements currently  K+ 5.1 (3/17) --> 6.3 (3/20) --> Lokelma 10 mg --> 5.9  Will give Lokelma x 2 more doses and check am labs    Borderline abnormal TFTs  Assessment & Plan  TSH: 5.61  FT4 1.45  Likely subclinical  Cont Synthroid  Note: needs repeat TFT's as out patient    Anemia  Assessment & Plan  Hb: 8.2 --> 9.1  Fe: 72, sats 31%  B12: 1108  Monitor    CKD (chronic kidney disease)  Assessment & Plan  Cr a little more elevated than baseline (baseline wanders)  CO2: 20 (3/17) --> 19 (3/20)  Cont bicarb tabs  On HCTZ  Cont to monitor    Type 2 diabetes mellitus with hyperglycemia (HCC)- (present on admission)  Assessment & Plan  Hba1c: 6.6 (3/7)  BS: 150 --> 112 --> 183 (am) --> 130 --> 132 --> 98 (am) --> 118 --> 110  On Glargine: 37 units qhs  Has a CGM  Note: off SS coverage at night  Note: home meds include Glargine 28-35 units qhs, Humalog 10 units tid at meals, Farxiga, and Mounjaro  Cont to monitor    Primary hypertension- (present on admission)  Assessment & Plan  BP trending better recently   Cont Core.25 mg bid  Cont Lisinopril: 40 mg daily  Cont Hydralazine: 75 mg tid (3/15) --> 100 mg tid (3/19)  Note: has allergy to Norvasc  Cont to monitor

## 2025-03-20 NOTE — THERAPY
"Occupational Therapy  DISCHARGE SUMMARY     Patient Name:  Marlo León  Age:  69 y.o., Sex:  male  Medical Record #:  [id number]  Today's Date:  3/19/2025    Precautions  Precautions: Fall Risk  Fall Risk: Low vision  Weight Bearing: NWB LLE  Braces: Immobilizer LE (laterality in comments)  Comments: L BKA w/ L immobilizer    Subjective: Pt declines taking a shower today.    Objective:    03/19/25 0901   OT Charge Group   OT Therapy Activity (Units) 3   OT Therapeutic Exercise (Units) 1   OT Total Time Spent   OT Individual Total Time Spent (Mins) 60   Cognitive Pattern Assessment   Cognitive Pattern Assessment Used BIMS   Brief Interview for Mental Status (BIMS)   Repetition of Three Words (First Attempt) 3   Temporal Orientation: Year Correct   Temporal Orientation: Month Accurate within 5 days   Temporal Orientation: Day Correct   Recall: \"Sock\" Yes, no cue required   Recall: \"Blue\" Yes, no cue required   Recall: \"Bed\" No, could not recall   BIMS Summary Score 13   Confusion Assessment Method (CAM)   Is there evidence of an acute change in mental status from the patient's baseline? No   Inattention Behavior not present   Disorganized thinking Behavior not present   Altered level of consciousness Behavior not present   OT Discharge Summary    Discharge Location  Home   Patient Discharging with Assist of Spouse / Significant Other   Level of Supervision Required No Supervision   Recommended Equipment for Discharge Front-Wheeled Walker;Manual Wheelchair;Shower Chair;Grab Bars by Toilet;Grab Bars in Tub / Shower;Hand Held Shower Head;Sock Aid;Reacher;Dressing Stick   Recommended Services Upon Discharge Home Health Occupational Therapy   Long Term Goals Met 2   Long Term Goals Not Met 0   Criteria for Termination of Services Maximum Function Achieved for Inpatient Rehabilitation   Discharge Instructions to Patient   Level of Assist Required for Eating Able to Complete Eating without Assist   Level of Assist Required " for Grooming Able to Complete Grooming without Assist   Level of Assist Required for Dressing Able to Complete Dressing without Assist   Equipment for Dressing Reacher;Sock Aid;Dressing Stick   Level of Assist Required for Toileting Able to Complete Toileting without Assist   Level of Assist Required for Toilet Transfer Able to Complete Toilet Transfer without Assist   Equipment for Toilet Transfer Grab Bars by Toilet   Level of Assist Required for Bathing Able to Complete Bathing without Assist   Equipment for Bathing Shower Chair;Grab Bars in Tub / Shower;Hand Held Shower Head   Level of Assist Required for Shower Transfer Able to Complete Shower Transfer without Assist   Equipment for Shower Transfer Grab Bars in Tub / Shower;Shower Chair   Level of Assist Required for Home Mgmt Requires Physical Assist with Home Management   Level of Assist Required for Meal Prep Requires Physical Assist with Meal Preparation   Driving May not Drive, Please Contact Physician for Further Information   Home Exercise Program None Issued         Therapeutic Activities  Purpose: to improve performance and function of daily activities and to increase safety with activities of daily life and mobility related to activities of daily life  Interventions:  Sit to stand training 2 sets of 10. Held onto // bars for first 10 reps, then for second set pt held onto // bars to stand and took hands off for 1 second   Balance: pt stood in // bars to do ring toss for 4 rounds. Used reacher to collect rings at WC level. Stood in // bars for bouts of 30 seconds-2 mins at a time while holding balloon on a tennis racket without dropping.    Therapeutic Exercise  Purpose: to improve strength  Interventions:   Upper body exercises:   Seated program -   Tricep press, 3 sets of 10 and Weight 45#    Assessment:    Pt is DCing at mod I level for most ADL and transfers. He will likely need assist with IADLs at home until he is more independent with functional  mobility at FWW level.  Strengths: Able to follow instructions, Alert and oriented, Good carryover of learning, Independent prior level of function, Manages pain appropriately, Motivated for self care and independence, Pleasant and cooperative, Supportive family, Willingly participates in therapeutic activities  Barriers: Decreased endurance, Fatigue, Generalized weakness, Impaired activity tolerance, Impaired balance, Limited mobility    Plan:  DC home tomorrow    DME       Passport items to be completed:  Complete    Occupational Therapy Goals (Active)       There are no active problems.

## 2025-03-20 NOTE — CARE PLAN
Problem: Discharge Barriers/Planning  Goal: Patient's continuum of care needs will be met  Outcome: Progressing     Problem: Wound/ / Incision Healing  Goal: Patient's wound/surgical incision will decrease in size and heals properly  Outcome: Progressing     Problem: Fall Risk - Rehab  Goal: Patient will remain free from falls  Outcome: Progressing     The patient is Stable - Low risk of patient condition declining or worsening    Shift Goals  Clinical Goals: safety ; manage wound  Patient Goals: pain control  Family Goals: no family present

## 2025-03-20 NOTE — PROGRESS NOTES
NURSING DAILY NOTE    Name: Marlo León   Date of Admission: 3/6/2025   Admitting Diagnosis: Hx of BKA, left (HCC)  Attending Physician: MICKIE JUAREZ D.O.  Allergies: Neosporin [neomycin-polymyxin b], Tape, Amlodipine, Amoxicillin, Bacitracin-polymyxin b, Cyclobenzaprine, Empagliflozin-metformin hcl, Gabapentin, Gadolinium, Metformin, Neomycin-polymyxin b gu, Nsaids, Pioglitazone, Prednisone, Sulfamethoxazole w-trimethoprim, and Tamsulosin    Safety  Patient Assist  sba during hs hours, mod-i daytime hours  Patient Precautions  Precautions: Fall Risk  Fall Risk: Low vision  Weight Bearing: NWB LLE  Braces: Immobilizer LE (laterality in comments)  Comments: L BKA w/ L immobilizer  Bed Transfer Status  Independent  Toilet Transfer Status   Contact Guard Assist  Assistive Devices  Rails, Wheelchair  Oxygen  None - Room Air  Diet/Therapeutic Dining  Current Diet Order   Procedures    Diet Order Diet: Consistent CHO (Diabetic) (HIGH PROTEIN AND VEGETABLES. DAIRY OK. LIMIT CARBOHYDRATES); Nutrient modifications: (optional): High Protein     Pill Administration  whole  Agitated Behavioral Scale     ABS Level of Severity       Fall Risk  Has the patient had a fall this admission?      Nikole Swanson Fall Risk Scoring  11, MODERATE RISK  Fall Risk Safety Measures  bed alarm and chair alarm    Vitals  Temperature: 36.3 °C (97.4 °F)  Temp src: Oral  Pulse: 81  Respiration: 18  Blood Pressure : 115/60  Blood Pressure MAP (Calculated): 78 MM HG  BP Location: Right, Upper Arm  Patient BP Position: Martino's Position     Oxygen  Pulse Oximetry: 98 %  O2 (LPM): 0  O2 Delivery Device: None - Room Air    Bowel and Bladder  Last Bowel Movement  03/19/25  Stool Type  Patient stated  Bowel Device  Bathroom  Continent  Bladder: Continent void   Bowel: Continent movement  Bladder Function  Urine Void (mL): 500 ml  Number of Times Voided: 1  Urine Color: Yellow  Number of Times Incontinent of Urine: 0  Straight Catheter:  (Refused.  Pt said he will revoid again later)  Genitourinary Assessment   Bladder Assessment (WDL):  WDL Except  Jane Catheter: Not Applicable  Urine Color: Yellow  Number of Bladder Accidents: 0  Total Number of Bladder of Accidents in Last 7 Days: 0  Number of Times Incontinent of Urine: 0  Bladder Device: Urinal, Bathroom  Time Void: Yes  Bladder Scan: Post Void  $ Bladder Scan Results (mL): 245    Skin  Anselmo Score   18  Sensory Interventions   Bed Types: Low Airlo  Skin Preventative Measures: Pillows in Use for Support / Positioning  Moisture Interventions  Moisturizers/Barriers: Barrier Wipes, Barrier Paste      Pain  Pain Rating Scale  0 - No Pain  Pain Location  Generalized  Pain Location Orientation  Left, Right  Pain Interventions   Declines    ADLs    Bathing   Shower, Staff  Linen Change   Complete  Personal Hygiene  Perineal Care, Moist Joceline Wipes  Chlorhexidine Bath   Completed  Oral Care  Brushed Teeth  Teeth/Dentures     Shave     Nutrition Percentage Eaten  *  * Meal *  *, Lunch, *  * Amount Consumed *  *, Between % Consumed  Environmental Precautions  Treaded Slipper Socks on Patient, Personal Belongings, Wastebasket, Call Bell etc. in Easy Reach, Transferred to Stronger Side, Report Given to Other Health Care Providers Regarding Fall Risk, Bed in Low Position, Communication Sign for Patients & Families, Mobility Assessed & Appropriate Sign Placed  Patient Turns/Positioning  Patient turns self independently side to side without assistance, to offload sacral area  Patient Turns Assistance/Tolerance     Bed Positions  Bed Locked, Bed Controls On  Head of Bed Elevated  Self regulated      Psychosocial/Neurologic Assessment  Psychosocial Assessment  Psychosocial (WDL):  Within Defined Limits  Patient Behaviors: Fatigue, Depressed  Neurologic Assessment  Neuro (WDL): Within Defined Limits  Level of Consciousness: Alert  Orientation Level: Oriented X4  Cognition: Follows commands, Appropriate judgement,  Appropriate safety awareness  Speech: Clear  Pupil Assesment: No  EENT (WDL):  WDL Except    Cardio/Pulmonary Assessment  Edema      Respiratory Breath Sounds  RUL Breath Sounds: Clear  RML Breath Sounds: Clear  RLL Breath Sounds: Diminished  NOEMY Breath Sounds: Clear  LLL Breath Sounds: Diminished  Cardiac Assessment   Cardiac (WDL):  Within Defined Limits

## 2025-03-20 NOTE — DISCHARGE INSTRUCTIONS
[facility] NURSING DISCHARGE INSTRUCTIONS    Blood Pressure : (!) 141/61  Weight: 110 kg (241 lb 8 oz)  Nursing recommendations for Marlo León at time of discharge are as follows:  Client and Family Member verbalized understanding of all discharge instructions and prescriptions.     Review all your home medications and newly ordered medications with your doctor and/or pharmacist. Follow medication instructions as directed by your doctor and/or pharmacist.    Pain Management:   Discharge Pain Medication Instructions:  Comfort Goal: Comfort with Movement, Sleep Comfortably, 0  Notify your primary care provider if pain is unrelieved with these measures, if the pain is new, or increased in intensity.    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 No evidence of bleeding or discharge noted.)  Wound 03/06/25 Incision Pretibial Proximal Left L BKA (Active)   Wound Image     03/19/25 1200   Site Assessment CHADD 03/19/25 2040   Periwound Assessment Clean;Dry 03/19/25 1200   Margins Attached edges 03/19/25 1200   Closure Approximated;Sutures 03/19/25 1200   Drainage Amount None 03/19/25 2040   Drainage Description Serosanguineous 03/19/25 1200   Treatments Cleansed;Site care 03/19/25 1200   Wound Cleansing Approved Wound Cleanser 03/19/25 1200   Dressing Status Clean;Dry 03/19/25 2040   Dressing Changed Observed 03/19/25 2040   Dressing Options Ace Wrap;Dry Roll Gauze;Petrolatum Gauze (yellow);Telfa 03/19/25 2040   Dressing Change/Treatment Frequency Daily, and As Needed 03/19/25 1200   NEXT Dressing Change/Treatment Date 03/20/25 03/19/25 1200   NEXT Weekly Photo (Inpatient Only) 03/23/25 03/18/25 [id number]     Skin / Wound Care Instructions: Please contact your primary care physician for any change in skin integrity. Continue to monitor for s/s of increased swelling, redness, drainage, pain and fever.     If You Have Surgical Incisions /  Wounds:  Monitor surgical site(s) for signs of increased swelling, redness or symptoms of drainage from the site or fever as this could indicate signs and symptoms of infection. If these symptoms are noted, notifiy your primary care provider.      Discharge Safety Instructions: Should Not Be Left Alone In The House     Discharge Safety Concerns: Balance Problems (Dizziness, Light Headedness), Unsteady Gait, Weakness (Impaired activity tolerance, Impaired balance, Constipation, Limited mobility)  The interdisciplinary team has made recommendation that you should not be left alone  in the house due to balance problem, weakness, and unsteady gait  Anti-embolic stockings are not required to increase circulation to the lower extremities.    Discharge Diet: Consistent CHO (Diabetic) with high protein     Discharge Liquids: thin liquids  Discharge Bowel Function: Continent  Please contact your primary care physician for any changes in bowel habits.  Discharge Bowel Program:    Discharge Bladder Function: Continent  Discharge Urinary Devices: None      Nursing Discharge Plan:        Case Management Discharge Instructions:   Discharge Location: Home with Home Health  Agency Name/Address/Phone: [facility]  Home Health: Registered Nurse, Home Health Aide, Occupational Therapist, Physical Therapist  Outpatient Services:    DME Provider/Phone:    Medical Equipment Ordered: Wheelchair  Prescription Faxed to: [facility]      Discharge Medication Instructions:  Below are the medications your physician expects you to take upon discharge:    Leg Amputation              Leg amputation is a procedure to remove the leg. You may have this procedure if your leg has been affected by a disease, such as peripheral vascular disease or severe circulation disease. You may also need this procedure if you have tumors or a traumatic injury and the leg is causing problems. There are three types of leg amputation:  Above-the-knee amputation  (transfemoral amputation). This type is done to remove the leg from above the knee.  Below-the-knee amputation (transtibial amputation). This type is done to remove the leg from below the knee.  Through-knee amputation. This type is done to remove the leg at the knee joint.  Tell a health care provider about:  Any allergies you have.  All medicines you are taking, including vitamins, herbs, eye drops, creams, and over-the-counter medicines.  Any problems you or family members have had with anesthetic medicines.  Any bleeding problems you have.  Any surgeries you have had.  Any medical conditions you have.  Whether you are pregnant or may be pregnant.  What are the risks?  Generally, this is a safe procedure. However, problems may occur, including:  Infection.  Bleeding.  Allergic reactions to medicines.  Stiffening of the hip and knee joint (hip and knee contracture).  Blood clot in a deep vein (deep vein thrombosis, or DVT), usually in the leg.  A blood clot in your lung (pulmonary embolism).  What happens before the procedure?  Staying hydrated  Follow instructions from your health care provider about hydration, which may include:  Up to 2 hours before the procedure - you may continue to drink clear liquids, such as water, clear fruit juice, black coffee, and plain tea.  Eating and drinking restrictions  Follow instructions from your health care provider about eating and drinking, which may include:  8 hours before the procedure - stop eating heavy meals or foods, such as meat, fried foods, or fatty foods.  6 hours before the procedure - stop eating light meals or foods, such as toast or cereal.  6 hours before the procedure - stop drinking milk or drinks that contain milk.  2 hours before the procedure - stop drinking clear liquids.  Medicines  Ask your health care provider about:  Changing or stopping your regular medicines. This is especially important if you are taking diabetes medicines or blood  thinners.  Taking medicines such as aspirin and ibuprofen. These medicines can thin your blood. Do not take these medicines unless your health care provider tells you to take them.  Taking over-the-counter medicines, vitamins, herbs, and supplements.  General instructions  Ask your health care provider:  How your surgery site will be marked.  What steps will be taken to help prevent infection. These steps may include:  Removing hair at the surgery site.  Washing skin with a germ-killing soap.  Taking antibiotic medicine.  Plan to have a responsible adult take you home from the hospital or clinic.  Plan to have a responsible adult care for you for the time you are told after you leave the hospital or clinic. This is important.  What happens during the procedure?  An IV will be inserted into one of your veins.  You will be given one or more of the following:  A medicine to help you relax (sedative).  A medicine to numb the area (local anesthetic).  A medicine to make you fall asleep (general anesthetic).  A medicine that is injected into your spine to numb the area below and slightly above the injection site (spinal anesthetic).  A medicine that is injected into an area of your body to numb everything below the injection site (regional anesthetic).  Damaged or diseased tissue and bone will be removed.  Uneven areas of bone will be smoothed.  Blood vessels and nerves will be closed off.  Muscles will be cut and reshaped so that an artificial leg (prosthesis) can be attached to the stump.  The incision will be closed with stitches (sutures), staples, or glue.  A bandage (dressing) will be placed over the incision.  The procedure may vary among health care providers and hospitals.  What happens after the procedure?  Your blood pressure, heart rate, breathing rate, and blood oxygen level will be monitored until you leave the hospital or clinic.  You will be given medicine for pain. The medicine may include:  A  sedative.  A spinal anesthetic.  A nerve block. This is an injection of numbing medicine near the nerve.  Muscle relaxants. These may relieve pain caused by muscle spasms.  Opioids. These medicines relieve pain and are a type of narcotic pain medicine.  Non-opioid medicines. These may include acetaminophen and NSAIDs, such as ibuprofen.  You may start physical therapy within 24 hours after your surgery.  Summary  Leg amputation is a procedure to remove the leg from above, below, or through the knee.  Follow instructions from your health care provider about eating or drinking restrictions before the procedure.  Before the procedure, ask your health care provider how your surgery site will be marked.  This information is not intended to replace advice given to you by your health care provider. Make sure you discuss any questions you have with your health care provider.  Document Revised: 04/21/2022 Document Reviewed: 04/21/2022  [employer] Patient Education © 2023 [employer]    Leg Amputation, Care After  The following information offers guidance on how to care for yourself after your procedure. Your health care provider may also give you more specific instructions. If you have problems or questions, contact your health care provider.  What can I expect after the procedure?  After the procedure, it is common to have:  A little blood or fluid coming from your incision.  Pain from your incision.  Pain that feels like it is coming from the leg that has been removed (phantom pain). This can last for a year or longer.  Skin breakdown on your stump (residual limb).  Feelings of depression, anxiety, and fear.  Follow these instructions at home:  Medicines  Take over-the-counter and prescription medicines only as told by your health care provider.  If you were prescribed an antibiotic medicine, take it as told by your health care provider. Do not stop taking the antibiotic even if you start to feel better.  Ask your health  care provider if the medicine prescribed to you:  Requires you to avoid driving or using machinery.  Can cause constipation. You may need to take these actions to prevent or treat constipation:  Take over-the-counter or prescription medicines.  Eat foods that are high in fiber, such as beans, whole grains, and fresh fruits and vegetables.  Limit foods that are high in fat and processed sugars, such as fried or sweet foods.  Bathing  Do not take baths, swim, use a hot tub, or get your residual limb wet until your health care provider approves. You may only be allowed to take sponge baths.  Ask your health care provider when you may start taking showers. After taking a shower, make sure to rinse and dry your residual limb carefully.  Incision care    Check your residual limb, especially your incision area, every day. Check for:  Blisters.  Scrapes.  Signs of infection, such as:  More redness, swelling, or pain.  More fluid or blood.  Warmth.  Pus or a bad smell.  Follow instructions from your health care provider about how to take care of your incision. Make sure you:  Wash your hands with soap and water for at least 20 seconds before and after you change your bandage (dressing). If soap and water are not available, use hand .  Change your dressing as told by your health care provider.  Leave stitches (sutures), staples, skin glue, or adhesive strips in place. These skin closures may need to stay in place for 2 weeks or longer. If adhesive strip edges start to loosen and curl up, you may trim the loose edges. Do not remove adhesive strips completely unless your health care provider tells you to do that.  Activity  Return to your normal activities as told by your health care provider. Ask your health care provider what activities are safe for you.  Do physical therapy exercises as told by your health care provider.  If you have been fitted with an artificial leg (prosthesis) or have been given crutches or  another assistive device, use them as told by your health care provider.  Eating and drinking  Eat a healthy diet that includes whole grains, fruits and vegetables, low-fat dairy products, and lean proteins.  Drink enough fluid to keep your urine pale yellow.  General instructions  Do not use oils, lotion, cream, or rubbing alcohol on the remaining part of your leg.  Wear compression stockings as told by your health care provider. These stockings help to prevent blood clots and reduce swelling in your legs.  Work with an occupational therapist to learn new strategies for safe driving with an amputation.  If you have trouble coping with your amputation, contact your health care provider. Some feelings of depression, anxiety, or fear are normal after an amputation, but if you struggle with these feelings or if they get overwhelming, your provider may be able to recommend a therapist or support group to help you.  Do not use any products that contain nicotine or tobacco. These products include cigarettes, chewing tobacco, and vaping devices, such as e-cigarettes. These can delay bone and wound healing. If you need help quitting, ask your health care provider.  Keep all follow-up visits. This is important.  Contact a health care provider if:  You have a fever.  You have more tenderness in your residual limb.  You have a rash or itchy skin.  You have a cough or chills and you feel achy and weak.  You have trouble coping with your amputation.  You have blisters or scrapes on your residual limb.  You have any of these signs of infection:  More redness, swelling, or pain around your incision.  More fluid or blood coming from your incision.  The incision feels warm to the touch, tender, and painful.  Pus or a bad smell coming from your incision.  Get help right away if:  You have severe pain in your residual limb.  You feel light-headed and have shortness of breath.  You have blood-soaked bandages.  You have chest pain or  pain when taking a deep breath or coughing.  These symptoms may represent a serious problem that is an emergency. Do not wait to see if the symptoms will go away. Get medical help right away. Call your local emergency services (671 in the [place] Do not drive yourself to the hospital.  If you ever feel like you may hurt yourself or others, or have thoughts about taking your own life, get help right away. Go to your nearest emergency department or:  Call your local emergency services (450 in the [place]  Call a suicide crisis helpline, such as the National Suicide Prevention Lifeline at 1-491.311.6858 or 479 in the [place] This is open 24 hours a day in the [place]  Text the Crisis Text Line at 460940 (in the [place]  Summary  After a leg amputation, you may have pain that feels like it is coming from the leg that was removed (phantom pain). This can last for a year or longer.  Follow instructions from your health care provider about how to take care of your incision.  Check your residual limb, especially your incision area, every day. More redness, swelling, or pain may be a sign of infection.  Contact your health care provider if you have trouble coping with your amputation.  This information is not intended to replace advice given to you by your health care provider. Make sure you discuss any questions you have with your health care provider.  Document Revised: 07/13/2022 Document Reviewed: 04/21/2022  Elsevier Patient Education © 2023 [employer]    Diabetes Mellitus and Foot Care  Foot care is an important part of your health, especially when you have diabetes. Diabetes may cause you to have problems because of poor blood flow (circulation) to your feet and legs, which can cause your skin to:  Become thinner and drier.  Break more easily.  Heal more slowly.  Peel and crack.  You may also have nerve damage (neuropathy) in your legs and feet, causing decreased feeling in them. This means that you may not notice minor  injuries to your feet that could lead to more serious problems. Noticing and addressing any potential problems early is the best way to prevent future foot problems.  How to care for your feet  Foot hygiene    Wash your feet daily with warm water and mild soap. Do not use hot water. Then, pat your feet and the areas between your toes until they are completely dry. Do not soak your feet as this can dry your skin.  Trim your toenails straight across. Do not dig under them or around the cuticle. File the edges of your nails with an emery board or nail file.  Apply a moisturizing lotion or petroleum jelly to the skin on your feet and to dry, brittle toenails. Use lotion that does not contain alcohol and is unscented. Do not apply lotion between your toes.  Shoes and socks  Wear clean socks or stockings every day. Make sure they are not too tight. Do not wear knee-high stockings since they may decrease blood flow to your legs.  Wear shoes that fit properly and have enough cushioning. Always look in your shoes before you put them on to be sure there are no objects inside.  To break in new shoes, wear them for just a few hours a day. This prevents injuries on your feet.  Wounds, scrapes, corns, and calluses    Check your feet daily for blisters, cuts, bruises, sores, and redness. If you cannot see the bottom of your feet, use a mirror or ask someone for help.  Do not cut corns or calluses or try to remove them with medicine.  If you find a minor scrape, cut, or break in the skin on your feet, keep it and the skin around it clean and dry. You may clean these areas with mild soap and water. Do not clean the area with peroxide, alcohol, or iodine.  If you have a wound, scrape, corn, or callus on your foot, look at it several times a day to make sure it is healing and not infected. Check for:  Redness, swelling, or pain.  Fluid or blood.  Warmth.  Pus or a bad smell.  General tips  Do not cross your legs. This may decrease  blood flow to your feet.  Do not use heating pads or hot water bottles on your feet. They may burn your skin. If you have lost feeling in your feet or legs, you may not know this is happening until it is too late.  Protect your feet from hot and cold by wearing shoes, such as at the beach or on hot pavement.  Schedule a complete foot exam at least once a year (annually) or more often if you have foot problems. Report any cuts, sores, or bruises to your health care provider immediately.  Where to find more information  American Diabetes Association: www.diabetes.org  Association of Diabetes Care & Education Specialists: www.diabeteseducator.org  Contact a health care provider if:  You have a medical condition that increases your risk of infection and you have any cuts, sores, or bruises on your feet.  You have an injury that is not healing.  You have redness on your legs or feet.  You feel burning or tingling in your legs or feet.  You have pain or cramps in your legs and feet.  Your legs or feet are numb.  Your feet always feel cold.  You have pain around any toenails.  Get help right away if:  You have a wound, scrape, corn, or callus on your foot and:  You have pain, swelling, or redness that gets worse.  You have fluid or blood coming from the wound, scrape, corn, or callus.  Your wound, scrape, corn, or callus feels warm to the touch.  You have pus or a bad smell coming from the wound, scrape, corn, or callus.  You have a fever.  You have a red line going up your leg.  Summary  Check your feet every day for blisters, cuts, bruises, sores, and redness.  Apply a moisturizing lotion or petroleum jelly to the skin on your feet and to dry, brittle toenails.  Wear shoes that fit properly and have enough cushioning.  If you have foot problems, report any cuts, sores, or bruises to your health care provider immediately.  Schedule a complete foot exam at least once a year (annually) or more often if you have foot  problems.  This information is not intended to replace advice given to you by your health care provider. Make sure you discuss any questions you have with your health care provider.  Document Revised: 07/08/2021 Document Reviewed: 07/08/2021  [employer] Patient Education © 2023 [employer]    When you have diabetes, or diabetes mellitus, it is very important to have healthy eating habits because your blood sugar (glucose) levels are greatly affected by what you eat and drink. Eating healthy foods in the right amounts, at about the same times every day, can help you:  Manage your blood glucose.  Lower your risk of heart disease.  Improve your blood pressure.  Reach or maintain a healthy weight.  What can affect my meal plan?  Every person with diabetes is different, and each person has different needs for a meal plan. Your health care provider may recommend that you work with a dietitian to make a meal plan that is best for you. Your meal plan may vary depending on factors such as:  The calories you need.  The medicines you take.  Your weight.  Your blood glucose, blood pressure, and cholesterol levels.  Your activity level.  Other health conditions you have, such as heart or kidney disease.  How do carbohydrates affect me?  Carbohydrates, also called carbs, affect your blood glucose level more than any other type of food. Eating carbs raises the amount of glucose in your blood.  It is important to know how many carbs you can safely have in each meal. This is different for every person. Your dietitian can help you calculate how many carbs you should have at each meal and for each snack.  How does alcohol affect me?  Alcohol can cause a decrease in blood glucose (hypoglycemia), especially if you use insulin or take certain diabetes medicines by mouth. Hypoglycemia can be a life-threatening condition. Symptoms of hypoglycemia, such as sleepiness, dizziness, and confusion, are similar to symptoms of having too much  "alcohol.  Do not drink alcohol if:  Your health care provider tells you not to drink.  You are pregnant, may be pregnant, or are planning to become pregnant.  If you drink alcohol:  Limit how much you have to:  0-1 drink a day for women.  0-2 drinks a day for men.  Know how much alcohol is in your drink. In the [place] one drink equals one 12 oz bottle of beer (355 mL), one 5 oz glass of wine (148 mL), or one 1½ oz glass of hard liquor (44 mL).  Keep yourself hydrated with water, diet soda, or unsweetened iced tea. Keep in mind that regular soda, juice, and other mixers may contain a lot of sugar and must be counted as carbs.  What are tips for following this plan?    Reading food labels  Start by checking the serving size on the Nutrition Facts label of packaged foods and drinks. The number of calories and the amount of carbs, fats, and other nutrients listed on the label are based on one serving of the item. Many items contain more than one serving per package.  Check the total grams (g) of carbs in one serving.  Check the number of grams of saturated fats and trans fats in one serving. Choose foods that have a low amount or none of these fats.  Check the number of milligrams (mg) of salt (sodium) in one serving. Most people should limit total sodium intake to less than 2,300 mg per day.  Always check the nutrition information of foods labeled as \"low-fat\" or \"nonfat.\" These foods may be higher in added sugar or refined carbs and should be avoided.  Talk to your dietitian to identify your daily goals for nutrients listed on the label.  Shopping  Avoid buying canned, pre-made, or processed foods. These foods tend to be high in fat, sodium, and added sugar.  Shop around the outside edge of the grocery store. This is where you will most often find fresh fruits and vegetables, bulk grains, fresh meats, and fresh dairy products.  Cooking  Use low-heat cooking methods, such as baking, instead of high-heat cooking methods, " such as deep frying.  Cook using healthy oils, such as olive, canola, or sunflower oil.  Avoid cooking with butter, cream, or high-fat meats.  Meal planning  Eat meals and snacks regularly, preferably at the same times every day. Avoid going long periods of time without eating.  Eat foods that are high in fiber, such as fresh fruits, vegetables, beans, and whole grains.  Eat 4-6 oz (112-168 g) of lean protein each day, such as lean meat, chicken, fish, eggs, or tofu. One ounce (oz) (28 g) of lean protein is equal to:  1 oz (28 g) of meat, chicken, or fish.  1 egg.  ¼ cup (62 g) of tofu.  Eat some foods each day that contain healthy fats, such as avocado, nuts, seeds, and fish.  What foods should I eat?  Fruits  Berries. Apples. Oranges. Peaches. Apricots. Plums. Grapes. Mangoes. Papayas. Pomegranates. Kiwi. Cherries.  Vegetables  Leafy greens, including lettuce, spinach, kale, chard, terrance greens, mustard greens, and cabbage. Beets. Cauliflower. Broccoli. Carrots. Green beans. Tomatoes. Peppers. Onions. Cucumbers. [place] sprouts.  Grains  Whole grains, such as whole-wheat or whole-grain bread, crackers, tortillas, cereal, and pasta. Unsweetened oatmeal. Quinoa. Brown or wild rice.  Meats and other proteins  Seafood. Poultry without skin. Lean cuts of poultry and beef. Tofu. Nuts. Seeds.  Dairy  Low-fat or fat-free dairy products such as milk, yogurt, and cheese.  The items listed above may not be a complete list of foods and beverages you can eat and drink. Contact a dietitian for more information.  What foods should I avoid?  Fruits  Fruits canned with syrup.  Vegetables  Canned vegetables. Frozen vegetables with butter or cream sauce.  Grains  Refined white flour and flour products such as bread, pasta, snack foods, and cereals. Avoid all processed foods.  Meats and other proteins  Fatty cuts of meat. Poultry with skin. Breaded or fried meats. Processed meat. Avoid saturated fats.  Dairy  Full-fat yogurt,  cheese, or milk.  Beverages  Sweetened drinks, such as soda or iced tea.  The items listed above may not be a complete list of foods and beverages you should avoid. Contact a dietitian for more information.  Questions to ask a health care provider  Do I need to meet with a certified diabetes care and ?  Do I need to meet with a dietitian?  What number can I call if I have questions?  When are the best times to check my blood glucose?  Where to find more information:  American Diabetes Association: diabetes.org  Academy of Nutrition and Dietetics: eatright.org  National [place] of Diabetes and Digestive and Kidney Diseases: niddk.nih.gov  Association of Diabetes Care & Education Specialists: diabeteseducator.org  Summary  It is important to have healthy eating habits because your blood sugar (glucose) levels are greatly affected by what you eat and drink. It is important to use alcohol carefully.  A healthy meal plan will help you manage your blood glucose and lower your risk of heart disease.  Your health care provider may recommend that you work with a dietitian to make a meal plan that is best for you.  This information is not intended to replace advice given to you by your health care provider. Make sure you discuss any questions you have with your health care provider.  Document Revised: 07/21/2021 Document Reviewed: 07/21/2021  [employer] Patient Education © 2023 [employer]    Pain Management after Surgery, Injury or Illness    What should I expect if I have pain?  Some pain may be normal.  It is important to know that it may not be possible to completely eliminate your pain.  Our goal is to help you to manage your pain so that you can get back to your normal routine as soon as possible.  We will work together to create a plan to manage your pain and track the progress of the plan.  If you have questions about your care, please tell your nurse or provider.  How is my pain measured?  Your  pain will be measured on a scale of 0 to 10.  The pain rating scale will help you to score your pain based on your ability to function with that pain.  For example, a score of:  0 = No pain  5 = Pain interrupts some activities  10 = Pain is as bad as it can be, nothing else matters  Remember, some pain is expected following illness, injury or surgery.  How will my pain be treated?  You may be offered medication to treat your pain.  You can also use non-medicine treatments to help manage your pain.  If you have severe, uncontrolled pain, you may be prescribed narcotics, also known as opioids.  You have the right to learn about your options and work with your care team to find the best treatment to manage your pain.  Non-Opioid Medicines  Many effective medicines do not need a prescription. Examples include:  Acetaminophen, which you may know as Tylenol®  Ibuprofen, which you may know as Advil® or Motrin®  Aspirin  Other low risk medicines require a prescription and are helpful for treating pain. Examples include:  o Celecoxib, which you may know as Celebrex®  Alternative Therapies  Alternative therapies can be very helpful in managing pain. You can try:  Ice or heat packs as recommended by your care team.  Massage, relaxation techniques or meditation.  Changing positions in bed.  Watching TV or listening to music.  Opioids, also known as Narcotics  Opioids should only be used to treat severe pain that cannot be controlled by other methods.  Opioids have been shown to increase your risk of complications.  Opioids are highly addictive.  Opioids should only be used in the lowest effective dose, for a limited amount of time.  Opioids require a prescription. Some examples include:  Tramadol, known as Ultram®.  Hydrocodone with acetaminophen, known as Lortab®, Vicodin®, Norco®.  Oxycodone with acetaminophen, known as Percocet®, or Roxicet®.  Oxycodone, known as OxyContin®.  Morphine, known as MS Contin®.  What will happen  after I go home from the hospital?  Your care team will discuss your ongoing care plan with you before you leave.  You will be given detailed instructions for any medicine and follow up care.  You may be given a prescription for medicines to take when you go home.  Be sure to tell your care team if you have concerns about caring for yourself at home. Common concerns may include stairs, or living alone.  How should I manage pain when I go home?  Always use non-opioid and non-medicine options first.  Take non-opioid medicine regularly, as instructed by your care team.  Avoid doing things that make your pain worse like heavy lifting or straining.  Use opioids only to treat severe pain that is not controlled by other methods.  Always follow the instructions of your care team.  Always take the lowest effective dose.  Do not take more than prescribed.  Do not mix with sleeping pills or alcohol.  Stop taking opioids when the pain can be managed by other methods listed above.  You may also be referred to a pain specialist when needed.    IMPORTANT INFORMATION:  Side effects of Opiates may include:  Sleepiness  Dizziness  Nausea and/or vomiting  Constipation  Decreased breathing  Addiction  Overdose  Death    Opiate dependency and addiction risk:  The risk of dependency increases after 3 days of continuous use.  There are many local resources to help with dependency issues.  Opiate dependency can develop easily. Don’t feel ashamed.  Speak to your care team if you have concerns.  General medicine safety:  Keep all medicines in a safe place, out of sight so they cannot be taken by someone else.  Keep out of reach of children.  Never mix opioids with sleeping pills, over the counter sleep aids or alcohol.  Do not drive while taking opioids.  Be careful at home when cooking, bathing, showering or using stairs.  You may be more likely to hurt yourself or fall.  For anyone taking opioids, watch for excessive sleepiness or  decreased breathing as a sign of overdose. Try to arouse the person if you are concerned. Call 911 if you are unable to awaken them OR if their breathing is shallow, slow, or irregular.  Proper medicine disposal:  Empty liquid medicine from containers, open capsules or crush tablets and mix with chris litter, dirt or old coffee grounds. Place the mixture into a sealed bag or container and throw it in the trash.  Take medicines to a local drug takeback center, for a list visit: https://apps.[employer].Tissuetech.gov/pubdispsearch/spring/main?execution=e1s2  Use a home drug disposal pouch.  Contact your local pharmacy for help.    Fall Prevention in the Home, Adult  Falls can cause injuries and can happen to people of all ages. There are many things you can do to make your home safe and to help prevent falls. Ask for help when making these changes.  What actions can I take to prevent falls?  General Instructions  Use good lighting in all rooms. Replace any light bulbs that burn out.  Turn on the lights in dark areas. Use night-lights.  Keep items that you use often in easy-to-reach places. Lower the shelves around your home if needed.  Set up your furniture so you have a clear path. Avoid moving your furniture around.  Do not have throw rugs or other things on the floor that can make you trip.  Avoid walking on wet floors.  If any of your floors are uneven, fix them.  Add color or contrast paint or tape to clearly pallavi and help you see:  Grab bars or handrails.  First and last steps of staircases.  Where the edge of each step is.  If you use a stepladder:  Make sure that it is fully opened. Do not climb a closed stepladder.  Make sure the sides of the stepladder are locked in place.  Ask someone to hold the stepladder while you use it.  Know where your pets are when moving through your home.  What can I do in the bathroom?         Keep the floor dry. Clean up any water on the floor right away.  Remove soap buildup in the  tub or shower.  Use nonskid mats or decals on the floor of the tub or shower.  Attach bath mats securely with double-sided, nonslip rug tape.  If you need to sit down in the shower, use a plastic, nonslip stool.  Install grab bars by the toilet and in the tub and shower. Do not use towel bars as grab bars.  What can I do in the bedroom?  Make sure that you have a light by your bed that is easy to reach.  Do not use any sheets or blankets for your bed that hang to the floor.  Have a firm chair with side arms that you can use for support when you get dressed.  What can I do in the kitchen?  Clean up any spills right away.  If you need to reach something above you, use a step stool with a grab bar.  Keep electrical cords out of the way.  Do not use floor polish or wax that makes floors slippery.  What can I do with my stairs?  Do not leave any items on the stairs.  Make sure that you have a light switch at the top and the bottom of the stairs.  Make sure that there are handrails on both sides of the stairs. Fix handrails that are broken or loose.  Install nonslip stair treads on all your stairs.  Avoid having throw rugs at the top or bottom of the stairs.  Choose a carpet that does not hide the edge of the steps on the stairs.  Check carpeting to make sure that it is firmly attached to the stairs. Fix carpet that is loose or worn.  What can I do on the outside of my home?  Use bright outdoor lighting.  Fix the edges of walkways and driveways and fix any cracks.  Remove anything that might make you trip as you walk through a door, such as a raised step or threshold.  Trim any bushes or trees on paths to your home.  Check to see if handrails are loose or broken and that both sides of all steps have handrails.  Install guardrails along the edges of any raised decks and porches.  Clear paths of anything that can make you trip, such as tools or rocks.  Have leaves, snow, or ice cleared regularly.  Use sand or salt on paths  during winter.  Clean up any spills in your garage right away. This includes grease or oil spills.  What other actions can I take?  Wear shoes that:  Have a low heel. Do not wear high heels.  Have rubber bottoms.  Feel good on your feet and fit well.  Are closed at the toe. Do not wear open-toe sandals.  Use tools that help you move around if needed. These include:  Canes.  Walkers.  Scooters.  Crutches.  Review your medicines with your doctor. Some medicines can make you feel dizzy. This can increase your chance of falling.  Ask your doctor what else you can do to help prevent falls.  Where to find more information  Centers for Disease Control and Prevention, [place]: www.cdc.gov  National [place] on Aging: www.krystyna.nih.gov  Contact a doctor if:  You are afraid of falling at home.  You feel weak, drowsy, or dizzy at home.  You fall at home.  Summary  There are many simple things that you can do to make your home safe and to help prevent falls.  Ways to make your home safe include removing things that can make you trip and installing grab bars in the bathroom.  Ask for help when making these changes in your home.  This information is not intended to replace advice given to you by your health care provider. Make sure you discuss any questions you have with your health care provider.  Document Revised: 09/19/2022 Document Reviewed: 07/21/2021  [employer] Patient Education © 2023 [employer]    Depression / Suicide Risk    As you are discharged from this [facility] facility, it is important to learn how to keep safe from harming yourself.    Recognize the warning signs:  Abrupt changes in personality, positive or negative- including increase in energy   Giving away possessions  Change in eating patterns- significant weight changes-  positive or negative  Change in sleeping patterns- unable to sleep or sleeping all the time   Unwillingness or inability to communicate  Depression  Unusual sadness, discouragement and  loneliness  Talk of wanting to die  Neglect of personal appearance   Rebelliousness- reckless behavior  Withdrawal from people/activities they love  Confusion- inability to concentrate     If you or a loved one observes any of these behaviors or has concerns about self-harm, here's what you can do:  Talk about it- your feelings and reasons for harming yourself  Remove any means that you might use to hurt yourself (examples: pills, rope, extension cords, firearm)  Get professional help from the community (Mental Health, Substance Abuse, psychological counseling)  Do not be alone:Call your Safe Contact- someone whom you trust who will be there for you.  Call your local [place] Crisis Hotline 888-3824 or 886-676-3097  Call your local Children's Mobile Crisis Response Team [place] (379) 446-1056 or www.[employer] Intelligence  Call or text 339 to reach the National Suicide and Crisis Prevention Lifeline

## 2025-03-20 NOTE — CARE PLAN
Problem: Knowledge Deficit - Standard  Goal: Patient and family/care givers will demonstrate understanding of plan of care, disease process/condition, diagnostic tests and medications  Outcome: Progressing  Discharge canceled due to lab work. MD at bedside educating on high potassium

## 2025-03-20 NOTE — CARE PLAN
Problem: OT Long Term Goals  Goal: LTG-By discharge, patient will complete basic self care tasks @ sup-mod I level.   3/19/2025 1707 by Pura Hedrick, OT  Outcome: Met  3/19/2025 1237 by Pura Hedrick, OT  Outcome: Progressing  Goal: LTG-By discharge, patient will perform bathroom transfers @ sup-mod I level.   3/19/2025 1707 by Pura Hedrick, OT  Outcome: Met  3/19/2025 1237 by Pura Hedrick, OT  Outcome: Progressing

## 2025-03-20 NOTE — PROGRESS NOTES
"  Physical Medicine & Rehabilitation Progress Note    Encounter Date: 3/20/2025    Chief Complaint: L BKA    Interval Events (Subjective):  VS: VSS - BP elevated at times  Last BM: 3/20  Bladder: Voiding, large PVRs no CIC  Schedule Meds Not Given: None  PRN Meds Taken: None    Patient seen and examined in his room. D/w him potassium and need to stay for monitoring one day. Patient disappointed but understands. D/w him close continued monitoring by PCP once leaves and need for Nephro appointment.       ROS: 14 point ROS negative unless otherwise specified in the HPI    Objective:  VITAL SIGNS: BP (!) 152/57   Pulse 73   Temp 36.8 °C (98.2 °F) (Temporal)   Resp 16   Ht 1.778 m (5' 10\")   Wt 110 kg (241 lb 8 oz)   SpO2 95%   BMI 34.65 kg/m²     GEN: No apparent distress  HEENT: Head normocephalic, atraumatic.  Sclera nonicteric bilaterally, no ocular discharge appreciated bilaterally.  CV: Extremities warm and well-perfused, no peripheral edema appreciated bilaterally.  PULMONARY: Breathing nonlabored on room air, no respiratory accessory muscle use.  Not requiring supplemental oxygen.  SKIN:   3/19     3/13      3/9    PSYCH: Mood and affect within normal limits.  NEURO: Awake alert.  Conversational.  Logical thought content.      Laboratory Values:  Recent Results (from the past 72 hours)   Basic Metabolic Panel    Collection Time: 03/19/25 11:17 AM   Result Value Ref Range    Sodium 137 135 - 145 mmol/L    Potassium 6.3 (H) 3.6 - 5.5 mmol/L    Chloride 110 96 - 112 mmol/L    Co2 19 (L) 20 - 33 mmol/L    Glucose 110 (H) 65 - 99 mg/dL    Bun 55 (H) 8 - 22 mg/dL    Creatinine 2.90 (H) 0.50 - 1.40 mg/dL    Calcium 8.5 8.5 - 10.5 mg/dL    Anion Gap 8.0 7.0 - 16.0   CBC WITH DIFFERENTIAL    Collection Time: 03/19/25 11:17 AM   Result Value Ref Range    WBC 5.6 4.8 - 10.8 K/uL    RBC 3.14 (L) 4.70 - 6.10 M/uL    Hemoglobin 9.1 (L) 14.0 - 18.0 g/dL    Hematocrit 30.6 (L) 42.0 - 52.0 %    MCV 97.5 81.4 - 97.8 fL    " MCH 29.0 27.0 - 33.0 pg    MCHC 29.7 (L) 32.3 - 36.5 g/dL    RDW 60.9 (H) 35.9 - 50.0 fL    Platelet Count 230 164 - 446 K/uL    MPV 11.4 9.0 - 12.9 fL    Neutrophils-Polys 65.70 44.00 - 72.00 %    Lymphocytes 22.40 22.00 - 41.00 %    Monocytes 8.40 0.00 - 13.40 %    Eosinophils 2.20 0.00 - 6.90 %    Basophils 0.90 0.00 - 1.80 %    Immature Granulocytes 0.40 0.00 - 0.90 %    Nucleated RBC 0.00 0.00 - 0.20 /100 WBC    Neutrophils (Absolute) 3.66 1.82 - 7.42 K/uL    Lymphs (Absolute) 1.25 1.00 - 4.80 K/uL    Monos (Absolute) 0.47 0.00 - 0.85 K/uL    Eos (Absolute) 0.12 0.00 - 0.51 K/uL    Baso (Absolute) 0.05 0.00 - 0.12 K/uL    Immature Granulocytes (abs) 0.02 0.00 - 0.11 K/uL    NRBC (Absolute) 0.00 K/uL    Anisocytosis 1+     Macrocytosis 1+    MAGNESIUM    Collection Time: 03/19/25 11:17 AM   Result Value Ref Range    Magnesium 2.5 1.5 - 2.5 mg/dL   PHOSPHORUS    Collection Time: 03/19/25 11:17 AM   Result Value Ref Range    Phosphorus 4.3 2.5 - 4.5 mg/dL   ESTIMATED GFR    Collection Time: 03/19/25 11:17 AM   Result Value Ref Range    GFR (CKD-EPI) 23 (A) >60 mL/min/1.73 m 2   PLATELET ESTIMATE    Collection Time: 03/19/25 11:17 AM   Result Value Ref Range    Plt Estimation Normal    MORPHOLOGY    Collection Time: 03/19/25 11:17 AM   Result Value Ref Range    RBC Morphology Present    PERIPHERAL SMEAR REVIEW    Collection Time: 03/19/25 11:17 AM   Result Value Ref Range    Peripheral Smear Review see below    DIFFERENTIAL COMMENT    Collection Time: 03/19/25 11:17 AM   Result Value Ref Range    Comments-Diff see below        Medications:  Scheduled Medications   Medication Dose Frequency    hydrALAZINE  100 mg Q8HRS    aspirin  325 mg BID    insulin lispro  2-12 Units TID AC    insulin GLARGINE  37 Units Q EVENING    sodium bicarbonate  650 mg TID WITH MEALS    acetaminophen  1,000 mg QHS    influenza vaccine High-Dose  0.5 mL Once    carvedilol  6.25 mg BID WITH MEALS    Pharmacy Consult Request  1 Each PHARMACY  TO DOSE    omeprazole  20 mg DAILY    atorvastatin  40 mg Q EVENING    hydroCHLOROthiazide  25 mg Q DAY    lisinopril  40 mg Q EVENING    levothyroxine  175 mcg AM ES    linezolid  600 mg Q12HRS     PRN medications: insulin lispro **AND** POC blood glucose manual result **AND** NOTIFY MD and PharmD **AND** Administer 20 grams of glucose (approximately 8 ounces of fruit juice) every 15 minutes PRN FSBG less than 70 mg/dL **AND** dextrose bolus, traMADol **OR** traMADol, lidocaine, hydrALAZINE, lactulose, docusate sodium, bisacodyl EC, magnesium hydroxide, sodium phosphate, carboxymethylcellulose, benzocaine-menthol, mag hydrox-al hydrox-simeth, ondansetron **OR** ondansetron, traZODone, sodium chloride, Respiratory Therapy Consult, acetaminophen    Diet:  Current Diet Order   Procedures    Diet Order Diet: Consistent CHO (Diabetic) (HIGH PROTEIN AND VEGETABLES. DAIRY OK. LIMIT CARBOHYDRATES); Nutrient modifications: (optional): High Protein       Medical Decision Making and Plan:  L BKA 3/2 Dr[facility]chavarria [facility]  MSSA Bacteremia  TTE without valvular lesions or endocarditis.   PT and OT for mobility and ADLs. Per guidelines, 15 hours per week between PT, OT and/or SLP.  Follow-up Ortho  Follow-up ID  Follow-up Vascular Surgery (Dr. Calderon) - referral placed per hospitalist at [facility]  Complete IV Cefazolin 3/14 --> Linezolid through 3/28   Tunneled cath placement 3/4     Hypertension - Continue Lisinopril, HCTZ, Coreg. 3/7 Elevated but improved in 150's monitor. 3/8 BP improving without med changes, monitor. 3/11 Continue monitor BP - patient reports feeling symptoms with low BP in early AM or late PM. Coreg was 12.5 BID hospitalist note indicates should be 6.25mg BID, will change today. 3/12 BP better controlled with increase in Hydralazine. 3/17 VSS    3/19 Hydralazine increased to 100mg q8hrs  3/20 BP better.     Hyperkalemia - 6.3 3/19. Lokelma with repeats 3/20 and 3/21    ABLA - 3/7 9 Monitor. 3/8  Better 9.8. 3/20 Stable    Leukocytosis - Mild monitor 3/7. 3/8 Mild increase 10.9--> 11.1. UA negative. Incision c/d/I. Afebrile and not on scheduled tylenol nor taking PRN. 3/10, 3/13, 3/17, 3/20 Resolved.     Thrombocytosis - WNL resolved.      Chronic Combined HR, LVH and Pulm HTN - EF 50-55% (Had been 65-70%). Follows with Cards Dr. Rodriguez     H/o CAD - On ASA and Statin      Hypothyroidism - Continue Synthroid      DARRIAN - RT consult     H/o Gastric Bypass - Has frequent BM due to this.      CKD Stage IV - Avoid Nephrotoxins. GFR 29 baseline. Managed by Nephro Dr. Michaels. 3/20 Stable, monitor trend    Elevated TSH - Free T4 WNL.     Morbid Obesity - Nutrition consult      Type II Diabetes Mellitus with Hyperglycemia - Currently on SSI and Lantus. Hgb A1c 6.6     Pain - Declines pain. Has Tylenol and Tramadol PRN.    3/14 More neuropathic pain, reduce Tramadol due to drug hangover. Cannot take Kaitlin has side effects. Pain varies, patient would like to stick mostly with tylenol for now. Schedule Tylenol at night.      Bowel - Patient on Senna-docusate for constipation prophylaxis.      Bladder - TV/PVR/BS PRN           DVT PROPHYLAXIS: Heparin --> D/c ASA 325mg BID     HOSPITALIST FOLLOWING: Yes - d/w hospitalist 3/20     CODE STATUS: FULL - c/f with patient on admission      DISPO: Home with spouse      GIANCARLO: 3/21 - held x1 day for K+ monitoring     MEDS SENT TO: [facility]     DISCHARGE SPECIALIST FOLLOW UP:   Ortho - Appnt scheduled 3/25  ID - New referral placed  Vasc Surg - established  Cardiology - established  Nephrology - established     Patient to scheduled follow up with their PCP within 2 weeks from discharge from the [facility].   ____________________________________    Dr. Tracy Tong DO, MS  [facility] - Physical Medicine & Rehabilitation   ____________________________________

## 2025-03-20 NOTE — PROGRESS NOTES
NURSING DAILY NOTE    Name: Marlo León   Date of Admission: 3/6/2025   Admitting Diagnosis: Hx of BKA, left (HCC)  Attending Physician: MICKIE JUAREZ D.O.  Allergies: Neosporin [neomycin-polymyxin b], Tape, Amlodipine, Amoxicillin, Bacitracin-polymyxin b, Cyclobenzaprine, Empagliflozin-metformin hcl, Gabapentin, Gadolinium, Metformin, Neomycin-polymyxin b gu, Nsaids, Pioglitazone, Prednisone, Sulfamethoxazole w-trimethoprim, and Tamsulosin    Safety  Patient Assist  sba during hs hours, mod-i daytime hours  Patient Precautions  Precautions: Fall Risk  Fall Risk: Low vision  Weight Bearing: NWB LLE  Braces: Immobilizer LE (laterality in comments)  Comments: Left BKA with immobilizer  Bed Transfer Status  Independent  Toilet Transfer Status   Contact Guard Assist  Assistive Devices  Wheelchair push  Oxygen  None - Room Air  Diet/Therapeutic Dining  Current Diet Order   Procedures    Diet Order Diet: Consistent CHO (Diabetic) (HIGH PROTEIN AND VEGETABLES. DAIRY OK. LIMIT CARBOHYDRATES); Nutrient modifications: (optional): High Protein     Pill Administration  whole  Agitated Behavioral Scale     ABS Level of Severity       Fall Risk  Has the patient had a fall this admission?      Nikole Swanson Fall Risk Scoring  15, HIGH RISK  Fall Risk Safety Measures  bed alarm, chair alarm, and poor balance    Vitals  Temperature: 36.6 °C (97.9 °F)  Temp src: Temporal  Pulse: 70  Respiration: 18  Blood Pressure : 134/51  Blood Pressure MAP (Calculated): 79 MM HG  BP Location: Left, Upper Arm  Patient BP Position: Supine     Oxygen  Pulse Oximetry: 96 %  O2 (LPM): 0  O2 Delivery Device: None - Room Air    Bowel and Bladder  Last Bowel Movement  03/20/25  Stool Type  Type 4: Like a sausage or snake, smooth and soft  Bowel Device  Bathroom  Continent  Bladder: Continent void   Bowel: Continent movement  Bladder Function  Urine Void (mL): 300 ml  Number of Times Voided: 1  Urine Color: Unable To Evaluate  Number of Times  Incontinent of Urine: 0  Straight Catheter:  (Refused. Pt said he will revoid again later)  Genitourinary Assessment   Bladder Assessment (WDL):  WDL Except  Jane Catheter: Not Applicable  Urine Color: Unable To Evaluate  Number of Bladder Accidents: 0  Total Number of Bladder of Accidents in Last 7 Days: 0  Number of Times Incontinent of Urine: 0  Bladder Device: Urinal  Time Void: Yes  Bladder Scan: Post Void  $ Bladder Scan Results (mL): 245    Skin  Anselmo Score   18  Sensory Interventions   Bed Types: [facility]  Skin Preventative Measures: Pillows in Use for Support / Positioning  Moisture Interventions  Moisturizers/Barriers: Barrier Wipes      Pain  Pain Rating Scale  2 - Notice Pain, does not interfere with activities  Pain Location  Generalized  Pain Location Orientation  Right, Left  Pain Interventions   Medication (see MAR)    ADLs    Bathing   Shower, Staff  Linen Change   Complete  Personal Hygiene  Perineal Care, Moist Joceline Wipes  Chlorhexidine Bath   Completed  Oral Care  Brushed Teeth  Teeth/Dentures     Shave     Nutrition Percentage Eaten  *  * Meal *  *, Lunch, *  * Amount Consumed *  *, Between % Consumed  Environmental Precautions  Treaded Slipper Socks on Patient, Personal Belongings, Wastebasket, Call Bell etc. in Easy Reach, Transferred to Stronger Side, Report Given to Other Health Care Providers Regarding Fall Risk, Bed in Low Position, Communication Sign for Patients & Families, Mobility Assessed & Appropriate Sign Placed  Patient Turns/Positioning  Patient turns self independently side to side without assistance, to offload sacral area  Patient Turns Assistance/Tolerance     Bed Positions  Bed Locked, Bed Controls On  Head of Bed Elevated  Self regulated      Psychosocial/Neurologic Assessment  Psychosocial Assessment  Psychosocial (WDL):  Within Defined Limits  Patient Behaviors: Fatigue, Depressed  Neurologic Assessment  Neuro (WDL): Within Defined Limits  Level of  Consciousness: Alert  Orientation Level: Oriented X4  Cognition: Follows commands, Appropriate judgement, Appropriate safety awareness  Speech: Clear  Pupil Assesment: No  EENT (WDL):  WDL Except    Cardio/Pulmonary Assessment  Edema      Respiratory Breath Sounds  RUL Breath Sounds: Clear  RML Breath Sounds: Clear  RLL Breath Sounds: Diminished  NOEMY Breath Sounds: Clear  LLL Breath Sounds: Diminished  Cardiac Assessment   Cardiac (WDL):  Within Defined Limits

## 2025-03-21 ENCOUNTER — PHARMACY VISIT (OUTPATIENT)
Dept: PHARMACY | Facility: MEDICAL CENTER | Age: 70
End: 2025-03-21
Payer: COMMERCIAL

## 2025-03-21 VITALS
HEART RATE: 80 BPM | SYSTOLIC BLOOD PRESSURE: 158 MMHG | TEMPERATURE: 97.3 F | WEIGHT: 241.5 LBS | BODY MASS INDEX: 34.57 KG/M2 | OXYGEN SATURATION: 97 % | HEIGHT: 70 IN | DIASTOLIC BLOOD PRESSURE: 64 MMHG | RESPIRATION RATE: 16 BRPM

## 2025-03-21 PROBLEM — E87.5 HYPERKALEMIA: Status: ACTIVE | Noted: 2025-03-21

## 2025-03-21 LAB
GLUCOSE BLD STRIP.AUTO-MCNC: 127 MG/DL (ref 65–99)
GLUCOSE BLD STRIP.AUTO-MCNC: 142 MG/DL (ref 65–99)
GLUCOSE BLD STRIP.AUTO-MCNC: 162 MG/DL (ref 65–99)
POTASSIUM SERPL-SCNC: 5.1 MMOL/L (ref 3.6–5.5)

## 2025-03-21 PROCEDURE — 700102 HCHG RX REV CODE 250 W/ 637 OVERRIDE(OP): Performed by: HOSPITALIST

## 2025-03-21 PROCEDURE — RXMED WILLOW AMBULATORY MEDICATION CHARGE: Performed by: PHYSICAL MEDICINE & REHABILITATION

## 2025-03-21 PROCEDURE — 36415 COLL VENOUS BLD VENIPUNCTURE: CPT

## 2025-03-21 PROCEDURE — 82962 GLUCOSE BLOOD TEST: CPT

## 2025-03-21 PROCEDURE — 700102 HCHG RX REV CODE 250 W/ 637 OVERRIDE(OP): Performed by: PHYSICAL MEDICINE & REHABILITATION

## 2025-03-21 PROCEDURE — 84132 ASSAY OF SERUM POTASSIUM: CPT

## 2025-03-21 PROCEDURE — A9270 NON-COVERED ITEM OR SERVICE: HCPCS | Performed by: HOSPITALIST

## 2025-03-21 PROCEDURE — 99232 SBSQ HOSP IP/OBS MODERATE 35: CPT | Performed by: HOSPITALIST

## 2025-03-21 PROCEDURE — A9270 NON-COVERED ITEM OR SERVICE: HCPCS | Performed by: PHYSICAL MEDICINE & REHABILITATION

## 2025-03-21 RX ADMIN — SODIUM BICARBONATE 650 MG: 650 TABLET ORAL at 13:46

## 2025-03-21 RX ADMIN — SODIUM BICARBONATE 650 MG: 650 TABLET ORAL at 07:49

## 2025-03-21 RX ADMIN — HYDRALAZINE HYDROCHLORIDE 100 MG: 50 TABLET ORAL at 05:12

## 2025-03-21 RX ADMIN — LEVOTHYROXINE SODIUM 175 MCG: 0.05 TABLET ORAL at 05:11

## 2025-03-21 RX ADMIN — HYDROCHLOROTHIAZIDE 25 MG: 25 TABLET ORAL at 05:12

## 2025-03-21 RX ADMIN — SODIUM BICARBONATE 650 MG: 650 TABLET ORAL at 17:28

## 2025-03-21 RX ADMIN — SODIUM ZIRCONIUM CYCLOSILICATE 10 G: 10 POWDER, FOR SUSPENSION ORAL at 10:37

## 2025-03-21 RX ADMIN — LINEZOLID 600 MG: 600 TABLET, FILM COATED ORAL at 08:09

## 2025-03-21 RX ADMIN — ASPIRIN 325 MG: 325 TABLET ORAL at 08:09

## 2025-03-21 RX ADMIN — HYDRALAZINE HYDROCHLORIDE 100 MG: 50 TABLET ORAL at 13:46

## 2025-03-21 RX ADMIN — CARVEDILOL 6.25 MG: 3.12 TABLET, FILM COATED ORAL at 07:49

## 2025-03-21 RX ADMIN — CARVEDILOL 6.25 MG: 3.12 TABLET, FILM COATED ORAL at 17:29

## 2025-03-21 RX ADMIN — INSULIN LISPRO 2 UNITS: 100 INJECTION, SOLUTION INTRAVENOUS; SUBCUTANEOUS at 10:40

## 2025-03-21 RX ADMIN — OMEPRAZOLE 20 MG: 20 CAPSULE, DELAYED RELEASE ORAL at 08:12

## 2025-03-21 ASSESSMENT — ENCOUNTER SYMPTOMS
VOMITING: 0
NAUSEA: 0
DIARRHEA: 0
NERVOUS/ANXIOUS: 0
SHORTNESS OF BREATH: 0
FEVER: 0
CHILLS: 0
ABDOMINAL PAIN: 0

## 2025-03-21 ASSESSMENT — PATIENT HEALTH QUESTIONNAIRE - PHQ9
5. POOR APPETITE OR OVEREATING: NOT AT ALL
1. LITTLE INTEREST OR PLEASURE IN DOING THINGS: NOT AT ALL
9. THOUGHTS THAT YOU WOULD BE BETTER OFF DEAD, OR OF HURTING YOURSELF: NOT AT ALL
7. TROUBLE CONCENTRATING ON THINGS, SUCH AS READING THE NEWSPAPER OR WATCHING TELEVISION: NOT AT ALL
2. FEELING DOWN, DEPRESSED, IRRITABLE, OR HOPELESS: NOT AT ALL
8. MOVING OR SPEAKING SO SLOWLY THAT OTHER PEOPLE COULD HAVE NOTICED. OR THE OPPOSITE, BEING SO FIGETY OR RESTLESS THAT YOU HAVE BEEN MOVING AROUND A LOT MORE THAN USUAL: NOT AT ALL
4. FEELING TIRED OR HAVING LITTLE ENERGY: NOT AT ALL
SUM OF ALL RESPONSES TO PHQ9 QUESTIONS 1 AND 2: 0
SUM OF ALL RESPONSES TO PHQ QUESTIONS 1-9: 0
6. FEELING BAD ABOUT YOURSELF - OR THAT YOU ARE A FAILURE OR HAVE LET YOURSELF OR YOUR FAMILY DOWN: NOT AL ALL
3. TROUBLE FALLING OR STAYING ASLEEP OR SLEEPING TOO MUCH: NOT AT ALL

## 2025-03-21 ASSESSMENT — PAIN DESCRIPTION - PAIN TYPE: TYPE: ACUTE PAIN

## 2025-03-21 NOTE — PROGRESS NOTES
NURSING DAILY NOTE    Name: Marlo León   Date of Admission: 3/6/2025   Admitting Diagnosis: Hx of BKA, left (HCC)  Attending Physician: MICKIE JUAREZ D.O.  Allergies: Neosporin [neomycin-polymyxin b], Tape, Amlodipine, Amoxicillin, Bacitracin-polymyxin b, Cyclobenzaprine, Empagliflozin-metformin hcl, Gabapentin, Gadolinium, Metformin, Neomycin-polymyxin b gu, Nsaids, Pioglitazone, Prednisone, Sulfamethoxazole w-trimethoprim, and Tamsulosin    Safety  Patient Assist  mod i  Patient Precautions  Precautions: Fall Risk  Fall Risk: Low vision  Weight Bearing: NWB LLE  Braces: Immobilizer LE (laterality in comments)  Comments: Left BKA with immobilizer  Bed Transfer Status  Independent  Toilet Transfer Status   Contact Guard Assist  Assistive Devices  Wheelchair  Oxygen  None - Room Air  Diet/Therapeutic Dining  Current Diet Order   Procedures    Diet Order Diet: Consistent CHO (Diabetic) (HIGH PROTEIN AND VEGETABLES. DAIRY OK. LIMIT CARBOHYDRATES); Nutrient modifications: (optional): High Protein     Pill Administration  whole  Agitated Behavioral Scale     ABS Level of Severity       Fall Risk  Has the patient had a fall this admission?      Nikole Swanson Fall Risk Scoring  15, HIGH RISK  Fall Risk Safety Measures  bed alarm and chair alarm    Vitals  Temperature: 36.3 °C (97.3 °F)  Temp src: Temporal  Pulse: 73  Respiration: 16  Blood Pressure : 133/51  Blood Pressure MAP (Calculated): 78 MM HG  BP Location: Right, Upper Arm  Patient BP Position: Sitting     Oxygen  Pulse Oximetry: 96 %  O2 (LPM): 0  O2 Delivery Device: None - Room Air    Bowel and Bladder  Last Bowel Movement  03/21/25  Stool Type  Type 4: Like a sausage or snake, smooth and soft  Bowel Device  Bathroom  Continent  Bladder: Continent void   Bowel: Continent movement  Bladder Function  Urine Void (mL): 200 ml  Number of Times Voided: 1  Urine Color: Yellow  Number of Times Incontinent of Urine: 0  Straight Catheter:  (Refused. Pt said he will  revoid again later)  Genitourinary Assessment   Bladder Assessment (WDL):  WDL Except  Jane Catheter: Not Applicable  Urine Color: Yellow  Number of Bladder Accidents: 0  Total Number of Bladder of Accidents in Last 7 Days: 0  Number of Times Incontinent of Urine: 0  Bladder Device: Bathroom  Time Void: Yes  Bladder Scan: Post Void  $ Bladder Scan Results (mL): 245    Skin  Anselmo Score   18  Sensory Interventions   Bed Types: Low [facility]  Skin Preventative Measures: Pillows in Use for Support / Positioning  Moisture Interventions  Moisturizers/Barriers: Moisturizer       Pain  Pain Rating Scale  0 - No Pain  Pain Location  Leg  Pain Location Orientation  Left  Pain Interventions   Declines    ADLs    Bathing   Shower, * * With Assistance from, Staff (SBA)  Linen Change   Complete  Personal Hygiene  Perineal Care, Moist Joceline Wipes  Chlorhexidine Bath   Completed  Oral Care  Brushed Teeth  Teeth/Dentures     Shave     Nutrition Percentage Eaten  Lunch, Between % Consumed  Environmental Precautions  Treaded Slipper Socks on Patient, Personal Belongings, Wastebasket, Call Bell etc. in Easy Reach, Transferred to Stronger Side, Report Given to Other Health Care Providers Regarding Fall Risk, Bed in Low Position, Communication Sign for Patients & Families, Mobility Assessed & Appropriate Sign Placed  Patient Turns/Positioning  Patient turns self independently side to side without assistance, to offload sacral area  Patient Turns Assistance/Tolerance     Bed Positions  Bed Locked, Bed Controls On  Head of Bed Elevated  Self regulated      Psychosocial/Neurologic Assessment  Psychosocial Assessment  Psychosocial (WDL):  Within Defined Limits  Patient Behaviors: Fatigue, Depressed  Neurologic Assessment  Neuro (WDL): Within Defined Limits  Level of Consciousness: Alert  Orientation Level: Oriented X4  Cognition: Follows commands, Appropriate judgement, Appropriate safety awareness  Speech: Clear  Pupil Assesment:  No  EENT (WDL):  WDL Except    Cardio/Pulmonary Assessment  Edema      Respiratory Breath Sounds  RUL Breath Sounds: Clear  RML Breath Sounds: Clear  RLL Breath Sounds: Diminished  NOEMY Breath Sounds: Clear  LLL Breath Sounds: Diminished  Cardiac Assessment   Cardiac (WDL):  Within Defined Limits

## 2025-03-21 NOTE — PROGRESS NOTES
NURSING DAILY NOTE    Name: Marlo León   Date of Admission: 3/6/2025   Admitting Diagnosis: Hx of BKA, left (HCC)  Attending Physician: MICKIE JUAREZ D.O.  Allergies: Neosporin [neomycin-polymyxin b], Tape, Amlodipine, Amoxicillin, Bacitracin-polymyxin b, Cyclobenzaprine, Empagliflozin-metformin hcl, Gabapentin, Gadolinium, Metformin, Neomycin-polymyxin b gu, Nsaids, Pioglitazone, Prednisone, Sulfamethoxazole w-trimethoprim, and Tamsulosin    Safety  Patient Assist  sba during hs hours, mod-i daytime hours  Patient Precautions  Precautions: Fall Risk  Fall Risk: Low vision  Weight Bearing: NWB LLE  Braces: Immobilizer LE (laterality in comments)  Comments: Left BKA with immobilizer  Bed Transfer Status  Independent  Toilet Transfer Status   Contact Guard Assist  Assistive Devices  Wheelchair push  Oxygen  None - Room Air  Diet/Therapeutic Dining  Current Diet Order   Procedures    Diet Order Diet: Consistent CHO (Diabetic) (HIGH PROTEIN AND VEGETABLES. DAIRY OK. LIMIT CARBOHYDRATES); Nutrient modifications: (optional): High Protein     Pill Administration  whole  Agitated Behavioral Scale     ABS Level of Severity       Fall Risk  Has the patient had a fall this admission?      Nikole Swanson Fall Risk Scoring  15, HIGH RISK  Fall Risk Safety Measures  bed alarm, chair alarm, and poor balance    Vitals  Temperature: 36.9 °C (98.4 °F)  Temp src: Temporal  Pulse: 78  Respiration: 14  Blood Pressure : (!) 151/81  Blood Pressure MAP (Calculated): 104 MM HG  BP Location: Right, Upper Arm  Patient BP Position: Martino's Position     Oxygen  Pulse Oximetry: 96 %  O2 (LPM): 0  O2 Delivery Device: None - Room Air    Bowel and Bladder  Last Bowel Movement  03/20/25  Stool Type  Type 4: Like a sausage or snake, smooth and soft  Bowel Device  Bathroom  Continent  Bladder: Continent void   Bowel: Continent movement  Bladder Function  Urine Void (mL): 300 ml  Number of Times Voided: 1  Urine Color: Unable To  Evaluate  Number of Times Incontinent of Urine: 0  Straight Catheter:  (Refused. Pt said he will revoid again later)  Genitourinary Assessment   Bladder Assessment (WDL):  WDL Except  Jane Catheter: Not Applicable  Urine Color: Unable To Evaluate  Number of Bladder Accidents: 0  Total Number of Bladder of Accidents in Last 7 Days: 0  Number of Times Incontinent of Urine: 0  Bladder Device: Urinal  Time Void: Yes  Bladder Scan: Post Void  $ Bladder Scan Results (mL): 245    Skin  Anselmo Score   18  Sensory Interventions   Bed Types: Low Airlo  Skin Preventative Measures: Pillows in Use for Support / Positioning  Moisture Interventions  Moisturizers/Barriers: Barrier Wipes      Pain  Pain Rating Scale  0 - No Pain  Pain Location  Generalized  Pain Location Orientation  Right, Left  Pain Interventions   Declines    ADLs    Bathing   Shower, Staff  Linen Change   Complete  Personal Hygiene  Perineal Care, Moist Joceline Wipes  Chlorhexidine Bath   Completed  Oral Care  Brushed Teeth  Teeth/Dentures     Shave     Nutrition Percentage Eaten  Lunch, Between % Consumed  Environmental Precautions  Treaded Slipper Socks on Patient, Personal Belongings, Wastebasket, Call Bell etc. in Easy Reach, Transferred to Stronger Side, Report Given to Other Health Care Providers Regarding Fall Risk, Bed in Low Position, Communication Sign for Patients & Families, Mobility Assessed & Appropriate Sign Placed  Patient Turns/Positioning  Patient turns self independently side to side without assistance, to offload sacral area  Patient Turns Assistance/Tolerance     Bed Positions  Bed Locked, Bed Controls On  Head of Bed Elevated  Self regulated      Psychosocial/Neurologic Assessment  Psychosocial Assessment  Psychosocial (WDL):  Within Defined Limits  Patient Behaviors: Fatigue, Depressed  Neurologic Assessment  Neuro (WDL): Within Defined Limits  Level of Consciousness: Alert  Orientation Level: Oriented X4  Cognition: Follows  commands, Appropriate judgement, Appropriate safety awareness  Speech: Clear  Pupil Assesment: No  EENT (WDL):  WDL Except    Cardio/Pulmonary Assessment  Edema      Respiratory Breath Sounds  RUL Breath Sounds: Clear  RML Breath Sounds: Clear  RLL Breath Sounds: Diminished  NOEMY Breath Sounds: Clear  LLL Breath Sounds: Diminished  Cardiac Assessment   Cardiac (WDL):  Within Defined Limits

## 2025-03-21 NOTE — PROGRESS NOTES
NURSING DAILY NOTE    Name: Marlo León   Date of Admission: 3/6/2025   Admitting Diagnosis: Hx of BKA, left (HCC)  Attending Physician: MICKIE JUAREZ D.O.  Allergies: Neosporin [neomycin-polymyxin b], Tape, Amlodipine, Amoxicillin, Bacitracin-polymyxin b, Cyclobenzaprine, Empagliflozin-metformin hcl, Gabapentin, Gadolinium, Metformin, Neomycin-polymyxin b gu, Nsaids, Pioglitazone, Prednisone, Sulfamethoxazole w-trimethoprim, and Tamsulosin    Safety  Patient Assist  SBA  Patient Precautions  Precautions: Fall Risk  Fall Risk: Low vision  Weight Bearing: NWB LLE  Braces: Immobilizer LE (laterality in comments)  Comments: Left BKA with immobilizer  Bed Transfer Status  Independent  Toilet Transfer Status   Contact Guard Assist  Assistive Devices  Rails, Wheelchair  Oxygen  None - Room Air  Diet/Therapeutic Dining  Current Diet Order   Procedures    Diet Order Diet: Consistent CHO (Diabetic) (HIGH PROTEIN AND VEGETABLES. DAIRY OK. LIMIT CARBOHYDRATES); Nutrient modifications: (optional): High Protein     Pill Administration  whole  Agitated Behavioral Scale     ABS Level of Severity       Fall Risk  Has the patient had a fall this admission?      Nikole Swanson Fall Risk Scoring  15, HIGH RISK  Fall Risk Safety Measures  bed alarm, chair alarm, and poor balance    Vitals  Temperature: 36.4 °C (97.6 °F)  Temp src: Oral  Pulse: 75  Respiration: 16  Blood Pressure : 115/74  Blood Pressure MAP (Calculated): 88 MM HG  BP Location: Upper Arm, Right  Patient BP Position: Supine     Oxygen  Pulse Oximetry: 96 %  O2 (LPM): 0  O2 Delivery Device: None - Room Air    Bowel and Bladder  Last Bowel Movement  03/21/25  Stool Type  Type 4: Like a sausage or snake, smooth and soft  Bowel Device  Bathroom  Continent  Bladder: Continent void   Bowel: Continent movement  Bladder Function  Urine Void (mL): 200 ml  Number of Times Voided: 1  Urine Color: Yellow  Number of Times Incontinent of Urine: 0  Straight Catheter:  (Refused.  Pt said he will revoid again later)  Genitourinary Assessment   Bladder Assessment (WDL):  WDL Except  Jane Catheter: Not Applicable  Urine Color: Yellow  Number of Bladder Accidents: 0  Total Number of Bladder of Accidents in Last 7 Days: 0  Number of Times Incontinent of Urine: 0  Bladder Device: Bathroom  Time Void: Yes  Bladder Scan: Post Void  $ Bladder Scan Results (mL): 245    Skin  Anselmo Score   18  Sensory Interventions   Bed Types: Low [facility]  Skin Preventative Measures: Pillows in Use for Support / Positioning  Moisture Interventions  Moisturizers/Barriers: Barrier Wipes      Pain  Pain Rating Scale  0 - No Pain  Pain Location  Leg  Pain Location Orientation  Left  Pain Interventions   Declines    ADLs    Bathing   Shower, * * With Assistance from, Staff (SBA)  Linen Change   Complete  Personal Hygiene  Perineal Care, Moist Joceline Wipes  Chlorhexidine Bath   Completed  Oral Care  Brushed Teeth  Teeth/Dentures     Shave     Nutrition Percentage Eaten  Lunch, Between % Consumed  Environmental Precautions  Treaded Slipper Socks on Patient, Personal Belongings, Wastebasket, Call Bell etc. in Easy Reach, Transferred to Stronger Side, Report Given to Other Health Care Providers Regarding Fall Risk, Bed in Low Position, Communication Sign for Patients & Families, Mobility Assessed & Appropriate Sign Placed  Patient Turns/Positioning  Patient turns self independently side to side without assistance, to offload sacral area  Patient Turns Assistance/Tolerance     Bed Positions  Bed Locked, Bed Controls On  Head of Bed Elevated  Self regulated      Psychosocial/Neurologic Assessment  Psychosocial Assessment  Psychosocial (WDL):  Within Defined Limits  Patient Behaviors: Fatigue, Depressed  Neurologic Assessment  Neuro (WDL): Within Defined Limits  Level of Consciousness: Alert  Orientation Level: Oriented X4  Cognition: Follows commands, Appropriate judgement, Appropriate safety awareness  Speech:  Clear  Pupil Assesment: No  EENT (WDL):  WDL Except    Cardio/Pulmonary Assessment  Edema      Respiratory Breath Sounds  RUL Breath Sounds: Clear  RML Breath Sounds: Clear  RLL Breath Sounds: Diminished  NOEMY Breath Sounds: Clear  LLL Breath Sounds: Diminished  Cardiac Assessment   Cardiac (WDL):  Within Defined Limits

## 2025-03-21 NOTE — CARE PLAN
The patient is Stable - Low risk of patient condition declining or worsening    Shift Goals  Clinical Goals: safety  Patient Goals: safety, sleep  Family Goals: no family present    Problem: Fall Risk - Rehab  Goal: Patient will remain free from falls  Outcome: Progressing     Problem: Pain - Standard  Goal: Alleviation of pain or a reduction in pain to the patient’s comfort goal  Outcome: Progressing     Problem: Skin Integrity  Goal: Skin integrity is maintained or improved  Outcome: Progressing     Problem: Wound/ / Incision Healing  Goal: Patient's wound/surgical incision will decrease in size and heals properly  Outcome: Progressing     Problem: Infection - Standard  Goal: Patient will remain free from infection  Outcome: Progressing

## 2025-03-21 NOTE — CARE PLAN
The patient is Stable - Low risk of patient condition declining or worsening    Shift Goals  Clinical Goals: Safety, Pain control  Patient Goals: Safety  Family Goals: Education    Progress made toward(s) clinical / shift goals:  VSS, skin intact, denies pain, dcing to home today.

## 2025-03-21 NOTE — PROGRESS NOTES
[facility] Medicine Daily Progress Note    Date of Service  3/21/2025    Chief Complaint:  Hypertension  Diabetes  CKD  Anemia    Interval History:  Pt going home today.  Will need f/u with repeat K+ levels as outpatient.    Review of Systems  Review of Systems   Constitutional:  Negative for chills and fever.   Respiratory:  Negative for shortness of breath.    Cardiovascular:  Negative for chest pain.   Gastrointestinal:  Negative for abdominal pain, diarrhea, nausea and vomiting.   Psychiatric/Behavioral:  The patient is not nervous/anxious.         Physical Exam  Temp:  [36.1 °C (96.9 °F)-36.9 °C (98.4 °F)] 36.4 °C (97.6 °F)  Pulse:  [66-81] 81  Resp:  [14-17] 16  BP: (115-163)/(57-81) 131/75  SpO2:  [95 %-96 %] 96 %    Physical Exam  Constitutional:       Appearance: Normal appearance.   HENT:      Head: Atraumatic.   Eyes:      Conjunctiva/sclera: Conjunctivae normal.      Pupils: Pupils are equal, round, and reactive to light.   Cardiovascular:      Rate and Rhythm: Normal rate and regular rhythm.   Pulmonary:      Breath sounds: Normal breath sounds.   Abdominal:      General: Bowel sounds are normal.      Palpations: Abdomen is soft.   Musculoskeletal:      Cervical back: Normal range of motion and neck supple.      Right lower leg: No edema.      Left lower leg: No edema.      Comments: Has left BKA   Neurological:      Mental Status: He is oriented to person, place, and time.   Psychiatric:         Mood and Affect: Mood normal.         Behavior: Behavior normal.         Fluids    Intake/Output Summary (Last 24 hours) at 3/21/2025 0922  Last data filed at 3/21/2025 0514  Gross per 24 hour   Intake 780 ml   Output 200 ml   Net 580 ml        Laboratory  Recent Labs     03/19/25  1117   WBC 5.6   RBC 3.14*   HEMOGLOBIN 9.1*   HEMATOCRIT 30.6*   MCV 97.5   MCH 29.0   MCHC 29.7*   RDW 60.9*   PLATELETCT 230   MPV 11.4     Recent Labs     03/19/25  1117 03/20/25  1445 03/21/25  0530   SODIUM 137  --   --     POTASSIUM 6.3* 5.9* 5.1   CHLORIDE 110  --   --    CO2 19*  --   --    GLUCOSE 110*  --   --    BUN 55*  --   --    CREATININE 2.90*  --   --    CALCIUM 8.5  --   --                    Imaging       Assessment/Plan  * Hx of BKA, left (HCC)- (present on admission)  Assessment & Plan  Had angioplasty in 10/2024  Developed left heel ulcer and left toe gangrene  Now s/p left BKA    MSSA bacteremia  Assessment & Plan  Completed Ancef 3/14/25  Now on Zyvox through 3/28/24    CAD (coronary artery disease)  Assessment & Plan  Hx MI  Hx CABG  Echo (): EF 55%  On Coreg & Lisinopril  On HCTZ  On ASA and Lipitor  Not on any K+ supplements currently  K+ 5.1 (3/17) --> 6.3 (3/20) --> Lokelma 10 mg --> 5.9 --> Lokelma 10 mg x 2 --> 5.1 (3/21)  Will give Lokelma 10 mg daily x 3 days (3/21)  Will need f/u and repeat K+ levels as outpatient  Monitor    Borderline abnormal TFTs  Assessment & Plan  TSH: 5.61  FT4 1.45  Likely subclinical  Cont Synthroid  Note: needs repeat TFT's as out patient    Anemia  Assessment & Plan  Hb: 8.2 --> 9.1  Fe: 72, sats 31%  B12: 1108  Monitor    CKD (chronic kidney disease)  Assessment & Plan  Cr a little more elevated than baseline (baseline wanders)  CO2: 20 (3/17) --> 19 (3/20)  Cont bicarb tabs  On HCTZ  Cont to monitor    Type 2 diabetes mellitus with hyperglycemia (HCC)- (present on admission)  Assessment & Plan  Hba1c: 6.6 (3/7)  BS: 150 --> 112 --> 183 (am) --> 130 --> 132 --> 98 (am) --> 118 --> 110  On Glargine: 37 units qhs  Has a CGM  Note: off SS coverage at night  Note: home meds include Glargine 28-35 units qhs, Humalog 10 units tid at meals, Farxiga, and Mounjaro  Cont to monitor    Primary hypertension- (present on admission)  Assessment & Plan  BP trending better recently   Cont Core.25 mg bid  Cont Lisinopril: 40 mg daily  Cont Hydralazine: 75 mg tid (3/15) --> 100 mg tid (3/19)  Note: has allergy to Norvasc  Cont to monitor

## 2025-03-22 NOTE — PROGRESS NOTES
Patient discharged to home per order.  Discharge instructions reviewed with patient and family; they verbalize understanding and signed copies placed in chart.  Patient has all belongings; signed copy of form in chart.  Patient left facility at 1715 via wheelchair accompanied by rehab staff and .  Have enjoyed working with this pleasant patient.

## 2025-03-23 ENCOUNTER — TELEPHONE (OUTPATIENT)
Dept: HOME HEALTH SERVICES | Facility: HOME HEALTHCARE | Age: 70
End: 2025-03-23
Payer: MEDICARE

## 2025-03-23 NOTE — TELEPHONE ENCOUNTER
Called patient's home/mobile number and left voicemail regarding home health scheduling. Called spouse's mobile number and spoke with spouse about home health scheduling delay. Spouse explained that Wednesday afternoon appointment will work for home health start of care appointment. Asks for a call if anything changes with appointment. Later start of care requested for 3/26 - Per Request

## 2025-03-26 ENCOUNTER — HOME CARE VISIT (OUTPATIENT)
Dept: HOME HEALTH SERVICES | Facility: HOME HEALTHCARE | Age: 70
End: 2025-03-26
Payer: MEDICARE

## 2025-03-26 VITALS
TEMPERATURE: 99.1 F | SYSTOLIC BLOOD PRESSURE: 130 MMHG | HEART RATE: 78 BPM | RESPIRATION RATE: 16 BRPM | OXYGEN SATURATION: 94 % | DIASTOLIC BLOOD PRESSURE: 50 MMHG

## 2025-03-26 PROCEDURE — 665005 NO-PAY RAP - HOME HEALTH

## 2025-03-26 PROCEDURE — G0495 RN CARE TRAIN/EDU IN HH: HCPCS

## 2025-03-26 ASSESSMENT — ENCOUNTER SYMPTOMS
DRY SKIN: 1
STOOL FREQUENCY: DAILY
HYPERTENSION: 1
BOWEL PATTERN NORMAL: 1
DENIES PAIN: 1
VOMITING: DENIES
BLURRED VISION: 1
NAUSEA: DENIES
LAST BOWEL MOVEMENT: 67289
LOSS OF SENSATION: 1

## 2025-03-26 ASSESSMENT — ACTIVITIES OF DAILY LIVING (ADL)
TRANSPORTATION COMMENTS: PATIENT NEEDS ASSISTANCE TO LEAVE THE HOME
OASIS_M1830: 05

## 2025-03-26 NOTE — Clinical Note
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 Medical reconciliation to be done on Friday due to the amount of medications patient is taking.

## 2025-03-27 NOTE — CASE COMMUNICATION
noted  ----- Message -----  From: Maddison Benavidez R.N.  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 Medical reconciliation to be done on Friday due to the amount of medications patient is taking.

## 2025-03-28 ENCOUNTER — HOME CARE VISIT (OUTPATIENT)
Dept: HOME HEALTH SERVICES | Facility: HOME HEALTHCARE | Age: 70
End: 2025-03-28
Payer: MEDICARE

## 2025-03-28 ENCOUNTER — DOCUMENTATION (OUTPATIENT)
Dept: MEDICAL GROUP | Facility: PHYSICIAN GROUP | Age: 70
End: 2025-03-28
Payer: MEDICARE

## 2025-03-28 VITALS
OXYGEN SATURATION: 94 % | DIASTOLIC BLOOD PRESSURE: 84 MMHG | RESPIRATION RATE: 16 BRPM | SYSTOLIC BLOOD PRESSURE: 156 MMHG | HEART RATE: 68 BPM | TEMPERATURE: 97.6 F

## 2025-03-28 PROCEDURE — G0299 HHS/HOSPICE OF RN EA 15 MIN: HCPCS

## 2025-03-28 ASSESSMENT — ENCOUNTER SYMPTOMS
SUBJECTIVE PAIN PROGRESSION: UNCHANGED
BOWEL PATTERN NORMAL: 1
PAIN LOCATION: LEFT STUMP
ASSOCIATED SYMPTOMS: DENIES
PAIN LOCATION - PAIN QUALITY: DULL, ACHY
STOOL FREQUENCY: DAILY
PAIN: 1
NAUSEA: DENIES
PAIN LOCATION - PAIN FREQUENCY: INFREQUENT
PAIN LOCATION - PAIN SEVERITY: 1/10
LAST BOWEL MOVEMENT: 67292
HIGHEST PAIN SEVERITY IN PAST 24 HOURS: 3/10
LOWER EXTREMITY EDEMA: 1
VOMITING: DENIES
PAIN LOCATION - RELIEVING FACTORS: PAIN MEDICATION
PAIN SEVERITY GOAL: 0/10
LOWEST PAIN SEVERITY IN PAST 24 HOURS: 1/10

## 2025-03-28 ASSESSMENT — PATIENT HEALTH QUESTIONNAIRE - PHQ9: CLINICAL INTERPRETATION OF PHQ2 SCORE: 0

## 2025-03-28 NOTE — PROGRESS NOTES
Medication chart review for [facility] services    Received referral from [facility].   Medications reviewed  compared with discharge summary if available.  Discharge summary date, if applicable:   3/21    Current medication list per [facility]     Medication list one, patient is currently taking    Current Outpatient Medications:     Cholecalciferol (VITAMIN D3 PO), 5,000 Int'l Units, Oral, DAILY    Vit C-Cholecalciferol-Leobardo Hip (VITAMIN C & D3/LEOBADRO HIPS PO), 1 Tablet, Oral, DAILY    Insulin Lispro, 100 mL, Subcutaneous, ACHS PRN    acetaminophen, 500-1,000 mg, Oral, Q6HRS PRN    travoprost, 1 Drop, Both Eyes, DAILY    Tresiba FlexTouch, 28-35 Units, Subcutaneous, QHS    aspirin, 325 mg, Oral, BID    carvedilol, 6.25 mg, Oral, BID WITH MEALS    hydroCHLOROthiazide, 25 mg, Oral, DAILY    lisinopril, 40 mg, Oral, Q EVENING    sodium bicarbonate, 650 mg, Oral, TID WITH MEALS (Patient taking differently: 650 mg, Oral, 3 TIMES DAILY WITH MEALS, sodium bicarbonate 10 gr, 2 tablets three times a day  , Indications: Heartburn)    hydrALAZINE, 100 mg, Oral, Q8HRS    linezolid, 600 mg, Oral, Q12HRS    Multiple Vitamin (MULTIVITAMIN PO), Multivitamin    levothyroxine, 1 tablet every morning on an empty stomach Orally Once a day for 90 days    dapagliflozin propanediol, TAKE 1 TABLET DAILY for 90    atorvastatin, 40 mg, Oral, Nightly      Medication list two, drugs that the patient has been prescribed or recommended to take by their healthcare provider on discharge summary  n List          START taking these medications         Instructions   aspirin 325 MG Tabs  Commonly known as: Asa  Replaces: ASPIRIN 81 PO    Take 1 Tablet by mouth 2 times a day. Until Ortho follow up then continue with 81mg daily.  Indications: Clot prevention  Dose: 325 mg      hydrALAZINE 100 MG tablet  Commonly known as: Apresoline    Take 1 Tablet by mouth every 8 hours.  Dose: 100 mg      linezolid 600 MG Tabs  Commonly known as: Zyvox     Take 1 Tablet by mouth every 12 hours for 9 days. Indications: Bacteria in the Blood  Dose: 600 mg      sodium bicarbonate 650 MG Tabs  Commonly known as: Sodium Bicarbonate  Replaces: Sodium Bicarbonate Powd    Take 1 Tablet by mouth 3 times a day with meals.  Dose: 650 mg      sodium zirconium cyclosilicate 10 g packet  Commonly known as: Lokelma    Take 10 g by mouth every day for 2 days.  Dose: 10 g      traMADol 50 MG Tabs  Commonly known as: Ultram    Take 0.5-1 Tablets by mouth every 6 hours as needed for Severe Pain for up to 7 days. Indications: Acute Pain  Dose: 25-50 mg                CHANGE how you take these medications         Instructions   atorvastatin 40 MG Tabs  What changed: Another medication with the same name was removed. Continue taking this medication, and follow the directions you see here.  Commonly known as: Lipitor    Take 40 mg by mouth every evening.  Dose: 40 mg      carvedilol 6.25 MG Tabs  What changed: Another medication with the same name was removed. Continue taking this medication, and follow the directions you see here.  Commonly known as: Coreg    Take 1 Tablet by mouth 2 times a day with meals.  Dose: 6.25 mg      Eylea 2 MG/0.05ML Soln  What changed: Another medication with the same name was removed. Continue taking this medication, and follow the directions you see here.  Generic drug: Aflibercept    by Intravitreal route as needed. Both eyes      Farxiga 10 MG Tabs  What changed: Another medication with the same name was removed. Continue taking this medication, and follow the directions you see here.  Generic drug: dapagliflozin propanediol    TAKE 1 TABLET DAILY for 90      HumaLOG 100 UNIT/ML  What changed: Another medication with the same name was removed. Continue taking this medication, and follow the directions you see here.  Generic drug: insulin lispro    Inject 2-30 Units under the skin 3 times a day before meals. Sliding scale  Dose: 2-30 Units       hydroCHLOROthiazide 25 MG Tabs  What changed:   when to take this  Another medication with the same name was removed. Continue taking this medication, and follow the directions you see here.    Take 1 Tablet by mouth every day.  Dose: 25 mg      lisinopril 40 MG tablet  What changed: Another medication with the same name was removed. Continue taking this medication, and follow the directions you see here.  Commonly known as: Prinivil    Take 1 Tablet by mouth every evening.  Dose: 40 mg      Synthroid 175 MCG Tabs  What changed: Another medication with the same name was removed. Continue taking this medication, and follow the directions you see here.  Generic drug: levothyroxine    1 tablet every morning on an empty stomach Orally Once a day for 90 days      TRAVATAN OP  What changed: Another medication with the same name was removed. Continue taking this medication, and follow the directions you see here.    Travatan      Tresiba FlexTouch 200 UNIT/ML Sopn  What changed: See the new instructions.  Generic drug: Insulin Degludec    Inject 28-35 Units under the skin at bedtime. inject 28-35 Subcutaneous at hour of sleep for 90 days  Indications: Type 2 Diabetes  Dose: 28-35 Units      TYLENOL PO  What changed: Another medication with the same name was removed. Continue taking this medication, and follow the directions you see here.    Tylenol                CONTINUE taking these medications         Instructions   GAVISCON-2 PO    Take 2 Tablets by mouth every 1 hour as needed.  Dose: 2 Tablet      Mounjaro 7.5 MG/0.5ML Sopn  Generic drug: Tirzepatide        MULTIVITAMIN PO    Multivitamin                STOP taking these medications       albuterol 108 (90 Base) MCG/ACT Aers inhalation aerosol      ALBUTEROL SULFATE HFA INH      aspirin 81 MG EC tablet      ASPIRIN 81 PO  Replaced by: aspirin 325 MG Tabs      chlorthalidone 25 MG Tabs  Commonly known as: Hygroton      CHLORTHALIDONE PO      cyclobenzaprine 10 mg  Tabs  Commonly known as: Flexeril      dexamethasone 0.5 MG Tabs  Commonly known as: Decadron      Fe Tabs 325 (65 Fe) MG EC tablet  Generic drug: ferrous sulfate      furosemide 40 MG Tabs  Commonly known as: Lasix      gabapentin 300 MG Caps  Commonly known as: Neurontin      High Potency Iron 65 MG Tabs      HYDROCODONE BITARTRATE PO      HYDROcodone-acetaminophen 5-325 MG Tabs per tablet  Commonly known as: Norco      insulin glargine 100 UNIT/ML Soln  Commonly known as: Lantus      IRON PO      Lantus 100 UNIT/ML Soln  Generic drug: insulin glargine      MULTIVITAMINS PO      NIFEdipine 60 MG CR tablet  Commonly known as: Adalat CC      NIFEDIPINE PO      ondansetron 4 MG Tbdp  Commonly known as: Zofran ODT      ONDANSETRON HCL PO      PROSTATE SUPPORT PO      sodium bicarbonate Powd      Sodium Bicarbonate Powd  Replaced by: sodium bicarbonate 650 MG Tabs      sulfamethoxazole-trimethoprim 800-160 MG tablet  Commonly known as: Bactrim DS      TAMSULOSIN HCL PO      Trulicity 1.5 MG/0.5ML Sopn  Generic drug: Dulaglutide      Trulicity 4.5 MG/0.5ML Sopn  Generic drug: Dulaglutide      VITAMIN D-3 PO      vitamin D3 125 MCG (5000 UT) Caps      vitamin E 100 UNIT capsule       Allergies   Allergen Reactions    Neosporin [Neomycin-Polymyxin B] Rash     Rxn - ongoing      Tape Unspecified     Has had blisters in the past    Other reaction(s): Blisters  Has had blisters in the past    Amlodipine Rash     Other reaction(s): Unknown  Other reaction(s): Unknown    Amoxicillin      Other reaction(s): Other (See Comments)  Other reaction(s): Not available    Cyclobenzaprine      Other reaction(s): .Unknown    Empagliflozin-Metformin Hcl      Other reaction(s): Unknown    Gabapentin      3/11/25 Patient states that Gabapentin gives him tremors in his extremities and eventually makes him shake all over.    Gadolinium      Other reaction(s): Not available    Metformin Diarrhea and Rash     Other reaction(s): Unknown  Severe  diarrhea    Severe diarrhea  Other reaction(s): Unknown    Neomycin-Polymyxin B Gu Rash     Rxn - ongoing    Nsaids      Other reaction(s): Unknown    Pioglitazone      Other reaction(s): Unknown    Prednisone      Other reaction(s): Other (See Comments)  Other reaction(s): Not available    Sulfamethoxazole W-Trimethoprim Shortness of Breath     Other reaction(s): Not available    Tamsulosin Rash       Labs     Lab Results   Component Value Date/Time    SODIUM 137 03/19/2025 11:17 AM    POTASSIUM 5.1 03/21/2025 05:30 AM    CHLORIDE 110 03/19/2025 11:17 AM    CO2 19 (L) 03/19/2025 11:17 AM    GLUCOSE 110 (H) 03/19/2025 11:17 AM    BUN 55 (H) 03/19/2025 11:17 AM    CREATININE 2.90 (H) 03/19/2025 11:17 AM    BUNCREATRAT 19 11/22/2023 07:57 AM    GLOMRATE 21 (L) 10/20/2023 09:37 AM     Lab Results   Component Value Date/Time    ALKPHOSPHAT 81 03/07/2025 05:35 AM    ASTSGOT 25 03/07/2025 05:35 AM    ALTSGPT 12 03/07/2025 05:35 AM    TBILIRUBIN <0.2 03/07/2025 05:35 AM    INR 0.99 03/01/2021 11:06 AM    ALBUMIN 3.1 (L) 03/07/2025 05:35 AM        Assessment for clinically significant drug interactions, drug omissions/additions, duplicative therapies.            CC   Meredith Gonzáles M.D.  [address] [place] [phone]  Fax: [phone]    [facility]  Phone [phone] fax [phone]    This note was created using voice recognition software (Dragon). The accuracy of the dictation is limited by the abilities of the software. I have reviewed the note prior to signing, however some errors in grammar and context are still possible. If you have any questions related to this note please do not hesitate to contact our office.       No follow-ups on file.

## 2025-04-01 ENCOUNTER — HOME CARE VISIT (OUTPATIENT)
Dept: HOME HEALTH SERVICES | Facility: HOME HEALTHCARE | Age: 70
End: 2025-04-01
Payer: MEDICARE

## 2025-04-01 VITALS
SYSTOLIC BLOOD PRESSURE: 148 MMHG | DIASTOLIC BLOOD PRESSURE: 70 MMHG | TEMPERATURE: 97.5 F | OXYGEN SATURATION: 94 % | RESPIRATION RATE: 16 BRPM | HEART RATE: 78 BPM

## 2025-04-01 VITALS
HEART RATE: 78 BPM | SYSTOLIC BLOOD PRESSURE: 148 MMHG | OXYGEN SATURATION: 94 % | DIASTOLIC BLOOD PRESSURE: 80 MMHG | TEMPERATURE: 97.5 F | RESPIRATION RATE: 16 BRPM

## 2025-04-01 PROCEDURE — G0299 HHS/HOSPICE OF RN EA 15 MIN: HCPCS

## 2025-04-01 PROCEDURE — G0151 HHCP-SERV OF PT,EA 15 MIN: HCPCS

## 2025-04-01 ASSESSMENT — ACTIVITIES OF DAILY LIVING (ADL)
PHYSICAL_TRANSFERS_DEVICES: B UES
PHYSICAL TRANSFERS ASSESSED: 1
FEEDING_COMMENTS: SEE OT NOTES
BATHING_COMMENTS: SEE OT NOTES
CURRENT_FUNCTION: STAND BY ASSIST
GROOMING_COMMENTS: SEE OT NOTES

## 2025-04-01 ASSESSMENT — ENCOUNTER SYMPTOMS
DENIES PAIN: 1
MUSCLE WEAKNESS: 1

## 2025-04-02 ASSESSMENT — ENCOUNTER SYMPTOMS
FATIGUES EASILY: 1
DENIES PAIN: 1
STOOL FREQUENCY: DAILY
LAST BOWEL MOVEMENT: 67296
BOWEL PATTERN NORMAL: 1
NAUSEA: DENIES
VOMITING: DENIES

## 2025-04-02 ASSESSMENT — PATIENT HEALTH QUESTIONNAIRE - PHQ9: CLINICAL INTERPRETATION OF PHQ2 SCORE: 0

## 2025-04-02 NOTE — Clinical Note
REFERRAL APPROVAL NOTICE         Sent on April 2, 2025                   [address]  [place] [address]                   Dear Mr. León,    After a careful review of the medical information and benefit coverage, [facility] has processed your referral. See below for additional details.    If applicable, you must be actively enrolled with your insurance for coverage of the authorized service. If you have any questions regarding your coverage, please contact your insurance directly.    REFERRAL INFORMATION   Referral #:  [id number]  Referred-To Department    Referred-By Provider:  Infectious Diseases    Tracy Tong D.O.   [facility]      [address]  [address]  [facility] [id number] [phone] [address]  [place] [address] [phone]    Referral Start Date:  03/19/2025  Referral End Date:   03/19/2026             SCHEDULING  If you do not already have an appointment, please call [phone] to make an appointment.     MORE INFORMATION  If you do not already have a [employer] account, sign up at: [email or web address]  You can access your medical information, make appointments, see lab results, billing information, and more.  If you have questions regarding this referral, please contact  the [facility] Referrals department at:             [phone]. Monday - Friday 8:00AM - 5:00PM.     Sincerely,    [facility]

## 2025-04-03 ENCOUNTER — HOME CARE VISIT (OUTPATIENT)
Dept: HOME HEALTH SERVICES | Facility: HOME HEALTHCARE | Age: 70
End: 2025-04-03
Payer: MEDICARE

## 2025-04-03 PROCEDURE — G0152 HHCP-SERV OF OT,EA 15 MIN: HCPCS

## 2025-04-04 ENCOUNTER — HOME CARE VISIT (OUTPATIENT)
Dept: HOME HEALTH SERVICES | Facility: HOME HEALTHCARE | Age: 70
End: 2025-04-04
Payer: MEDICARE

## 2025-04-04 VITALS
OXYGEN SATURATION: 95 % | DIASTOLIC BLOOD PRESSURE: 70 MMHG | RESPIRATION RATE: 18 BRPM | SYSTOLIC BLOOD PRESSURE: 124 MMHG | HEART RATE: 74 BPM | TEMPERATURE: 97.7 F

## 2025-04-04 PROCEDURE — G0299 HHS/HOSPICE OF RN EA 15 MIN: HCPCS

## 2025-04-04 ASSESSMENT — ENCOUNTER SYMPTOMS
DENIES PAIN: 1
BOWEL PATTERN NORMAL: 1
STOOL FREQUENCY: DAILY
VOMITING: DENIES
FATIGUES EASILY: 1
LAST BOWEL MOVEMENT: 67298
NAUSEA: DENIES

## 2025-04-04 ASSESSMENT — PATIENT HEALTH QUESTIONNAIRE - PHQ9: CLINICAL INTERPRETATION OF PHQ2 SCORE: 0

## 2025-04-07 ENCOUNTER — HOME CARE VISIT (OUTPATIENT)
Dept: HOME HEALTH SERVICES | Facility: HOME HEALTHCARE | Age: 70
End: 2025-04-07
Payer: MEDICARE

## 2025-04-07 VITALS
HEART RATE: 66 BPM | DIASTOLIC BLOOD PRESSURE: 68 MMHG | TEMPERATURE: 99.7 F | RESPIRATION RATE: 16 BRPM | OXYGEN SATURATION: 95 % | SYSTOLIC BLOOD PRESSURE: 148 MMHG

## 2025-04-07 VITALS
HEART RATE: 73 BPM | DIASTOLIC BLOOD PRESSURE: 70 MMHG | RESPIRATION RATE: 16 BRPM | SYSTOLIC BLOOD PRESSURE: 138 MMHG | TEMPERATURE: 98.3 F | OXYGEN SATURATION: 94 %

## 2025-04-07 PROCEDURE — G0151 HHCP-SERV OF PT,EA 15 MIN: HCPCS

## 2025-04-07 PROCEDURE — G0152 HHCP-SERV OF OT,EA 15 MIN: HCPCS

## 2025-04-07 ASSESSMENT — ENCOUNTER SYMPTOMS
DENIES PAIN: 1
DENIES PAIN: 1

## 2025-04-07 ASSESSMENT — ACTIVITIES OF DAILY LIVING (ADL)
ORAL_CARE_ASSESSED: 1
TOILETING: 1
DRESSING_LB_CURRENT_FUNCTION: MODERATE ASSIST
PHYSICAL TRANSFERS ASSESSED: 1
GROOMING_WITHIN_DEFINED_LIMITS: 1
TOILETING: MODERATE ASSIST
FEEDING: INDEPENDENT
FEEDING_WITHIN_DEFINED_LIMITS: 1
GROOMING ASSESSED: 1
FEEDING ASSESSED: 1
GROOMING_CURRENT_FUNCTION: INDEPENDENT
DRESSING_UB_CURRENT_FUNCTION: STAND BY ASSIST
CURRENT_FUNCTION: MINIMUM ASSIST
ORAL_CARE_CURRENT_FUNCTION: INDEPENDENT

## 2025-04-08 ENCOUNTER — HOME CARE VISIT (OUTPATIENT)
Dept: HOME HEALTH SERVICES | Facility: HOME HEALTHCARE | Age: 70
End: 2025-04-08
Payer: MEDICARE

## 2025-04-08 VITALS
DIASTOLIC BLOOD PRESSURE: 68 MMHG | OXYGEN SATURATION: 95 % | HEART RATE: 66 BPM | SYSTOLIC BLOOD PRESSURE: 148 MMHG | TEMPERATURE: 99.7 F | RESPIRATION RATE: 16 BRPM

## 2025-04-09 ENCOUNTER — HOME CARE VISIT (OUTPATIENT)
Dept: HOME HEALTH SERVICES | Facility: HOME HEALTHCARE | Age: 70
End: 2025-04-09
Payer: MEDICARE

## 2025-04-10 ENCOUNTER — TELEPHONE (OUTPATIENT)
Dept: INFECTIOUS DISEASES | Facility: MEDICAL CENTER | Age: 70
End: 2025-04-10
Payer: MEDICARE

## 2025-04-10 ENCOUNTER — HOME CARE VISIT (OUTPATIENT)
Dept: HOME HEALTH SERVICES | Facility: HOME HEALTHCARE | Age: 70
End: 2025-04-10
Payer: MEDICARE

## 2025-04-10 VITALS
OXYGEN SATURATION: 98 % | HEART RATE: 74 BPM | SYSTOLIC BLOOD PRESSURE: 144 MMHG | DIASTOLIC BLOOD PRESSURE: 76 MMHG | RESPIRATION RATE: 18 BRPM | TEMPERATURE: 97.6 F

## 2025-04-10 PROCEDURE — G0299 HHS/HOSPICE OF RN EA 15 MIN: HCPCS

## 2025-04-10 ASSESSMENT — ENCOUNTER SYMPTOMS
SUBJECTIVE PAIN PROGRESSION: UNCHANGED
PAIN LOCATION - PAIN SEVERITY: 2/10
PAIN LOCATION - PAIN QUALITY: DULL, ACHY
LOWEST PAIN SEVERITY IN PAST 24 HOURS: 2/10
STOOL FREQUENCY: DAILY
BOWEL PATTERN NORMAL: 1
LOWER EXTREMITY EDEMA: 1
PAIN LOCATION: LEFT STUMP
VOMITING: DENIES
PAIN LOCATION - RELIEVING FACTORS: PAIN MEDICATION
PAIN: 1
DENIES PAIN: 1
MUSCLE WEAKNESS: 1
PAIN SEVERITY GOAL: 0/10
LAST BOWEL MOVEMENT: 67305
NAUSEA: DENIES
HIGHEST PAIN SEVERITY IN PAST 24 HOURS: 4/10

## 2025-04-10 ASSESSMENT — PATIENT HEALTH QUESTIONNAIRE - PHQ9: CLINICAL INTERPRETATION OF PHQ2 SCORE: 0

## 2025-04-10 NOTE — TELEPHONE ENCOUNTER
Phone Number Called: [phone]    Call outcome: Left detailed message for patient. Informed to call back with any additional questions.    Message: Left voicemail to pt that we received voicemail from spouse and called spouse to schedule but no answer, left voicemails to call office back

## 2025-04-10 NOTE — TELEPHONE ENCOUNTER
Phone Number Called: [phone]    Call outcome: Left detailed message for patient. Informed to call back with any additional questions.    Message: Received voicemail and returning call to schedule New Patient visit for pt, call office back to schedule

## 2025-04-10 NOTE — TELEPHONE ENCOUNTER
VOICEMAIL 4/10/25  1. Caller Name: Celia                        Call Back Number: [phone]    2. Message: Pt spouse requesting a returned phone call to schedule New Patient Visit    3. Patient approves office to leave a detailed voicemail/MyChart message: yes

## 2025-04-11 ENCOUNTER — TELEPHONE (OUTPATIENT)
Dept: INFECTIOUS DISEASES | Facility: MEDICAL CENTER | Age: 70
End: 2025-04-11
Payer: MEDICARE

## 2025-04-11 NOTE — TELEPHONE ENCOUNTER
Caller Name: Celia  Call Back Number: [phone]    How would the patient prefer to be contacted with a response: Phone call OK to leave a detailed message    Pt spouse called office with questions regarding ID referral and follow up visit, spouse asking if blood draw is required for visit and if a wheelchair can be available for pt. I advised pt spouse that she can call office prior to arrival and our office can arrange for a wheelchair to be available for pt and no labs needed prior to visit. While on the phone, pt yelled No - that he will not be coming for an appointment. Pt spouse stated that pt will not be coming to an appointment if no blood draw needed. I asked pt spouse if pt ok with closing ID referral, pt spouse approved referral closing.

## 2025-04-14 ENCOUNTER — HOME CARE VISIT (OUTPATIENT)
Dept: HOME HEALTH SERVICES | Facility: HOME HEALTHCARE | Age: 70
End: 2025-04-14
Payer: MEDICARE

## 2025-04-14 VITALS
HEART RATE: 78 BPM | OXYGEN SATURATION: 92 % | RESPIRATION RATE: 16 BRPM | SYSTOLIC BLOOD PRESSURE: 140 MMHG | DIASTOLIC BLOOD PRESSURE: 60 MMHG | TEMPERATURE: 98.3 F

## 2025-04-14 PROCEDURE — G0152 HHCP-SERV OF OT,EA 15 MIN: HCPCS

## 2025-04-14 PROCEDURE — G0151 HHCP-SERV OF PT,EA 15 MIN: HCPCS

## 2025-04-14 ASSESSMENT — ENCOUNTER SYMPTOMS: DENIES PAIN: 1

## 2025-04-15 VITALS
HEART RATE: 78 BPM | RESPIRATION RATE: 16 BRPM | OXYGEN SATURATION: 92 % | SYSTOLIC BLOOD PRESSURE: 140 MMHG | TEMPERATURE: 98.3 F | DIASTOLIC BLOOD PRESSURE: 60 MMHG

## 2025-04-15 ASSESSMENT — ENCOUNTER SYMPTOMS: DENIES PAIN: 1

## 2025-04-17 ENCOUNTER — HOME CARE VISIT (OUTPATIENT)
Dept: HOME HEALTH SERVICES | Facility: HOME HEALTHCARE | Age: 70
End: 2025-04-17
Payer: MEDICARE

## 2025-04-17 VITALS
HEART RATE: 74 BPM | OXYGEN SATURATION: 95 % | TEMPERATURE: 97.6 F | SYSTOLIC BLOOD PRESSURE: 154 MMHG | DIASTOLIC BLOOD PRESSURE: 80 MMHG | RESPIRATION RATE: 18 BRPM

## 2025-04-17 PROCEDURE — G0299 HHS/HOSPICE OF RN EA 15 MIN: HCPCS

## 2025-04-17 ASSESSMENT — ENCOUNTER SYMPTOMS
STOOL FREQUENCY: DAILY
BOWEL PATTERN NORMAL: 1
FATIGUES EASILY: 1
NAUSEA: DENIES
VOMITING: DENIES
LAST BOWEL MOVEMENT: 67312
MUSCLE WEAKNESS: 1
DENIES PAIN: 1

## 2025-04-17 ASSESSMENT — PATIENT HEALTH QUESTIONNAIRE - PHQ9: CLINICAL INTERPRETATION OF PHQ2 SCORE: 0

## 2025-04-24 ENCOUNTER — HOME CARE VISIT (OUTPATIENT)
Dept: HOME HEALTH SERVICES | Facility: HOME HEALTHCARE | Age: 70
End: 2025-04-24
Payer: MEDICARE

## 2025-04-24 VITALS
HEART RATE: 70 BPM | DIASTOLIC BLOOD PRESSURE: 72 MMHG | RESPIRATION RATE: 16 BRPM | SYSTOLIC BLOOD PRESSURE: 160 MMHG | OXYGEN SATURATION: 97 % | TEMPERATURE: 98.1 F

## 2025-04-24 VITALS
HEART RATE: 70 BPM | OXYGEN SATURATION: 97 % | TEMPERATURE: 98.1 F | SYSTOLIC BLOOD PRESSURE: 160 MMHG | RESPIRATION RATE: 16 BRPM | DIASTOLIC BLOOD PRESSURE: 72 MMHG

## 2025-04-24 PROCEDURE — G0151 HHCP-SERV OF PT,EA 15 MIN: HCPCS

## 2025-04-24 PROCEDURE — G0152 HHCP-SERV OF OT,EA 15 MIN: HCPCS

## 2025-04-24 PROCEDURE — G0299 HHS/HOSPICE OF RN EA 15 MIN: HCPCS

## 2025-04-24 SDOH — ECONOMIC STABILITY: HOUSING INSECURITY: HOME SAFETY: PT HAS HAD SIGNIFICANT REMODELING DONE TO HOME SINCE SURGERY AND HAS IMPROVED ACCESS WITH WHEELCHAIR.

## 2025-04-24 ASSESSMENT — ENCOUNTER SYMPTOMS
DRY SKIN: 1
NAUSEA: DENIES
LAST BOWEL MOVEMENT: 67319
DENIES PAIN: 1
BOWEL PATTERN NORMAL: 1
HYPERTENSION: 1
VOMITING: DENIES
DENIES PAIN: 1
STOOL FREQUENCY: DAILY
LOSS OF SENSATION: 1

## 2025-04-24 ASSESSMENT — ACTIVITIES OF DAILY LIVING (ADL)
PHYSICAL_TRANSFERS_DEVICES: USE OF B UES TO ASSIST
PHYSICAL TRANSFERS ASSESSED: 1
CURRENT_FUNCTION: INDEPENDENT
TRANSPORTATION COMMENTS: PATIENT NEEDS ASSISTANCE TO LEAVE THE HOME
CURRENT_FUNCTION: STAND BY ASSIST
AMBULATION ASSISTANCE: NON-AMBULATORY
AMBULATION ASSISTANCE: 1
AMBULATION ASSISTANCE: INDEPENDENT

## 2025-04-25 VITALS
TEMPERATURE: 98.1 F | OXYGEN SATURATION: 97 % | DIASTOLIC BLOOD PRESSURE: 72 MMHG | SYSTOLIC BLOOD PRESSURE: 160 MMHG | RESPIRATION RATE: 16 BRPM | HEART RATE: 70 BPM

## 2025-04-25 ASSESSMENT — ENCOUNTER SYMPTOMS: DENIES PAIN: 1

## 2025-05-01 ENCOUNTER — HOME CARE VISIT (OUTPATIENT)
Dept: HOME HEALTH SERVICES | Facility: HOME HEALTHCARE | Age: 70
End: 2025-05-01
Payer: MEDICARE

## 2025-05-01 VITALS
TEMPERATURE: 97.5 F | SYSTOLIC BLOOD PRESSURE: 152 MMHG | RESPIRATION RATE: 18 BRPM | DIASTOLIC BLOOD PRESSURE: 80 MMHG | OXYGEN SATURATION: 96 % | HEART RATE: 74 BPM

## 2025-05-01 PROCEDURE — G0299 HHS/HOSPICE OF RN EA 15 MIN: HCPCS

## 2025-05-01 SDOH — ECONOMIC STABILITY: FOOD INSECURITY: MEALS PER DAY: 3

## 2025-05-01 ASSESSMENT — ENCOUNTER SYMPTOMS
PAIN LOCATION: GENERALIZED
ASSOCIATED SYMPTOMS: DENIES
PAIN LOCATION - PAIN FREQUENCY: FREQUENT
PAIN LOCATION - PAIN QUALITY: DULL, ACHY
APPETITE LEVEL: FAIR
LAST BOWEL MOVEMENT: 67325
PAIN SEVERITY GOAL: 0/10
LOWEST PAIN SEVERITY IN PAST 24 HOURS: 1/10
PAIN LOCATION - RELIEVING FACTORS: PAIN MEDICATION
PAIN: 1
STOOL FREQUENCY: DAILY
NAUSEA: DENIES
VOMITING: DENIES
HIGHEST PAIN SEVERITY IN PAST 24 HOURS: 3/10
BOWEL PATTERN NORMAL: 1
PAIN LOCATION - PAIN SEVERITY: 1/10
SUBJECTIVE PAIN PROGRESSION: GRADUALLY IMPROVING

## 2025-05-01 ASSESSMENT — ACTIVITIES OF DAILY LIVING (ADL)
OASIS_M1830: 02
HOME_HEALTH_OASIS: 01

## 2025-05-08 ENCOUNTER — HOME CARE VISIT (OUTPATIENT)
Dept: HOME HEALTH SERVICES | Facility: HOME HEALTHCARE | Age: 70
End: 2025-05-08
Payer: MEDICARE

## (undated) DEVICE — TUBING CLEARLINK DUO-VENT - C-FLO (48EA/CA)

## (undated) DEVICE — LACTATED RINGERS INJ. 500 ML - (24EA/CA)

## (undated) DEVICE — NEEDLE SPINAL NON-SAFETY 18 GA X 3 IN (25EA/BX)

## (undated) DEVICE — STERI STRIP COMPOUND BENZOIN - TINCTURE 0.6ML WITH APPLICATOR (40EA/BX)

## (undated) DEVICE — RESTRAINTS LIMB DISP. - (12/BX 4BX/CA)

## (undated) DEVICE — NEEDLE NON SAFETY HYPO 22 GA X 1 1/2 IN (100/BX)

## (undated) DEVICE — PLATE PIN GOLDIN (2TX3=6)

## (undated) DEVICE — ELECTRODE 850 FOAM ADHESIVE - HYDROGEL RADIOTRNSPRNT (50/PK)

## (undated) DEVICE — SODIUM CHL IRRIGATION 0.9% 1000ML (12EA/CA)

## (undated) DEVICE — APPLICATOR COTTON TIP 6 IN - STERILE (2EA/PK 100PK/BX)

## (undated) DEVICE — SLEEVE, VASO, REPROC, LARGE

## (undated) DEVICE — PROTECTOR ULNA NERVE - (36PR/CA)

## (undated) DEVICE — DRESSING TRANSPARENT FILM TEGADERM 4 X 4.75" (50EA/BX)"

## (undated) DEVICE — CANISTER SUCTION 3000ML MECHANICAL FILTER AUTO SHUTOFF MEDI-VAC NONSTERILE LF DISP  (40EA/CA)

## (undated) DEVICE — SPONGE GAUZE STER 4X4 8-PL - (2/PK 50PK/BX 12BX/CS)

## (undated) DEVICE — GOWN SURGEONS X-LARGE - DISP. (30/CA)

## (undated) DEVICE — CATHETER IV 20 GA X 1-1/4 ---SURG.& SDS ONLY--- (50EA/BX)

## (undated) DEVICE — MIDAS LUBRICATOR DIFFUSER PACK (4EA/CA)

## (undated) DEVICE — CHLORAPREP 26 ML APPLICATOR - ORANGE TINT(25/CA)

## (undated) DEVICE — ELECTRODE DUAL RETURN W/ CORD - (50/PK)

## (undated) DEVICE — LACTATED RINGERS INJ 1000 ML - (14EA/CA 60CA/PF)

## (undated) DEVICE — BIT DRILL 11MM

## (undated) DEVICE — SUTURE 0 SILK TIES (36PK/BX)

## (undated) DEVICE — TUBING C&T SET FLYING LEADS DRAIN TUBING (10EA/BX)

## (undated) DEVICE — SURGIFOAM (12X7) - (12EA/CA)

## (undated) DEVICE — KIT ROOM DECONTAMINATION

## (undated) DEVICE — SUCTION INSTRUMENT YANKAUER BULBOUS TIP W/O VENT (50EA/CA)

## (undated) DEVICE — GLOVE BIOGEL SZ 8 SURGICAL PF LTX - (50PR/BX 4BX/CA)

## (undated) DEVICE — HEMOSTAT SURG ABSORBABLE - 2 X 3 IN SURGICEL (24EA/CA)

## (undated) DEVICE — SET LEADWIRE 5 LEAD BEDSIDE DISPOSABLE ECG (1SET OF 5/EA)

## (undated) DEVICE — KIT EVACUATER 3 SPRING PVC LF 1/8 DRAIN SIZE (10EA/CA)"

## (undated) DEVICE — KIT ANESTHESIA W/CIRCUIT & 3/LT BAG W/FILTER (20EA/CA)

## (undated) DEVICE — SENSOR SPO2 NEO LNCS ADHESIVE (20/BX) SEE USER NOTES

## (undated) DEVICE — BLADE SURGICAL CLIPPER - (50EA/CA)

## (undated) DEVICE — SPONGE GAUZESTER 4 X 4 4PLY - (128PK/CA)

## (undated) DEVICE — MASK ANESTHESIA ADULT  - (100/CA)

## (undated) DEVICE — TUBE E-T HI-LO CUFF 8.0MM (10EA/PK)

## (undated) DEVICE — SUTURE 3-0 VICRYL PLUS RB-1 - 8 X 18 INCH (12/BX)

## (undated) DEVICE — MAT PATIENT POSITIONING PREVALON (10EA/CA)

## (undated) DEVICE — INTRAOP NEURO IN OR 1:1 PER 15 MIN

## (undated) DEVICE — CLOSURE SKIN STRIP 1/2 X 4 IN - (STERI STRIP) (50/BX 4BX/CA)

## (undated) DEVICE — WATER IRRIG. STER. 1500 ML - (9/CA)

## (undated) DEVICE — BLANKET WARMING LOWER BODY - (10/CA) INACTIVE USE #8585

## (undated) DEVICE — HEAD HOLDER JUNIOR/ADULT

## (undated) DEVICE — SCREW DISTRACTION 14MM YELLOW - STERILE (10EA/BX) (5TX4=20)

## (undated) DEVICE — SUTURE GENERAL

## (undated) DEVICE — TOOL DISSECT MATCH HEAD

## (undated) DEVICE — KIT SURGIFLO W/OUT THROMBIN - (6EA/CA)

## (undated) DEVICE — TUBE EMG NIM TRIVANTAGE 8MM (3EA/PK)

## (undated) DEVICE — DRAPE MICROSCOPE X-LONG (10EA/CA)

## (undated) DEVICE — SUTURE 4-0 VICRYL PLUS TF - 8 X 18 INCH (12/BX)

## (undated) DEVICE — DRAPE SRG LG BCK TBL DISP - 10/CA

## (undated) DEVICE — BOVIE BLADE COATED &INSULATED - 25/PK

## (undated) DEVICE — SHEET PEDIATRIC LAPAROTOMY - (10/CA)

## (undated) DEVICE — GOWN WARMING STANDARD FLEX - (30/CA)

## (undated) DEVICE — DISSECT TOOL MIDAS REX

## (undated) DEVICE — BONE MILL BM210

## (undated) DEVICE — GLOVE BIOGEL PI INDICATOR SZ 7.0 SURGICAL PF LF - (50/BX 4BX/CA)

## (undated) DEVICE — SUTURE 0 VICRYL PLUS CT-1 - 8 X 18 INCH (12/BX)

## (undated) DEVICE — SYRINGE SAFETY 3 ML 18 GA X 1 1/2 BLUNT LL (100/BX 8BX/CA)

## (undated) DEVICE — SPONGE PEANUT - (5/PK 50PK/CA)

## (undated) DEVICE — PACK NEURO - (2EA/CA)

## (undated) DEVICE — PATTIES SURG X-RAYCOTTONOID - 1/2 X 3 IN (200/CA)

## (undated) DEVICE — SLEEVE, VASO, THIGH, MED

## (undated) DEVICE — DRAPE 36X28IN RAD CARM BND BG - (25/CA) O

## (undated) DEVICE — GLOVE BIOGEL INDICATOR SZ 6.5 SURGICAL PF LTX - (50PR/BX 4BX/CA)

## (undated) DEVICE — COVER MAYO STAND X-LG - (22EA/CA)

## (undated) DEVICE — GLOVE SZ 6.5 BIOGEL PI MICRO - PF LF (50PR/BX)

## (undated) DEVICE — SET EXTENSION WITH 2 PORTS (48EA/CA) ***PART #2C8610 IS A SUBSTITUTE*****

## (undated) DEVICE — SUTURE 4-0 MONOCRYL PLUS PS-2 - 27 INCH (36/BX)

## (undated) DEVICE — GLOVE, BIOGEL ECLIPSE, SZ 7.0, PF LTX (50/BX)

## (undated) DEVICE — GOWN WARMING X-LARGE FLEX - (20/CA)

## (undated) DEVICE — SYRINGE SAFETY 10 ML 18 GA X 1 1/2 BLUNT LL (100/BX 4BX/CA)

## (undated) DEVICE — GOWN SURGEONS LARGE - (32/CA)

## (undated) DEVICE — GLOVE BIOGEL SZ 7 SURGICAL PF LTX - (50PR/BX 4BX/CA)

## (undated) DEVICE — NEPTUNE 4 PORT MANIFOLD - (20/PK)

## (undated) DEVICE — SUTURE 2-0 VICRYL PLUS CT-1 - 8 X 18 INCH(12/BX)

## (undated) DEVICE — DRAPE LARGE 3 QUARTER - (20/CA)